# Patient Record
Sex: MALE | Employment: OTHER | URBAN - METROPOLITAN AREA
[De-identification: names, ages, dates, MRNs, and addresses within clinical notes are randomized per-mention and may not be internally consistent; named-entity substitution may affect disease eponyms.]

---

## 2017-03-02 ENCOUNTER — HOSPITAL ENCOUNTER (OUTPATIENT)
Age: 72
Setting detail: SPECIMEN
Discharge: HOME | End: 2017-03-02
Payer: MEDICARE

## 2017-03-02 LAB
ABSOLUTE EOS #: 0.3 K/UL (ref 0–0.4)
ABSOLUTE LYMPH #: 2.1 K/UL (ref 1–4.8)
ABSOLUTE MONO #: 0.6 K/UL (ref 0.1–1.2)
ALT SERPL-CCNC: 28 U/L (ref 5–41)
AST SERPL-CCNC: 21 U/L
BASOPHILS # BLD: 1 % (ref 0–2)
BASOPHILS ABSOLUTE: 0 K/UL (ref 0–0.2)
BUN BLDV-MCNC: 26 MG/DL (ref 8–23)
CHOLESTEROL/HDL RATIO: 3.4
CHOLESTEROL: 143 MG/DL
CREAT SERPL-MCNC: 1.2 MG/DL (ref 0.7–1.2)
DIFFERENTIAL TYPE: ABNORMAL
EOSINOPHILS RELATIVE PERCENT: 4 % (ref 1–4)
GFR AFRICAN AMERICAN: >60 ML/MIN
GFR NON-AFRICAN AMERICAN: 59 ML/MIN
GFR SERPL CREATININE-BSD FRML MDRD: ABNORMAL ML/MIN/{1.73_M2}
GFR SERPL CREATININE-BSD FRML MDRD: ABNORMAL ML/MIN/{1.73_M2}
HCT VFR BLD CALC: 39.3 % (ref 41–53)
HDLC SERPL-MCNC: 42 MG/DL
HEMOGLOBIN: 13.4 G/DL (ref 13.5–17.5)
INR BLD: 1.6
LDL CHOLESTEROL: 72 MG/DL (ref 0–130)
LYMPHOCYTES # BLD: 30 % (ref 24–44)
MAGNESIUM: 2.3 MG/DL (ref 1.6–2.6)
MCH RBC QN AUTO: 30.4 PG (ref 26–34)
MCHC RBC AUTO-ENTMCNC: 34.1 G/DL (ref 31–37)
MCV RBC AUTO: 89.1 FL (ref 80–100)
MONOCYTES # BLD: 9 % (ref 2–11)
PDW BLD-RTO: 14.4 % (ref 12.5–15.4)
PLATELET # BLD: 183 K/UL (ref 140–450)
PLATELET ESTIMATE: ABNORMAL
PMV BLD AUTO: 10.2 FL (ref 6–12)
POTASSIUM SERPL-SCNC: 4.5 MMOL/L (ref 3.7–5.3)
PROTHROMBIN TIME: 17.2 SEC (ref 9.4–12.6)
RBC # BLD: 4.41 M/UL (ref 4.5–5.9)
RBC # BLD: ABNORMAL 10*6/UL
SEG NEUTROPHILS: 56 % (ref 36–66)
SEGMENTED NEUTROPHILS ABSOLUTE COUNT: 3.9 K/UL (ref 1.8–7.7)
SODIUM BLD-SCNC: 141 MMOL/L (ref 135–144)
T3 FREE: 3.28 PG/ML (ref 2.02–4.43)
THYROXINE, FREE: 1.11 NG/DL (ref 0.93–1.7)
TRIGL SERPL-MCNC: 147 MG/DL
TSH SERPL DL<=0.05 MIU/L-ACNC: 2.68 MIU/L (ref 0.3–5)
VLDLC SERPL CALC-MCNC: NORMAL MG/DL (ref 1–30)
WBC # BLD: 7 K/UL (ref 3.5–11)
WBC # BLD: ABNORMAL 10*3/UL

## 2017-03-02 PROCEDURE — 84439 ASSAY OF FREE THYROXINE: CPT

## 2017-03-02 PROCEDURE — 82565 ASSAY OF CREATININE: CPT

## 2017-03-02 PROCEDURE — 80061 LIPID PANEL: CPT

## 2017-03-02 PROCEDURE — 36415 COLL VENOUS BLD VENIPUNCTURE: CPT

## 2017-03-02 PROCEDURE — 84450 TRANSFERASE (AST) (SGOT): CPT

## 2017-03-02 PROCEDURE — 83735 ASSAY OF MAGNESIUM: CPT

## 2017-03-02 PROCEDURE — 84295 ASSAY OF SERUM SODIUM: CPT

## 2017-03-02 PROCEDURE — 84520 ASSAY OF UREA NITROGEN: CPT

## 2017-03-02 PROCEDURE — 85025 COMPLETE CBC W/AUTO DIFF WBC: CPT

## 2017-03-02 PROCEDURE — 84443 ASSAY THYROID STIM HORMONE: CPT

## 2017-03-02 PROCEDURE — 84460 ALANINE AMINO (ALT) (SGPT): CPT

## 2017-03-02 PROCEDURE — 85610 PROTHROMBIN TIME: CPT

## 2017-03-02 PROCEDURE — 84481 FREE ASSAY (FT-3): CPT

## 2017-03-02 PROCEDURE — 84132 ASSAY OF SERUM POTASSIUM: CPT

## 2017-03-15 ENCOUNTER — HOSPITAL ENCOUNTER (OUTPATIENT)
Age: 72
Discharge: HOME | End: 2017-03-15
Payer: MEDICARE

## 2017-03-15 LAB
INR BLD: 1.7
PROTHROMBIN TIME: 18.9 SEC (ref 9.4–12.6)

## 2017-03-15 PROCEDURE — 85610 PROTHROMBIN TIME: CPT

## 2017-03-15 PROCEDURE — 36415 COLL VENOUS BLD VENIPUNCTURE: CPT

## 2017-03-20 ENCOUNTER — APPOINTMENT (OUTPATIENT)
Dept: GENERAL RADIOLOGY | Age: 72
End: 2017-03-20
Payer: MEDICARE

## 2017-03-20 ENCOUNTER — HOSPITAL ENCOUNTER (EMERGENCY)
Age: 72
Discharge: HOME | End: 2017-03-20
Attending: EMERGENCY MEDICINE
Payer: MEDICARE

## 2017-03-20 VITALS
DIASTOLIC BLOOD PRESSURE: 56 MMHG | OXYGEN SATURATION: 99 % | RESPIRATION RATE: 11 BRPM | HEART RATE: 45 BPM | SYSTOLIC BLOOD PRESSURE: 137 MMHG | TEMPERATURE: 95.7 F

## 2017-03-20 DIAGNOSIS — R06.02 SHORTNESS OF BREATH: Primary | ICD-10-CM

## 2017-03-20 LAB
ABSOLUTE EOS #: 0.3 K/UL (ref 0–0.4)
ABSOLUTE LYMPH #: 2.4 K/UL (ref 1–4.8)
ABSOLUTE MONO #: 0.5 K/UL (ref 0.1–1.2)
BASOPHILS # BLD: 1 % (ref 0–2)
BASOPHILS ABSOLUTE: 0 K/UL (ref 0–0.2)
BNP INTERPRETATION: ABNORMAL
DIFFERENTIAL TYPE: ABNORMAL
EOSINOPHILS RELATIVE PERCENT: 4 % (ref 1–4)
HCT VFR BLD CALC: 38.6 % (ref 41–53)
HEMOGLOBIN: 13.4 G/DL (ref 13.5–17.5)
LYMPHOCYTES # BLD: 33 % (ref 24–44)
MCH RBC QN AUTO: 30.8 PG (ref 26–34)
MCHC RBC AUTO-ENTMCNC: 34.8 G/DL (ref 31–37)
MCV RBC AUTO: 88.7 FL (ref 80–100)
MONOCYTES # BLD: 7 % (ref 2–11)
PDW BLD-RTO: 14.9 % (ref 12.5–15.4)
PLATELET # BLD: 163 K/UL (ref 140–450)
PLATELET ESTIMATE: ABNORMAL
PMV BLD AUTO: 11.1 FL (ref 6–12)
POC TROPONIN I: 0 NG/ML (ref 0–0.1)
POC TROPONIN I: 0.01 NG/ML (ref 0–0.1)
POC TROPONIN INTERP: NORMAL
POC TROPONIN INTERP: NORMAL
PRO-BNP: 400 PG/ML
RBC # BLD: 4.35 M/UL (ref 4.5–5.9)
RBC # BLD: ABNORMAL 10*6/UL
SEG NEUTROPHILS: 55 % (ref 36–66)
SEGMENTED NEUTROPHILS ABSOLUTE COUNT: 4 K/UL (ref 1.8–7.7)
WBC # BLD: 7.3 K/UL (ref 3.5–11)
WBC # BLD: ABNORMAL 10*3/UL

## 2017-03-20 PROCEDURE — 99285 EMERGENCY DEPT VISIT HI MDM: CPT

## 2017-03-20 PROCEDURE — 85025 COMPLETE CBC W/AUTO DIFF WBC: CPT

## 2017-03-20 PROCEDURE — 83880 ASSAY OF NATRIURETIC PEPTIDE: CPT

## 2017-03-20 PROCEDURE — 84484 ASSAY OF TROPONIN QUANT: CPT

## 2017-03-20 PROCEDURE — 93005 ELECTROCARDIOGRAM TRACING: CPT

## 2017-03-20 PROCEDURE — 71020 XR CHEST STANDARD TWO VW: CPT

## 2017-03-22 LAB
EKG ATRIAL RATE: 48 BPM
EKG P AXIS: 12 DEGREES
EKG P-R INTERVAL: 224 MS
EKG Q-T INTERVAL: 550 MS
EKG QRS DURATION: 156 MS
EKG QTC CALCULATION (BAZETT): 491 MS
EKG R AXIS: -62 DEGREES
EKG T AXIS: 3 DEGREES
EKG VENTRICULAR RATE: 48 BPM

## 2017-03-29 ENCOUNTER — HOSPITAL ENCOUNTER (OUTPATIENT)
Age: 72
Discharge: HOME | End: 2017-03-29
Payer: MEDICARE

## 2017-03-29 LAB
INR BLD: 1.4
PROTHROMBIN TIME: 15.5 SEC (ref 9.4–12.6)

## 2017-03-29 PROCEDURE — 85610 PROTHROMBIN TIME: CPT

## 2017-03-29 PROCEDURE — 36415 COLL VENOUS BLD VENIPUNCTURE: CPT

## 2017-04-06 ENCOUNTER — HOSPITAL ENCOUNTER (OUTPATIENT)
Age: 72
Discharge: HOME | End: 2017-04-06
Payer: MEDICARE

## 2017-04-06 LAB
INR BLD: 1.3
PROTHROMBIN TIME: 14.3 SEC (ref 9.4–12.6)

## 2017-04-06 PROCEDURE — 36415 COLL VENOUS BLD VENIPUNCTURE: CPT

## 2017-04-06 PROCEDURE — 85610 PROTHROMBIN TIME: CPT

## 2017-04-13 ENCOUNTER — HOSPITAL ENCOUNTER (OUTPATIENT)
Age: 72
Discharge: HOME | End: 2017-04-13
Payer: MEDICARE

## 2017-04-13 LAB
INR BLD: 1.8
PROTHROMBIN TIME: 19.5 SEC (ref 9.4–12.6)

## 2017-04-13 PROCEDURE — 85610 PROTHROMBIN TIME: CPT

## 2017-04-13 PROCEDURE — 36415 COLL VENOUS BLD VENIPUNCTURE: CPT

## 2017-04-27 ENCOUNTER — HOSPITAL ENCOUNTER (OUTPATIENT)
Age: 72
Discharge: HOME | End: 2017-04-27
Payer: MEDICARE

## 2017-04-27 LAB
INR BLD: 2.5
PROTHROMBIN TIME: 26.9 SEC (ref 9.4–12.6)

## 2017-04-27 PROCEDURE — 36415 COLL VENOUS BLD VENIPUNCTURE: CPT

## 2017-04-27 PROCEDURE — 85610 PROTHROMBIN TIME: CPT

## 2017-05-11 ENCOUNTER — HOSPITAL ENCOUNTER (OUTPATIENT)
Age: 72
Discharge: HOME | End: 2017-05-11
Payer: MEDICARE

## 2017-05-11 LAB
INR BLD: 2.3
PROTHROMBIN TIME: 24.5 SEC (ref 9.4–12.6)

## 2017-05-11 PROCEDURE — 36415 COLL VENOUS BLD VENIPUNCTURE: CPT

## 2017-05-11 PROCEDURE — 85610 PROTHROMBIN TIME: CPT

## 2017-06-08 ENCOUNTER — HOSPITAL ENCOUNTER (OUTPATIENT)
Age: 72
Discharge: HOME | End: 2017-06-08
Payer: MEDICARE

## 2017-06-08 LAB
INR BLD: 2.2
PROTHROMBIN TIME: 24.1 SEC (ref 9.4–12.6)

## 2017-06-08 PROCEDURE — 85610 PROTHROMBIN TIME: CPT

## 2017-06-08 PROCEDURE — 36415 COLL VENOUS BLD VENIPUNCTURE: CPT

## 2017-08-31 ENCOUNTER — HOSPITAL ENCOUNTER (OUTPATIENT)
Age: 72
Discharge: HOME | End: 2017-08-31
Payer: MEDICARE

## 2017-08-31 LAB
INR BLD: 2.1
PROTHROMBIN TIME: 22.9 SEC (ref 9.4–12.6)

## 2017-08-31 PROCEDURE — 85610 PROTHROMBIN TIME: CPT

## 2017-08-31 PROCEDURE — 36415 COLL VENOUS BLD VENIPUNCTURE: CPT

## 2017-10-12 ENCOUNTER — HOSPITAL ENCOUNTER (OUTPATIENT)
Age: 72
Discharge: HOME | End: 2017-10-12
Payer: MEDICARE

## 2017-10-12 LAB
INR BLD: 3.8
PROTHROMBIN TIME: 41.3 SEC (ref 9.4–12.6)

## 2017-10-12 PROCEDURE — 85610 PROTHROMBIN TIME: CPT

## 2017-10-12 PROCEDURE — 36415 COLL VENOUS BLD VENIPUNCTURE: CPT

## 2017-11-02 ENCOUNTER — HOSPITAL ENCOUNTER (OUTPATIENT)
Age: 72
Discharge: HOME | DRG: 308 | End: 2017-11-02
Payer: MEDICARE

## 2017-11-02 LAB
INR BLD: 2.7
PROTHROMBIN TIME: 29.7 SEC (ref 9.4–12.6)

## 2017-11-02 PROCEDURE — 85610 PROTHROMBIN TIME: CPT

## 2017-11-02 PROCEDURE — 36415 COLL VENOUS BLD VENIPUNCTURE: CPT

## 2017-11-04 ENCOUNTER — APPOINTMENT (OUTPATIENT)
Dept: GENERAL RADIOLOGY | Age: 72
DRG: 308 | End: 2017-11-04
Payer: MEDICARE

## 2017-11-04 ENCOUNTER — APPOINTMENT (OUTPATIENT)
Dept: CT IMAGING | Age: 72
DRG: 308 | End: 2017-11-04
Payer: MEDICARE

## 2017-11-04 ENCOUNTER — HOSPITAL ENCOUNTER (INPATIENT)
Age: 72
LOS: 1 days | Discharge: HOME | DRG: 308 | End: 2017-11-05
Attending: EMERGENCY MEDICINE | Admitting: INTERNAL MEDICINE
Payer: MEDICARE

## 2017-11-04 DIAGNOSIS — R55 SYNCOPE AND COLLAPSE: Primary | ICD-10-CM

## 2017-11-04 DIAGNOSIS — Z45.02 AICD DISCHARGE: ICD-10-CM

## 2017-11-04 LAB
ABSOLUTE EOS #: 0.25 K/UL (ref 0–0.44)
ABSOLUTE IMMATURE GRANULOCYTE: <0.03 K/UL (ref 0–0.3)
ABSOLUTE LYMPH #: 2.09 K/UL (ref 1.1–3.7)
ABSOLUTE MONO #: 0.39 K/UL (ref 0.1–1.2)
ANION GAP SERPL CALCULATED.3IONS-SCNC: 16 MMOL/L (ref 9–17)
BASOPHILS # BLD: 1 % (ref 0–2)
BASOPHILS ABSOLUTE: 0.07 K/UL (ref 0–0.2)
BNP INTERPRETATION: ABNORMAL
BUN BLDV-MCNC: 28 MG/DL (ref 8–23)
BUN/CREAT BLD: ABNORMAL (ref 9–20)
CALCIUM SERPL-MCNC: 9 MG/DL (ref 8.6–10.4)
CHLORIDE BLD-SCNC: 101 MMOL/L (ref 98–107)
CO2: 21 MMOL/L (ref 20–31)
CREAT SERPL-MCNC: 1.28 MG/DL (ref 0.7–1.2)
DIFFERENTIAL TYPE: NORMAL
EOSINOPHILS RELATIVE PERCENT: 3 % (ref 1–4)
GFR AFRICAN AMERICAN: >60 ML/MIN
GFR NON-AFRICAN AMERICAN: 55 ML/MIN
GFR SERPL CREATININE-BSD FRML MDRD: ABNORMAL ML/MIN/{1.73_M2}
GFR SERPL CREATININE-BSD FRML MDRD: ABNORMAL ML/MIN/{1.73_M2}
GLUCOSE BLD-MCNC: 196 MG/DL (ref 70–99)
HCT VFR BLD CALC: 40.9 % (ref 40.7–50.3)
HEMOGLOBIN: 13.6 G/DL (ref 13–17)
IMMATURE GRANULOCYTES: 0 %
INR BLD: 2.5
LYMPHOCYTES # BLD: 27 % (ref 24–43)
MAGNESIUM: 1.8 MG/DL (ref 1.6–2.6)
MAGNESIUM: 2.2 MG/DL (ref 1.6–2.6)
MCH RBC QN AUTO: 30.5 PG (ref 25.2–33.5)
MCHC RBC AUTO-ENTMCNC: 33.3 G/DL (ref 28.4–34.8)
MCV RBC AUTO: 91.7 FL (ref 82.6–102.9)
MONOCYTES # BLD: 5 % (ref 3–12)
PDW BLD-RTO: 12.9 % (ref 11.8–14.4)
PHOSPHORUS: 2.9 MG/DL (ref 2.5–4.5)
PLATELET # BLD: 172 K/UL (ref 138–453)
PLATELET ESTIMATE: NORMAL
PMV BLD AUTO: 13 FL (ref 8.1–13.5)
POC TROPONIN I: 0.02 NG/ML (ref 0–0.1)
POC TROPONIN I: 0.03 NG/ML (ref 0–0.1)
POC TROPONIN INTERP: NORMAL
POC TROPONIN INTERP: NORMAL
POTASSIUM SERPL-SCNC: 4 MMOL/L (ref 3.7–5.3)
PRO-BNP: 992 PG/ML
PROTHROMBIN TIME: 26.6 SEC (ref 9.4–12.6)
RBC # BLD: 4.46 M/UL (ref 4.21–5.77)
RBC # BLD: NORMAL 10*6/UL
SEG NEUTROPHILS: 63 % (ref 36–65)
SEGMENTED NEUTROPHILS ABSOLUTE COUNT: 4.87 K/UL (ref 1.5–8.1)
SODIUM BLD-SCNC: 138 MMOL/L (ref 135–144)
TROPONIN INTERP: NORMAL
TROPONIN INTERP: NORMAL
TROPONIN T: <0.03 NG/ML
TROPONIN T: <0.03 NG/ML
WBC # BLD: 7.7 K/UL (ref 3.5–11.3)
WBC # BLD: NORMAL 10*3/UL

## 2017-11-04 PROCEDURE — 85610 PROTHROMBIN TIME: CPT

## 2017-11-04 PROCEDURE — 71020 XR CHEST STANDARD TWO VW: CPT

## 2017-11-04 PROCEDURE — 83735 ASSAY OF MAGNESIUM: CPT

## 2017-11-04 PROCEDURE — 83880 ASSAY OF NATRIURETIC PEPTIDE: CPT

## 2017-11-04 PROCEDURE — 99223 1ST HOSP IP/OBS HIGH 75: CPT | Performed by: INTERNAL MEDICINE

## 2017-11-04 PROCEDURE — 36415 COLL VENOUS BLD VENIPUNCTURE: CPT

## 2017-11-04 PROCEDURE — 84484 ASSAY OF TROPONIN QUANT: CPT

## 2017-11-04 PROCEDURE — 84132 ASSAY OF SERUM POTASSIUM: CPT

## 2017-11-04 PROCEDURE — 2580000003 HC RX 258: Performed by: EMERGENCY MEDICINE

## 2017-11-04 PROCEDURE — 85025 COMPLETE CBC W/AUTO DIFF WBC: CPT

## 2017-11-04 PROCEDURE — 93005 ELECTROCARDIOGRAM TRACING: CPT

## 2017-11-04 PROCEDURE — 80048 BASIC METABOLIC PNL TOTAL CA: CPT

## 2017-11-04 PROCEDURE — 70450 CT HEAD/BRAIN W/O DYE: CPT

## 2017-11-04 PROCEDURE — 6370000000 HC RX 637 (ALT 250 FOR IP): Performed by: STUDENT IN AN ORGANIZED HEALTH CARE EDUCATION/TRAINING PROGRAM

## 2017-11-04 PROCEDURE — 99285 EMERGENCY DEPT VISIT HI MDM: CPT

## 2017-11-04 PROCEDURE — 1200000000 HC SEMI PRIVATE

## 2017-11-04 PROCEDURE — 84100 ASSAY OF PHOSPHORUS: CPT

## 2017-11-04 PROCEDURE — 6370000000 HC RX 637 (ALT 250 FOR IP): Performed by: INTERNAL MEDICINE

## 2017-11-04 RX ORDER — FUROSEMIDE 40 MG/1
40 TABLET ORAL DAILY
Status: DISCONTINUED | OUTPATIENT
Start: 2017-11-04 | End: 2017-11-04

## 2017-11-04 RX ORDER — WARFARIN SODIUM 3 MG/1
9 TABLET ORAL
Status: DISCONTINUED | OUTPATIENT
Start: 2017-11-04 | End: 2017-11-05 | Stop reason: HOSPADM

## 2017-11-04 RX ORDER — CLOPIDOGREL BISULFATE 75 MG/1
75 TABLET ORAL DAILY
Status: DISCONTINUED | OUTPATIENT
Start: 2017-11-04 | End: 2017-11-04

## 2017-11-04 RX ORDER — SODIUM CHLORIDE 0.9 % (FLUSH) 0.9 %
10 SYRINGE (ML) INJECTION PRN
Status: DISCONTINUED | OUTPATIENT
Start: 2017-11-04 | End: 2017-11-05 | Stop reason: HOSPADM

## 2017-11-04 RX ORDER — DOCUSATE SODIUM 100 MG/1
100 CAPSULE, LIQUID FILLED ORAL 2 TIMES DAILY
Status: DISCONTINUED | OUTPATIENT
Start: 2017-11-04 | End: 2017-11-04

## 2017-11-04 RX ORDER — WARFARIN SODIUM 1 MG/1
9 TABLET ORAL DAILY
COMMUNITY
End: 2022-04-07 | Stop reason: ALTCHOICE

## 2017-11-04 RX ORDER — SODIUM CHLORIDE 0.9 % (FLUSH) 0.9 %
10 SYRINGE (ML) INJECTION EVERY 12 HOURS SCHEDULED
Status: DISCONTINUED | OUTPATIENT
Start: 2017-11-04 | End: 2017-11-05 | Stop reason: HOSPADM

## 2017-11-04 RX ORDER — WARFARIN SODIUM 3 MG/1
6 TABLET ORAL
Status: DISCONTINUED | OUTPATIENT
Start: 2017-11-06 | End: 2017-11-05 | Stop reason: HOSPADM

## 2017-11-04 RX ORDER — ONDANSETRON 2 MG/ML
4 INJECTION INTRAMUSCULAR; INTRAVENOUS EVERY 6 HOURS PRN
Status: DISCONTINUED | OUTPATIENT
Start: 2017-11-04 | End: 2017-11-05 | Stop reason: HOSPADM

## 2017-11-04 RX ORDER — ATORVASTATIN CALCIUM 40 MG/1
40 TABLET, FILM COATED ORAL NIGHTLY
Status: DISCONTINUED | OUTPATIENT
Start: 2017-11-04 | End: 2017-11-05 | Stop reason: HOSPADM

## 2017-11-04 RX ORDER — WARFARIN SODIUM 6 MG/1
6 TABLET ORAL DAILY
COMMUNITY

## 2017-11-04 RX ORDER — CARVEDILOL 3.12 MG/1
3.12 TABLET ORAL 2 TIMES DAILY WITH MEALS
Status: DISCONTINUED | OUTPATIENT
Start: 2017-11-04 | End: 2017-11-04

## 2017-11-04 RX ORDER — SOTALOL HYDROCHLORIDE 80 MG/1
80 TABLET ORAL 2 TIMES DAILY
Status: DISCONTINUED | OUTPATIENT
Start: 2017-11-04 | End: 2017-11-05 | Stop reason: HOSPADM

## 2017-11-04 RX ORDER — SPIRONOLACTONE 25 MG/1
25 TABLET ORAL DAILY
Status: DISCONTINUED | OUTPATIENT
Start: 2017-11-04 | End: 2017-11-05 | Stop reason: HOSPADM

## 2017-11-04 RX ORDER — ALBUTEROL SULFATE 90 UG/1
2 AEROSOL, METERED RESPIRATORY (INHALATION) EVERY 6 HOURS PRN
Status: DISCONTINUED | OUTPATIENT
Start: 2017-11-04 | End: 2017-11-05 | Stop reason: HOSPADM

## 2017-11-04 RX ORDER — PANTOPRAZOLE SODIUM 40 MG/1
40 TABLET, DELAYED RELEASE ORAL DAILY
Status: DISCONTINUED | OUTPATIENT
Start: 2017-11-04 | End: 2017-11-04

## 2017-11-04 RX ORDER — GUAIFENESIN 600 MG/1
600 TABLET, EXTENDED RELEASE ORAL 2 TIMES DAILY
Status: DISCONTINUED | OUTPATIENT
Start: 2017-11-04 | End: 2017-11-05 | Stop reason: HOSPADM

## 2017-11-04 RX ORDER — ACETAMINOPHEN 325 MG/1
650 TABLET ORAL EVERY 4 HOURS PRN
Status: DISCONTINUED | OUTPATIENT
Start: 2017-11-04 | End: 2017-11-05 | Stop reason: HOSPADM

## 2017-11-04 RX ORDER — ASPIRIN 81 MG/1
81 TABLET, CHEWABLE ORAL DAILY
Status: DISCONTINUED | OUTPATIENT
Start: 2017-11-04 | End: 2017-11-05 | Stop reason: HOSPADM

## 2017-11-04 RX ORDER — CARVEDILOL 3.12 MG/1
3.12 TABLET ORAL DAILY
Status: DISCONTINUED | OUTPATIENT
Start: 2017-11-05 | End: 2017-11-05 | Stop reason: HOSPADM

## 2017-11-04 RX ORDER — LISINOPRIL 10 MG/1
10 TABLET ORAL DAILY
Status: DISCONTINUED | OUTPATIENT
Start: 2017-11-04 | End: 2017-11-05 | Stop reason: HOSPADM

## 2017-11-04 RX ADMIN — ATORVASTATIN CALCIUM 40 MG: 40 TABLET, FILM COATED ORAL at 20:13

## 2017-11-04 RX ADMIN — WARFARIN SODIUM 9 MG: 3 TABLET ORAL at 20:12

## 2017-11-04 RX ADMIN — GUAIFENESIN 600 MG: 600 TABLET, EXTENDED RELEASE ORAL at 21:30

## 2017-11-04 RX ADMIN — SODIUM CHLORIDE, PRESERVATIVE FREE 10 ML: 5 INJECTION INTRAVENOUS at 20:37

## 2017-11-04 ASSESSMENT — ENCOUNTER SYMPTOMS
DIARRHEA: 0
SHORTNESS OF BREATH: 0
BACK PAIN: 0
ORTHOPNEA: 0
HEARTBURN: 0
SPUTUM PRODUCTION: 0
RESPIRATORY NEGATIVE: 1
GASTROINTESTINAL NEGATIVE: 1
SORE THROAT: 0
VOMITING: 0
ABDOMINAL PAIN: 0
DOUBLE VISION: 0
BLURRED VISION: 0
HEMOPTYSIS: 0
COUGH: 0
CHEST TIGHTNESS: 0
NAUSEA: 0
PHOTOPHOBIA: 0
EYES NEGATIVE: 1

## 2017-11-04 NOTE — ED NOTES
Pt arrives per EMS from a gym where is was playing racketball. Pt began feeling dizzy while playing racketball and had a syncopal episode. Pt denies hitting head. Pt reports LOC for approx 2 minutes, witnessed by bystander. Pt denies any chest pain or SOB. Pt currently denies dizziness. Pt alert and oriented times four, speaking in complete sentences. Pt following commands. Neurologically intact.       Abrahan Garrido RN  11/04/17 1253

## 2017-11-04 NOTE — ED PROVIDER NOTES
[facility]  ED  Emergency Department Encounter  Emergency Medicine Resident     Pt Name: David Tay  MRN: [id number]  Otonielgfjayson 1945  Date of evaluation: 11/4/17  PCP:  Balbir Flores MD    [address] [place] [address]       Chief Complaint   Patient presents with    Loss of Consciousness     syncopal episode while playing racketball       HISTORY OF PRESENT ILLNESS  (Location/Symptom, Timing/Onset, Context/Setting, Quality, Duration, Modifying Factors, Severity.)      David Tay is a 74 y.o. male who presents with Syncopal episode. Patient was playing kickball when he sustained a wasp at two-minute syncopal episode via bystander. Patient does have history of CABG and related placed back in 2013. Patient's cardiologist Dr. Tex Irby. Upon EMS arrival patient was alert, oriented ×3 no apparent distress. Patient currently denies any chest pain, shortness of breath, abdominal pain, dizziness, paresthesias. Patient denies any preceding chest pain or shortness of breath prior to the event. PAST MEDICAL / SURGICAL / SOCIAL / FAMILY HISTORY      has a past medical history of CAD (coronary artery disease); Cardiac arrest ([facility] [address] Hyperkalemia; Hypertension, essential; Ischemic cardiomyopathy; Left ventricular apical thrombus; MI (myocardial infarction); Pseudoaneurysm (HCC) - right groin; and Systolic dysfunction with acute on chronic heart failure (HCC) - Efx 30%. has a past surgical history that includes Coronary artery bypass graft (11/19/13); Vasectomy; Coronary artery bypass graft (2013); Cardiac defibrillator placement (2013); and Cardiac catheterization (12/08/2016). Social History     Social History    Marital status: Single     Spouse name: N/A    Number of children: N/A    Years of education: N/A     Occupational History    Not on file.      Social History Main Topics    Smoking status: Current Every Day Smoker     Packs/day: 1.00     Types: Cigars    Smokeless tobacco: Not on file no tenderness. There is no rebound and no guarding. No pulsatile mass or bruit   Neurological: He is alert and oriented to person, place, and time. Cranial nerves II through XII intact   Skin: Skin is warm. No rash noted. DIFFERENTIAL  DIAGNOSIS     PLAN (LABS / IMAGING / EKG):  Orders Placed This Encounter   Procedures    XR CHEST STANDARD (2 VW)    CT Head WO Contrast    CBC Auto Differential    BASIC METABOLIC PANEL    Brain Natriuretic Peptide    PROTIME-INR    Magnesium    Phosphorus    Inpatient consult to Cardiology    Inpatient consult to Internal Medicine    Pacer Interrogate    POCT troponin    POCT troponin    POCT troponin    EKG 12 Lead    Insert peripheral IV    PATIENT STATUS (FROM ED OR OR/PROCEDURAL) Inpatient       MEDICATIONS ORDERED:  No orders of the defined types were placed in this encounter.       DDX: Arrhythmia, stroke, PE, dissection     DIAGNOSTIC RESULTS / EMERGENCY DEPARTMENT COURSE / MDM     LABS:  Results for orders placed or performed during the hospital encounter of 11/04/17   CBC Auto Differential   Result Value Ref Range    WBC 7.7 3.5 - 11.3 k/uL    RBC 4.46 4.21 - 5.77 m/uL    Hemoglobin 13.6 13.0 - 17.0 g/dL    Hematocrit 40.9 40.7 - 50.3 %    MCV 91.7 82.6 - 102.9 fL    MCH 30.5 25.2 - 33.5 pg    MCHC 33.3 28.4 - 34.8 g/dL    RDW 12.9 11.8 - 14.4 %    Platelets 775 570 - 876 k/uL    MPV 13.0 8.1 - 13.5 fL    Differential Type NOT REPORTED     WBC Morphology NOT REPORTED     RBC Morphology NOT REPORTED     Platelet Estimate NOT REPORTED     Seg Neutrophils 63 36 - 65 %    Lymphocytes 27 24 - 43 %    Monocytes 5 3 - 12 %    Eosinophils % 3 1 - 4 %    Basophils 1 0 - 2 %    Immature Granulocytes 0 0 %    Segs Absolute 4.87 1.50 - 8.10 k/uL    Absolute Lymph # 2.09 1.10 - 3.70 k/uL    Absolute Mono # 0.39 0.10 - 1.20 k/uL    Absolute Eos # 0.25 0.00 - 0.44 k/uL    Basophils # 0.07 0.00 - 0.20 k/uL    Absolute Immature Granulocyte <0.03 0.00 - 0.30 k/uL BASIC METABOLIC PANEL   Result Value Ref Range    Glucose 196 (H) 70 - 99 mg/dL    BUN 28 (H) 8 - 23 mg/dL    CREATININE 1.28 (H) 0.70 - 1.20 mg/dL    Bun/Cre Ratio NOT REPORTED 9 - 20    Calcium 9.0 8.6 - 10.4 mg/dL    Sodium 138 135 - 144 mmol/L    Potassium 4.0 3.7 - 5.3 mmol/L    Chloride 101 98 - 107 mmol/L    CO2 21 20 - 31 mmol/L    Anion Gap 16 9 - 17 mmol/L    GFR Non-African American 55 (L) >60 mL/min    GFR African American >60 >60 mL/min    GFR Comment          GFR Staging NOT REPORTED    Brain Natriuretic Peptide   Result Value Ref Range    Pro- (H) <300 pg/mL    BNP Interpretation         PROTIME-INR   Result Value Ref Range    Protime 26.6 (H) 9.4 - 12.6 sec    INR 2.5    Magnesium   Result Value Ref Range    Magnesium 1.8 1.6 - 2.6 mg/dL   Phosphorus   Result Value Ref Range    Phosphorus 2.9 2.5 - 4.5 mg/dL   POCT troponin   Result Value Ref Range    POC Troponin I 0.03 0.00 - 0.10 ng/mL    POC Troponin Interp       The Troponin-I (POC) results cannot be compared to the Troponin-T results. POCT troponin   Result Value Ref Range    POC Troponin I 0.02 0.00 - 0.10 ng/mL    POC Troponin Interp       The Troponin-I (POC) results cannot be compared to the Troponin-T results. IMPRESSION: 26-year-old male presenting with syncope and collapse history of CABG AICD in place. Plan for cardiac workup, PT INR, AICD interrogation. Given patient's presentation of syncope and collapse while playing squash likely cardiac etiology. Patient currently is asymptomatic not complaining of any chest pain, neck pain, abdominal pain. Low concern for dissection at this time given equal pulses bilaterally. If upon further evaluation with subtherapeutic INR or negative interrogation we will pursue dissection or PE workup. RADIOLOGY:  Xr Chest Standard (2 Vw)    Result Date: 11/4/2017  EXAMINATION: TWO VIEWS OF THE CHEST 11/4/2017 1:07 pm COMPARISON: Chest March 20, 2017.  HISTORY: ORDERING SYSTEM PROVIDED HISTORY: syncope TECHNOLOGIST PROVIDED HISTORY: Reason for exam:->syncope FINDINGS: Defibrillator device in place. Sternotomy wires are present. Cardiomegaly is unchanged. Lungs are clear. No free air. Osseous structures demonstrate degenerative change. Cardiomegaly without acute process. Ct Head Wo Contrast    Result Date: 11/4/2017  EXAMINATION: CT OF THE HEAD WITHOUT CONTRAST - STROKE ALERT  11/4/2017 1:12 pm TECHNIQUE: CT of the head was performed without the administration of intravenous contrast. Dose modulation, iterative reconstruction, and/or weight based adjustment of the mA/kV was utilized to reduce the radiation dose to as low as reasonably achievable. COMPARISON: None. HISTORY: ORDERING SYSTEM PROVIDED HISTORY: syncope, On coumadin FINDINGS: BRAIN/VENTRICLES: There is no acute intracranial hemorrhage, mass effect or midline shift. No abnormal extra-axial fluid collection. The gray-white differentiation is maintained without evidence of an acute infarct. There is no evidence of hydrocephalus. Cortical atrophy. ORBITS: The visualized portion of the orbits demonstrate no acute abnormality. SINUSES: The visualized paranasal sinuses and mastoid air cells demonstrate no acute abnormality. SOFT TISSUES/SKULL:  No acute abnormality of the visualized skull or soft tissues. No acute intracranial abnormality. Cortical atrophy. EKG  EKG Interpretation    Interpreted by emergency department physician    Rhythm: 1 degree AV block  Rate: normal  Axis: left  Ectopy: none  Conduction: 1st degree AV block, RBBB  ST Segments: normal  T Waves: normal  Q Waves: none    Clinical Impression: Sinus rhythm with first-degree AV block with right bundle branch block.   EKG unchanged when compared to 3/22/17    Lex Rod  All EKG's are interpreted by the Emergency Department Physician who either signs or Co-signs this chart in the absence of a cardiologist.    Obi Pate 94

## 2017-11-04 NOTE — ED NOTES
Pacer interrogated. Pt admits to [employer] that he was \"hot and heavy with a 26year old female last night\". Pt states \"I may have overexerted myself\".       Brittney Ramirez, ARCHIE  11/04/17 3953

## 2017-11-04 NOTE — ED PROVIDER NOTES
[facility] ED  Emergency Department  Emergency Medicine Resident Sign-out     Care of Janey Tillman was assumed from Dr. Endy Pena and is being seen for Loss of Consciousness (syncopal episode while playing racketball)  . The patient's initial evaluation and plan have been discussed with the prior provider who initially evaluated the patient. EMERGENCY DEPARTMENT COURSE / MEDICAL DECISION MAKING:       MEDICATIONS GIVEN:  No orders of the defined types were placed in this encounter. LABS / RADIOLOGY:     Labs Reviewed   BASIC METABOLIC PANEL - Abnormal; Notable for the following:        Result Value    Glucose 196 (*)     BUN 28 (*)     CREATININE 1.28 (*)     GFR Non- 55 (*)     All other components within normal limits   BRAIN NATRIURETIC PEPTIDE - Abnormal; Notable for the following:     Pro- (*)     All other components within normal limits   PROTIME-INR - Abnormal; Notable for the following:     Protime 26.6 (*)     All other components within normal limits   CBC WITH AUTO DIFFERENTIAL   MAGNESIUM   PHOSPHORUS   POCT TROPONIN   POCT TROPONIN   POCT TROPONIN   POCT TROPONIN       Xr Chest Standard (2 Vw)    Result Date: 11/4/2017  EXAMINATION: TWO VIEWS OF THE CHEST 11/4/2017 1:07 pm COMPARISON: Chest March 20, 2017. HISTORY: ORDERING SYSTEM PROVIDED HISTORY: syncope TECHNOLOGIST PROVIDED HISTORY: Reason for exam:->syncope FINDINGS: Defibrillator device in place. Sternotomy wires are present. Cardiomegaly is unchanged. Lungs are clear. No free air. Osseous structures demonstrate degenerative change. Cardiomegaly without acute process.      Ct Head Wo Contrast    Result Date: 11/4/2017  EXAMINATION: CT OF THE HEAD WITHOUT CONTRAST - STROKE ALERT  11/4/2017 1:12 pm TECHNIQUE: CT of the head was performed without the administration of intravenous contrast. Dose modulation, iterative reconstruction, and/or weight based adjustment of the mA/kV was utilized to

## 2017-11-04 NOTE — ED PROVIDER NOTES
His respiration is 16 and oxygen saturation is 96%. Patient is awake alert, speaking in full sentences with no signs of respiratory distress  cardiovascular: Regular rate and rhythm, no murmurs, gallops, rubs    respiratory: Lungs are clear to auscultation bilaterally without any rales, wheezes, rhonchi next line   abdomen: Abdomen is soft, nontender to palpation in all quadrants, no rebound or guarding noted  No swelling to the lower extremities    Impression: Syncope    Plan: pacemaker interrogation was done with shows Ventricular dysrythmia and 2 shocks. Cardiology consulted, and admission, additional trop, ekg, cbc, bmp,   Pt/inr. EKG Interpretation    Interpreted by me    EKG Interpretation    Interpreted by emergency department physician    Rhythm: normal sinus   Rate: normal  Axis: left  Ectopy: none  Conduction: 1st degree AV block, RBBB  ST Segments: normal  T Waves: normal  Q Waves: inferior leads    Clinical Impression: Normal sinus rhythm with first-degree AV block, and right bundle branch block, left anterior fascicular block. Deluna Section    EKG was compared to prior EKG in March 20 of 2013 that shows similar EKG morphology with normal sinus rhythm with a first-degree AV block, and a right bundle-branch block as well as a left anterior fascicular block.   There are Q waves noted in the inferior leads      Magdy Garrett D.O, M.P.H  Attending Emergency Medicine Physician         Magdy Garrett DO  11/04/17 5016

## 2017-11-04 NOTE — H&P
Internal Medicine         History and Physical Examination         NAME:  Juan Pablo Ly  MRN:  [id number]  Acct: [de-identified]   : 1945  PCP:Albertina Mendes MD  Admit date:2017  History Obtained From:  Patient     CHIEF COMPLAINT:  \"syncope\"    HISTORY OF PRESENT ILLNESS:    The patient is a 74 y.o. male with significant past medical history of HTN, A.fib, CAD s/p ICD, presented to the ER after syncope while he was playing squash (his usual). He began to feel light headed and he went to ground . No CP, SOB, nausea, vomiting . He has a PMH of CABG s/p AICD. His defibrillator was interrogated and showed  Ventricular dysrythmia and 2 shocks. Cardiology consulted . EKG unchanged       REVIEW OF SYSTEMS:  Review of Systems   Constitutional: Negative. Negative for chills, diaphoresis, fever, malaise/fatigue and weight loss. HENT: Negative. Negative for ear discharge, ear pain, hearing loss and tinnitus. Eyes: Negative. Negative for blurred vision, double vision and photophobia. Respiratory: Negative. Negative for cough, hemoptysis, sputum production and shortness of breath. Cardiovascular: Negative. Negative for chest pain, palpitations, orthopnea and claudication. Gastrointestinal: Negative. Negative for abdominal pain, diarrhea, heartburn, nausea and vomiting. Genitourinary: Negative. Negative for dysuria, frequency and urgency. Musculoskeletal: Negative. Negative for back pain, myalgias and neck pain. Skin: Negative. Negative for itching and rash. Neurological: Negative. Negative for dizziness, tingling, tremors and headaches. Endo/Heme/Allergies: Negative. Psychiatric/Behavioral: Negative.         PAST MEDICAL HISTORY:  Past Medical History:   Diagnosis Date    CAD (coronary artery disease) 13    cabg x 3    Cardiac arrest ([facility] [address]     Hyperkalemia 2014    Hypertension, essential     Ischemic cardiomyopathy 2016    Left ventricular apical thrombus 12/9/2016    MI (myocardial infarction) November 2013    Pseudoaneurysm [facility]) - right groin 98/54/6343    Systolic dysfunction with acute on chronic heart failure ([facility] [address] - Efx 30% 12/8/2016       PAST SURGICAL HISTORY:   Past Surgical History:   Procedure Laterality Date    CARDIAC CATHETERIZATION  12/08/2016    CARDIAC DEFIBRILLATOR PLACEMENT  2013    CORONARY ARTERY BYPASS GRAFT  11/19/13    CORONARY ARTERY BYPASS GRAFT  2013    VASECTOMY         MEDICATIONS PRIOR TO ADMISSION:  Prescriptions Prior to Admission: warfarin (COUMADIN) 6 MG tablet, Take 2 tablets by mouth daily daily  atorvastatin (LIPITOR) 40 MG tablet, Take 1 tablet by mouth nightly  aspirin 81 MG chewable tablet, Take 1 tablet by mouth daily. simvastatin (ZOCOR) 20 MG tablet, Take 1 tablet by mouth nightly. furosemide (LASIX) 40 MG tablet, Take 1 tablet by mouth daily  lisinopril (PRINIVIL;ZESTRIL) 10 MG tablet, Take 1 tablet by mouth daily  carvedilol (COREG) 3.125 MG tablet, Take 1 tablet by mouth 2 times daily (with meals)  spironolactone (ALDACTONE) 25 MG tablet, Take 1 tablet by mouth daily  albuterol (VENTOLIN HFA) 108 (90 BASE) MCG/ACT inhaler, Inhale 2 puffs into the lungs every 6 hours as needed for Wheezing.  sotalol (BETAPACE) 80 MG tablet, Take 1 tablet by mouth 2 times daily. aspirin 81 MG tablet, Take 81 mg by mouth daily. lisinopril (PRINIVIL;ZESTRIL) 5 MG tablet, Take 1 tablet by mouth nightly. metoprolol (LOPRESSOR) 25 MG tablet, Take 1.5 tablets by mouth 2 times daily. furosemide (LASIX) 40 MG tablet, Take 1 tablet by mouth daily. docusate sodium (COLACE, DULCOLAX) 100 MG CAPS, Take 100 mg by mouth 2 times daily. clopidogrel (PLAVIX) 75 MG tablet, Take 1 tablet by mouth daily. pantoprazole (PROTONIX) 40 MG tablet, Take 1 tablet by mouth daily.     ALLERGIES:  Pcn [penicillins] and Pcn [penicillins]    SOCIAL HISTORY:  Social History     Social History    Marital status: Single     Spouse name: N/A    Number of 1. Trop trend x 3   2. orthostats   3. Cardiology consulted from ER  4. Restarted home meds   5. Asa, plavix and coumadin ; inr daily   6. Cbc, bmp  carmen       Diet- Cradiac   GI prophylaxis- Pepcid  DVT prophylaxis- coumadin  Consults- Cards  Outpatient needs- pend       Zahraa Perez MD  PGY-3, Department of Internal Medicine,  [address] [facility],  Attending Physician Statement  Patient seen and examined  I have discussed the care of the patient, including pertinent history and exam findings,  with the resident. I have reviewed the key elements of all parts of the encounter with the resident. I agree with the assessment, plan and orders as documented by the resident.     Josy Bowman

## 2017-11-05 VITALS
SYSTOLIC BLOOD PRESSURE: 121 MMHG | WEIGHT: 190 LBS | DIASTOLIC BLOOD PRESSURE: 58 MMHG | OXYGEN SATURATION: 99 % | HEIGHT: 68 IN | HEART RATE: 52 BPM | BODY MASS INDEX: 28.79 KG/M2 | RESPIRATION RATE: 15 BRPM | TEMPERATURE: 97.9 F

## 2017-11-05 LAB
ABSOLUTE EOS #: 0.38 K/UL (ref 0–0.44)
ABSOLUTE IMMATURE GRANULOCYTE: 0.03 K/UL (ref 0–0.3)
ABSOLUTE LYMPH #: 2.78 K/UL (ref 1.1–3.7)
ABSOLUTE MONO #: 0.67 K/UL (ref 0.1–1.2)
ALBUMIN SERPL-MCNC: 3.9 G/DL (ref 3.5–5.2)
ALBUMIN/GLOBULIN RATIO: 1.4 (ref 1–2.5)
ALP BLD-CCNC: 139 U/L (ref 40–129)
ALT SERPL-CCNC: 22 U/L (ref 5–41)
ANION GAP SERPL CALCULATED.3IONS-SCNC: 10 MMOL/L (ref 9–17)
AST SERPL-CCNC: 26 U/L
BASOPHILS # BLD: 1 % (ref 0–2)
BASOPHILS ABSOLUTE: 0.09 K/UL (ref 0–0.2)
BILIRUB SERPL-MCNC: 0.6 MG/DL (ref 0.3–1.2)
BUN BLDV-MCNC: 31 MG/DL (ref 8–23)
BUN/CREAT BLD: ABNORMAL (ref 9–20)
CALCIUM SERPL-MCNC: 8.6 MG/DL (ref 8.6–10.4)
CHLORIDE BLD-SCNC: 100 MMOL/L (ref 98–107)
CO2: 25 MMOL/L (ref 20–31)
CREAT SERPL-MCNC: 0.99 MG/DL (ref 0.7–1.2)
DIFFERENTIAL TYPE: ABNORMAL
EOSINOPHILS RELATIVE PERCENT: 5 % (ref 1–4)
GFR AFRICAN AMERICAN: >60 ML/MIN
GFR NON-AFRICAN AMERICAN: >60 ML/MIN
GFR SERPL CREATININE-BSD FRML MDRD: ABNORMAL ML/MIN/{1.73_M2}
GFR SERPL CREATININE-BSD FRML MDRD: ABNORMAL ML/MIN/{1.73_M2}
GLUCOSE BLD-MCNC: 98 MG/DL (ref 70–99)
HCT VFR BLD CALC: 39.3 % (ref 40.7–50.3)
HEMOGLOBIN: 13.1 G/DL (ref 13–17)
IMMATURE GRANULOCYTES: 0 %
INR BLD: 2.6
LYMPHOCYTES # BLD: 33 % (ref 24–43)
MAGNESIUM: 2.1 MG/DL (ref 1.6–2.6)
MCH RBC QN AUTO: 30.1 PG (ref 25.2–33.5)
MCHC RBC AUTO-ENTMCNC: 33.3 G/DL (ref 28.4–34.8)
MCV RBC AUTO: 90.3 FL (ref 82.6–102.9)
MONOCYTES # BLD: 8 % (ref 3–12)
PDW BLD-RTO: 13.1 % (ref 11.8–14.4)
PLATELET # BLD: 164 K/UL (ref 138–453)
PLATELET ESTIMATE: ABNORMAL
PMV BLD AUTO: 12.7 FL (ref 8.1–13.5)
POTASSIUM SERPL-SCNC: 3.7 MMOL/L (ref 3.7–5.3)
POTASSIUM SERPL-SCNC: 3.7 MMOL/L (ref 3.7–5.3)
PROTHROMBIN TIME: 27.6 SEC (ref 9.4–12.6)
RBC # BLD: 4.35 M/UL (ref 4.21–5.77)
RBC # BLD: ABNORMAL 10*6/UL
SEG NEUTROPHILS: 53 % (ref 36–65)
SEGMENTED NEUTROPHILS ABSOLUTE COUNT: 4.38 K/UL (ref 1.5–8.1)
SODIUM BLD-SCNC: 135 MMOL/L (ref 135–144)
TOTAL PROTEIN: 6.7 G/DL (ref 6.4–8.3)
TROPONIN INTERP: NORMAL
TROPONIN T: <0.03 NG/ML
WBC # BLD: 8.7 K/UL (ref 3.5–11.3)
WBC # BLD: ABNORMAL 10*3/UL

## 2017-11-05 PROCEDURE — 85025 COMPLETE CBC W/AUTO DIFF WBC: CPT

## 2017-11-05 PROCEDURE — 93005 ELECTROCARDIOGRAM TRACING: CPT

## 2017-11-05 PROCEDURE — 6370000000 HC RX 637 (ALT 250 FOR IP): Performed by: INTERNAL MEDICINE

## 2017-11-05 PROCEDURE — 85610 PROTHROMBIN TIME: CPT

## 2017-11-05 PROCEDURE — 36415 COLL VENOUS BLD VENIPUNCTURE: CPT

## 2017-11-05 PROCEDURE — 6370000000 HC RX 637 (ALT 250 FOR IP): Performed by: STUDENT IN AN ORGANIZED HEALTH CARE EDUCATION/TRAINING PROGRAM

## 2017-11-05 PROCEDURE — 80053 COMPREHEN METABOLIC PANEL: CPT

## 2017-11-05 PROCEDURE — 84484 ASSAY OF TROPONIN QUANT: CPT

## 2017-11-05 PROCEDURE — 99239 HOSP IP/OBS DSCHRG MGMT >30: CPT | Performed by: INTERNAL MEDICINE

## 2017-11-05 PROCEDURE — 2580000003 HC RX 258: Performed by: EMERGENCY MEDICINE

## 2017-11-05 RX ADMIN — SODIUM CHLORIDE, PRESERVATIVE FREE 10 ML: 5 INJECTION INTRAVENOUS at 08:32

## 2017-11-05 RX ADMIN — CARVEDILOL 3.12 MG: 3.12 TABLET, FILM COATED ORAL at 08:30

## 2017-11-05 RX ADMIN — GUAIFENESIN 600 MG: 600 TABLET, EXTENDED RELEASE ORAL at 08:30

## 2017-11-05 RX ADMIN — ASPIRIN 81 MG: 81 TABLET, CHEWABLE ORAL at 08:30

## 2017-11-05 RX ADMIN — SOTALOL HYDROCHLORIDE 80 MG: 80 TABLET ORAL at 08:30

## 2017-11-05 RX ADMIN — SPIRONOLACTONE 25 MG: 25 TABLET ORAL at 08:30

## 2017-11-05 RX ADMIN — LISINOPRIL 10 MG: 10 TABLET ORAL at 08:30

## 2017-11-05 NOTE — PROGRESS NOTES
Positive for lightheadedness, improved now  · Respiratory: Negative for cough or wheezing, sputum production,  · Gastrointestinal: Negative for nausea/vomiting, change in bowel habits, abdominal pain, dysphagia/appetite loss, hematemesis, blood in stools. · Genitourinary:Negative for change in bladder habits, dysuria, hematuria. · Musculoskeletal: Negative for gait disturbance, weakness, joint complaints. · Neurological: Negative for headache, dizziness, weakness, numbness/tingling, change in gait,   · Psychiatric: negative for change in mood, behavior. PHYSICAL EXAM      BP (!) 121/58   Pulse 52   Temp 97.9 °F (36.6 °C) (Oral)   Resp 15   Ht 5' 8\" (1.727 m)   Wt 190 lb (86.2 kg)   SpO2 99%   BMI 28.89 kg/m²      · General appearance: well nourished, not in distress   · HEENT: Head: Normocephalic, atraumatic, neck supple, thyroid normal  · Lungs: clear to auscultation bilaterally  · Heart: regular rate and rhythm, S1, S2 normal, no murmur  · Abdomen: soft, non-tender; bowel sounds normal; no masses,  no organomegaly  · Extremities: extremities normal, atraumatic, no cyanosis or edema  · Neurological: Gait normal. Reflexes normal and symmetric. Sensation grossly normal  · Skin - no rash, no lump   · Psych-normal affect      DIAGNOSTICS      Laboratory Testing:  CBC:   Recent Labs      11/05/17   0430   WBC  8.7   HGB  13.1   PLT  164     BMP:    Recent Labs      11/04/17   1224   11/05/17   0559   NA  138   --   135   K  4.0   < >  3.7   CL  101   --   100   CO2  21   --   25   BUN  28*   --   31*   CREATININE  1.28*   --   0.99   GLUCOSE  196*   --   98    < > = values in this interval not displayed. S. Calcium:  Recent Labs      11/05/17   0559   CALCIUM  8.6     S. Ionized Calcium:No results for input(s): IONCA in the last 72 hours. S. Magnesium:  Recent Labs      11/04/17   2216   MG  2.1     S.  Phosphorus:  Recent Labs      11/04/17   1224   PHOS  2.9       INR:   Recent Labs      11/05/17 0430   INR  2.6     Hepatic functions:   Recent Labs      11/05/17   0559   ALKPHOS  139*   ALT  22   AST  26   PROT  6.7   BILITOT  0.60   LABALBU  3.9     Cardiac enzymes:  Recent Labs      11/04/17   1214  11/04/17   1411   TROPONINI  0.03  0.02     Thyroid functions:   Lab Results   Component Value Date    TSH 2.68 03/02/2017        ASSESSMENT     Patient Active Problem List   Diagnosis    Ventricular tachycardia, monomorphic ([facility] [address]    Coronary artery disease involving native coronary artery of native heart without angina pectoris    Tobacco use    Spell of dizziness    Hyponatremia    ICD (implantable cardioverter-defibrillator) discharge    HTN (hypertension), benign    Hyperkalemia    Bradycardia    Hyperphosphatemia    MI (myocardial infarction)    Cardiac arrest ([facility] [address]    Hyperglycemia    Acute on chronic congestive heart failure (HCC)    Shortness of breath    Ischemic cardiomyopathy    Systolic dysfunction with acute on chronic heart failure (HCC) - Efx 30%    Acute on chronic diastolic heart failure (HCC)    Left ventricular apical thrombus    Azotemia    Hematoma of groin    Disorder of scapula - sclerosis, 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    Pseudoaneurysm (HCC)    Thrombocytopenia (HCC)    LETICIA (acute kidney injury) ([facility] [address]    Elevated serum lactate dehydrogenase    Congestive heart failure (HCC)    Myocardial infarction    Syncope and collapse       PLAN      · Troponin WNL  · Orthostatic normal/ BMP normal, Hb normal   · Mag/phosphorous WNL  · AICD interrogated showed ventricular dysrhythmias and 2 shocks  · Continue home medication  · Continue warfarin 6+9 mg on alternate days  · INR therapeutic 2.6 today/ Follow daily INR  · PT/OT evaluation  · Will discharge today after cardiology recommendations      Karla Keating MD  PGY-1, Internal Medicine resident  [facility].   11/5/17  8:13 AM   Attending Physician Statement  Patient seen and examined  I have discussed the care of the patient, including pertinent history and exam findings,  with the resident. I have reviewed the key elements of all parts of the encounter with the resident. I agree with the assessment, plan and orders as documented by the resident.     Maxim Bowman

## 2017-11-05 NOTE — PLAN OF CARE
Problem: Falls - Risk of  Goal: Absence of falls  Outcome: Met This Shift      Problem: Fluid Volume - Imbalance:  Goal: Absence of imbalanced fluid volume signs and symptoms  Absence of imbalanced fluid volume signs and symptoms   Outcome: Ongoing

## 2017-11-05 NOTE — CARE COORDINATION
Discharge [facility] Case Management Department  Written by: Zayra Mascorro RN    Patient Name: Karon Ahn  Attending Provider: Deepak Steinberg MD  Admit Date: 2017 12:07 PM  MRN: [id number]  Account: [de-identified]                     : 1945  Discharge Date:       Disposition: home    Zayra Mascorro RN

## 2017-11-05 NOTE — FLOWSHEET NOTE
Visited and prayed with patient at his bedside. Family was not present at the time. However, patient did say he had strong family support. When asked how he was feeling, patient responded; \"I am feeling okay. \"  offered support. Patient said he was raised Jew but not affiliated with any parish. Patient received sacrament of anointing of the sick. Follow up visits recommended for more spiritual and emotional support. 11/05/17 1051   Encounter Summary   Services provided to: Patient   Support System Family members   Place of Jew None   Continue Visiting (11/05/2017)   Complexity of Encounter Moderate   Length of Encounter 30 minutes   Spiritual Assessment Completed Yes   Routine   Type Initial   Spiritual/Mosque   Type Spiritual support   Assessment Calm; Approachable; Hopeful   Intervention Active listening;Nurtured hope;Prayer; Anointing   Outcome Expressed gratitude   Sacraments   Sacrament of Sick-Anointing Anointed

## 2017-11-05 NOTE — CONSULTS
Attestation signed by      Attending Physician Statement:    I have discussed the care of  Jp Yung , including pertinent history and exam findings, with the Cardiology fellow/resident. I have seen and examined the patient and the key elements of all parts of the encounter have been performed by me. I agree with the assessment, plan and orders as documented by the fellow/resident, after I modified exam findings and plan of treatments, and the final version is my approved version of the assessment. Additional Comments: had appropriate ICD SHOCKS. WILL CONTINUE SOTALOL AND COREG AND PLAN OP FUNCTIONAL STRESS TEST. [facility] Cardiology Cardiology    Consult / H&P               Today's Date: 11/5/2017  Patient Name: Jp Yung  Date of admission: 11/4/2017 12:07 PM  Patient's age: 74 y.o., 1945  Admission Dx: Syncope and collapse [R55]    Reason for Consult:  Cardiac evaluation    Requesting Physician: Jose Munoz MD    CHIEF COMPLAINT:  Syncope    History Obtained From:  patient, electronic medical record    HISTORY OF PRESENT ILLNESS:      The patient is a 74 y.o.  male who is admitted to the hospital for syncope. Patient has  significant past medical history of HTN, A.fib, CAD s/p ICD, presented to the ER after syncope while he was playing squash. His defibrillator was interrogated and showed  Ventricular dysrythmia and 2 shocks. Past Medical History:   has a past medical history of CAD (coronary artery disease); Cardiac arrest ([facility] [address] Hyperkalemia; Hypertension, essential; Ischemic cardiomyopathy; Left ventricular apical thrombus; MI (myocardial infarction); Pseudoaneurysm (HCC) - right groin; and Systolic dysfunction with acute on chronic heart failure (HCC) - Efx 30%. Past Surgical History:   has a past surgical history that includes Coronary artery bypass graft (11/19/13); Vasectomy; Coronary artery bypass graft (2013);  Cardiac defibrillator placement (2013); and Cardiac catheterization (12/08/2016). Home Medications:    Prior to Admission medications    Medication Sig Start Date End Date Taking? Authorizing Provider   warfarin (COUMADIN) 6 MG tablet Take 6 mg by mouth Takes MON and tuesday   Yes Historical Provider, MD   warfarin (COUMADIN) 1 MG tablet Take 9 mg by mouth daily on Gamal WED THURS FRI Sat   Yes Historical Provider, MD   atorvastatin (LIPITOR) 40 MG tablet Take 1 tablet by mouth nightly 12/13/16  Yes Fariha Wells DO   aspirin 81 MG chewable tablet Take 1 tablet by mouth daily. 11/26/13  Yes Audelia Mg PA-C   furosemide (LASIX) 40 MG tablet Take 1 tablet by mouth daily  Patient taking differently: Take 20 mg by mouth daily  12/21/16   Herber Hobbs MD   lisinopril (PRINIVIL;ZESTRIL) 10 MG tablet Take 1 tablet by mouth daily 12/13/16   Fariha Wells DO   carvedilol (COREG) 3.125 MG tablet Take 1 tablet by mouth 2 times daily (with meals)  Patient taking differently: Take 3.125 mg by mouth daily  12/13/16   Fariha Wells DO   spironolactone (ALDACTONE) 25 MG tablet Take 1 tablet by mouth daily 12/13/16   Fariha Wells DO   albuterol (VENTOLIN HFA) 108 (90 BASE) MCG/ACT inhaler Inhale 2 puffs into the lungs every 6 hours as needed for Wheezing. 10/10/14   Bob Willett,    sotalol (BETAPACE) 80 MG tablet Take 1 tablet by mouth 2 times daily.  5/19/14   Yasmeen Govea DO      Current Facility-Administered Medications: sodium chloride flush 0.9 % injection 10 mL, 10 mL, Intravenous, 2 times per day  sodium chloride flush 0.9 % injection 10 mL, 10 mL, Intravenous, PRN  acetaminophen (TYLENOL) tablet 650 mg, 650 mg, Oral, Q4H PRN  influenza quadrivalent split vaccine (FLUZONE;FLUARIX;FLULAVAL;AFLURIA) injection 0.5 mL, 0.5 mL, Intramuscular, Once  albuterol sulfate  (90 Base) MCG/ACT inhaler 2 puff, 2 puff, Inhalation, Q6H PRN  aspirin chewable tablet 81 mg, 81 mg, Oral, Daily  atorvastatin (LIPITOR) tablet 40 mg, 40 mg, Oral, Nightly  lisinopril (PRINIVIL;ZESTRIL) tablet 10 mg, 10 mg, Oral, Daily  sotalol (BETAPACE) tablet 80 mg, 80 mg, Oral, BID  spironolactone (ALDACTONE) tablet 25 mg, 25 mg, Oral, Daily  sodium chloride flush 0.9 % injection 10 mL, 10 mL, Intravenous, 2 times per day  sodium chloride flush 0.9 % injection 10 mL, 10 mL, Intravenous, PRN  acetaminophen (TYLENOL) tablet 650 mg, 650 mg, Oral, Q4H PRN  magnesium hydroxide (MILK OF MAGNESIA) 400 MG/5ML suspension 30 mL, 30 mL, Oral, Daily PRN  ondansetron (ZOFRAN) injection 4 mg, 4 mg, Intravenous, Q6H PRN  [START ON 11/6/2017] warfarin (COUMADIN) tablet 6 mg, 6 mg, Oral, Once per day on Mon Tue  warfarin (COUMADIN) tablet 9 mg, 9 mg, Oral, Once per day on Sun Wed Thu Fri Sat  carvedilol (COREG) tablet 3.125 mg, 3.125 mg, Oral, Daily  guaiFENesin (MUCINEX) extended release tablet 600 mg, 600 mg, Oral, BID    Allergies:  Pcn [penicillins] and Pcn [penicillins]    Social History:   reports that he has been smoking Cigars. He has been smoking about 1.00 pack per day. He does not have any smokeless tobacco history on file. He reports that he drinks alcohol. He reports that he does not use drugs. Family History: family history includes Heart Failure in his brother and sister; Hypertension in his father, paternal grandfather, and paternal grandmother; No Known Problems in his mother; Other in his father; Stroke in his father and maternal grandfather. No h/o sudden cardiac death. for premature CAD    REVIEW OF SYSTEMS:    · Constitutional: there has been no unanticipated weight loss. There's been No change in energy level, No change in activity level. · Eyes: No visual changes or diplopia. No scleral icterus. · ENT: No Headaches  · Cardiovascular: No cardiac history  · Respiratory: No previous pulmonary problems, No cough  · Gastrointestinal: No abdominal pain. No change in bowel or bladder habits.   · Genitourinary: No dysuria, trouble voiding, or hematuria. · Musculoskeletal:  No gait disturbance, No weakness or joint complaints. · Integumentary: No rash or pruritis. · Neurological: No headache, diplopia, change in muscle strength, numbness or tingling. No change in gait, balance, coordination, mood, affect, memory, mentation, behavior. · Psychiatric: No anxiety, or depression. · Endocrine: No temperature intolerance. No excessive thirst, fluid intake, or urination. No tremor. · Hematologic/Lymphatic: No abnormal bruising or bleeding, blood clots or swollen lymph nodes. · Allergic/Immunologic: No nasal congestion or hives. PHYSICAL EXAM:      BP (!) 121/58   Pulse 52   Temp 97.9 °F (36.6 °C) (Oral)   Resp 15   Ht 5' 8\" (1.727 m)   Wt 190 lb (86.2 kg)   SpO2 99%   BMI 28.89 kg/m²    Constitutional and General Appearance: alert, cooperative, no distress and appears stated age  HEENT: PERRL, no cervical lymphadenopathy. No masses palpable. Normal oral mucosa  Respiratory:  · Normal excursion and expansion without use of accessory muscles  · Resp Auscultation: Good respiratory effort. No for increased work of breathing. On auscultation: clear to auscultation bilaterally  Cardiovascular:  · The apical impulse is not displaced  · Heart tones are crisp and normal. regular S1 and S2.  · Jugular venous pulsation Normal  · The carotid upstroke is normal in amplitude and contour without delay or bruit  · Peripheral pulses are symmetrical and full   Abdomen:   · No masses or tenderness  · Bowel sounds present  Extremities:  ·  No Cyanosis or Clubbing  ·  Lower extremity edema: No  ·  Skin: Warm and dry  Neurological:  · Alert and oriented. · Moves all extremities well  · No abnormalities of mood, affect, memory, mentation, or behavior are noted    DATA:    Diagnostics:    EKG: oredered  ECHO: reviewed. Ejection fraction: 30%  Stress Test: not obtained. Cardiac Angiography: reviewed.     Labs:     CBC:   Recent Labs      11/04/17   1224  11/05/17 infarction    Syncope and collapse       RECOMMENDATIONS:  1. Will continue on Coreg and sotalol  2. Need to restrict over exertion  3. F/u with cardiology OP  4. Need OP functional stress test  5. Can be D/C from cardiology point      Discussed with patient and Nurse.     Electronically signed by Jaydon Haq MD on 11/5/2017 at [address] [facility]      [phone]

## 2017-11-06 LAB
EKG ATRIAL RATE: 54 BPM
EKG ATRIAL RATE: 65 BPM
EKG P AXIS: 30 DEGREES
EKG P AXIS: 51 DEGREES
EKG P-R INTERVAL: 226 MS
EKG P-R INTERVAL: 238 MS
EKG Q-T INTERVAL: 492 MS
EKG Q-T INTERVAL: 550 MS
EKG QRS DURATION: 156 MS
EKG QRS DURATION: 156 MS
EKG QTC CALCULATION (BAZETT): 511 MS
EKG QTC CALCULATION (BAZETT): 521 MS
EKG R AXIS: -61 DEGREES
EKG R AXIS: -66 DEGREES
EKG T AXIS: -49 DEGREES
EKG T AXIS: 44 DEGREES
EKG VENTRICULAR RATE: 54 BPM
EKG VENTRICULAR RATE: 65 BPM

## 2017-11-07 NOTE — DISCHARGE SUMMARY
continued sotalol and coreg and recommend OP stress test and limit too much exertion with proper hydration with Follow up in OP. Procedures: none    Any Hospital Acquired Infections: none      Discharge Functional Status:   Hemodynamically stable, No dizziness, weakness      Disposition:   home      Patient Instructions:   Discharge Meds:-   Discharge Medication List as of 11/5/2017 12:02 PM      CONTINUE these medications which have NOT CHANGED    Details   !! warfarin (COUMADIN) 6 MG tablet Take 6 mg by mouth Takes MON and tuesdayHistorical Med      !! warfarin (COUMADIN) 1 MG tablet Take 9 mg by mouth daily on Sunday WED THURS FRI SatHistorical Med      furosemide (LASIX) 40 MG tablet Take 1 tablet by mouth daily, Disp-30 tablet, R-3      atorvastatin (LIPITOR) 40 MG tablet Take 1 tablet by mouth nightly, Disp-30 tablet, R-3      lisinopril (PRINIVIL;ZESTRIL) 10 MG tablet Take 1 tablet by mouth daily, Disp-30 tablet, R-3      carvedilol (COREG) 3.125 MG tablet Take 1 tablet by mouth 2 times daily (with meals), Disp-60 tablet, R-3      spironolactone (ALDACTONE) 25 MG tablet Take 1 tablet by mouth daily, Disp-30 tablet, R-3      albuterol (VENTOLIN HFA) 108 (90 BASE) MCG/ACT inhaler Inhale 2 puffs into the lungs every 6 hours as needed for Wheezing., Disp-1 Inhaler, R-3      sotalol (BETAPACE) 80 MG tablet Take 1 tablet by mouth 2 times daily. , Disp-60 tablet, R-0      aspirin 81 MG chewable tablet Take 1 tablet by mouth daily. , Disp-30 tablet, R-3       !! - Potential duplicate medications found. Please discuss with provider. STOP taking these medications       aspirin 81 MG tablet Comments:   Reason for Stopping:             Activity: activity as tolerated, avoid too much exertion  Diet: cardiac diet    Follow-up  Frederic Nuñez MD  [address]  [phone]    Schedule an appointment as

## 2017-11-24 ENCOUNTER — HOSPITAL ENCOUNTER (OUTPATIENT)
Age: 72
Discharge: HOME | End: 2017-11-24
Payer: MEDICARE

## 2017-11-24 LAB
INR BLD: 2.7
PROTHROMBIN TIME: 29.4 SEC (ref 9.4–12.6)

## 2017-11-24 PROCEDURE — 85610 PROTHROMBIN TIME: CPT

## 2017-11-24 PROCEDURE — 36415 COLL VENOUS BLD VENIPUNCTURE: CPT

## 2017-12-26 ENCOUNTER — HOSPITAL ENCOUNTER (OUTPATIENT)
Age: 72
Discharge: HOME | End: 2017-12-26
Payer: MEDICARE

## 2017-12-26 LAB
INR BLD: 3.2
PROTHROMBIN TIME: 34.3 SEC (ref 9.4–12.6)

## 2017-12-26 PROCEDURE — 85610 PROTHROMBIN TIME: CPT

## 2017-12-26 PROCEDURE — 36415 COLL VENOUS BLD VENIPUNCTURE: CPT

## 2018-01-23 ENCOUNTER — HOSPITAL ENCOUNTER (OUTPATIENT)
Age: 73
Discharge: HOME | End: 2018-01-23
Payer: MEDICARE

## 2018-01-23 LAB
INR BLD: 2.3
PROTHROMBIN TIME: 24.6 SEC (ref 9.4–12.6)

## 2018-01-23 PROCEDURE — 85610 PROTHROMBIN TIME: CPT

## 2018-01-23 PROCEDURE — 36415 COLL VENOUS BLD VENIPUNCTURE: CPT

## 2018-02-22 ENCOUNTER — HOSPITAL ENCOUNTER (OUTPATIENT)
Age: 73
Discharge: HOME | End: 2018-02-22
Payer: MEDICARE

## 2018-02-22 LAB
INR BLD: 2.6
PROTHROMBIN TIME: 26 SEC (ref 9–12)

## 2018-02-22 PROCEDURE — 85610 PROTHROMBIN TIME: CPT

## 2018-02-22 PROCEDURE — 36415 COLL VENOUS BLD VENIPUNCTURE: CPT

## 2018-03-21 ENCOUNTER — HOSPITAL ENCOUNTER (OUTPATIENT)
Age: 73
Discharge: HOME | End: 2018-03-21
Payer: MEDICARE

## 2018-03-21 LAB
INR BLD: 2.5
PROTHROMBIN TIME: 25.2 SEC (ref 9–12)

## 2018-03-21 PROCEDURE — 36415 COLL VENOUS BLD VENIPUNCTURE: CPT

## 2018-03-21 PROCEDURE — 85610 PROTHROMBIN TIME: CPT

## 2018-04-24 ENCOUNTER — HOSPITAL ENCOUNTER (OUTPATIENT)
Age: 73
Discharge: HOME | End: 2018-04-24
Payer: MEDICARE

## 2018-04-24 ENCOUNTER — HOSPITAL ENCOUNTER (EMERGENCY)
Age: 73
Discharge: HOME | End: 2018-04-24
Attending: EMERGENCY MEDICINE
Payer: MEDICARE

## 2018-04-24 ENCOUNTER — APPOINTMENT (OUTPATIENT)
Dept: GENERAL RADIOLOGY | Age: 73
End: 2018-04-24
Payer: MEDICARE

## 2018-04-24 VITALS
HEART RATE: 57 BPM | BODY MASS INDEX: 28.79 KG/M2 | RESPIRATION RATE: 18 BRPM | DIASTOLIC BLOOD PRESSURE: 81 MMHG | OXYGEN SATURATION: 100 % | SYSTOLIC BLOOD PRESSURE: 175 MMHG | TEMPERATURE: 97.9 F | HEIGHT: 68 IN | WEIGHT: 190 LBS

## 2018-04-24 DIAGNOSIS — R06.02 SHORTNESS OF BREATH: Primary | ICD-10-CM

## 2018-04-24 DIAGNOSIS — R79.89 ELEVATED BRAIN NATRIURETIC PEPTIDE (BNP) LEVEL: ICD-10-CM

## 2018-04-24 LAB
ABSOLUTE EOS #: 0.51 K/UL (ref 0–0.44)
ABSOLUTE IMMATURE GRANULOCYTE: 0.03 K/UL (ref 0–0.3)
ABSOLUTE LYMPH #: 3.43 K/UL (ref 1.1–3.7)
ABSOLUTE MONO #: 0.95 K/UL (ref 0.1–1.2)
ANION GAP SERPL CALCULATED.3IONS-SCNC: 12 MMOL/L (ref 9–17)
BASOPHILS # BLD: 1 % (ref 0–2)
BASOPHILS ABSOLUTE: 0.08 K/UL (ref 0–0.2)
BNP INTERPRETATION: ABNORMAL
BUN BLDV-MCNC: 30 MG/DL (ref 8–23)
BUN/CREAT BLD: ABNORMAL (ref 9–20)
CALCIUM SERPL-MCNC: 8.8 MG/DL (ref 8.6–10.4)
CHLORIDE BLD-SCNC: 101 MMOL/L (ref 98–107)
CO2: 23 MMOL/L (ref 20–31)
CREAT SERPL-MCNC: 1.18 MG/DL (ref 0.7–1.2)
D-DIMER QUANTITATIVE: 0.56 MG/L FEU
DIFFERENTIAL TYPE: ABNORMAL
EKG ATRIAL RATE: 54 BPM
EKG P AXIS: 63 DEGREES
EKG P-R INTERVAL: 220 MS
EKG Q-T INTERVAL: 498 MS
EKG QRS DURATION: 154 MS
EKG QTC CALCULATION (BAZETT): 472 MS
EKG R AXIS: -67 DEGREES
EKG T AXIS: 34 DEGREES
EKG VENTRICULAR RATE: 54 BPM
EOSINOPHILS RELATIVE PERCENT: 5 % (ref 1–4)
GFR AFRICAN AMERICAN: >60 ML/MIN
GFR NON-AFRICAN AMERICAN: >60 ML/MIN
GFR SERPL CREATININE-BSD FRML MDRD: ABNORMAL ML/MIN/{1.73_M2}
GFR SERPL CREATININE-BSD FRML MDRD: ABNORMAL ML/MIN/{1.73_M2}
GLUCOSE BLD-MCNC: 104 MG/DL (ref 70–99)
HCT VFR BLD CALC: 40.1 % (ref 40.7–50.3)
HEMOGLOBIN: 13.1 G/DL (ref 13–17)
IMMATURE GRANULOCYTES: 0 %
INR BLD: 3.3
INR BLD: 3.4
LYMPHOCYTES # BLD: 30 % (ref 24–43)
MCH RBC QN AUTO: 29.7 PG (ref 25.2–33.5)
MCHC RBC AUTO-ENTMCNC: 32.7 G/DL (ref 28.4–34.8)
MCV RBC AUTO: 90.9 FL (ref 82.6–102.9)
MONOCYTES # BLD: 8 % (ref 3–12)
NRBC AUTOMATED: 0 PER 100 WBC
PARTIAL THROMBOPLASTIN TIME: 33.4 SEC (ref 20.5–30.5)
PDW BLD-RTO: 13.8 % (ref 11.8–14.4)
PLATELET # BLD: 203 K/UL (ref 138–453)
PLATELET ESTIMATE: ABNORMAL
PMV BLD AUTO: 12.4 FL (ref 8.1–13.5)
POC TROPONIN I: 0 NG/ML (ref 0–0.1)
POC TROPONIN I: 0.01 NG/ML (ref 0–0.1)
POC TROPONIN INTERP: NORMAL
POC TROPONIN INTERP: NORMAL
POTASSIUM SERPL-SCNC: 4.6 MMOL/L (ref 3.7–5.3)
PRO-BNP: 394 PG/ML
PROTHROMBIN TIME: 32.9 SEC (ref 9–12)
PROTHROMBIN TIME: 33.4 SEC (ref 9–12)
RBC # BLD: 4.41 M/UL (ref 4.21–5.77)
RBC # BLD: ABNORMAL 10*6/UL
SEG NEUTROPHILS: 56 % (ref 36–65)
SEGMENTED NEUTROPHILS ABSOLUTE COUNT: 6.32 K/UL (ref 1.5–8.1)
SODIUM BLD-SCNC: 136 MMOL/L (ref 135–144)
WBC # BLD: 11.3 K/UL (ref 3.5–11.3)
WBC # BLD: ABNORMAL 10*3/UL

## 2018-04-24 PROCEDURE — 80048 BASIC METABOLIC PNL TOTAL CA: CPT

## 2018-04-24 PROCEDURE — 36415 COLL VENOUS BLD VENIPUNCTURE: CPT

## 2018-04-24 PROCEDURE — 84484 ASSAY OF TROPONIN QUANT: CPT

## 2018-04-24 PROCEDURE — 85025 COMPLETE CBC W/AUTO DIFF WBC: CPT

## 2018-04-24 PROCEDURE — 83880 ASSAY OF NATRIURETIC PEPTIDE: CPT

## 2018-04-24 PROCEDURE — 93005 ELECTROCARDIOGRAM TRACING: CPT

## 2018-04-24 PROCEDURE — 85610 PROTHROMBIN TIME: CPT

## 2018-04-24 PROCEDURE — 6370000000 HC RX 637 (ALT 250 FOR IP): Performed by: EMERGENCY MEDICINE

## 2018-04-24 PROCEDURE — 85730 THROMBOPLASTIN TIME PARTIAL: CPT

## 2018-04-24 PROCEDURE — 85379 FIBRIN DEGRADATION QUANT: CPT

## 2018-04-24 PROCEDURE — 99285 EMERGENCY DEPT VISIT HI MDM: CPT

## 2018-04-24 PROCEDURE — 71046 X-RAY EXAM CHEST 2 VIEWS: CPT

## 2018-04-24 RX ORDER — ASPIRIN 81 MG/1
324 TABLET, CHEWABLE ORAL ONCE
Status: COMPLETED | OUTPATIENT
Start: 2018-04-24 | End: 2018-04-24

## 2018-04-24 RX ADMIN — ASPIRIN 81 MG 324 MG: 81 TABLET ORAL at 01:56

## 2018-04-24 ASSESSMENT — ENCOUNTER SYMPTOMS
NAUSEA: 0
WHEEZING: 0
VOMITING: 0
COLOR CHANGE: 0
SHORTNESS OF BREATH: 1

## 2018-05-23 ENCOUNTER — HOSPITAL ENCOUNTER (OUTPATIENT)
Age: 73
Discharge: HOME | End: 2018-05-23
Payer: MEDICARE

## 2018-05-23 LAB
INR BLD: 2.7
PROTHROMBIN TIME: 27 SEC (ref 9–12)

## 2018-05-23 PROCEDURE — 36415 COLL VENOUS BLD VENIPUNCTURE: CPT

## 2018-05-23 PROCEDURE — 85610 PROTHROMBIN TIME: CPT

## 2018-06-09 ENCOUNTER — APPOINTMENT (OUTPATIENT)
Dept: GENERAL RADIOLOGY | Age: 73
End: 2018-06-09
Payer: MEDICARE

## 2018-06-09 ENCOUNTER — HOSPITAL ENCOUNTER (EMERGENCY)
Age: 73
Discharge: HOME | End: 2018-06-10
Attending: EMERGENCY MEDICINE
Payer: MEDICARE

## 2018-06-09 DIAGNOSIS — R06.00 DYSPNEA, UNSPECIFIED TYPE: Primary | ICD-10-CM

## 2018-06-09 LAB
ABSOLUTE EOS #: 0.68 K/UL (ref 0–0.44)
ABSOLUTE IMMATURE GRANULOCYTE: <0.03 K/UL (ref 0–0.3)
ABSOLUTE LYMPH #: 3.39 K/UL (ref 1.1–3.7)
ABSOLUTE MONO #: 0.7 K/UL (ref 0.1–1.2)
ANION GAP SERPL CALCULATED.3IONS-SCNC: 11 MMOL/L (ref 9–17)
BASOPHILS # BLD: 1 % (ref 0–2)
BASOPHILS ABSOLUTE: 0.11 K/UL (ref 0–0.2)
BNP INTERPRETATION: NORMAL
BUN BLDV-MCNC: 27 MG/DL (ref 8–23)
BUN/CREAT BLD: ABNORMAL (ref 9–20)
CALCIUM SERPL-MCNC: 8.8 MG/DL (ref 8.6–10.4)
CHLORIDE BLD-SCNC: 104 MMOL/L (ref 98–107)
CO2: 23 MMOL/L (ref 20–31)
CREAT SERPL-MCNC: 1.08 MG/DL (ref 0.7–1.2)
DIFFERENTIAL TYPE: ABNORMAL
EKG ATRIAL RATE: 56 BPM
EKG P AXIS: 110 DEGREES
EKG P-R INTERVAL: 222 MS
EKG Q-T INTERVAL: 490 MS
EKG QRS DURATION: 160 MS
EKG QTC CALCULATION (BAZETT): 472 MS
EKG R AXIS: -61 DEGREES
EKG T AXIS: 27 DEGREES
EKG VENTRICULAR RATE: 56 BPM
EOSINOPHILS RELATIVE PERCENT: 8 % (ref 1–4)
GFR AFRICAN AMERICAN: >60 ML/MIN
GFR NON-AFRICAN AMERICAN: >60 ML/MIN
GFR SERPL CREATININE-BSD FRML MDRD: ABNORMAL ML/MIN/{1.73_M2}
GFR SERPL CREATININE-BSD FRML MDRD: ABNORMAL ML/MIN/{1.73_M2}
GLUCOSE BLD-MCNC: 125 MG/DL (ref 70–99)
HCT VFR BLD CALC: 43.9 % (ref 40.7–50.3)
HEMOGLOBIN: 14.1 G/DL (ref 13–17)
IMMATURE GRANULOCYTES: 0 %
LYMPHOCYTES # BLD: 38 % (ref 24–43)
MCH RBC QN AUTO: 30.1 PG (ref 25.2–33.5)
MCHC RBC AUTO-ENTMCNC: 32.1 G/DL (ref 28.4–34.8)
MCV RBC AUTO: 93.6 FL (ref 82.6–102.9)
MONOCYTES # BLD: 8 % (ref 3–12)
NRBC AUTOMATED: 0 PER 100 WBC
PDW BLD-RTO: 13.4 % (ref 11.8–14.4)
PLATELET # BLD: 184 K/UL (ref 138–453)
PLATELET ESTIMATE: ABNORMAL
PMV BLD AUTO: 12.2 FL (ref 8.1–13.5)
POC TROPONIN I: 0.02 NG/ML (ref 0–0.1)
POC TROPONIN INTERP: NORMAL
POTASSIUM SERPL-SCNC: 4.5 MMOL/L (ref 3.7–5.3)
PRO-BNP: 192 PG/ML
RBC # BLD: 4.69 M/UL (ref 4.21–5.77)
RBC # BLD: ABNORMAL 10*6/UL
SEG NEUTROPHILS: 45 % (ref 36–65)
SEGMENTED NEUTROPHILS ABSOLUTE COUNT: 4.01 K/UL (ref 1.5–8.1)
SODIUM BLD-SCNC: 138 MMOL/L (ref 135–144)
WBC # BLD: 8.9 K/UL (ref 3.5–11.3)
WBC # BLD: ABNORMAL 10*3/UL

## 2018-06-09 PROCEDURE — 85025 COMPLETE CBC W/AUTO DIFF WBC: CPT

## 2018-06-09 PROCEDURE — 80048 BASIC METABOLIC PNL TOTAL CA: CPT

## 2018-06-09 PROCEDURE — 85610 PROTHROMBIN TIME: CPT

## 2018-06-09 PROCEDURE — 84484 ASSAY OF TROPONIN QUANT: CPT

## 2018-06-09 PROCEDURE — 83880 ASSAY OF NATRIURETIC PEPTIDE: CPT

## 2018-06-09 PROCEDURE — 93005 ELECTROCARDIOGRAM TRACING: CPT

## 2018-06-09 PROCEDURE — 99285 EMERGENCY DEPT VISIT HI MDM: CPT

## 2018-06-09 ASSESSMENT — ENCOUNTER SYMPTOMS
RHINORRHEA: 1
ABDOMINAL PAIN: 0
SHORTNESS OF BREATH: 1
VOMITING: 0
NAUSEA: 0
WHEEZING: 1
COUGH: 1

## 2018-06-10 ENCOUNTER — APPOINTMENT (OUTPATIENT)
Dept: GENERAL RADIOLOGY | Age: 73
End: 2018-06-10
Payer: MEDICARE

## 2018-06-10 VITALS
RESPIRATION RATE: 18 BRPM | TEMPERATURE: 98.8 F | BODY MASS INDEX: 29.82 KG/M2 | HEIGHT: 67 IN | OXYGEN SATURATION: 96 % | HEART RATE: 90 BPM | DIASTOLIC BLOOD PRESSURE: 67 MMHG | WEIGHT: 190 LBS | SYSTOLIC BLOOD PRESSURE: 127 MMHG

## 2018-06-10 LAB
INR BLD: 2.3
POC TROPONIN I: 0.01 NG/ML (ref 0–0.1)
POC TROPONIN INTERP: NORMAL
PROTHROMBIN TIME: 23.3 SEC (ref 9–12)

## 2018-06-10 PROCEDURE — 71046 X-RAY EXAM CHEST 2 VIEWS: CPT

## 2018-06-10 PROCEDURE — 6370000000 HC RX 637 (ALT 250 FOR IP): Performed by: EMERGENCY MEDICINE

## 2018-06-10 PROCEDURE — 84484 ASSAY OF TROPONIN QUANT: CPT

## 2018-06-10 PROCEDURE — 94640 AIRWAY INHALATION TREATMENT: CPT

## 2018-06-10 PROCEDURE — 6360000002 HC RX W HCPCS: Performed by: EMERGENCY MEDICINE

## 2018-06-10 RX ORDER — PREDNISONE 50 MG/1
50 TABLET ORAL DAILY
Qty: 4 TABLET | Refills: 0 | Status: SHIPPED | OUTPATIENT
Start: 2018-06-10 | End: 2019-07-10

## 2018-06-10 RX ORDER — PREDNISONE 20 MG/1
60 TABLET ORAL ONCE
Status: COMPLETED | OUTPATIENT
Start: 2018-06-10 | End: 2018-06-10

## 2018-06-10 RX ADMIN — PREDNISONE 60 MG: 20 TABLET ORAL at 01:06

## 2018-06-10 RX ADMIN — ALBUTEROL SULFATE 5 MG: 5 SOLUTION RESPIRATORY (INHALATION) at 00:36

## 2018-06-25 ENCOUNTER — HOSPITAL ENCOUNTER (OUTPATIENT)
Age: 73
Discharge: HOME | End: 2018-06-25
Payer: MEDICARE

## 2018-06-25 LAB
INR BLD: 2.9
PROTHROMBIN TIME: 29.3 SEC (ref 9–12)

## 2018-06-25 PROCEDURE — 85610 PROTHROMBIN TIME: CPT

## 2018-06-25 PROCEDURE — 36415 COLL VENOUS BLD VENIPUNCTURE: CPT

## 2018-07-23 ENCOUNTER — HOSPITAL ENCOUNTER (OUTPATIENT)
Age: 73
Discharge: HOME | End: 2018-07-23
Payer: MEDICARE

## 2018-07-23 LAB
INR BLD: 2.5
PROTHROMBIN TIME: 24.8 SEC (ref 9–12)

## 2018-07-23 PROCEDURE — 85610 PROTHROMBIN TIME: CPT

## 2018-07-23 PROCEDURE — 36415 COLL VENOUS BLD VENIPUNCTURE: CPT

## 2018-08-21 ENCOUNTER — HOSPITAL ENCOUNTER (OUTPATIENT)
Age: 73
Discharge: HOME | End: 2018-08-21
Payer: MEDICARE

## 2018-08-21 LAB
INR BLD: 2.9
PROTHROMBIN TIME: 29.2 SEC (ref 9–12)

## 2018-08-21 PROCEDURE — 36415 COLL VENOUS BLD VENIPUNCTURE: CPT

## 2018-08-21 PROCEDURE — 85610 PROTHROMBIN TIME: CPT

## 2018-09-24 ENCOUNTER — HOSPITAL ENCOUNTER (OUTPATIENT)
Age: 73
Discharge: HOME | End: 2018-09-24
Payer: MEDICARE

## 2018-09-24 LAB
INR BLD: 1.6
PROTHROMBIN TIME: 16.5 SEC (ref 9–12)

## 2018-09-24 PROCEDURE — 36415 COLL VENOUS BLD VENIPUNCTURE: CPT

## 2018-09-24 PROCEDURE — 85610 PROTHROMBIN TIME: CPT

## 2018-10-18 ENCOUNTER — HOSPITAL ENCOUNTER (OUTPATIENT)
Age: 73
Discharge: HOME | End: 2018-10-18
Payer: MEDICARE

## 2018-10-18 LAB
INR BLD: 2
PROTHROMBIN TIME: 20 SEC (ref 9–12)

## 2018-10-18 PROCEDURE — 85610 PROTHROMBIN TIME: CPT

## 2018-10-18 PROCEDURE — 36415 COLL VENOUS BLD VENIPUNCTURE: CPT

## 2018-12-25 ENCOUNTER — HOSPITAL ENCOUNTER (EMERGENCY)
Age: 73
Discharge: HOME | End: 2018-12-25
Attending: EMERGENCY MEDICINE
Payer: MEDICARE

## 2018-12-25 ENCOUNTER — APPOINTMENT (OUTPATIENT)
Dept: GENERAL RADIOLOGY | Age: 73
End: 2018-12-25
Payer: MEDICARE

## 2018-12-25 VITALS
SYSTOLIC BLOOD PRESSURE: 162 MMHG | TEMPERATURE: 98 F | BODY MASS INDEX: 27.28 KG/M2 | WEIGHT: 180 LBS | HEIGHT: 68 IN | HEART RATE: 55 BPM | OXYGEN SATURATION: 95 % | RESPIRATION RATE: 16 BRPM | DIASTOLIC BLOOD PRESSURE: 82 MMHG

## 2018-12-25 DIAGNOSIS — J40 BRONCHITIS: Primary | ICD-10-CM

## 2018-12-25 LAB
ABSOLUTE EOS #: 0.38 K/UL (ref 0–0.44)
ABSOLUTE IMMATURE GRANULOCYTE: 0.04 K/UL (ref 0–0.3)
ABSOLUTE LYMPH #: 2.01 K/UL (ref 1.1–3.7)
ABSOLUTE MONO #: 1.04 K/UL (ref 0.1–1.2)
ANION GAP SERPL CALCULATED.3IONS-SCNC: 12 MMOL/L (ref 9–17)
BASOPHILS # BLD: 1 % (ref 0–2)
BASOPHILS ABSOLUTE: 0.06 K/UL (ref 0–0.2)
BUN BLDV-MCNC: 23 MG/DL (ref 8–23)
BUN/CREAT BLD: ABNORMAL (ref 9–20)
CALCIUM SERPL-MCNC: 9.1 MG/DL (ref 8.6–10.4)
CHLORIDE BLD-SCNC: 102 MMOL/L (ref 98–107)
CO2: 27 MMOL/L (ref 20–31)
CREAT SERPL-MCNC: 1.01 MG/DL (ref 0.7–1.2)
DIFFERENTIAL TYPE: ABNORMAL
EKG ATRIAL RATE: 55 BPM
EKG P AXIS: 75 DEGREES
EKG P-R INTERVAL: 222 MS
EKG Q-T INTERVAL: 486 MS
EKG QRS DURATION: 152 MS
EKG QTC CALCULATION (BAZETT): 464 MS
EKG R AXIS: -59 DEGREES
EKG T AXIS: 34 DEGREES
EKG VENTRICULAR RATE: 55 BPM
EOSINOPHILS RELATIVE PERCENT: 3 % (ref 1–4)
GFR AFRICAN AMERICAN: >60 ML/MIN
GFR NON-AFRICAN AMERICAN: >60 ML/MIN
GFR SERPL CREATININE-BSD FRML MDRD: ABNORMAL ML/MIN/{1.73_M2}
GFR SERPL CREATININE-BSD FRML MDRD: ABNORMAL ML/MIN/{1.73_M2}
GLUCOSE BLD-MCNC: 106 MG/DL (ref 70–99)
HCT VFR BLD CALC: 39.2 % (ref 40.7–50.3)
HEMOGLOBIN: 13.2 G/DL (ref 13–17)
IMMATURE GRANULOCYTES: 0 %
LYMPHOCYTES # BLD: 16 % (ref 24–43)
MCH RBC QN AUTO: 31.1 PG (ref 25.2–33.5)
MCHC RBC AUTO-ENTMCNC: 33.7 G/DL (ref 28.4–34.8)
MCV RBC AUTO: 92.5 FL (ref 82.6–102.9)
MONOCYTES # BLD: 8 % (ref 3–12)
NRBC AUTOMATED: 0 PER 100 WBC
PDW BLD-RTO: 13.6 % (ref 11.8–14.4)
PLATELET # BLD: 184 K/UL (ref 138–453)
PLATELET ESTIMATE: ABNORMAL
PMV BLD AUTO: 12.1 FL (ref 8.1–13.5)
POTASSIUM SERPL-SCNC: 4.6 MMOL/L (ref 3.7–5.3)
RBC # BLD: 4.24 M/UL (ref 4.21–5.77)
RBC # BLD: ABNORMAL 10*6/UL
SEG NEUTROPHILS: 72 % (ref 36–65)
SEGMENTED NEUTROPHILS ABSOLUTE COUNT: 8.81 K/UL (ref 1.5–8.1)
SODIUM BLD-SCNC: 141 MMOL/L (ref 135–144)
TROPONIN INTERP: NORMAL
TROPONIN INTERP: NORMAL
TROPONIN T: NORMAL NG/ML
TROPONIN T: NORMAL NG/ML
TROPONIN, HIGH SENSITIVITY: 10 NG/L (ref 0–22)
TROPONIN, HIGH SENSITIVITY: <6 NG/L (ref 0–22)
WBC # BLD: 12.3 K/UL (ref 3.5–11.3)
WBC # BLD: ABNORMAL 10*3/UL

## 2018-12-25 PROCEDURE — 85025 COMPLETE CBC W/AUTO DIFF WBC: CPT

## 2018-12-25 PROCEDURE — 80048 BASIC METABOLIC PNL TOTAL CA: CPT

## 2018-12-25 PROCEDURE — 6370000000 HC RX 637 (ALT 250 FOR IP): Performed by: EMERGENCY MEDICINE

## 2018-12-25 PROCEDURE — 93005 ELECTROCARDIOGRAM TRACING: CPT

## 2018-12-25 PROCEDURE — 84484 ASSAY OF TROPONIN QUANT: CPT

## 2018-12-25 PROCEDURE — G0383 LEV 4 HOSP TYPE B ED VISIT: HCPCS

## 2018-12-25 PROCEDURE — 71046 X-RAY EXAM CHEST 2 VIEWS: CPT

## 2018-12-25 RX ORDER — PREDNISONE 20 MG/1
40 TABLET ORAL ONCE
Status: COMPLETED | OUTPATIENT
Start: 2018-12-25 | End: 2018-12-25

## 2018-12-25 RX ORDER — BENZONATATE 100 MG/1
100 CAPSULE ORAL 3 TIMES DAILY PRN
Status: DISCONTINUED | OUTPATIENT
Start: 2018-12-25 | End: 2018-12-25 | Stop reason: HOSPADM

## 2018-12-25 RX ORDER — PREDNISONE 10 MG/1
TABLET ORAL
Qty: 20 TABLET | Refills: 0 | Status: SHIPPED | OUTPATIENT
Start: 2018-12-25 | End: 2019-01-04

## 2018-12-25 RX ORDER — PROPOFOL 10 MG/ML
20 INJECTION, EMULSION INTRAVENOUS
Status: DISCONTINUED | OUTPATIENT
Start: 2018-12-25 | End: 2018-12-25

## 2018-12-25 RX ORDER — PREDNISONE 20 MG/1
40 TABLET ORAL ONCE
Status: DISCONTINUED | OUTPATIENT
Start: 2018-12-25 | End: 2018-12-25

## 2018-12-25 RX ORDER — ASPIRIN 81 MG/1
324 TABLET, CHEWABLE ORAL ONCE
Status: COMPLETED | OUTPATIENT
Start: 2018-12-25 | End: 2018-12-25

## 2018-12-25 RX ORDER — BENZONATATE 100 MG/1
100 CAPSULE ORAL 3 TIMES DAILY PRN
Qty: 30 CAPSULE | Refills: 0 | Status: SHIPPED | OUTPATIENT
Start: 2018-12-25 | End: 2019-01-01

## 2018-12-25 RX ADMIN — ASPIRIN 81 MG 324 MG: 81 TABLET ORAL at 04:39

## 2018-12-25 RX ADMIN — PREDNISONE 40 MG: 20 TABLET ORAL at 06:53

## 2018-12-25 RX ADMIN — BENZONATATE 100 MG: 100 CAPSULE ORAL at 05:59

## 2018-12-25 ASSESSMENT — ENCOUNTER SYMPTOMS
DIARRHEA: 0
SHORTNESS OF BREATH: 0
RHINORRHEA: 1
CHEST TIGHTNESS: 0
COUGH: 1
VOMITING: 0
NAUSEA: 0
ABDOMINAL PAIN: 0
COLOR CHANGE: 0

## 2018-12-25 ASSESSMENT — PAIN DESCRIPTION - LOCATION: LOCATION: SHOULDER

## 2018-12-25 ASSESSMENT — PAIN DESCRIPTION - PAIN TYPE: TYPE: ACUTE PAIN

## 2018-12-25 NOTE — ED NOTES
Pt resting in bed, no needs at this time. Will continue to monitor.       Lolly Bob RN  12/25/18 [id number]

## 2018-12-25 NOTE — ED PROVIDER NOTES
(TESSALON PERLES) 100 MG capsule Take 1 capsule by mouth 3 times daily as needed for Cough, Disp-30 capsule, R-0Print      !! predniSONE (DELTASONE) 10 MG tablet Take 4 tablets by mouth once daily for 5 days, Disp-20 tablet, R-0Print       !! - Potential duplicate medications found. Please discuss with provider.           Maggie Charles MD  Emergency Medicine Resident    (Please note that portions of thisnote were completed with a voice recognition program.  Efforts were made to edit the dictations but occasionally words are mis-transcribed.)       Maggie Charles MD  12/25/18 2127

## 2019-03-04 ENCOUNTER — HOSPITAL ENCOUNTER (OUTPATIENT)
Age: 74
Discharge: HOME | End: 2019-03-04
Payer: MEDICARE

## 2019-03-04 LAB
INR BLD: 3
PROTHROMBIN TIME: 28.7 SEC (ref 9–12)

## 2019-03-04 PROCEDURE — 36415 COLL VENOUS BLD VENIPUNCTURE: CPT

## 2019-03-04 PROCEDURE — 85610 PROTHROMBIN TIME: CPT

## 2019-04-08 ENCOUNTER — HOSPITAL ENCOUNTER (OUTPATIENT)
Age: 74
Discharge: HOME | End: 2019-04-08
Payer: MEDICARE

## 2019-04-08 LAB
INR BLD: 2.1
PROTHROMBIN TIME: 21 SEC (ref 9–12)

## 2019-04-08 PROCEDURE — 85610 PROTHROMBIN TIME: CPT

## 2019-04-08 PROCEDURE — 36415 COLL VENOUS BLD VENIPUNCTURE: CPT

## 2019-04-24 ENCOUNTER — HOSPITAL ENCOUNTER (OUTPATIENT)
Age: 74
Setting detail: OBSERVATION
Discharge: HOME | End: 2019-04-24
Attending: EMERGENCY MEDICINE | Admitting: INTERNAL MEDICINE
Payer: MEDICARE

## 2019-04-24 ENCOUNTER — APPOINTMENT (OUTPATIENT)
Dept: GENERAL RADIOLOGY | Age: 74
End: 2019-04-24
Payer: MEDICARE

## 2019-04-24 VITALS
HEART RATE: 54 BPM | HEIGHT: 68 IN | OXYGEN SATURATION: 93 % | TEMPERATURE: 97.8 F | BODY MASS INDEX: 28.95 KG/M2 | SYSTOLIC BLOOD PRESSURE: 109 MMHG | RESPIRATION RATE: 20 BRPM | DIASTOLIC BLOOD PRESSURE: 69 MMHG | WEIGHT: 191 LBS

## 2019-04-24 DIAGNOSIS — T82.118A MALFUNCTION OF IMPLANTABLE CARDIOVERTER-DEFIBRILLATOR (ICD), INITIAL ENCOUNTER: Primary | ICD-10-CM

## 2019-04-24 PROBLEM — I44.30 HEART BLOCK, AV: Status: ACTIVE | Noted: 2019-04-24

## 2019-04-24 PROBLEM — R74.8 SERUM LIPASE ELEVATION: Status: ACTIVE | Noted: 2019-04-24

## 2019-04-24 PROBLEM — I50.22 CHRONIC SYSTOLIC HEART FAILURE (HCC): Status: ACTIVE | Noted: 2019-04-24

## 2019-04-24 LAB
-: NORMAL
ABSOLUTE EOS #: 0.16 K/UL (ref 0–0.44)
ABSOLUTE IMMATURE GRANULOCYTE: 0.03 K/UL (ref 0–0.3)
ABSOLUTE LYMPH #: 2.94 K/UL (ref 1.1–3.7)
ABSOLUTE MONO #: 0.62 K/UL (ref 0.1–1.2)
ALBUMIN SERPL-MCNC: 4.3 G/DL (ref 3.5–5.2)
ALBUMIN/GLOBULIN RATIO: 1.4 (ref 1–2.5)
ALP BLD-CCNC: 173 U/L (ref 40–129)
ALT SERPL-CCNC: 58 U/L (ref 5–41)
AMORPHOUS: NORMAL
ANION GAP SERPL CALCULATED.3IONS-SCNC: 11 MMOL/L (ref 9–17)
AST SERPL-CCNC: 38 U/L
BACTERIA: NORMAL
BASOPHILS # BLD: 1 % (ref 0–2)
BASOPHILS ABSOLUTE: 0.05 K/UL (ref 0–0.2)
BILIRUB SERPL-MCNC: 0.53 MG/DL (ref 0.3–1.2)
BILIRUBIN URINE: NEGATIVE
BNP INTERPRETATION: NORMAL
BUN BLDV-MCNC: 37 MG/DL (ref 8–23)
BUN/CREAT BLD: ABNORMAL (ref 9–20)
CALCIUM SERPL-MCNC: 9.2 MG/DL (ref 8.6–10.4)
CASTS UA: NORMAL /LPF (ref 0–8)
CHLORIDE BLD-SCNC: 105 MMOL/L (ref 98–107)
CO2: 21 MMOL/L (ref 20–31)
COLOR: YELLOW
CREAT SERPL-MCNC: 1.1 MG/DL (ref 0.7–1.2)
CRYSTALS, UA: NORMAL /HPF
DIFFERENTIAL TYPE: NORMAL
EOSINOPHILS RELATIVE PERCENT: 2 % (ref 1–4)
EPITHELIAL CELLS UA: NORMAL /HPF (ref 0–5)
GFR AFRICAN AMERICAN: >60 ML/MIN
GFR NON-AFRICAN AMERICAN: >60 ML/MIN
GFR SERPL CREATININE-BSD FRML MDRD: ABNORMAL ML/MIN/{1.73_M2}
GFR SERPL CREATININE-BSD FRML MDRD: ABNORMAL ML/MIN/{1.73_M2}
GLUCOSE BLD-MCNC: 107 MG/DL (ref 70–99)
GLUCOSE URINE: NEGATIVE
HCT VFR BLD CALC: 48.9 % (ref 40.7–50.3)
HEMOGLOBIN: 16.2 G/DL (ref 13–17)
IMMATURE GRANULOCYTES: 0 %
KETONES, URINE: NEGATIVE
LEUKOCYTE ESTERASE, URINE: NEGATIVE
LIPASE: 76 U/L (ref 13–60)
LYMPHOCYTES # BLD: 31 % (ref 24–43)
MAGNESIUM: 2.2 MG/DL (ref 1.6–2.6)
MCH RBC QN AUTO: 30.3 PG (ref 25.2–33.5)
MCHC RBC AUTO-ENTMCNC: 33.1 G/DL (ref 28.4–34.8)
MCV RBC AUTO: 91.6 FL (ref 82.6–102.9)
MONOCYTES # BLD: 7 % (ref 3–12)
MUCUS: NORMAL
NITRITE, URINE: NEGATIVE
NRBC AUTOMATED: 0 PER 100 WBC
OTHER OBSERVATIONS UA: NORMAL
PDW BLD-RTO: 14.1 % (ref 11.8–14.4)
PH UA: 5 (ref 5–8)
PLATELET # BLD: 236 K/UL (ref 138–453)
PLATELET ESTIMATE: NORMAL
PMV BLD AUTO: 11.5 FL (ref 8.1–13.5)
POTASSIUM SERPL-SCNC: 4.9 MMOL/L (ref 3.7–5.3)
PRO-BNP: 108 PG/ML
PROTEIN UA: NEGATIVE
RBC # BLD: 5.34 M/UL (ref 4.21–5.77)
RBC # BLD: NORMAL 10*6/UL
RBC UA: NORMAL /HPF (ref 0–4)
RENAL EPITHELIAL, UA: NORMAL /HPF
SEG NEUTROPHILS: 59 % (ref 36–65)
SEGMENTED NEUTROPHILS ABSOLUTE COUNT: 5.73 K/UL (ref 1.5–8.1)
SODIUM BLD-SCNC: 137 MMOL/L (ref 135–144)
SPECIFIC GRAVITY UA: 1.02 (ref 1–1.03)
TOTAL PROTEIN: 7.4 G/DL (ref 6.4–8.3)
TRICHOMONAS: NORMAL
TROPONIN INTERP: NORMAL
TROPONIN INTERP: NORMAL
TROPONIN T: NORMAL NG/ML
TROPONIN T: NORMAL NG/ML
TROPONIN, HIGH SENSITIVITY: 8 NG/L (ref 0–22)
TROPONIN, HIGH SENSITIVITY: 8 NG/L (ref 0–22)
TSH SERPL DL<=0.05 MIU/L-ACNC: 1.91 MIU/L (ref 0.3–5)
TURBIDITY: CLEAR
URINE HGB: NEGATIVE
UROBILINOGEN, URINE: NORMAL
WBC # BLD: 9.5 K/UL (ref 3.5–11.3)
WBC # BLD: NORMAL 10*3/UL
WBC UA: NORMAL /HPF (ref 0–5)
YEAST: NORMAL

## 2019-04-24 PROCEDURE — 81001 URINALYSIS AUTO W/SCOPE: CPT

## 2019-04-24 PROCEDURE — 99285 EMERGENCY DEPT VISIT HI MDM: CPT

## 2019-04-24 PROCEDURE — APPSS60 APP SPLIT SHARED TIME 46-60 MINUTES: Performed by: PHYSICIAN ASSISTANT

## 2019-04-24 PROCEDURE — 83690 ASSAY OF LIPASE: CPT

## 2019-04-24 PROCEDURE — G0378 HOSPITAL OBSERVATION PER HR: HCPCS

## 2019-04-24 PROCEDURE — 84443 ASSAY THYROID STIM HORMONE: CPT

## 2019-04-24 PROCEDURE — 71046 X-RAY EXAM CHEST 2 VIEWS: CPT

## 2019-04-24 PROCEDURE — 84484 ASSAY OF TROPONIN QUANT: CPT

## 2019-04-24 PROCEDURE — 85025 COMPLETE CBC W/AUTO DIFF WBC: CPT

## 2019-04-24 PROCEDURE — 83880 ASSAY OF NATRIURETIC PEPTIDE: CPT

## 2019-04-24 PROCEDURE — 93005 ELECTROCARDIOGRAM TRACING: CPT

## 2019-04-24 PROCEDURE — 80053 COMPREHEN METABOLIC PANEL: CPT

## 2019-04-24 PROCEDURE — 83735 ASSAY OF MAGNESIUM: CPT

## 2019-04-24 PROCEDURE — 6370000000 HC RX 637 (ALT 250 FOR IP): Performed by: INTERNAL MEDICINE

## 2019-04-24 PROCEDURE — 99235 HOSP IP/OBS SAME DATE MOD 70: CPT | Performed by: INTERNAL MEDICINE

## 2019-04-24 RX ORDER — POTASSIUM CHLORIDE 20 MEQ/1
40 TABLET, EXTENDED RELEASE ORAL PRN
Status: DISCONTINUED | OUTPATIENT
Start: 2019-04-24 | End: 2019-04-24 | Stop reason: HOSPADM

## 2019-04-24 RX ORDER — ONDANSETRON 2 MG/ML
4 INJECTION INTRAMUSCULAR; INTRAVENOUS EVERY 6 HOURS PRN
Status: DISCONTINUED | OUTPATIENT
Start: 2019-04-24 | End: 2019-04-24

## 2019-04-24 RX ORDER — ATORVASTATIN CALCIUM 40 MG/1
40 TABLET, FILM COATED ORAL NIGHTLY
Status: DISCONTINUED | OUTPATIENT
Start: 2019-04-24 | End: 2019-04-24 | Stop reason: HOSPADM

## 2019-04-24 RX ORDER — CARVEDILOL 3.12 MG/1
3.12 TABLET ORAL 2 TIMES DAILY WITH MEALS
Status: DISCONTINUED | OUTPATIENT
Start: 2019-04-24 | End: 2019-04-24 | Stop reason: HOSPADM

## 2019-04-24 RX ORDER — FUROSEMIDE 40 MG/1
20 TABLET ORAL DAILY
Qty: 30 TABLET | Refills: 0 | Status: SHIPPED | OUTPATIENT
Start: 2019-04-24

## 2019-04-24 RX ORDER — WARFARIN SODIUM 3 MG/1
9 TABLET ORAL
Status: DISCONTINUED | OUTPATIENT
Start: 2019-04-24 | End: 2019-04-24 | Stop reason: HOSPADM

## 2019-04-24 RX ORDER — SODIUM CHLORIDE 0.9 % (FLUSH) 0.9 %
10 SYRINGE (ML) INJECTION PRN
Status: DISCONTINUED | OUTPATIENT
Start: 2019-04-24 | End: 2019-04-24 | Stop reason: HOSPADM

## 2019-04-24 RX ORDER — ACETAMINOPHEN 325 MG/1
650 TABLET ORAL EVERY 4 HOURS PRN
Status: DISCONTINUED | OUTPATIENT
Start: 2019-04-24 | End: 2019-04-24 | Stop reason: HOSPADM

## 2019-04-24 RX ORDER — ALBUTEROL SULFATE 90 UG/1
2 AEROSOL, METERED RESPIRATORY (INHALATION) EVERY 6 HOURS PRN
Status: DISCONTINUED | OUTPATIENT
Start: 2019-04-24 | End: 2019-04-24 | Stop reason: HOSPADM

## 2019-04-24 RX ORDER — SPIRONOLACTONE 25 MG/1
25 TABLET ORAL DAILY
Status: DISCONTINUED | OUTPATIENT
Start: 2019-04-24 | End: 2019-04-24 | Stop reason: HOSPADM

## 2019-04-24 RX ORDER — POTASSIUM CHLORIDE 7.45 MG/ML
10 INJECTION INTRAVENOUS PRN
Status: DISCONTINUED | OUTPATIENT
Start: 2019-04-24 | End: 2019-04-24 | Stop reason: HOSPADM

## 2019-04-24 RX ORDER — SODIUM CHLORIDE 0.9 % (FLUSH) 0.9 %
10 SYRINGE (ML) INJECTION EVERY 12 HOURS SCHEDULED
Status: DISCONTINUED | OUTPATIENT
Start: 2019-04-24 | End: 2019-04-24 | Stop reason: HOSPADM

## 2019-04-24 RX ORDER — FUROSEMIDE 20 MG/1
20 TABLET ORAL DAILY
Status: DISCONTINUED | OUTPATIENT
Start: 2019-04-24 | End: 2019-04-24 | Stop reason: HOSPADM

## 2019-04-24 RX ORDER — ASPIRIN 81 MG/1
81 TABLET, CHEWABLE ORAL DAILY
Status: DISCONTINUED | OUTPATIENT
Start: 2019-04-24 | End: 2019-04-24 | Stop reason: HOSPADM

## 2019-04-24 RX ORDER — LISINOPRIL 10 MG/1
10 TABLET ORAL DAILY
Status: DISCONTINUED | OUTPATIENT
Start: 2019-04-24 | End: 2019-04-24 | Stop reason: HOSPADM

## 2019-04-24 RX ORDER — PREDNISONE 50 MG/1
50 TABLET ORAL DAILY
Status: DISCONTINUED | OUTPATIENT
Start: 2019-04-24 | End: 2019-04-24 | Stop reason: HOSPADM

## 2019-04-24 RX ORDER — MAGNESIUM SULFATE 1 G/100ML
1 INJECTION INTRAVENOUS PRN
Status: DISCONTINUED | OUTPATIENT
Start: 2019-04-24 | End: 2019-04-24 | Stop reason: HOSPADM

## 2019-04-24 RX ORDER — MAGNESIUM HYDROXIDE/ALUMINUM HYDROXICE/SIMETHICONE 120; 1200; 1200 MG/30ML; MG/30ML; MG/30ML
30 SUSPENSION ORAL EVERY 6 HOURS PRN
Status: DISCONTINUED | OUTPATIENT
Start: 2019-04-24 | End: 2019-04-24 | Stop reason: HOSPADM

## 2019-04-24 RX ORDER — SOTALOL HYDROCHLORIDE 80 MG/1
80 TABLET ORAL 2 TIMES DAILY
Status: DISCONTINUED | OUTPATIENT
Start: 2019-04-24 | End: 2019-04-24 | Stop reason: HOSPADM

## 2019-04-24 RX ORDER — NICOTINE 21 MG/24HR
1 PATCH, TRANSDERMAL 24 HOURS TRANSDERMAL DAILY PRN
Status: DISCONTINUED | OUTPATIENT
Start: 2019-04-24 | End: 2019-04-24 | Stop reason: HOSPADM

## 2019-04-24 RX ADMIN — ALUMINUM HYDROXIDE, MAGNESIUM HYDROXIDE, AND SIMETHICONE 30 ML: 200; 200; 20 SUSPENSION ORAL at 14:17

## 2019-04-24 ASSESSMENT — ENCOUNTER SYMPTOMS
ABDOMINAL PAIN: 0
WHEEZING: 0
DIARRHEA: 1
FACIAL SWELLING: 0
VOMITING: 0
NAUSEA: 0
VOICE CHANGE: 0
SHORTNESS OF BREATH: 0
COUGH: 0

## 2019-04-24 ASSESSMENT — PAIN SCALES - GENERAL: PAINLEVEL_OUTOF10: 0

## 2019-04-24 NOTE — PROGRESS NOTES
Discharge teaching and instructions completed with patient using teachback method. AVS reviewed. Printed prescriptions given to patient. Patient voiced understanding regarding prescriptions, follow up appointments, and care of self at home. Discharged home with family. All questions answered.

## 2019-04-24 NOTE — CARE COORDINATION
Case Management Initial Discharge Plan  Mathew Willett             Met with:patient to discuss discharge plans. Information verified: address, contacts, phone number, , insurance Yes  PCP: Carolee Harrison MD  Date of last visit: 1 week ago     Insurance Provider: [facility] [employer]    Discharge Planning    Living Arrangements:      Support Systems:       Home has 2 stories  6 stairs to climb to get into front door, 16 stairs to climb to reach second floor  Location of bedroom/bathroom in home bath on the 1st and 2nd floor, bedroom on the 2nd floor. Patient able to perform ADL's:Independent    Current Services (outpatient & in home) none  DME equipment: nebulizer  DME provider:     Pharmacy: [facility], and [facility] [address] [facility] Medications:     Does patient want to participate in local refill/ meds to beds program?       Potential Assistance Needed:       Patient agreeable to home care: No  Ebro of choice provided:  n/a    Prior SNF/Rehab Placement and Facility:   Agreeable to SNF/Rehab: No  Ebro of choice provided: n/a   Evaluation: no    Expected Discharge date:     Patient expects to be discharged to: Follow Up Appointment: Best Day/ Time:      Transportation provider: self  Transportation arrangements needed for discharge: No, drove the hospital    Readmission Risk              Risk of Unplanned Readmission:        0             Does patient have a readmission risk score greater than 14?: not calculated. If yes, follow-up appointment must be made within 7 days of discharge. Discharge Plan: return to home, no skilled needs identified.           Electronically signed by Ayaka Arzola RN on 19 at 11:35 AM

## 2019-04-24 NOTE — PROGRESS NOTES
Smoking Cessation - topics covered   []  Health Risks  []  Benefits of Quitting   []  Smoking Cessation  [x]  Patient has no history of tobacco use  []  Patient is former smoker. [x]  No need for tobacco cessation education. []  Booklet given  []  Patient verbalizes understanding. []  Patient denies need for tobacco cessation education. []  Unable to meet with patient today. Will follow up as able.   Gibson Castelan  2:25 PM

## 2019-04-24 NOTE — ED NOTES
Pt to ER with c/o his AICD is making a \"buzzing\" noise and initially thought it was his cell phone. Denies chest pain, SOB, dizziness. Pt stated his AICD was interrogated about 3 months ago and also has an upcoming stress test and Echo tomorrow. Placed on full cardiac monitor. No acute distress noted, rr even and NL. Dr. Anton Merida at bedside to evaluate the pt. Will continue to monitor.      Gloria Thorne RN  04/24/19 [id number]

## 2019-04-24 NOTE — H&P
[facility]    HISTORY AND PHYSICAL EXAMINATION            Date:   4/24/2019  Patient name:  Beatriz Mar  Date of admission:  4/24/2019  8:00 AM  MRN:   [id number]  Account:  [de-identified]  YOB: 1945  PCP:    Aamir Villagomez MD  Room:   2372/6737-74  Code Status:    Full Code    Chief Complaint:     Chief Complaint   Patient presents with    AICD Problem     pt reports his AICD is making a \"buzzing\" noise and initially thought it was his cell phone. Denies chest pain, SOB, dizziness       History Obtained From:     patient, electronic medical record    History of Present Illness:     Per ED:     \"66year-old male with a history of CAD, CABG, AICD placement who presents with 2 days of noting his AST is making alarm. He says he has no cardiac symptoms, is recovering from a diarrheal illness but is eating and drinking normally now and feels that his symptoms are almost gone. Did have some symptoms of heartburn last night however these are resolved. \"    Mr. Ilir Palma reports that he began hearing a beeping noise yesterday, which he originally thought was his phone. He continued to hear the noise and realized it was his pacemaker alarm, prompting him to come to the ER. He otherwise denies chest pain, shortness of breath, palpitations, or diaphoresis. Cardiology was consulted in the ED and recommended admission to IM. In the ED, HS troponin x 2 was 8. Labs unremarkable with exception to mildly elevated lipase (76), ALP (173), and ALT (58). He was admitted to IM for further management.      Past Medical History:     Past Medical History:   Diagnosis Date    CAD (coronary artery disease) 11/19/13    cabg x 3    Cardiac arrest ([facility] [address]     Hyperkalemia 5/19/2014    Hypertension, essential     Ischemic cardiomyopathy 12/8/2016    Left ventricular apical thrombus 12/9/2016    MI ([facility] infarction) [facility] November 2013    Pseudoaneurysm ([facility] [address] - right groin 92/96/0806    Systolic dysfunction with acute on chronic heart failure (HCC) - Efx 30% 12/8/2016        Past Surgical History:     Past Surgical History:   Procedure Laterality Date    CARDIAC CATHETERIZATION  12/08/2016    CARDIAC DEFIBRILLATOR PLACEMENT  2013    CORONARY ARTERY BYPASS GRAFT  11/19/13    CORONARY ARTERY BYPASS GRAFT  2013    VASECTOMY          Medications Prior to Admission:     Prior to Admission medications    Medication Sig Start Date End Date Taking? Authorizing Provider   predniSONE (DELTASONE) 50 MG tablet Take 1 tablet by mouth daily 6/10/18  Yes Jill Coats MD   albuterol (PROVENTIL) (5 MG/ML) 0.5% nebulizer solution Take 0.5 mLs by nebulization every 6 hours as needed for Wheezing 6/10/18  Yes Jill Coats MD   warfarin (COUMADIN) 6 MG tablet Take 6 mg by mouth Takes MON and tuesday   Yes Historical Provider, MD   warfarin (COUMADIN) 1 MG tablet Take 9 mg by mouth daily on Gamal WED THURS FRI Sat   Yes Historical Provider, MD   furosemide (LASIX) 40 MG tablet Take 1 tablet by mouth daily  Patient taking differently: Take 20 mg by mouth daily  12/21/16  Yes Klaus Escalante MD   atorvastatin (LIPITOR) 40 MG tablet Take 1 tablet by mouth nightly 12/13/16  Yes Vani Max DO   lisinopril (PRINIVIL;ZESTRIL) 10 MG tablet Take 1 tablet by mouth daily 12/13/16  Yes Vani Max DO   carvedilol (COREG) 3.125 MG tablet Take 1 tablet by mouth 2 times daily (with meals)  Patient taking differently: Take 3.125 mg by mouth daily  12/13/16  Yes Vani Max DO   spironolactone (ALDACTONE) 25 MG tablet Take 1 tablet by mouth daily 12/13/16  Yes Vani Max DO   albuterol (VENTOLIN HFA) 108 (90 BASE) MCG/ACT inhaler Inhale 2 puffs into the lungs every 6 hours as needed for Wheezing. 10/10/14  Yes Tavo Rivera,    sotalol (BETAPACE) 80 MG tablet Take 1 tablet by mouth 2 times daily.  5/19/14  Yes Hailee Burciaga,    aspirin 81 MG chewable tablet g/dL    RDW 14.1 11.8 - 14.4 %    Platelets 689 817 - 075 k/uL    MPV 11.5 8.1 - 13.5 fL    NRBC Automated 0.0 0.0 per 100 WBC    Differential Type NOT REPORTED     Seg Neutrophils 59 36 - 65 %    Lymphocytes 31 24 - 43 %    Monocytes 7 3 - 12 %    Eosinophils % 2 1 - 4 %    Basophils 1 0 - 2 %    Immature Granulocytes 0 0 %    Segs Absolute 5.73 1.50 - 8.10 k/uL    Absolute Lymph # 2.94 1.10 - 3.70 k/uL    Absolute Mono # 0.62 0.10 - 1.20 k/uL    Absolute Eos # 0.16 0.00 - 0.44 k/uL    Basophils # 0.05 0.00 - 0.20 k/uL    Absolute Immature Granulocyte 0.03 0.00 - 0.30 k/uL    WBC Morphology NOT REPORTED     RBC Morphology NOT REPORTED     Platelet Estimate NOT REPORTED    Comprehensive Metabolic Panel    Collection Time: 04/24/19  8:19 AM   Result Value Ref Range    Glucose 107 (H) 70 - 99 mg/dL    BUN 37 (H) 8 - 23 mg/dL    CREATININE 1.10 0.70 - 1.20 mg/dL    Bun/Cre Ratio NOT REPORTED 9 - 20    Calcium 9.2 8.6 - 10.4 mg/dL    Sodium 137 135 - 144 mmol/L    Potassium 4.9 3.7 - 5.3 mmol/L    Chloride 105 98 - 107 mmol/L    CO2 21 20 - 31 mmol/L    Anion Gap 11 9 - 17 mmol/L    Alkaline Phosphatase 173 (H) 40 - 129 U/L    ALT 58 (H) 5 - 41 U/L    AST 38 <40 U/L    Total Bilirubin 0.53 0.3 - 1.2 mg/dL    Total Protein 7.4 6.4 - 8.3 g/dL    Alb 4.3 3.5 - 5.2 g/dL    Albumin/Globulin Ratio 1.4 1.0 - 2.5    GFR Non-African American >60 >60 mL/min    GFR African American >60 >60 mL/min    GFR Comment          GFR Staging NOT REPORTED    Lipase    Collection Time: 04/24/19  8:19 AM   Result Value Ref Range    Lipase 76 (H) 13 - 60 U/L   Magnesium    Collection Time: 04/24/19  8:19 AM   Result Value Ref Range    Magnesium 2.2 1.6 - 2.6 mg/dL   TSH with Reflex    Collection Time: 04/24/19  8:19 AM   Result Value Ref Range    TSH 1.91 0.30 - 5.00 mIU/L   Brain Natriuretic Peptide    Collection Time: 04/24/19  8:19 AM   Result Value Ref Range    Pro- <300 pg/mL    BNP Interpretation Pro-BNP Reference Range: Troponin    Collection Time: 04/24/19  8:19 AM   Result Value Ref Range    Troponin, High Sensitivity 8 0 - 22 ng/L    Troponin T NOT REPORTED <0.03 ng/mL    Troponin Interp NOT REPORTED    Troponin    Collection Time: 04/24/19  9:38 AM   Result Value Ref Range    Troponin, High Sensitivity 8 0 - 22 ng/L    Troponin T NOT REPORTED <0.03 ng/mL    Troponin Interp NOT REPORTED    EKG 12 Lead    Collection Time: 04/24/19  4:38 PM   Result Value Ref Range    Ventricular Rate 52 BPM    Atrial Rate 52 BPM    P-R Interval 238 ms    QRS Duration 160 ms    Q-T Interval 492 ms    QTc Calculation (Bazett) 457 ms    P Axis 53 degrees    R Axis -59 degrees    T Axis 17 degrees       Imaging/Diagnostics:    CXR    FINDINGS:  Single lead cardiac defibrillator is seen overlying the right chest and is  unchanged. There does not appear to be any evidence of lead fracture, as  visualized. Median sternotomy wires are noted. Heart size is within normal  limits. No vascular congestion. No focal airspace consolidation,  pneumothorax, or pleural effusion. No free air beneath the diaphragm. No  acute osseous abnormality. Assessment :      Primary Problem  AICD malfunction, initial encounter    Active Hospital Problems    Diagnosis Date Noted    AICD malfunction, initial encounter [T82.118A] 04/24/2019    Chronic systolic heart failure ([facility] [address] [I50.22] 04/24/2019    Serum lipase elevation [R74.8] 04/24/2019    Heart block, AV - bifascicular [I44.30] 04/24/2019    Left ventricular apical thrombus [I51.3] 12/09/2016    Azotemia [R79.89] 12/09/2016    Coronary artery disease involving native coronary artery of native heart without angina pectoris [I25.10] 05/18/2014    Tobacco use [Z72.0] 05/18/2014    HTN (hypertension), benign [I10] 05/18/2014       Plan:     Patient status Admit as observation in the  Med/Surge    1.  AICD malfunction  - cardiology consult placed; recs pending  - tele  - AICD interrogation without any abnormalities; believe alarm was due to rate parameters being set to low    2. HFrEF  - continue medical management with carvedilol, lisinopril, aldactone, lasix    3. Left ventricular apical thrombus  - pharmacy to dose coumadin    4. HTN  - stable; continue to monitor; resume home meds    5. CAD s/p CABG  - medical management with aspirin, atorvastatin  - troponin negative    6. Tobacco use  - encourage cessation; education provided    7. Elevated lipase, ALP, ALT  - no abdominal pain; f/u with PCP    Disposition: likely discharge home this afternoon pending cardiac clearance. I have seen and examined Mathew Willett and the mclean elements of all parts of the encounter have been performed by me. I agree with the assessment, plan and orders as documented by the Advanced Practice Provider. Any modifications to the exam findings and the plan of treatment is as below and the final version is my approved version of the assessment. Additional Comments:   38-year-old male admitted through the emergency room  His ICD was placed in 2013  The device begun beeping  The patient was admitted for further cardiology evaluation    His ICD was interrogated and reprogrammed  And is no longer alarming    Patient was found to have an elevated lipase  He does acknowledge some dyspepsia  No prior history of pancreatitis  He did eat a pizza last night with \"everything on it\"  The patient does generally drink a beer on Monday when he bowls  There has been no beer consumption this week    Database revealed:  EKG:Sinus bradycardia with 1st degree A-V block  Right bundle branch block  Left anterior fascicular block   Bifascicular block   Minimal voltage criteria for LVH, may be normal variant  Abnormal ECG  When compared with ECG of 24-APR-2019 08:07,     Lipase 76High     Glucose 107High mg/dL     BUN 37High mg/dL   CREATININE 1.10     Chest x-ray:  Impression:   No acute intrathoracic process.      The patient feels good and hopes to go

## 2019-04-24 NOTE — ED PROVIDER NOTES
[facility] ED  eMERGENCY dEPARTMENT eNCOUnter      Pt Name: Georgina Dunham  MRN: [id number]  [place] 1945  Date of evaluation: 4/24/2019  Provider: Cooper Bacon MD    CHIEF COMPLAINT     Chief Complaint   Patient presents with    AICD Problem     pt reports his AICD is making a \"buzzing\" noise and initially thought it was his cell phone. Denies chest pain, SOB, dizziness         HISTORY OF PRESENT ILLNESS   (Location/Symptom, Timing/Onset, Context/Setting,Quality, Duration, Modifying Factors, Severity)  Note limiting factors. Georgina Dunham is a70 y.o. male who presents to the emergency department      HPI    64-year-old male with a history of CAD, CABG, AICD placement who presents with 2 days of noting his AST is making alarm. He says he has no cardiac symptoms, is recovering from a diarrheal illness but is eating and drinking normally now and feels that his symptoms are almost gone. Did have some symptoms of heartburn last night however these are resolved. Taken his medicines properly, no other changes. Nursing Notes werereviewed. REVIEW OF SYSTEMS    (2-9 systems for level 4, 10 or more for level 5)     Review of Systems   Constitutional: Negative for appetite change, chills, fatigue and fever. HENT: Negative for drooling, facial swelling, mouth sores and voice change. Eyes: Negative for visual disturbance. Respiratory: Negative for cough, shortness of breath and wheezing. Cardiovascular: Negative for chest pain. Gastrointestinal: Positive for diarrhea. Negative for abdominal pain, nausea and vomiting. Genitourinary: Negative for difficulty urinating and dysuria. Musculoskeletal: Negative for neck stiffness. Skin: Negative for rash. Neurological: Negative for weakness and numbness. Psychiatric/Behavioral: Negative for agitation. All other systems reviewed and are negative.       Except as noted above the remainder of the review of systems was reviewed Gamal W[facility] THUR[facility] FRI Sat    WARFARIN (COUMADIN) 6 MG TABLET    Take 6 mg by mouth Takes MON and tuesday       ALLERGIES     Pcn [penicillins] and Pcn [penicillins]    FAMILY HISTORY       Family History   Problem Relation Age of Onset    Hypertension Father     Stroke Father     Stroke Maternal Grandfather     Hypertension Paternal Grandmother     Hypertension Paternal Grandfather     No Known Problems Mother         age 80    Other Father         vascular     Heart Failure Sister         MI    Heart Failure Brother         MI          SOCIAL HISTORY       Social History     Socioeconomic History    Marital status: Single     Spouse name: None    Number of children: None    Years of education: None    Highest education level: None   Occupational History    None   Social Needs    Financial resource strain: None    Food insecurity:     Worry: None     Inability: None    Transportation needs:     Medical: None     Non-medical: None   Tobacco Use    Smoking status: Current Every Day Smoker     Packs/day: 1.00     Types: Cigars    Smokeless tobacco: Never Used   Substance and Sexual Activity    Alcohol use: Yes     Comment: occasional    Drug use: No    Sexual activity: None   Lifestyle    Physical activity:     Days per week: None     Minutes per session: None    Stress: None   Relationships    Social connections:     Talks on phone: None     Gets together: None     Attends Anabaptist service: None     Active member of club or organization: None     Attends meetings of clubs or organizations: None     Relationship status: None    Intimate partner violence:     Fear of current or ex partner: None     Emotionally abused: None     Physically abused: None     Forced sexual activity: None   Other Topics Concern    None   Social History Narrative    ** Merged History Encounter **            SCREENINGS    [place] Coma Scale  Eye Opening: Spontaneous  Best Verbal Response: Oriented  Best Motor Response: Obeys commands  Juan José Coma Scale Score: 15 @FLOW([id number])@      PHYSICAL EXAM    (up to 7 for level 4, 8 or more for level 5)     ED Triage Vitals   BP Temp Temp src Pulse Resp SpO2 Height Weight   -- -- -- -- -- -- -- --       Physical Exam   Constitutional: He is oriented to person, place, and time. He appears well-developed and well-nourished. No distress. HENT:   Head: Normocephalic and atraumatic. Eyes: Conjunctivae and EOM are normal.   Neck: Normal range of motion. Neck supple. No JVD present. Cardiovascular: Normal rate, regular rhythm and intact distal pulses. Pulmonary/Chest: Effort normal. No stridor. No respiratory distress. Abdominal: Soft. He exhibits no distension. There is no tenderness. There is no guarding. Musculoskeletal: Normal range of motion. Neurological: He is alert and oriented to person, place, and time. Skin: Skin is warm and dry. Psychiatric: He has a normal mood and affect. DIAGNOSTIC RESULTS     EKG: All EKG's are interpreted by the Emergency Department Physician who either signs orCo-signs this chart in the absence of a cardiologist.      RADIOLOGY:   Non-plainfilm images such as CT, Ultrasound and MRI are read by the radiologist. Plain radiographic images are visualized and preliminarily interpreted by the emergency physician with the below findings:      Interpretationper the Radiologist below, if available at the time of this note:    XR CHEST STANDARD (2 VW)   Preliminary Result   No acute intrathoracic process.                ED BEDSIDE ULTRASOUND:   Performed by ED Physician - none    LABS:  Labs Reviewed   COMPREHENSIVE METABOLIC PANEL - Abnormal; Notable for the following components:       Result Value    Glucose 107 (*)     BUN 37 (*)     Alkaline Phosphatase 173 (*)     ALT 58 (*)     All other components within normal limits   LIPASE - Abnormal; Notable for the following components:    Lipase 76 (*)     All other components within normal limits   CBC WITH AUTO DIFFERENTIAL   MAGNESIUM   TSH WITH REFLEX   BRAIN NATRIURETIC PEPTIDE   TROPONIN   TROPONIN   URINALYSIS WITH MICROSCOPIC       All other labs were within normal range or not returned as of this dictation. EMERGENCY DEPARTMENT COURSE and DIFFERENTIAL DIAGNOSIS/MDM:   Vitals:    Vitals:    04/24/19 0815 04/24/19 0817 04/24/19 0932 04/24/19 1014   BP:  134/66 115/61    Pulse: 58  54 52   Resp: 14   12   Temp:       TempSrc:       SpO2:   98% 99%   Weight:       Height:             MDM  Number of Diagnoses or Management Options  Malfunction of implantable cardioverter-defibrillator (ICD), initial encounter:   Diagnosis management comments: 57-year-old with AICD alarm and no other cardiac symptoms. Interrogation shows there is lead impedance issue, discussed with [employer] and they recommended cardiology consult. Discussed with her all the resident, they requested admit to internal medicine and they would follow. Patient is also scheduled for a stress test tomorrow, is having no cardiac complaints. Baseline labs and troponins sent, no abnormalities. Discussed with cardiology and internal medicine, they're willing to accept this patient. Only addition of the chest x-ray report to re-eval for lead fracture       Amount and/or Complexity of Data Reviewed  Clinical lab tests: ordered and reviewed  Tests in the radiology section of CPT®: ordered and reviewed  Discuss the patient with other providers: yes              Procedures    FINAL IMPRESSION      1.  Malfunction of implantable cardioverter-defibrillator (ICD), initial encounter        DISPOSITION/PLAN   DISPOSITION Admitted 04/24/2019 10:46:59 AM      PATIENT REFERRED TO:  Gricelda Petersen MD  [address] [place] [address]  [phone]            DISCHARGE MEDICATIONS:  New Prescriptions    No medications on file              Summation      Patient Course:      ED Medications administered this visit:  Medications - No data to display    New Prescriptions from this visit:    New Prescriptions    No medications on file       Follow-up:  Arcadio Dance, MD  [address] [facility] [phone]              Final Impression:   1.  Malfunction of implantable cardioverter-defibrillator (ICD), initial encounter               (Please note that portions of this note were completed with a voice recognition program.  Efforts were made to edit the dictations but occasionally words are mis-transcribed.)           Jocelyne Fuentes MD  04/24/19 [id number]

## 2019-04-24 NOTE — ED NOTES
Pt back from [facility]  04/24/19 1024

## 2019-04-24 NOTE — PROGRESS NOTES
Cardiac Testing:    TTE 11/15/18: EF 40%. LAteral wall hypokinesis. Reduced LV diastolic compliance. Mild to mod MR. Mild TR    STRESS 12/08/17: Inferolateral infarction. Residual ischemia around infarction zone. EF 51%. Segmental WMA    CATH 12/08/16: Multi-vessel Coronary Artery Disease. Patent LIMA-LAD and SVG-RPDA. Occluded SVG-OM1. LCX territory getting good right to left collaterals. Mildly impaired ventricular function. (TK)    TTT 5/29/14: Vasodepressor response. ICD 11/25/13: MOC [employer]     CABG 11/19/13: LIMA-LAD, SVG-PDA, SVG-OM    CATH 11/18/13: MV CAD. EF 40%      1. VF arrest during exercise on 11/18/2013. Subsequent demonstration of 3-vessel CAD with serial mid-LAD lesion at 80% and 70%, totally occluded mid-circumflex, evidence of left-to-left collaterals and subtotal proximal RCA stenosis. 2. Urgent bypass surgery on 11/20/2013 at [facility] with LIMA-LAD, SVG-OM and SVG-PDA. 3. LVEF acutely 30%, increasing to 45% postoperatively with evidence of small inferior segment of hypokinesis. 4. Post bypass, EP study on 11/25/2013 demonstrated persistently inducible monomorphic VT (cycle length 215 ms) with evidence of underlying sinus bradycardia and prolonged HV interval.   5. Subsequent implantation of [employer] Evera XT VR defibrillator on 11/25/2013 at [facility]. The patient has a [employer] [id number] lead. 6. Hypertension. 7. Hospitalization in 05/2014 for clinical VT, cycle length near 300 ms, started on sotalol with good clinical suppression. 8. Underlying conduction disease with bifascicular block and NM interval of 200 ms. 9. Admission 12/2016 at [facility] for congestive heart failure with cardiac catheterization showing LVEF 40% with occluded SVG-OM1, however, with patent LIMA-LAD and patent SVG-RPDA, with the left circumflex territory getting good right-to-left collaterals. 10. Finding of small apical LV thrombus on admission 12/2016, Coumadin started.    11. Right groin pseudoaneurysm 12/2016 after catheterization, resolved without surgery. ([facility] 10/18/18)

## 2019-04-24 NOTE — PROGRESS NOTES
Pharmacy Note  Warfarin Consult    Theresa Bob is a 66 y.o. male for whom pharmacy has been consulted to manage warfarin therapy. Consulting Physician: Dr Darya Miller PAManuelC  Reason for Admission: AICD problem    Warfarin dose prior to admission: 6 mg Monday/Friday and 9 mg all other days  Warfarin indication: left ventricular thrombus  Target INR range: 2-3     Past Medical History:   Diagnosis Date    CAD (coronary artery disease) 11/19/13    cabg x 3    Cardiac arrest ([address]     Hyperkalemia 5/19/2014    Hypertension, essential     Ischemic cardiomyopathy 12/8/2016    Left ventricular apical thrombus 12/9/2016    MI (myocardial infarction) [facility]) November 2013    Pseudoaneurysm [facility]) - right groin 64/92/7218    Systolic dysfunction with acute on chronic heart failure (HCC) - Efx 30% 12/8/2016                No results for input(s): INR in the last 72 hours. Recent Labs     04/24/19  0819   HGB 16.2   HCT 48.9          Current warfarin drug-drug interactions: acetaminophen, aspirin, prednisone      Date             INR           Dose   4/24/2019         ordered      9 mg    Daily PT/INR while inpatient. Thank you for the consult. Will continue to follow. [address] [place] [address]  Ph., CACP, Clinical Pharmacist  Anticoagulation Services, [address] [place] [address] [facility]  4/24/2019  2:21 PM

## 2019-04-25 LAB
EKG ATRIAL RATE: 52 BPM
EKG ATRIAL RATE: 57 BPM
EKG P AXIS: -3 DEGREES
EKG P AXIS: 53 DEGREES
EKG P-R INTERVAL: 234 MS
EKG P-R INTERVAL: 238 MS
EKG Q-T INTERVAL: 458 MS
EKG Q-T INTERVAL: 492 MS
EKG QRS DURATION: 152 MS
EKG QRS DURATION: 160 MS
EKG QTC CALCULATION (BAZETT): 445 MS
EKG QTC CALCULATION (BAZETT): 457 MS
EKG R AXIS: -59 DEGREES
EKG R AXIS: -65 DEGREES
EKG T AXIS: 17 DEGREES
EKG T AXIS: 37 DEGREES
EKG VENTRICULAR RATE: 52 BPM
EKG VENTRICULAR RATE: 57 BPM

## 2019-04-25 NOTE — DISCHARGE SUMMARY
[address]    Discharge Summary     Patient ID: Shama Rollins  :  1945   MRN: [id number]     ACCOUNT:  [de-identified]   Patient's PCP: Roxy Jean-Baptiste MD  Admit Date: 2019   Discharge Date: 2019    Discharge Physician: Myrna Kong DO     The patient was seen and examined on day of discharge and this discharge summary is in conjunction with any daily progress note from day of discharge. Active Discharge Diagnoses:     Primary Problem  AICD malfunction, initial encounter      [place] Problems    Diagnosis Date Noted    AICD malfunction, initial encounter [T82.118A] 2019    Chronic systolic heart failure ([facility] [I50.22] 2019    Serum lipase elevation [R74.8] 2019    Heart block, AV - bifascicular [I44.30] 2019    Left ventricular apical thrombus [I51.3] 2016    Azotemia [R79.89] 2016    Coronary artery disease involving native coronary artery of native heart without angina pectoris [I25.10] 2014    Tobacco use [Z72.0] 2014    HTN (hypertension), benign [I10] 2014         Hospital Course:     Brief History:  Per ED:      \"66year-old male with a history of CAD, CABG, AICD placement who presents with 2 days of noting his AST is making alarm.  He says he has no cardiac symptoms, is recovering from a diarrheal illness but is eating and drinking normally now and feels that his symptoms are almost gone.  Did have some symptoms of heartburn last night however these are resolved. \"     Mr. Max Quevedo reports that he began hearing a beeping noise yesterday, which he originally thought was his phone. He continued to hear the noise and realized it was his pacemaker alarm, prompting him to come to the ER. He otherwise denies chest pain, shortness of breath, palpitations, or diaphoresis.  Cardiology was consulted in the ED and recommended admission to IM.     In the ED, HS troponin x 2 was 8. Labs unremarkable with exception to mildly elevated lipase (76), ALP (173), and ALT (58).    He was admitted to IM for further management. Discharge plan:       57-year-old male admitted through the emergency room  His ICD was placed in 2013  The device began beeping  The patient was admitted for further cardiology evaluation     His ICD was interrogated and reprogrammed  And is no longer alarming     Patient was found to have an elevated lipase  He does acknowledge some dyspepsia  No prior history of pancreatitis  He did eat a pizza last night with \"everything on it\"  The patient does generally drink a beer on Monday when he bowls  There has been no beer consumption this week     Database revealed:  EKG:Sinus bradycardia with 1st degree A-V block  Right bundle branch block  Left anterior fascicular block  Bifascicular block   Minimal voltage criteria for LVH, may be normal variant  Abnormal ECG  When compared with ECG of 24-APR-2019 08:07,      Lipase 76High      Glucose 107High mg/dL      BUN 37High mg/dL   CREATININE 1.10      Chest x-ray:  Impression:   No acute intrathoracic process.      The patient feels good and hopes to go home  He has been cleared by cardiology for discharge  Suggest outpatient GI consultation  Cardiology follow-up as scheduled  Has stress test and echocardiogram ordered for tomorrow  Monitor BUN and creatinine     Will discharge when arrangements complete and ok with other services.   Follow-up with PCP in one week, Carry MD Tobias  Notify PCP of discharge         Significant Diagnostic Studies:   Labs / Micro:   Hematology:  Recent Labs     04/24/19  0819   WBC 9.5   HGB 16.2   HCT 48.9        Chemistry:  Recent Labs     04/24/19  0819      K 4.9      CO2 21   GLUCOSE 107*   BUN 37*   CREATININE 1.10   MG 2.2   CALCIUM 9.2     Recent Labs     04/24/19  0819   PROT 7.4   LABALBU 4.3   TSH 1.91   AST 38   ALT 58* ALKPHOS 173*   BILITOT 0.53   LIPASE 76*       Radiology:    Xr Chest Standard (2 Vw)    Result Date: 4/24/2019  EXAMINATION: TWO VIEWS OF THE CHEST 4/24/2019 10:19 am COMPARISON: 12/25/2018 HISTORY: ORDERING SYSTEM PROVIDED HISTORY: eval for AICD lead placement/fracture TECHNOLOGIST PROVIDED HISTORY: eval for AICD lead placement/fracture FINDINGS: Single lead cardiac defibrillator is seen overlying the right chest and is unchanged. There does not appear to be any evidence of lead fracture, as visualized. Median sternotomy wires are noted. Heart size is within normal limits. No vascular congestion. No focal airspace consolidation, pneumothorax, or pleural effusion. No free air beneath the diaphragm. No acute osseous abnormality. No acute intrathoracic process. Consultations:    Consults:     Final Specialist Recommendations/Findings:   IP CONSULT TO CARDIOLOGY  IP CONSULT TO INTERNAL MEDICINE  PHARMACY TO DOSE WARFARIN        Discharged Condition:    Stable     Disposition: Home    Physician Follow Up: Raul Owens MD  [address] [place] [address]  [phone]    Call  for follow-up appointment. Diet:   Cardiac    Activity:   As tolerated    Discharge Medications:      Medication List      CHANGE how you take these medications    carvedilol 3.125 MG tablet  Commonly known as:  COREG  Take 1 tablet by mouth 2 times daily (with meals)  What changed:  when to take this        CONTINUE taking these medications    * albuterol sulfate  (90 Base) MCG/ACT inhaler  Commonly known as:  VENTOLIN HFA  Inhale 2 puffs into the lungs every 6 hours as needed for Wheezing. * albuterol (5 MG/ML) 0.5% nebulizer solution  Commonly known as:  PROVENTIL  Take 0.5 mLs by nebulization every 6 hours as needed for Wheezing     aspirin 81 MG chewable tablet  Take 1 tablet by mouth daily.      atorvastatin 40 MG tablet  Commonly known as:  LIPITOR  Take 1 tablet by mouth nightly furosemide 40 MG tablet  Commonly known as:  LASIX  Take 0.5 tablets by mouth daily     lisinopril 10 MG tablet  Commonly known as:  PRINIVIL;ZESTRIL  Take 1 tablet by mouth daily     * Nebulizer Compressor Kit  1 kit by Does not apply route once for 1 dose     * Nebulizer Compressor Kit  1 kit by Does not apply route once for 1 dose     predniSONE 50 MG tablet  Commonly known as:  DELTASONE  Take 1 tablet by mouth daily     sotalol 80 MG tablet  Commonly known as:  BETAPACE  Take 1 tablet by mouth 2 times daily. spironolactone 25 MG tablet  Commonly known as:  ALDACTONE  Take 1 tablet by mouth daily     * warfarin 6 MG tablet  Commonly known as:  COUMADIN     * warfarin 1 MG tablet  Commonly known as:  COUMADIN         * This list has 6 medication(s) that are the same as other medications prescribed for you. Read the directions carefully, and ask your doctor or other care provider to review them with you. Where to Get Your Medications      These medications were sent to [facility] [address] [place] [address], [address] [place] [address], [address]  [address]    Phone:  [phone]   · furosemide 40 MG tablet         Time Spent on discharge is  15 mins in patient examination, evaluation, counseling, medication reconciliation, discharge plan and follow up. Electronically signed by   Evalina Blizzard, DO  4/25/2019  7:51 AM      Thank you Dr. Jerica Long MD for the opportunity to be involved in this patient's care.

## 2019-05-29 ENCOUNTER — HOSPITAL ENCOUNTER (OUTPATIENT)
Age: 74
Discharge: HOME | End: 2019-05-29
Payer: MEDICARE

## 2019-05-29 LAB
INR BLD: 1.8
PROTHROMBIN TIME: 18 SEC (ref 9–12)

## 2019-05-29 PROCEDURE — 36415 COLL VENOUS BLD VENIPUNCTURE: CPT

## 2019-05-29 PROCEDURE — 85610 PROTHROMBIN TIME: CPT

## 2019-07-10 ENCOUNTER — HOSPITAL ENCOUNTER (OUTPATIENT)
Dept: PREADMISSION TESTING | Age: 74
Discharge: HOME | End: 2019-07-14
Payer: MEDICARE

## 2019-07-10 VITALS
SYSTOLIC BLOOD PRESSURE: 140 MMHG | HEIGHT: 68 IN | RESPIRATION RATE: 16 BRPM | DIASTOLIC BLOOD PRESSURE: 65 MMHG | BODY MASS INDEX: 30.81 KG/M2 | OXYGEN SATURATION: 97 % | WEIGHT: 203.26 LBS | HEART RATE: 54 BPM

## 2019-07-10 LAB
ANION GAP SERPL CALCULATED.3IONS-SCNC: 11 MMOL/L (ref 9–17)
BUN BLDV-MCNC: 27 MG/DL (ref 8–23)
CHLORIDE BLD-SCNC: 101 MMOL/L (ref 98–107)
CO2: 27 MMOL/L (ref 20–31)
CREAT SERPL-MCNC: 1.12 MG/DL (ref 0.7–1.2)
GFR AFRICAN AMERICAN: >60 ML/MIN
GFR NON-AFRICAN AMERICAN: >60 ML/MIN
GFR SERPL CREATININE-BSD FRML MDRD: ABNORMAL ML/MIN/{1.73_M2}
GFR SERPL CREATININE-BSD FRML MDRD: ABNORMAL ML/MIN/{1.73_M2}
HCT VFR BLD CALC: 43.8 % (ref 40.7–50.3)
HEMOGLOBIN: 14.5 G/DL (ref 13–17)
MCH RBC QN AUTO: 30.5 PG (ref 25.2–33.5)
MCHC RBC AUTO-ENTMCNC: 33.1 G/DL (ref 28.4–34.8)
MCV RBC AUTO: 92.2 FL (ref 82.6–102.9)
NRBC AUTOMATED: 0 PER 100 WBC
PDW BLD-RTO: 13.5 % (ref 11.8–14.4)
PLATELET # BLD: 211 K/UL (ref 138–453)
PMV BLD AUTO: 11.6 FL (ref 8.1–13.5)
POTASSIUM SERPL-SCNC: 4.6 MMOL/L (ref 3.7–5.3)
RBC # BLD: 4.75 M/UL (ref 4.21–5.77)
SODIUM BLD-SCNC: 139 MMOL/L (ref 135–144)
WBC # BLD: 8.8 K/UL (ref 3.5–11.3)

## 2019-07-10 PROCEDURE — 36415 COLL VENOUS BLD VENIPUNCTURE: CPT

## 2019-07-10 PROCEDURE — 80051 ELECTROLYTE PANEL: CPT

## 2019-07-10 PROCEDURE — 82565 ASSAY OF CREATININE: CPT

## 2019-07-10 PROCEDURE — 85027 COMPLETE CBC AUTOMATED: CPT

## 2019-07-10 PROCEDURE — 84520 ASSAY OF UREA NITROGEN: CPT

## 2019-07-10 RX ORDER — AZELASTINE 1 MG/ML
1 SPRAY, METERED NASAL 2 TIMES DAILY PRN
COMMUNITY
End: 2022-04-07

## 2019-07-10 RX ORDER — MONTELUKAST SODIUM 10 MG/1
10 TABLET ORAL NIGHTLY
COMMUNITY

## 2019-07-10 RX ORDER — IPRATROPIUM BROMIDE AND ALBUTEROL SULFATE 2.5; .5 MG/3ML; MG/3ML
1 SOLUTION RESPIRATORY (INHALATION) 3 TIMES DAILY PRN
Status: ON HOLD | COMMUNITY
End: 2022-08-12 | Stop reason: HOSPADM

## 2019-07-10 RX ORDER — IPRATROPIUM BROMIDE 21 UG/1
2 SPRAY, METERED NASAL EVERY 12 HOURS
COMMUNITY
End: 2022-04-07

## 2019-07-10 NOTE — PRE-PROCEDURE INSTRUCTIONS
pressure, or seizure medications. No alcoholic beverages for 24 hours prior to surgery.  Bring a list of all medications you take, along with the dose of the medications and how often you take it. If more convenient bring the pharmacy bottles in a zip lock bag.  Brush your teeth but do not swallow water.  Bring your eyeglasses and case with you. No contacts are to be worn the day of surgery. You also may bring your hearing aids.  If you are on C-PAP or Bi-PAP at home and plan on staying in the hospital overnight for your surgery please bring the machine with you. · Do not wear any jewelry or body piercings day of surgery. Also, NO lotion, perfume or deodorant to be used the day of surgery. No nail polish on the operative extremity (arm/leg surgeries)    ·   If you are staying overnight with us, please bring a small bag of personal items.  Please wear loose, comfortable clothing. If you are potentially going to have a cast or brace bring clothing that will fit over them.  In case of illness - If you have cold or flu like symptoms (high fever, runny nose, sore throat, cough, etc.) rash, nausea, vomiting, loose stools, and/or recent contact with someone who has a contagious disease (chicken pox, measles, etc.) Please call your doctor before coming to the hospital.     If your child is having surgery please make arrangements for any other children to be cared for at home on the day of surgery. Other children are not permitted in recovery room and we want you to be able to spend time with the patient. If other arrangements are not available then we suggest that you have a second adult to stay in the waiting room. Day of Surgery/Procedure:    As a patient at [place] [address] you can expect quality medical and nursing care that is centered on your individual needs.

## 2019-07-10 NOTE — H&P
atraumatic. Eye:  No icterus, redness, pupils equal and reactive, extraocular eye movements intact, and conjunctiva clear. Ear: Hard of hearing. Nose:  No drainage noted. Mouth:  Mucous membranes moist.  Neck:  Supple and no carotid bruits noted. Lungs:  Bilateral equal air entry, clear to auscultation, no wheezing, rales or rhonchi, and normal effort. Cardiovascular: Distant heart sounds. Bradycardia. Defibrillator in the right upper chest. Regular rhythm, no murmur, gallop, or rub. Abdomen:  Soft, non-tender, non-distended, and active bowel sounds. Neurologic:  Normal speech and cranial nerves II through XII grossly intact. Strength 5/5 bilaterally. Skin: Soft, golf ball size lump in the right upper chest.  Multiple bruises on bilateral arms. No other gross lesions, rashes, or bleeding on exposed skin area. Extremities: Posterior tibial pulses 2+ bilaterally. No pedal edema. No calf tenderness with palpation. Psych: Normal affect.      Investigations:      Laboratory Testing:  Recent Results (from the past 24 hour(s))   CBC    Collection Time: 07/10/19  8:57 AM   Result Value Ref Range    WBC 8.8 3.5 - 11.3 k/uL    RBC 4.75 4.21 - 5.77 m/uL    Hemoglobin 14.5 13.0 - 17.0 g/dL    Hematocrit 43.8 40.7 - 50.3 %    MCV 92.2 82.6 - 102.9 fL    MCH 30.5 25.2 - 33.5 pg    MCHC 33.1 28.4 - 34.8 g/dL    RDW 13.5 11.8 - 14.4 %    Platelets 567 418 - 961 k/uL    MPV 11.6 8.1 - 13.5 fL    NRBC Automated 0.0 0.0 per 100 WBC   Electrolytes    Collection Time: 07/10/19  8:57 AM   Result Value Ref Range    Sodium 139 135 - 144 mmol/L    Potassium 4.6 3.7 - 5.3 mmol/L    Chloride 101 98 - 107 mmol/L    CO2 27 20 - 31 mmol/L    Anion Gap 11 9 - 17 mmol/L   BUN + Creatinine    Collection Time: 07/10/19  8:57 AM   Result Value Ref Range    BUN 27 (H) 8 - 23 mg/dL    CREATININE 1.12 0.70 - 1.20 mg/dL    GFR Non-African American >60 >60 mL/min    GFR African American >60 >60 mL/min    GFR Comment          GFR Staging NOT

## 2019-07-10 NOTE — H&P (VIEW-ONLY)
tolerance     pt plays golf, bowling but cannot play [employer] squash anymore (he passes out), still works full time ()    Hx of blood clots 12/2017    heart    Hyperkalemia 5/19/2014    Hypertension, essential     Ischemic cardiomyopathy 12/8/2016    Left ventricular apical thrombus 12/9/2016    [facility]) [facility] 2013    Pseudoaneurysm [facility]) - right groin 12/12/2016    Post cardiac catheterization    Sinus drainage     Systolic dysfunction with acute on chronic heart failure (HCC) - Efx 30% 12/8/2016        Past Surgical History:     Past Surgical History:   Procedure Laterality Date    CARDIAC CATHETERIZATION  12/08/2016    No stents placed per pt    CARDIAC CATHETERIZATION  11/18/2013    CARDIAC DEFIBRILLATOR PLACEMENT  2013    COLONOSCOPY      CORONARY ARTERY BYPASS GRAFT  11/19/2013    x 3 vessell    TONSILLECTOMY      VASECTOMY          Medications Prior to Admission:     Prior to Admission medications    Medication Sig Start Date End Date Taking?  Authorizing Provider   montelukast (SINGULAIR) 10 MG tablet Take 10 mg by mouth nightly   Yes Historical Provider, MD   ipratropium-albuterol (DUONEB) 0.5-2.5 (3) MG/3ML SOLN nebulizer solution Inhale 1 vial into the lungs 3 times daily as needed for Shortness of Breath   Yes Historical Provider, MD   fluticasone propionate (FLOVENT DISKUS) 100 MCG/BLIST AEPB inhaler Inhale 1 puff into the lungs 2 times daily   Yes Historical Provider, MD   ipratropium (ATROVENT) 0.03 % nasal spray 2 sprays by Each Nostril route every 12 hours   Yes Historical Provider, MD   azelastine (ASTELIN) 0.1 % nasal spray 1 spray by Nasal route 2 times daily as needed for Rhinitis Use in each nostril as directed   Yes Historical Provider, MD   LUTEIN PO Take 2 tablets by mouth daily   Yes Historical Provider, MD   Cholecalciferol (VITAMIN D3 PO) Take by mouth daily   Yes Historical Provider, MD   VITAMIN E PO Take by mouth daily   Yes Historical Provider, MD   furosemide (LASIX) 40 MG tablet Take 0.5 tablets by mouth daily 4/24/19   Caroline Lighter, DO   Respiratory Therapy Supplies (NEBULIZER COMPRESSOR) KIT 1 kit by Does not apply route once for 1 dose 6/10/18 12/25/18  Saulo Urrutia MD   Respiratory Therapy Supplies (NEBULIZER COMPRESSOR) KIT 1 kit by Does not apply route once for 1 dose 6/10/18 6/10/18  Saulo Urrutia MD   warfarin (COUMADIN) 6 MG tablet Take 6 mg by mouth Takes MON and tuesday    Historical Provider, MD   warfarin (COUMADIN) 1 MG tablet Take 9 mg by mouth daily on Sunday  [facility]    Historical Provider, MD   atorvastatin (LIPITOR) 40 MG tablet Take 1 tablet by mouth nightly 12/13/16   Akin Argueta, DO   lisinopril (PRINIVIL;ZESTRIL) 10 MG tablet Take 1 tablet by mouth daily 12/13/16   Akin Argueta, DO   spironolactone (ALDACTONE) 25 MG tablet Take 1 tablet by mouth daily 12/13/16   Akin Argueta, DO   albuterol (VENTOLIN HFA) 108 (90 BASE) MCG/ACT inhaler Inhale 2 puffs into the lungs every 6 hours as needed for Wheezing. 10/10/14   Mendez Gomez, DO   sotalol (BETAPACE) 80 MG tablet Take 1 tablet by mouth 2 times daily. 5/19/14   Caroline Lighter, DO   aspirin 81 MG chewable tablet Take 1 tablet by mouth daily. 11/26/13   Audelia Hightower PA-C        Allergies:     Pcn [penicillins] and Pcn [penicillins]    Social History:     Tobacco:    reports that he has been smoking cigars. He has been smoking about 0.00 packs per day. He has never used smokeless tobacco.  Alcohol:      reports that he drinks alcohol. Drug Use:  reports that he does not use drugs. Functional Capacity:   1) Pt is able to walk 2 city blocks or more on level ground without SOB. 2) Pt is able to climb 2 flights of stairs without SOB. 3) Pt is not able to walk up a hill for 1-2 city blocks without SOB.     Family History:     Family History   Problem Relation Age of Onset    Hypertension Father     Stroke Father     Stroke

## 2019-07-18 ENCOUNTER — ANESTHESIA EVENT (OUTPATIENT)
Dept: OPERATING ROOM | Age: 74
End: 2019-07-18
Payer: MEDICARE

## 2019-07-19 ENCOUNTER — ANESTHESIA (OUTPATIENT)
Dept: OPERATING ROOM | Age: 74
End: 2019-07-19
Payer: MEDICARE

## 2019-07-19 ENCOUNTER — HOSPITAL ENCOUNTER (OUTPATIENT)
Age: 74
Setting detail: OUTPATIENT SURGERY
Discharge: HOME | End: 2019-07-19
Attending: SURGERY | Admitting: SURGERY
Payer: MEDICARE

## 2019-07-19 VITALS
WEIGHT: 203.26 LBS | DIASTOLIC BLOOD PRESSURE: 69 MMHG | TEMPERATURE: 97 F | HEIGHT: 68 IN | RESPIRATION RATE: 16 BRPM | OXYGEN SATURATION: 98 % | HEART RATE: 43 BPM | BODY MASS INDEX: 30.81 KG/M2 | SYSTOLIC BLOOD PRESSURE: 110 MMHG

## 2019-07-19 VITALS — TEMPERATURE: 95 F | SYSTOLIC BLOOD PRESSURE: 131 MMHG | OXYGEN SATURATION: 98 % | DIASTOLIC BLOOD PRESSURE: 59 MMHG

## 2019-07-19 DIAGNOSIS — G89.18 ACUTE POST-OPERATIVE PAIN: Primary | ICD-10-CM

## 2019-07-19 LAB
INR BLD: 1
PARTIAL THROMBOPLASTIN TIME: 24.7 SEC (ref 23–31)
PROTHROMBIN TIME: 10.5 SEC (ref 9.7–11.6)

## 2019-07-19 PROCEDURE — 2580000003 HC RX 258: Performed by: SURGERY

## 2019-07-19 PROCEDURE — 85730 THROMBOPLASTIN TIME PARTIAL: CPT

## 2019-07-19 PROCEDURE — 7100000010 HC PHASE II RECOVERY - FIRST 15 MIN: Performed by: SURGERY

## 2019-07-19 PROCEDURE — 88304 TISSUE EXAM BY PATHOLOGIST: CPT

## 2019-07-19 PROCEDURE — 3700000001 HC ADD 15 MINUTES (ANESTHESIA): Performed by: SURGERY

## 2019-07-19 PROCEDURE — 2580000003 HC RX 258: Performed by: ANESTHESIOLOGY

## 2019-07-19 PROCEDURE — 3700000000 HC ANESTHESIA ATTENDED CARE: Performed by: SURGERY

## 2019-07-19 PROCEDURE — 3600000012 HC SURGERY LEVEL 2 ADDTL 15MIN: Performed by: SURGERY

## 2019-07-19 PROCEDURE — 6360000002 HC RX W HCPCS: Performed by: SURGERY

## 2019-07-19 PROCEDURE — 2500000003 HC RX 250 WO HCPCS

## 2019-07-19 PROCEDURE — 85610 PROTHROMBIN TIME: CPT

## 2019-07-19 PROCEDURE — 2500000003 HC RX 250 WO HCPCS: Performed by: SURGERY

## 2019-07-19 PROCEDURE — 7100000011 HC PHASE II RECOVERY - ADDTL 15 MIN: Performed by: SURGERY

## 2019-07-19 PROCEDURE — 3600000002 HC SURGERY LEVEL 2 BASE: Performed by: SURGERY

## 2019-07-19 PROCEDURE — 2709999900 HC NON-CHARGEABLE SUPPLY: Performed by: SURGERY

## 2019-07-19 PROCEDURE — 6360000002 HC RX W HCPCS

## 2019-07-19 RX ORDER — DOCUSATE SODIUM 100 MG/1
100 CAPSULE, LIQUID FILLED ORAL 2 TIMES DAILY PRN
Qty: 20 CAPSULE | Refills: 0 | Status: SHIPPED | OUTPATIENT
Start: 2019-07-19 | End: 2022-04-07

## 2019-07-19 RX ORDER — HYDROCODONE BITARTRATE AND ACETAMINOPHEN 5; 325 MG/1; MG/1
1 TABLET ORAL EVERY 6 HOURS PRN
Qty: 20 TABLET | Refills: 0 | Status: SHIPPED | OUTPATIENT
Start: 2019-07-19 | End: 2019-07-26

## 2019-07-19 RX ORDER — BUPIVACAINE HYDROCHLORIDE AND EPINEPHRINE 5; 5 MG/ML; UG/ML
INJECTION, SOLUTION EPIDURAL; INTRACAUDAL; PERINEURAL PRN
Status: DISCONTINUED | OUTPATIENT
Start: 2019-07-19 | End: 2019-07-19 | Stop reason: ALTCHOICE

## 2019-07-19 RX ORDER — PROPOFOL 10 MG/ML
INJECTION, EMULSION INTRAVENOUS CONTINUOUS PRN
Status: DISCONTINUED | OUTPATIENT
Start: 2019-07-19 | End: 2019-07-19 | Stop reason: SDUPTHER

## 2019-07-19 RX ORDER — LIDOCAINE HYDROCHLORIDE AND EPINEPHRINE 10; 10 MG/ML; UG/ML
INJECTION, SOLUTION INFILTRATION; PERINEURAL PRN
Status: DISCONTINUED | OUTPATIENT
Start: 2019-07-19 | End: 2019-07-19 | Stop reason: ALTCHOICE

## 2019-07-19 RX ORDER — ONDANSETRON 4 MG/1
4 TABLET, FILM COATED ORAL EVERY 8 HOURS PRN
Qty: 20 TABLET | Refills: 0 | Status: SHIPPED | OUTPATIENT
Start: 2019-07-19 | End: 2022-04-07

## 2019-07-19 RX ORDER — LIDOCAINE HYDROCHLORIDE 20 MG/ML
INJECTION, SOLUTION EPIDURAL; INFILTRATION; INTRACAUDAL; PERINEURAL PRN
Status: DISCONTINUED | OUTPATIENT
Start: 2019-07-19 | End: 2019-07-19 | Stop reason: SDUPTHER

## 2019-07-19 RX ORDER — SODIUM CHLORIDE, SODIUM LACTATE, POTASSIUM CHLORIDE, CALCIUM CHLORIDE 600; 310; 30; 20 MG/100ML; MG/100ML; MG/100ML; MG/100ML
INJECTION, SOLUTION INTRAVENOUS CONTINUOUS
Status: DISCONTINUED | OUTPATIENT
Start: 2019-07-19 | End: 2019-07-19 | Stop reason: HOSPADM

## 2019-07-19 RX ORDER — ONDANSETRON 2 MG/ML
4 INJECTION INTRAMUSCULAR; INTRAVENOUS
Status: DISCONTINUED | OUTPATIENT
Start: 2019-07-19 | End: 2019-07-19 | Stop reason: HOSPADM

## 2019-07-19 RX ORDER — HYDROMORPHONE HCL 110MG/55ML
0.25 PATIENT CONTROLLED ANALGESIA SYRINGE INTRAVENOUS EVERY 5 MIN PRN
Status: DISCONTINUED | OUTPATIENT
Start: 2019-07-19 | End: 2019-07-19 | Stop reason: HOSPADM

## 2019-07-19 RX ORDER — PROPOFOL 10 MG/ML
INJECTION, EMULSION INTRAVENOUS PRN
Status: DISCONTINUED | OUTPATIENT
Start: 2019-07-19 | End: 2019-07-19 | Stop reason: SDUPTHER

## 2019-07-19 RX ORDER — FENTANYL CITRATE 50 UG/ML
50 INJECTION, SOLUTION INTRAMUSCULAR; INTRAVENOUS EVERY 5 MIN PRN
Status: DISCONTINUED | OUTPATIENT
Start: 2019-07-19 | End: 2019-07-19 | Stop reason: HOSPADM

## 2019-07-19 RX ORDER — HYDROMORPHONE HCL 110MG/55ML
0.5 PATIENT CONTROLLED ANALGESIA SYRINGE INTRAVENOUS EVERY 5 MIN PRN
Status: DISCONTINUED | OUTPATIENT
Start: 2019-07-19 | End: 2019-07-19 | Stop reason: HOSPADM

## 2019-07-19 RX ORDER — FENTANYL CITRATE 50 UG/ML
25 INJECTION, SOLUTION INTRAMUSCULAR; INTRAVENOUS EVERY 5 MIN PRN
Status: DISCONTINUED | OUTPATIENT
Start: 2019-07-19 | End: 2019-07-19 | Stop reason: HOSPADM

## 2019-07-19 RX ADMIN — CEFAZOLIN 2 G: 10 INJECTION, POWDER, FOR SOLUTION INTRAVENOUS; PARENTERAL at 10:12

## 2019-07-19 RX ADMIN — LIDOCAINE HYDROCHLORIDE 100 MG: 20 INJECTION, SOLUTION EPIDURAL; INFILTRATION; INTRACAUDAL; PERINEURAL at 10:07

## 2019-07-19 RX ADMIN — PROPOFOL 40 MG: 10 INJECTION, EMULSION INTRAVENOUS at 10:10

## 2019-07-19 RX ADMIN — PROPOFOL 75 MCG/KG/MIN: 10 INJECTION, EMULSION INTRAVENOUS at 10:10

## 2019-07-19 RX ADMIN — SODIUM CHLORIDE, POTASSIUM CHLORIDE, SODIUM LACTATE AND CALCIUM CHLORIDE: 600; 310; 30; 20 INJECTION, SOLUTION INTRAVENOUS at 07:41

## 2019-07-19 ASSESSMENT — PULMONARY FUNCTION TESTS
PIF_VALUE: 1
PIF_VALUE: 0
PIF_VALUE: 1
PIF_VALUE: 0
PIF_VALUE: 1

## 2019-07-19 ASSESSMENT — PAIN SCALES - GENERAL
PAINLEVEL_OUTOF10: 0

## 2019-07-19 ASSESSMENT — ENCOUNTER SYMPTOMS: SHORTNESS OF BREATH: 1

## 2019-07-19 ASSESSMENT — PAIN - FUNCTIONAL ASSESSMENT: PAIN_FUNCTIONAL_ASSESSMENT: 0-10

## 2019-07-19 NOTE — ANESTHESIA POSTPROCEDURE EVALUATION
Department of Anesthesiology  Postprocedure Note    Patient: Jada Salinas  MRN: [id number]  YOB: 1945  Date of evaluation: 7/19/2019  Time:  11:54 AM     Procedure Summary     Date:  07/19/19 Room / Location:  [facility] OR 04 / [facility]    Anesthesia Start:  1002 Anesthesia Stop:  1119    Procedure:  EXCISION RIGHT SHOULDER MASS (Right ) Diagnosis:  (DX SHOULDER MASS)    Surgeon:  Sundar Ferraro DO Responsible Provider:  Michell Amaya MD    Anesthesia Type:  MAC ASA Status:  4          Anesthesia Type: MAC    Lianna Phase I:      Lianna Phase II: Lianna Score: 8    Last vitals: Reviewed and per EMR flowsheets.        Anesthesia Post Evaluation    Patient location during evaluation: PACU  Level of consciousness: awake and alert  Complications: no

## 2019-07-19 NOTE — INTERVAL H&P NOTE
Supplies (NEBULIZER COMPRESSOR) KIT 1 kit by Does not apply route once for 1 dose 6/10/18 12/25/18  Camelia Prince MD   Respiratory Therapy Supplies (NEBULIZER COMPRESSOR) KIT 1 kit by Does not apply route once for 1 dose 6/10/18 6/10/18  Camelia Prince MD   warfarin (COUMADIN) 6 MG tablet Take 6 mg by mouth Takes MON and tuesday    Historical Provider, MD   warfarin (COUMADIN) 1 MG tablet Take 9 mg by mouth daily on Gamal WED THURS FRI Sat    Historical Provider, MD   atorvastatin (LIPITOR) 40 MG tablet Take 1 tablet by mouth nightly 12/13/16   Maura Cervantes, DO   lisinopril (PRINIVIL;ZESTRIL) 10 MG tablet Take 1 tablet by mouth daily 12/13/16   Maura Cervantes, DO   spironolactone (ALDACTONE) 25 MG tablet Take 1 tablet by mouth daily 12/13/16   Maura Cervantes, DO   albuterol (VENTOLIN HFA) 108 (90 BASE) MCG/ACT inhaler Inhale 2 puffs into the lungs every 6 hours as needed for Wheezing. 10/10/14   Kenny Pickering, DO   sotalol (BETAPACE) 80 MG tablet Take 1 tablet by mouth 2 times daily. 5/19/14   Kameron Roque, DO   aspirin 81 MG chewable tablet Take 1 tablet by mouth daily. 11/26/13   Michelle Verduzco PA-C         This is a 66 y.o. male who is pleasant, cooperative, alert and oriented x3, in no acute distress. Heart: Heart sounds are normal.  HR regular rate and rhythm without murmur, gallop or rub. Lungs: Normal respiratory effort with good air exchange, unlabored and clear to auscultation without wheezes or rales bilaterally   Abdomen: soft, nontender, nondistended with bowel sounds   PEDAL PULSES . + 2 BILATERALLY NO CALF TENDERNESS NO EDEMA.        Labs:  Recent Labs     07/10/19  0857   HGB 14.5   HCT 43.8   WBC 8.8   MCV 92.2         K 4.6      CO2 27   BUN 27*   CREATININE 1.12       BRUNO FRAUSTO  APRN, ACNP-BC  Electronically signed 7/19/2019 at 7:46 AM

## 2019-07-22 LAB — SURGICAL PATHOLOGY REPORT: NORMAL

## 2019-08-02 ENCOUNTER — HOSPITAL ENCOUNTER (OUTPATIENT)
Age: 74
Discharge: HOME | End: 2019-08-02
Payer: MEDICARE

## 2019-08-02 LAB
INR BLD: 1.4
PROTHROMBIN TIME: 14.4 SEC (ref 9–12)

## 2019-08-02 PROCEDURE — 85610 PROTHROMBIN TIME: CPT

## 2019-08-02 PROCEDURE — 36415 COLL VENOUS BLD VENIPUNCTURE: CPT

## 2019-08-20 ENCOUNTER — HOSPITAL ENCOUNTER (OUTPATIENT)
Age: 74
Discharge: HOME | End: 2019-08-20
Payer: MEDICARE

## 2019-08-20 LAB
INR BLD: 4.2
PROTHROMBIN TIME: 39.2 SEC (ref 9–12)

## 2019-08-20 PROCEDURE — 85610 PROTHROMBIN TIME: CPT

## 2019-08-20 PROCEDURE — 36415 COLL VENOUS BLD VENIPUNCTURE: CPT

## 2019-08-27 ENCOUNTER — HOSPITAL ENCOUNTER (OUTPATIENT)
Age: 74
Discharge: HOME | End: 2019-08-27
Payer: MEDICARE

## 2019-08-27 LAB
INR BLD: 1.3
PROTHROMBIN TIME: 13.5 SEC (ref 9–12)

## 2019-08-27 PROCEDURE — 36415 COLL VENOUS BLD VENIPUNCTURE: CPT

## 2019-08-27 PROCEDURE — 85610 PROTHROMBIN TIME: CPT

## 2019-09-04 ENCOUNTER — HOSPITAL ENCOUNTER (OUTPATIENT)
Age: 74
Discharge: HOME | End: 2019-09-04
Payer: MEDICARE

## 2019-09-04 LAB
INR BLD: 1.9
PROTHROMBIN TIME: 19.2 SEC (ref 9–12)

## 2019-09-04 PROCEDURE — 36415 COLL VENOUS BLD VENIPUNCTURE: CPT

## 2019-09-04 PROCEDURE — 85610 PROTHROMBIN TIME: CPT

## 2019-09-19 ENCOUNTER — HOSPITAL ENCOUNTER (OUTPATIENT)
Age: 74
Discharge: HOME | End: 2019-09-19
Payer: MEDICARE

## 2019-09-19 LAB
INR BLD: 2.6
PROTHROMBIN TIME: 25.7 SEC (ref 9–12)

## 2019-09-19 PROCEDURE — 85610 PROTHROMBIN TIME: CPT

## 2019-09-19 PROCEDURE — 36415 COLL VENOUS BLD VENIPUNCTURE: CPT

## 2019-10-24 ENCOUNTER — HOSPITAL ENCOUNTER (OUTPATIENT)
Age: 74
Discharge: HOME | End: 2019-10-24
Payer: MEDICARE

## 2019-10-24 LAB
INR BLD: 2.7
PROTHROMBIN TIME: 26.6 SEC (ref 9–12)

## 2019-10-24 PROCEDURE — 36415 COLL VENOUS BLD VENIPUNCTURE: CPT

## 2019-10-24 PROCEDURE — 85610 PROTHROMBIN TIME: CPT

## 2019-11-22 ENCOUNTER — HOSPITAL ENCOUNTER (OUTPATIENT)
Age: 74
Discharge: HOME | End: 2019-11-22
Payer: MEDICARE

## 2019-11-22 LAB
INR BLD: 2.5
PROTHROMBIN TIME: 24.3 SEC (ref 9–12)

## 2019-11-22 PROCEDURE — 36415 COLL VENOUS BLD VENIPUNCTURE: CPT

## 2019-11-22 PROCEDURE — 85610 PROTHROMBIN TIME: CPT

## 2019-12-27 ENCOUNTER — HOSPITAL ENCOUNTER (OUTPATIENT)
Age: 74
Discharge: HOME | End: 2019-12-27
Payer: MEDICARE

## 2019-12-27 LAB
INR BLD: 2.8
PROTHROMBIN TIME: 27.3 SEC (ref 9–12)

## 2019-12-27 PROCEDURE — 36415 COLL VENOUS BLD VENIPUNCTURE: CPT

## 2019-12-27 PROCEDURE — 85610 PROTHROMBIN TIME: CPT

## 2020-01-28 ENCOUNTER — HOSPITAL ENCOUNTER (OUTPATIENT)
Age: 75
Discharge: HOME | End: 2020-01-28
Payer: MEDICARE

## 2020-01-28 LAB
INR BLD: 3.7
PROTHROMBIN TIME: 35.1 SEC (ref 9–12)

## 2020-01-28 PROCEDURE — 85610 PROTHROMBIN TIME: CPT

## 2020-01-28 PROCEDURE — 36415 COLL VENOUS BLD VENIPUNCTURE: CPT

## 2020-02-05 ENCOUNTER — HOSPITAL ENCOUNTER (OUTPATIENT)
Age: 75
Discharge: HOME | End: 2020-02-05
Payer: MEDICARE

## 2020-02-05 LAB
INR BLD: 1.5
PROTHROMBIN TIME: 15.4 SEC (ref 9–12)

## 2020-02-05 PROCEDURE — 85610 PROTHROMBIN TIME: CPT

## 2020-02-05 PROCEDURE — 36415 COLL VENOUS BLD VENIPUNCTURE: CPT

## 2020-02-18 ENCOUNTER — HOSPITAL ENCOUNTER (OUTPATIENT)
Age: 75
Discharge: HOME | End: 2020-02-18
Payer: MEDICARE

## 2020-02-18 LAB
INR BLD: 4.4
PROTHROMBIN TIME: 41.7 SEC (ref 9–12)

## 2020-02-18 PROCEDURE — 36415 COLL VENOUS BLD VENIPUNCTURE: CPT

## 2020-02-18 PROCEDURE — 85610 PROTHROMBIN TIME: CPT

## 2020-02-25 ENCOUNTER — HOSPITAL ENCOUNTER (OUTPATIENT)
Age: 75
Discharge: HOME | End: 2020-02-25
Payer: MEDICARE

## 2020-02-25 LAB
INR BLD: 3.2
PROTHROMBIN TIME: 30.7 SEC (ref 9–12)

## 2020-02-25 PROCEDURE — 85610 PROTHROMBIN TIME: CPT

## 2020-02-25 PROCEDURE — 36415 COLL VENOUS BLD VENIPUNCTURE: CPT

## 2020-03-03 ENCOUNTER — HOSPITAL ENCOUNTER (OUTPATIENT)
Age: 75
Discharge: HOME | End: 2020-03-03
Payer: MEDICARE

## 2020-03-03 LAB
INR BLD: 2.4
PROTHROMBIN TIME: 24 SEC (ref 9–12)

## 2020-03-03 PROCEDURE — 36415 COLL VENOUS BLD VENIPUNCTURE: CPT

## 2020-03-03 PROCEDURE — 85610 PROTHROMBIN TIME: CPT

## 2020-04-09 ENCOUNTER — HOSPITAL ENCOUNTER (OUTPATIENT)
Age: 75
Discharge: HOME | End: 2020-04-09
Payer: MEDICARE

## 2020-04-09 LAB
INR BLD: 2.9
PROTHROMBIN TIME: 29.2 SEC (ref 9–12)

## 2020-04-09 PROCEDURE — 85610 PROTHROMBIN TIME: CPT

## 2020-04-09 PROCEDURE — 36415 COLL VENOUS BLD VENIPUNCTURE: CPT

## 2020-05-11 ENCOUNTER — HOSPITAL ENCOUNTER (OUTPATIENT)
Age: 75
Discharge: HOME | End: 2020-05-11
Payer: MEDICARE

## 2020-05-11 LAB
INR BLD: 1.8
PROTHROMBIN TIME: 18.9 SEC (ref 9–12)

## 2020-05-11 PROCEDURE — 36415 COLL VENOUS BLD VENIPUNCTURE: CPT

## 2020-05-11 PROCEDURE — 85610 PROTHROMBIN TIME: CPT

## 2020-05-18 ENCOUNTER — HOSPITAL ENCOUNTER (OUTPATIENT)
Age: 75
Discharge: HOME | End: 2020-05-18
Payer: MEDICARE

## 2020-05-18 LAB
INR BLD: 4.4
PROTHROMBIN TIME: 43.8 SEC (ref 9–12)

## 2020-05-18 PROCEDURE — 85610 PROTHROMBIN TIME: CPT

## 2020-05-18 PROCEDURE — 36415 COLL VENOUS BLD VENIPUNCTURE: CPT

## 2020-05-28 ENCOUNTER — HOSPITAL ENCOUNTER (OUTPATIENT)
Age: 75
Discharge: HOME | End: 2020-05-28
Payer: MEDICARE

## 2020-05-28 LAB
INR BLD: 2.7
PROTHROMBIN TIME: 27 SEC (ref 9–12)

## 2020-05-28 PROCEDURE — 36415 COLL VENOUS BLD VENIPUNCTURE: CPT

## 2020-05-28 PROCEDURE — 85610 PROTHROMBIN TIME: CPT

## 2020-06-19 ENCOUNTER — HOSPITAL ENCOUNTER (OUTPATIENT)
Age: 75
Discharge: HOME | End: 2020-06-19
Payer: MEDICARE

## 2020-06-19 LAB
INR BLD: 3.4
PROTHROMBIN TIME: 33.6 SEC (ref 9–12)

## 2020-06-19 PROCEDURE — 36415 COLL VENOUS BLD VENIPUNCTURE: CPT

## 2020-06-19 PROCEDURE — 85610 PROTHROMBIN TIME: CPT

## 2020-06-26 ENCOUNTER — HOSPITAL ENCOUNTER (OUTPATIENT)
Age: 75
Discharge: HOME | End: 2020-06-26
Payer: MEDICARE

## 2020-06-26 LAB
INR BLD: 2.1
PROTHROMBIN TIME: 21.1 SEC (ref 9–12)

## 2020-06-26 PROCEDURE — 36415 COLL VENOUS BLD VENIPUNCTURE: CPT

## 2020-06-26 PROCEDURE — 85610 PROTHROMBIN TIME: CPT

## 2020-07-09 ENCOUNTER — HOSPITAL ENCOUNTER (OUTPATIENT)
Age: 75
Discharge: HOME | End: 2020-07-09
Payer: MEDICARE

## 2020-07-09 LAB
INR BLD: 2.2
PROTHROMBIN TIME: 22.1 SEC (ref 9–12)

## 2020-07-09 PROCEDURE — 36415 COLL VENOUS BLD VENIPUNCTURE: CPT

## 2020-07-09 PROCEDURE — 85610 PROTHROMBIN TIME: CPT

## 2020-08-13 ENCOUNTER — HOSPITAL ENCOUNTER (OUTPATIENT)
Age: 75
Discharge: HOME | End: 2020-08-13
Payer: MEDICARE

## 2020-08-13 LAB
INR BLD: 3.2
PROTHROMBIN TIME: 31.9 SEC (ref 9–12)

## 2020-08-13 PROCEDURE — 85610 PROTHROMBIN TIME: CPT

## 2020-08-13 PROCEDURE — 36415 COLL VENOUS BLD VENIPUNCTURE: CPT

## 2020-08-19 ENCOUNTER — HOSPITAL ENCOUNTER (OUTPATIENT)
Age: 75
Discharge: HOME | End: 2020-08-19
Payer: MEDICARE

## 2020-08-19 LAB
INR BLD: 3.8
PROTHROMBIN TIME: 37.8 SEC (ref 9–12)

## 2020-08-19 PROCEDURE — 36415 COLL VENOUS BLD VENIPUNCTURE: CPT

## 2020-08-19 PROCEDURE — 85610 PROTHROMBIN TIME: CPT

## 2020-08-27 ENCOUNTER — HOSPITAL ENCOUNTER (OUTPATIENT)
Age: 75
Discharge: HOME | End: 2020-08-27
Payer: MEDICARE

## 2020-08-27 LAB
INR BLD: 3.5
PROTHROMBIN TIME: 34.9 SEC (ref 9–12)

## 2020-08-27 PROCEDURE — 85610 PROTHROMBIN TIME: CPT

## 2020-08-27 PROCEDURE — 36415 COLL VENOUS BLD VENIPUNCTURE: CPT

## 2020-09-03 ENCOUNTER — HOSPITAL ENCOUNTER (OUTPATIENT)
Age: 75
Discharge: HOME | End: 2020-09-03
Payer: MEDICARE

## 2020-09-03 LAB
INR BLD: 2
PROTHROMBIN TIME: 20.7 SEC (ref 9–12)

## 2020-09-03 PROCEDURE — 85610 PROTHROMBIN TIME: CPT

## 2020-09-03 PROCEDURE — 36415 COLL VENOUS BLD VENIPUNCTURE: CPT

## 2020-09-10 ENCOUNTER — HOSPITAL ENCOUNTER (OUTPATIENT)
Age: 75
Discharge: HOME | End: 2020-09-10
Payer: MEDICARE

## 2020-09-10 LAB
INR BLD: 2.4
PROTHROMBIN TIME: 23.9 SEC (ref 9–12)

## 2020-09-10 PROCEDURE — 36415 COLL VENOUS BLD VENIPUNCTURE: CPT

## 2020-09-10 PROCEDURE — 85610 PROTHROMBIN TIME: CPT

## 2020-09-24 ENCOUNTER — HOSPITAL ENCOUNTER (OUTPATIENT)
Age: 75
Discharge: HOME | End: 2020-09-24
Payer: MEDICARE

## 2020-09-24 LAB
INR BLD: 2.4
PROTHROMBIN TIME: 24.5 SEC (ref 9–12)

## 2020-09-24 PROCEDURE — 85610 PROTHROMBIN TIME: CPT

## 2020-09-24 PROCEDURE — 36415 COLL VENOUS BLD VENIPUNCTURE: CPT

## 2020-10-28 ENCOUNTER — HOSPITAL ENCOUNTER (OUTPATIENT)
Age: 75
Discharge: HOME | End: 2020-10-28
Payer: MEDICARE

## 2020-10-28 LAB
INR BLD: 2.1
PROTHROMBIN TIME: 21.8 SEC (ref 9–12)

## 2020-10-28 PROCEDURE — 85610 PROTHROMBIN TIME: CPT

## 2020-10-28 PROCEDURE — 36415 COLL VENOUS BLD VENIPUNCTURE: CPT

## 2020-12-18 ENCOUNTER — HOSPITAL ENCOUNTER (OUTPATIENT)
Age: 75
Discharge: HOME | End: 2020-12-18
Payer: MEDICARE

## 2020-12-18 LAB
INR BLD: 1.2
PROTHROMBIN TIME: 12.1 SEC (ref 9–12)

## 2020-12-18 PROCEDURE — 85610 PROTHROMBIN TIME: CPT

## 2020-12-18 PROCEDURE — 36415 COLL VENOUS BLD VENIPUNCTURE: CPT

## 2020-12-23 ENCOUNTER — HOSPITAL ENCOUNTER (OUTPATIENT)
Age: 75
Discharge: HOME | End: 2020-12-23
Payer: MEDICARE

## 2020-12-23 LAB
INR BLD: 1.5
PROTHROMBIN TIME: 15.8 SEC (ref 9–12)

## 2020-12-23 PROCEDURE — 85610 PROTHROMBIN TIME: CPT

## 2020-12-30 ENCOUNTER — HOSPITAL ENCOUNTER (OUTPATIENT)
Age: 75
Discharge: HOME | End: 2020-12-30
Payer: MEDICARE

## 2020-12-30 LAB
INR BLD: 6.8
PROTHROMBIN TIME: 66.7 SEC (ref 9–12)

## 2020-12-30 PROCEDURE — 85610 PROTHROMBIN TIME: CPT

## 2020-12-30 PROCEDURE — 36415 COLL VENOUS BLD VENIPUNCTURE: CPT

## 2021-01-04 ENCOUNTER — HOSPITAL ENCOUNTER (OUTPATIENT)
Age: 76
Discharge: HOME | End: 2021-01-04
Payer: MEDICARE

## 2021-01-04 LAB
INR BLD: 1.7
PROTHROMBIN TIME: 17 SEC (ref 9–12)

## 2021-01-04 PROCEDURE — 36415 COLL VENOUS BLD VENIPUNCTURE: CPT

## 2021-01-04 PROCEDURE — 85610 PROTHROMBIN TIME: CPT

## 2021-01-11 ENCOUNTER — HOSPITAL ENCOUNTER (OUTPATIENT)
Age: 76
Discharge: HOME | End: 2021-01-11
Payer: MEDICARE

## 2021-01-11 LAB
INR BLD: 3.1
PROTHROMBIN TIME: 30.8 SEC (ref 9–12)

## 2021-01-11 PROCEDURE — 36415 COLL VENOUS BLD VENIPUNCTURE: CPT

## 2021-01-11 PROCEDURE — 85610 PROTHROMBIN TIME: CPT

## 2021-01-18 ENCOUNTER — HOSPITAL ENCOUNTER (OUTPATIENT)
Age: 76
Discharge: HOME | End: 2021-01-18
Payer: MEDICARE

## 2021-01-18 LAB
INR BLD: 2
PROTHROMBIN TIME: 20.2 SEC (ref 9–12)

## 2021-01-18 PROCEDURE — 36415 COLL VENOUS BLD VENIPUNCTURE: CPT

## 2021-01-18 PROCEDURE — 85610 PROTHROMBIN TIME: CPT

## 2021-02-01 ENCOUNTER — HOSPITAL ENCOUNTER (OUTPATIENT)
Age: 76
Discharge: HOME | End: 2021-02-01
Payer: MEDICARE

## 2021-02-01 LAB
INR BLD: 2.1
PROTHROMBIN TIME: 21.5 SEC (ref 9–12)

## 2021-02-01 PROCEDURE — 85610 PROTHROMBIN TIME: CPT

## 2021-02-01 PROCEDURE — 36415 COLL VENOUS BLD VENIPUNCTURE: CPT

## 2021-02-15 ENCOUNTER — HOSPITAL ENCOUNTER (OUTPATIENT)
Age: 76
Discharge: HOME | End: 2021-02-15
Payer: MEDICARE

## 2021-02-15 LAB
INR BLD: 2.3
PROTHROMBIN TIME: 22.8 SEC (ref 9.1–12.3)

## 2021-02-15 PROCEDURE — 36415 COLL VENOUS BLD VENIPUNCTURE: CPT

## 2021-02-15 PROCEDURE — 85610 PROTHROMBIN TIME: CPT

## 2021-03-13 ENCOUNTER — HOSPITAL ENCOUNTER (OUTPATIENT)
Age: 76
Discharge: HOME | End: 2021-03-13
Payer: MEDICARE

## 2021-03-13 LAB
INR BLD: 2.8
PROTHROMBIN TIME: 27.7 SEC (ref 9.1–12.3)

## 2021-03-13 PROCEDURE — 85610 PROTHROMBIN TIME: CPT

## 2021-03-13 PROCEDURE — 36415 COLL VENOUS BLD VENIPUNCTURE: CPT

## 2021-04-21 ENCOUNTER — HOSPITAL ENCOUNTER (OUTPATIENT)
Age: 76
Discharge: HOME | End: 2021-04-21
Payer: MEDICARE

## 2021-04-21 LAB
INR BLD: 1.5
PROTHROMBIN TIME: 15.6 SEC (ref 9.1–12.3)

## 2021-04-21 PROCEDURE — 85610 PROTHROMBIN TIME: CPT

## 2021-04-21 PROCEDURE — 36415 COLL VENOUS BLD VENIPUNCTURE: CPT

## 2021-04-29 ENCOUNTER — HOSPITAL ENCOUNTER (OUTPATIENT)
Age: 76
Discharge: HOME | End: 2021-04-29
Payer: MEDICARE

## 2021-04-29 LAB
INR BLD: 1.6
PROTHROMBIN TIME: 16.8 SEC (ref 9.1–12.3)

## 2021-04-29 PROCEDURE — 85610 PROTHROMBIN TIME: CPT

## 2021-04-29 PROCEDURE — 36415 COLL VENOUS BLD VENIPUNCTURE: CPT

## 2021-05-10 ENCOUNTER — HOSPITAL ENCOUNTER (OUTPATIENT)
Age: 76
Discharge: HOME | End: 2021-05-10
Payer: MEDICARE

## 2021-05-10 LAB
INR BLD: 2.2
PROTHROMBIN TIME: 22.3 SEC (ref 9.1–12.3)

## 2021-05-10 PROCEDURE — 36415 COLL VENOUS BLD VENIPUNCTURE: CPT

## 2021-05-10 PROCEDURE — 85610 PROTHROMBIN TIME: CPT

## 2021-06-01 ENCOUNTER — HOSPITAL ENCOUNTER (OUTPATIENT)
Age: 76
Discharge: HOME | End: 2021-06-01
Payer: MEDICARE

## 2021-06-01 LAB
INR BLD: 2.3
PROTHROMBIN TIME: 23.3 SEC (ref 9.1–12.3)

## 2021-06-01 PROCEDURE — 85610 PROTHROMBIN TIME: CPT

## 2021-06-01 PROCEDURE — 36415 COLL VENOUS BLD VENIPUNCTURE: CPT

## 2021-07-06 ENCOUNTER — HOSPITAL ENCOUNTER (OUTPATIENT)
Age: 76
Discharge: HOME | End: 2021-07-06
Payer: MEDICARE

## 2021-07-06 LAB
INR BLD: 1.9
PROTHROMBIN TIME: 19.6 SEC (ref 9.1–12.3)

## 2021-07-06 PROCEDURE — 36415 COLL VENOUS BLD VENIPUNCTURE: CPT

## 2021-07-06 PROCEDURE — 85610 PROTHROMBIN TIME: CPT

## 2021-07-14 ENCOUNTER — HOSPITAL ENCOUNTER (OUTPATIENT)
Age: 76
Discharge: HOME | End: 2021-07-14
Payer: MEDICARE

## 2021-07-14 LAB
INR BLD: 1.8
PROTHROMBIN TIME: 18.6 SEC (ref 9.1–12.3)

## 2021-07-14 PROCEDURE — 85610 PROTHROMBIN TIME: CPT

## 2021-07-14 PROCEDURE — 36415 COLL VENOUS BLD VENIPUNCTURE: CPT

## 2021-07-22 ENCOUNTER — HOSPITAL ENCOUNTER (OUTPATIENT)
Age: 76
Discharge: HOME | End: 2021-07-22
Payer: MEDICARE

## 2021-07-22 LAB
INR BLD: 2
PROTHROMBIN TIME: 19.9 SEC (ref 9.1–12.3)

## 2021-07-22 PROCEDURE — 85610 PROTHROMBIN TIME: CPT

## 2021-07-22 PROCEDURE — 36415 COLL VENOUS BLD VENIPUNCTURE: CPT

## 2021-07-29 ENCOUNTER — HOSPITAL ENCOUNTER (OUTPATIENT)
Age: 76
Discharge: HOME | End: 2021-07-29
Payer: MEDICARE

## 2021-07-29 LAB
INR BLD: 2.1
PROTHROMBIN TIME: 21.4 SEC (ref 9.1–12.3)

## 2021-07-29 PROCEDURE — 36415 COLL VENOUS BLD VENIPUNCTURE: CPT

## 2021-07-29 PROCEDURE — 85610 PROTHROMBIN TIME: CPT

## 2021-08-19 ENCOUNTER — HOSPITAL ENCOUNTER (OUTPATIENT)
Age: 76
Discharge: HOME | End: 2021-08-19
Payer: MEDICARE

## 2021-08-19 LAB
INR BLD: 2.2
PROTHROMBIN TIME: 22.2 SEC (ref 9.1–12.3)

## 2021-08-19 PROCEDURE — 85610 PROTHROMBIN TIME: CPT

## 2021-08-19 PROCEDURE — 36415 COLL VENOUS BLD VENIPUNCTURE: CPT

## 2021-09-09 ENCOUNTER — HOSPITAL ENCOUNTER (OUTPATIENT)
Age: 76
Discharge: HOME | End: 2021-09-09
Payer: MEDICARE

## 2021-09-09 LAB
INR BLD: 2.3
PROTHROMBIN TIME: 23 SEC (ref 9.1–12.3)

## 2021-09-09 PROCEDURE — 36415 COLL VENOUS BLD VENIPUNCTURE: CPT

## 2021-09-09 PROCEDURE — 85610 PROTHROMBIN TIME: CPT

## 2021-10-12 ENCOUNTER — HOSPITAL ENCOUNTER (OUTPATIENT)
Age: 76
Discharge: HOME | End: 2021-10-12
Payer: MEDICARE

## 2021-10-12 LAB
INR BLD: 2
PROTHROMBIN TIME: 19.9 SEC (ref 9.1–12.3)

## 2021-10-12 PROCEDURE — 85610 PROTHROMBIN TIME: CPT

## 2021-10-12 PROCEDURE — 36415 COLL VENOUS BLD VENIPUNCTURE: CPT

## 2021-11-12 ENCOUNTER — HOSPITAL ENCOUNTER (OUTPATIENT)
Age: 76
Discharge: HOME | End: 2021-11-12
Payer: MEDICARE

## 2021-11-12 LAB
INR BLD: 2
PROTHROMBIN TIME: 19.9 SEC (ref 9.1–12.3)

## 2021-11-12 PROCEDURE — 36415 COLL VENOUS BLD VENIPUNCTURE: CPT

## 2021-11-12 PROCEDURE — 85610 PROTHROMBIN TIME: CPT

## 2021-12-15 ENCOUNTER — HOSPITAL ENCOUNTER (OUTPATIENT)
Age: 76
Discharge: HOME | End: 2021-12-15
Payer: MEDICARE

## 2021-12-15 LAB
INR BLD: 1.6
PROTHROMBIN TIME: 16.6 SEC (ref 9.1–12.3)

## 2021-12-15 PROCEDURE — 85610 PROTHROMBIN TIME: CPT

## 2021-12-15 PROCEDURE — 36415 COLL VENOUS BLD VENIPUNCTURE: CPT

## 2021-12-23 ENCOUNTER — HOSPITAL ENCOUNTER (OUTPATIENT)
Age: 76
Discharge: HOME | End: 2021-12-23
Payer: MEDICARE

## 2021-12-23 LAB
INR BLD: 1.9
PROTHROMBIN TIME: 18.9 SEC (ref 9.1–12.3)

## 2021-12-23 PROCEDURE — 85610 PROTHROMBIN TIME: CPT

## 2021-12-23 PROCEDURE — 36415 COLL VENOUS BLD VENIPUNCTURE: CPT

## 2021-12-30 ENCOUNTER — HOSPITAL ENCOUNTER (OUTPATIENT)
Age: 76
Discharge: HOME | End: 2021-12-30
Payer: MEDICARE

## 2021-12-30 LAB
INR BLD: 2.7
PROTHROMBIN TIME: 26.2 SEC (ref 9.1–12.3)

## 2021-12-30 PROCEDURE — 85610 PROTHROMBIN TIME: CPT

## 2021-12-30 PROCEDURE — 36415 COLL VENOUS BLD VENIPUNCTURE: CPT

## 2022-01-19 ENCOUNTER — HOSPITAL ENCOUNTER (OUTPATIENT)
Age: 77
Discharge: HOME | End: 2022-01-19
Payer: MEDICARE

## 2022-01-19 LAB
INR BLD: 2.1
PROTHROMBIN TIME: 21.1 SEC (ref 9.1–12.3)

## 2022-01-19 PROCEDURE — 36415 COLL VENOUS BLD VENIPUNCTURE: CPT

## 2022-01-19 PROCEDURE — 85610 PROTHROMBIN TIME: CPT

## 2022-02-06 ENCOUNTER — HOSPITAL ENCOUNTER (EMERGENCY)
Age: 77
Discharge: HOME | End: 2022-02-06
Attending: EMERGENCY MEDICINE
Payer: MEDICARE

## 2022-02-06 VITALS
BODY MASS INDEX: 28.25 KG/M2 | OXYGEN SATURATION: 90 % | WEIGHT: 180 LBS | SYSTOLIC BLOOD PRESSURE: 155 MMHG | RESPIRATION RATE: 16 BRPM | TEMPERATURE: 96.8 F | HEIGHT: 67 IN | DIASTOLIC BLOOD PRESSURE: 71 MMHG | HEART RATE: 63 BPM

## 2022-02-06 DIAGNOSIS — I10 HYPERTENSION, UNSPECIFIED TYPE: Primary | ICD-10-CM

## 2022-02-06 PROCEDURE — 99284 EMERGENCY DEPT VISIT MOD MDM: CPT

## 2022-02-06 PROCEDURE — 93005 ELECTROCARDIOGRAM TRACING: CPT | Performed by: STUDENT IN AN ORGANIZED HEALTH CARE EDUCATION/TRAINING PROGRAM

## 2022-02-06 ASSESSMENT — ENCOUNTER SYMPTOMS
RHINORRHEA: 0
BLOOD IN STOOL: 0
DIARRHEA: 0
SORE THROAT: 0
NAUSEA: 0
COUGH: 0
ABDOMINAL PAIN: 0
SHORTNESS OF BREATH: 0
CONSTIPATION: 0
VOMITING: 0

## 2022-02-06 NOTE — ED NOTES
Pt ambulatory to room 23 with c/o hypertension. Pt reports that he has been monitoring his blood pressure at home and that it has been higher than normal. Pt reports that he does not have any chest pain, but has had previous cardiac caths. Pt reports that he took his morning medications. Pt reports that he is concerned about his blood pressure and wants to have everything checked out. Pt denies chest pain, denies SOB, denies N/V/D. Pt placed on continuous cardiac monitor, bp and pulse ox. EKG completed, IV established, blood work drawn. Pt alert and oriented x4, talking in complete sentences, respirations even and unlabored. Pt acting age appropriate.  White board updated, will continue to plan of care       Collin Luna RN  02/06/22 9702

## 2022-02-06 NOTE — ED PROVIDER NOTES
[facility]  ED  Emergency Department Encounter  EmergencyMedicine Resident     Pt Name:Erik Corbett  MRN: [id number]  [place] 1945  Date of evaluation: 2/6/22  PCP:  Lanie Archibald MD    This patient was evaluated in the Emergency Department for symptoms described in the history of present illness. The patient was evaluated in the context of the global COVID-19 pandemic, which necessitated consideration that the patient might be at risk for infection with the SARS-CoV-2 virus that causes COVID-19. Institutional protocols and algorithms that pertain to the evaluation of patients at risk for COVID-19 are in a state of rapid change based on information released by regulatory bodies including the CDC and federal and state organizations. These policies and algorithms were followed during the patient's care in the ED. CHIEF COMPLAINT       Chief Complaint   Patient presents with    Hypertension       HISTORY OF PRESENT ILLNESS  (Location/Symptom, Timing/Onset, Context/Setting, Quality, Duration, Modifying Factors, Severity.)      Moustapha Luna is a 64 y.o. male who presents with high blood pressure. Patient has history of hypertension, takes 4 medications for this, has been taking all medications as prescribed, reports elevated blood pressures over the last couple days to include 190s/100s, this morning it was 140/90, came to the emergency department for evaluation. Patient denies any headaches, visual changes, hearing changes, cough, congestion, chest pain, shortness breath, abdominal pain, nausea, vomiting, diarrhea, lower extremity swelling or pain. Patient has history of CAD, cardiac arrest, HTN, HLD. Patient was concerned about his blood pressure because it is normally 120/70. Patient does have an appointment with his cardiologist scheduled for 2/17/2022.     PAST MEDICAL / SURGICAL / SOCIAL / FAMILY HISTORY      has a past medical history of Accident, Arthritis, Bronchitis, CAD (coronary artery disease), Cancer ([facility] Cardiac arrest ([facility] Good exercise tolerance, Hx of blood clots, Hyperkalemia, Hypertension, essential, Ischemic cardiomyopathy, Left ventricular apical thrombus, MI (myocardial infarction) ([facility] Pseudoaneurysm (HCC) - right groin, Sinus drainage, and Systolic dysfunction with acute on chronic heart failure (HCC) - Efx 30%. has a past surgical history that includes Coronary artery bypass graft (11/19/2013); Vasectomy; Tonsillectomy; Colonoscopy; Cardiac defibrillator placement (2013); Cardiac catheterization (12/08/2016); Cardiac catheterization (11/18/2013); other surgical history (Right, 07/19/2019); and Abdomen surgery (Right, 7/19/2019). Social History     Socioeconomic History    Marital status: Single     Spouse name: Not on file    Number of children: Not on file    Years of education: Not on file    Highest education level: Not on file   Occupational History    Not on file   Tobacco Use    Smoking status: Current Some Day Smoker     Packs/day: 0.00     Types: Cigars    Smokeless tobacco: Never Used    Tobacco comment: smokes cigars occasionally   Vaping Use    Vaping Use: Never used   Substance and Sexual Activity    Alcohol use: Yes     Comment: couple drinks per week    Drug use: No    Sexual activity: Not on file   Other Topics Concern    Not on file   Social History Narrative    ** Merged History Encounter **          Social Determinants of Health     Financial Resource Strain:     Difficulty of Paying Living Expenses: Not on file   Food Insecurity:     Worried About Running Out of Food in the Last Year: Not on file    [place] of Food in the Last Year: Not on file   Transportation Needs:     Lack of Transportation (Medical): Not on file    Lack of Transportation (Non-Medical):  Not on file   Physical Activity:     Days of Exercise per Week: Not on file    Minutes of Exercise per Session: Not on file   Stress:     Feeling of Stress : Not on file   Social Connections:     Frequency of Communication with Friends and Family: Not on file    Frequency of Social Gatherings with Friends and Family: Not on file    Attends Yazidi Services: Not on file    Active Member of Clubs or Organizations: Not on file    Attends Club or Organization Meetings: Not on file    Marital Status: Not on file   Intimate Partner Violence:     Fear of Current or Ex-Partner: Not on file    Emotionally Abused: Not on file    Physically Abused: Not on file    Sexually Abused: Not on file   Housing Stability:     Unable to Pay for Housing in the Last Year: Not on file    Number of [place] in the Last Year: Not on file    Unstable Housing in the Last Year: Not on file       Family History   Problem Relation Age of Onset    Hypertension Father     Stroke Father     Stroke Maternal Grandfather     Hypertension Paternal Grandmother     Hypertension Paternal Grandfather     No Known Problems Mother         age 80    Other Father         vascular     Heart Failure Sister         MI    Heart Failure Brother         MI       Allergies:  Pcn [penicillins]    Home Medications:  Prior to Admission medications    Medication Sig Start Date End Date Taking?  Authorizing Provider   docusate sodium (COLACE) 100 MG capsule Take 1 capsule by mouth 2 times daily as needed for Constipation 7/19/19   Janel Din, DO   ondansetron (ZOFRAN) 4 MG tablet Take 1 tablet by mouth every 8 hours as needed for Nausea or Vomiting 7/19/19   Janel Din, DO   montelukast (SINGULAIR) 10 MG tablet Take 10 mg by mouth nightly    Historical Provider, MD   ipratropium-albuterol (DUONEB) 0.5-2.5 (3) MG/3ML SOLN nebulizer solution Inhale 1 vial into the lungs 3 times daily as needed for Shortness of Breath    Historical Provider, MD   fluticasone propionate (FLOVENT DISKUS) 100 MCG/BLIST AEPB inhaler Inhale 1 puff into the lungs 2 times daily    Historical Provider, MD   ipratropium (ATROVENT) 0.03 % nasal spray 2 sprays by Each Nostril route every 12 hours    Historical Provider, MD   azelastine (ASTELIN) 0.1 % nasal spray 1 spray by Nasal route 2 times daily as needed for Rhinitis Use in each nostril as directed    Historical Provider, MD   LUTEIN PO Take 2 tablets by mouth daily    Historical Provider, MD   Cholecalciferol (VITAMIN D3 PO) Take by mouth daily    Historical Provider, MD   VITAMIN E PO Take by mouth daily    Historical Provider, MD   furosemide (LASIX) 40 MG tablet Take 0.5 tablets by mouth daily 4/24/19   Tita Crouch,    Respiratory Therapy Supplies (NEBULIZER COMPRESSOR) KIT 1 kit by Does not apply route once for 1 dose 6/10/18 12/25/18  Elina Nichols MD   Respiratory Therapy Supplies (NEBULIZER COMPRESSOR) KIT 1 kit by Does not apply route once for 1 dose 6/10/18 6/10/18  Elina Nichols MD   warfarin (COUMADIN) 6 MG tablet Take 6 mg by mouth Takes MON and tuesday    Historical Provider, MD   warfarin (COUMADIN) 1 MG tablet Take 9 mg by mouth daily on Sunday [facility]    Historical Provider, MD   atorvastatin (LIPITOR) 40 MG tablet Take 1 tablet by mouth nightly 12/13/16   Francyne Leventhal, DO   lisinopril (PRINIVIL;ZESTRIL) 10 MG tablet Take 1 tablet by mouth daily 12/13/16   Francyne Leventhal, DO   spironolactone (ALDACTONE) 25 MG tablet Take 1 tablet by mouth daily 12/13/16   Francyne Leventhal, DO   albuterol (VENTOLIN HFA) 108 (90 BASE) MCG/ACT inhaler Inhale 2 puffs into the lungs every 6 hours as needed for Wheezing. 10/10/14   Cristian Kelly, DO   sotalol (BETAPACE) 80 MG tablet Take 1 tablet by mouth 2 times daily. 5/19/14   Tita Crouch, DO   aspirin 81 MG chewable tablet Take 1 tablet by mouth daily. 11/26/13   Audelia Kwon PA-C       REVIEW OF SYSTEMS    (2-9 systems for level 4, 10 or more for level 5)      Review of Systems   Constitutional: Negative for chills, diaphoresis, fatigue and fever.    HENT: Negative for congestion, rhinorrhea and sore throat. Eyes: Negative for visual disturbance. Respiratory: Negative for cough and shortness of breath. Cardiovascular: Negative for chest pain, palpitations and leg swelling. Gastrointestinal: Negative for abdominal pain, blood in stool, constipation, diarrhea, nausea and vomiting. Genitourinary: Negative for hematuria. Musculoskeletal: Negative for neck pain and neck stiffness. Skin: Negative for pallor and rash. Neurological: Negative for dizziness, syncope, facial asymmetry, speech difficulty, weakness, light-headedness, numbness and headaches. PHYSICAL EXAM   (up to 7 for level 4, 8 or more for level 5)      INITIAL VITALS:   BP (!) 155/71   Pulse 63   Temp 96.8 °F (36 °C) (Tympanic) Comment (Src): could not obtain oral temp  Resp 16   Ht 5' 7\" (1.702 m)   Wt 180 lb (81.6 kg)   SpO2 90%   BMI 28.19 kg/m²     Physical Exam  Constitutional:       General: He is not in acute distress. Appearance: Normal appearance. He is well-developed. He is not ill-appearing, toxic-appearing or diaphoretic. HENT:      Head: Normocephalic and atraumatic. Right Ear: External ear normal.      Left Ear: External ear normal.   Eyes:      General:         Right eye: No discharge. Left eye: No discharge. Extraocular Movements: Extraocular movements intact. Pupils: Pupils are equal, round, and reactive to light. Neck:      Vascular: No JVD. Trachea: No tracheal deviation. Cardiovascular:      Rate and Rhythm: Normal rate and regular rhythm. Pulses: Normal pulses. Heart sounds: Normal heart sounds. No murmur heard. No friction rub. No gallop. Pulmonary:      Effort: Pulmonary effort is normal. No respiratory distress. Breath sounds: Normal breath sounds. No stridor. No wheezing, rhonchi or rales. Chest:      Chest wall: No tenderness. Abdominal:      General: There is no distension. Palpations: Abdomen is soft. There is no mass. Tenderness: There is no abdominal tenderness. There is no right CVA tenderness, left CVA tenderness or guarding. Musculoskeletal:         General: No tenderness. Normal range of motion. Cervical back: Normal range of motion and neck supple. No rigidity or tenderness. Right lower leg: No edema. Left lower leg: No edema. Skin:     General: Skin is warm. Capillary Refill: Capillary refill takes less than 2 seconds. Neurological:      General: No focal deficit present. Mental Status: He is alert and oriented to person, place, and time. Cranial Nerves: No cranial nerve deficit. Sensory: No sensory deficit. Motor: No weakness. Coordination: Coordination normal.      Gait: Gait normal.   Psychiatric:         Mood and Affect: Mood normal.         Behavior: Behavior normal.         DIFFERENTIAL  DIAGNOSIS     PLAN (LABS / IMAGING / EKG):  No orders of the defined types were placed in this encounter. MEDICATIONS ORDERED:  No orders of the defined types were placed in this encounter. DDX: High blood pressure    DIAGNOSTIC RESULTS / EMERGENCY DEPARTMENT COURSE / MDM   LAB RESULTS:  No results found for this visit on 02/06/22. IMPRESSION: 58-year-old male with history of high blood pressure, taking all medications as prescribed, presenting with elevated blood pressure more than normal, no associated symptoms. Patient is completely asymptomatic at this time. Patient's blood pressure is 150s/90s on initial exam, no indication for labs or imaging at this time as patient is asymptomatic. Had in-depth discussion with patient regarding risks and benefits of testing in the emergency department for high blood pressure, recommended outpatient management of patient's blood pressure regimen. Patient is in agreement with plan.       RADIOLOGY:      EKG  EKG Interpretation    Interpreted by me    Rhythm: Sinus bradycardia  Rate: 53  Axis: Left axis  Ectopy: none  Conduction: RBBB, LAFB  ST Segments: no acute change  T Waves: no acute change  Q Waves: none    Clinical Impression: no acute changes and normal EKG, similar to prior EKG    All EKG's are interpreted by the Emergency Department Physician who either signs or Co-signs this chart in the absence of a cardiologist.    EMERGENCY DEPARTMENT COURSE:  Patient came to emergency department, HPI and physical exam were conducted. All nursing notes were reviewed. Patient remained stable emerge department with improving vital signs. Gave strict return precautions to the emergency department and discharge patient home. Recommend patient follow-up with his primary care provider and keep a blood pressure diary for better management. Recommended patient continue to follow with his cardiologist as scheduled. Recommended patient continue to take all medications as prescribed. PROCEDURES:      CONSULTS:  None    CRITICAL CARE:      FINAL IMPRESSION      1.  Hypertension, unspecified type          DISPOSITION / PLAN     DISPOSITION Decision To Discharge 02/06/2022 12:27:30 PM      PATIENT REFERRED TO:  Wade Whiteside MD  [address] [place] [address]  [phone]    Schedule an appointment as soon as possible for a visit in 3 days  For reassessment    [facility]  [address] [facility] [address]  [phone]  Go to   As needed, If symptoms worsen      DISCHARGE MEDICATIONS:  Discharge Medication List as of 2/6/2022 12:29 PM          Shane Mendes MD  Emergency Medicine Resident    (Please note that portions of thisnote were completed with a voice recognition program.  Efforts were made to edit the dictations but occasionally words are mis-transcribed.)        Shane Mendes MD  Resident  02/06/22 [id number]

## 2022-02-06 NOTE — ED PROVIDER NOTES
[facility]     Emergency Department     Faculty Note/ Attestation      Pt Name: Peter Simental                                       MRN: [id number]  [place] 1945  Date of evaluation: 2/6/2022    Patients PCP:    Sheryl Solomon MD      Attestation  I performed a history and physical examination of the patient and discussed management with the resident. I reviewed the residents note and agree with the documented findings and plan of care. Any areas of disagreement are noted on the chart. I was personally present for the key portions of any procedures. I have documented in the chart those procedures where I was not present during the key portions. I have reviewed the emergency nurses triage note. I agree with the chief complaint, past medical history, past surgical history, allergies, medications, social and family history as documented unless otherwise noted below. For Physician Assistant/ Nurse Practitioner cases/documentation I have personally evaluated this patient and have completed at least one if not all key elements of the E/M (history, physical exam, and MDM). Additional findings are as noted.       Initial Screens:        Juan José Coma Scale  Eye Opening: Spontaneous  Best Verbal Response: Oriented  Best Motor Response: Obeys commands  Juan José Coma Scale Score: 15    Vitals:    Vitals:    02/06/22 1135 02/06/22 1205   BP: (!) 144/75 (!) 155/71   Pulse: 63    Resp: 18    Temp: 96.8 °F (36 °C)    TempSrc: Tympanic    SpO2: 98% 90%   Weight: 180 lb (81.6 kg)    Height: 5' 7\" (1.702 m)        CHIEF COMPLAINT       Chief Complaint   Patient presents with    Hypertension             DIAGNOSTIC RESULTS             RADIOLOGY:   No orders to display         LABS:  Labs Reviewed - No data to display      EMERGENCY DEPARTMENT COURSE:     -------------------------  BP: (!) 155/71, Temp: 96.8 °F (36 °C), Pulse: 63, Resp: 18      Comments    Prior bypass, on warfarin  HTN  BP elevated at home to 955 systolic  Normal BP 060Z    Patient has multiple medications for blood pressure, he is asymptomatic at this time, had no symptoms other than a blood pressure reading elevated at home. We had shared decision making, he has a cardiologist that he follows in a primary care doctor. The patient, the resident, and I all felt it was more appropriate for him to follow-up with his PCP for primary blood pressure management, but he is to return to the emergency department or call his doctor right away if he has any new or concerning symptoms.     (Please note that portions of this note were completed with a voice recognition program.  Efforts were made to edit the dictations but occasionally words are mis-transcribed.)      Aura Gonzalez MD,, MD  Attending Emergency Physician         Aura Gonzalez MD  02/10/22 6230

## 2022-02-07 LAB
EKG ATRIAL RATE: 53 BPM
EKG P AXIS: 50 DEGREES
EKG P-R INTERVAL: 252 MS
EKG Q-T INTERVAL: 526 MS
EKG QRS DURATION: 154 MS
EKG QTC CALCULATION (BAZETT): 493 MS
EKG R AXIS: -63 DEGREES
EKG T AXIS: 23 DEGREES
EKG VENTRICULAR RATE: 53 BPM

## 2022-02-07 PROCEDURE — 93010 ELECTROCARDIOGRAM REPORT: CPT | Performed by: INTERNAL MEDICINE

## 2022-02-25 ENCOUNTER — HOSPITAL ENCOUNTER (OUTPATIENT)
Age: 77
Setting detail: SPECIMEN
Discharge: HOME | End: 2022-02-25
Payer: MEDICARE

## 2022-02-25 LAB
INR BLD: 2.5
PROTHROMBIN TIME: 24.8 SEC (ref 9.1–12.3)

## 2022-02-25 PROCEDURE — 85610 PROTHROMBIN TIME: CPT

## 2022-02-25 PROCEDURE — 36415 COLL VENOUS BLD VENIPUNCTURE: CPT

## 2022-04-07 ENCOUNTER — HOSPITAL ENCOUNTER (OUTPATIENT)
Dept: CARDIAC CATH/INVASIVE PROCEDURES | Age: 77
Discharge: HOME | End: 2022-04-07
Payer: MEDICARE

## 2022-04-07 VITALS
SYSTOLIC BLOOD PRESSURE: 122 MMHG | OXYGEN SATURATION: 96 % | DIASTOLIC BLOOD PRESSURE: 63 MMHG | RESPIRATION RATE: 12 BRPM | BODY MASS INDEX: 28.49 KG/M2 | WEIGHT: 188 LBS | HEIGHT: 68 IN | HEART RATE: 45 BPM

## 2022-04-07 LAB
GFR NON-AFRICAN AMERICAN: 56 ML/MIN
GFR SERPL CREATININE-BSD FRML MDRD: >60 ML/MIN
GFR SERPL CREATININE-BSD FRML MDRD: ABNORMAL ML/MIN/{1.73_M2}
GLUCOSE BLD-MCNC: 105 MG/DL (ref 74–100)
PLATELET # BLD: 210 K/UL (ref 138–453)
POC BUN: 29 MG/DL (ref 8–26)
POC CHLORIDE: 103 MMOL/L (ref 98–107)
POC CREATININE: 1.24 MG/DL (ref 0.51–1.19)
POC HEMATOCRIT: 44 % (ref 41–53)
POC HEMOGLOBIN: 14.9 G/DL (ref 13.5–17.5)
POC POTASSIUM: 4.4 MMOL/L (ref 3.5–4.5)
POC SODIUM: 138 MMOL/L (ref 138–146)

## 2022-04-07 PROCEDURE — 84520 ASSAY OF UREA NITROGEN: CPT

## 2022-04-07 PROCEDURE — 2709999900 HC NON-CHARGEABLE SUPPLY

## 2022-04-07 PROCEDURE — 6360000002 HC RX W HCPCS

## 2022-04-07 PROCEDURE — 85014 HEMATOCRIT: CPT

## 2022-04-07 PROCEDURE — 84295 ASSAY OF SERUM SODIUM: CPT

## 2022-04-07 PROCEDURE — C1760 CLOSURE DEV, VASC: HCPCS

## 2022-04-07 PROCEDURE — 82565 ASSAY OF CREATININE: CPT

## 2022-04-07 PROCEDURE — C1894 INTRO/SHEATH, NON-LASER: HCPCS

## 2022-04-07 PROCEDURE — 82435 ASSAY OF BLOOD CHLORIDE: CPT

## 2022-04-07 PROCEDURE — 93459 L HRT ART/GRFT ANGIO: CPT

## 2022-04-07 PROCEDURE — 7100000001 HC PACU RECOVERY - ADDTL 15 MIN

## 2022-04-07 PROCEDURE — 84132 ASSAY OF SERUM POTASSIUM: CPT

## 2022-04-07 PROCEDURE — 99152 MOD SED SAME PHYS/QHP 5/>YRS: CPT

## 2022-04-07 PROCEDURE — 7100000000 HC PACU RECOVERY - FIRST 15 MIN

## 2022-04-07 PROCEDURE — 85049 AUTOMATED PLATELET COUNT: CPT

## 2022-04-07 PROCEDURE — 82947 ASSAY GLUCOSE BLOOD QUANT: CPT

## 2022-04-07 PROCEDURE — 6360000004 HC RX CONTRAST MEDICATION

## 2022-04-07 RX ORDER — SODIUM CHLORIDE 9 MG/ML
INJECTION, SOLUTION INTRAVENOUS CONTINUOUS
Status: DISCONTINUED | OUTPATIENT
Start: 2022-04-07 | End: 2022-04-08 | Stop reason: HOSPADM

## 2022-04-07 RX ORDER — SODIUM CHLORIDE 9 MG/ML
25 INJECTION, SOLUTION INTRAVENOUS PRN
Status: DISCONTINUED | OUTPATIENT
Start: 2022-04-07 | End: 2022-04-08 | Stop reason: HOSPADM

## 2022-04-07 RX ORDER — SODIUM CHLORIDE 0.9 % (FLUSH) 0.9 %
5-40 SYRINGE (ML) INJECTION PRN
Status: DISCONTINUED | OUTPATIENT
Start: 2022-04-07 | End: 2022-04-08 | Stop reason: HOSPADM

## 2022-04-07 RX ORDER — SODIUM CHLORIDE 0.9 % (FLUSH) 0.9 %
5-40 SYRINGE (ML) INJECTION EVERY 12 HOURS SCHEDULED
Status: DISCONTINUED | OUTPATIENT
Start: 2022-04-07 | End: 2022-04-08 | Stop reason: HOSPADM

## 2022-04-07 RX ORDER — MEXILETINE HYDROCHLORIDE 200 MG/1
200 CAPSULE ORAL 2 TIMES DAILY
COMMUNITY

## 2022-04-07 RX ORDER — ACETAMINOPHEN 325 MG/1
650 TABLET ORAL EVERY 4 HOURS PRN
Status: DISCONTINUED | OUTPATIENT
Start: 2022-04-07 | End: 2022-04-08 | Stop reason: HOSPADM

## 2022-04-07 RX ORDER — VITAMIN B COMPLEX
1 CAPSULE ORAL DAILY
COMMUNITY

## 2022-04-07 RX ORDER — FLUTICASONE PROPIONATE 50 MCG
1 SPRAY, SUSPENSION (ML) NASAL DAILY
COMMUNITY

## 2022-04-07 RX ADMIN — SODIUM CHLORIDE: 9 INJECTION, SOLUTION INTRAVENOUS at 13:31

## 2022-04-07 NOTE — OP NOTE
[facility]    CARDIAC CATHETERIZATION    Date:   4/7/2022  Patient name:  Abhishek Wu  Date of admission:  4/7/2022 12:12 PM  MRN:   [id number]  YOB: 1945    Operators:  Primary:   Siena Lo MD (Attending Physician)    Assistant/CV fellow: Dave Florentino MD      Procedure performed:     [x] Left Heart Catheterization. [x] Graft Angiography. [x] Left Ventriculography. [] Right Heart Catheterization. [x] Coronary Angiography. [] Aortic Valve Studies. [] PCI:      [] Other:       Pre Procedure Conscious Sedation Data:  ASA Class:    [] I [x] II [] III [] IV    Mallampati Class:  [] I [x] II [] III [] IV      Indication:  [] STEMI      [x] + Stress test  [] ACS      [] + EKG Changes  [] Non Q MI       [] Significant Risk Factors  [] Recurrent Angina             [] Diabetes Mellitus    [] New LBBB      [] Other.  [] CHF / Low EF changes     [] Abnormal CTA / Ca Score      Procedure:  Access:  [x] Femoral  [] Radial  artery       [x] Right  [] Left    Procedure: After informed consent was obtained with explanation of the risks and benefits, patient was brought to the cath lab. The access area was prepped and draped in sterile fashion. 1% lidocaine was used for local block. The artery was cannulated with 6  Fr sheath with brisk arterial blood return. The side port was frequently flushed and aspirated with normal saline. Estimated Blood Loss:  [x] Minimal < 25 cc [] Moderate 25-50 cc  [] >50 cc    Findings:        LMCA: Normal 0% stenosis. LAD: Multiple stenosis. with patent LIMA-LAD         Lesion on Prox LAD: 80% stenosis. Lesion on Mid LAD: 90% stenosis. Lesion on 1st Diag: Ostial.70% stenosis. Comments:very small vessel       LCx: Chronic occlusion 100 % . with known occluded SVG-OM . LCX territory   getting right to left collaterals         Lesion on Prox CX: 100% stenosis. RCA: Multiple stenosis. with patent SVG-RCA         Lesion on Prox RCA: 80% stenosis. Lesion on Mid RCA: 90% stenosis. Lesion on Dist RCA: 80% stenosis. Graft Lesions         Lesion on Aorta Left to 1st Ob Cristina: Proximal anastomosis. 100% stenosis. Cardiac Grafts        -  There is a LIMA graft that originates at the LIMA and attaches to the       Mid LAD. -  There is a Vein graft that originates at the Aorta Left and attaches to       the 1st Ob Cristina.        -  There is a Vein graft that originates at the Aorta Right and attaches       to the R PDA. The LV gram was performed in the MONTE 30 position. LVEF: 55 %. Conclusions:     Multi-vessel Coronary Artery Disease. Patent LIMA-LAD and SVG-RPDA. Occluded LCX and occluded SVG-OM. with right to left collaterals. Mildly impaired ventricular function. No significant change from cardiac cath 2016. Recommendations:  Post-cath protocol  Continue optimal medical therapy  Risk factor modification        Electronically signed on 4/7/2022 at 2:24 PM by:    Naseem Haas MD  Fellow, [address]      Attending Physician Statement  I have discussed the case of Danielle Rodriguez including pertinent history and exam findings with the resident. I have seen and examined the patient and the key elements of the encounter have been performed by me. I agree with the assessment, plan and orders as documented by the resident With changes made to the note.   I was present during entire procedure and performed all critical elements of the procedure    Electronically signed by Stefano Wylie MD on 4/8/2022 at 11:57 AM.    [facility]      [phone]

## 2022-04-07 NOTE — H&P
[facility]  Procedure History and Physical Update          Patient Name: Kiera Bradshaw  MRN:    [id number]  YOB: 1945  Date of evaluation:  4/7/2022    Procedure:    Cardiac cath +/- PCI    Indication for procedure:  Abnormal stress test      Please refer to the office note completed by Dr. Jose Ortiz on 3/24/2022 in the medical record and note that:    [x] I have examined the patient and reviewed the H&P/Consult and there are no changes to be made to the assessment or plan. [] I have examined the patient and reviewed the H&P/Consult and have noted the following changes:    Past Medical History:   Diagnosis Date    Accident 5    [place] accident in the [address] [place] [address] \"jumping school\". Numbness and tingling for 1 year from the low back to bilateral mid thighs. Arthritis     Bronchitis     CAD (coronary artery disease) 11/19/13    cabg x 3    Cancer ([address]     skin    Cardiac arrest [address] [address] 11/2013    While playing squash.     Good exercise tolerance     pt plays golf, bowling but cannot play competive squash anymore (he passes out), still works full time ()    Hx of blood clots 12/2017    heart    Hyperkalemia 5/19/2014    Hypertension, essential     Ischemic cardiomyopathy 12/8/2016    Left ventricular apical thrombus 12/9/2016    MI (myocardial infarction) [facility]) November 2013    Pseudoaneurysm [facility]) - right groin 12/12/2016    Post cardiac catheterization    Sinus drainage     Systolic dysfunction with acute on chronic heart failure ([facility] [address] - Efx 30% 12/8/2016       Past Surgical History:   Procedure Laterality Date    ABDOMEN SURGERY Right 7/19/2019    EXCISION RIGHT SHOULDER MASS performed by Clelia Soulier, DO at [address] [place] [address]  12/08/2016    No stents placed per pt    CARDIAC CATHETERIZATION  11/18/2013    CARDIAC CATHETERIZATION  04/07/2022    CARDIAC DEFIBRILLATOR PLACEMENT  2013    right chest    COLONOSCOPY      CORONARY ARTERY BYPASS GRAFT 11/19/2013    x 3 vessell    OTHER SURGICAL HISTORY Right 07/19/2019    EXCISION RIGHT SHOULDER MASS     TONSILLECTOMY      VASECTOMY         Family History   Problem Relation Age of Onset    Hypertension Father     Stroke Father     Stroke Maternal Grandfather     Hypertension Paternal Grandmother     Hypertension Paternal Grandfather     No Known Problems Mother         age 80    Other Father         vascular     Heart Failure Sister         MI    Heart Failure Brother         MI       Allergies   Allergen Reactions    Pcn [Penicillins] Itching     Patient denies. Prior to Admission medications    Medication Sig Start Date End Date Taking?  Authorizing Provider   fluticasone (FLONASE) 50 MCG/ACT nasal spray 1 spray by Each Nostril route daily   Yes Historical Provider, MD   b complex vitamins capsule Take 1 capsule by mouth daily   Yes Historical Provider, MD   mexiletine (MEXITIL) 200 MG capsule Take 200 mg by mouth 2 times daily   Yes Historical Provider, MD   montelukast (SINGULAIR) 10 MG tablet Take 10 mg by mouth nightly    Historical Provider, MD   ipratropium-albuterol (DUONEB) 0.5-2.5 (3) MG/3ML SOLN nebulizer solution Inhale 1 vial into the lungs 3 times daily as needed for Shortness of Breath    Historical Provider, MD   fluticasone propionate (FLOVENT DISKUS) 100 MCG/BLIST AEPB inhaler Inhale 1 puff into the lungs 2 times daily    Historical Provider, MD   LUTEIN PO Take 2 tablets by mouth daily    Historical Provider, MD   Cholecalciferol (VITAMIN D3 PO) Take by mouth daily    Historical Provider, MD   furosemide (LASIX) 40 MG tablet Take 0.5 tablets by mouth daily 4/24/19   Juanjo Carty DO   Respiratory Therapy Supplies (NEBULIZER COMPRESSOR) KIT 1 kit by Does not apply route once for 1 dose 6/10/18 12/25/18  Andrew Linder MD   Respiratory Therapy Supplies (NEBULIZER COMPRESSOR) KIT 1 kit by Does not apply route once for 1 dose 6/10/18 6/10/18  Andrew Linder MD   warfarin (COUMADIN) 6 MG tablet Take 6 mg by mouth Takes MON and tuesday    Historical Provider, MD   atorvastatin (LIPITOR) 40 MG tablet Take 1 tablet by mouth nightly 12/13/16   Young Coats, DO   lisinopril (PRINIVIL;ZESTRIL) 10 MG tablet Take 1 tablet by mouth daily 12/13/16   Young Coats, DO   spironolactone (ALDACTONE) 25 MG tablet Take 1 tablet by mouth daily 12/13/16   Young Coats, DO   albuterol (VENTOLIN HFA) 108 (90 BASE) MCG/ACT inhaler Inhale 2 puffs into the lungs every 6 hours as needed for Wheezing. 10/10/14   Darby Licote, DO   sotalol (BETAPACE) 80 MG tablet Take 1 tablet by mouth 2 times daily. 5/19/14   Lucien Sarah, DO   aspirin 81 MG chewable tablet Take 1 tablet by mouth daily. 11/26/13   Audelia Alvarado PA-C         Vitals:    04/07/22 1325   BP:    Pulse: (!) 48   Resp:    SpO2:        Constitutional and General Appearance:   alert, cooperative, no distress and appears stated age  HEENT:  PERRL, EOMI  Respiratory:  Normal excursion and expansion without use of accessory muscles  Resp Auscultation:  Good respiratory effort. No for increased work of breathing. On auscultation: clear to auscultation bilaterally  Cardiovascular:  Regular rate and rhythm. S1/S2  No murmurs. The apical impulse is not displaced  Abdomen:  Soft  Bowel sounds present  Non-tender to palpation  Extremities:  No cyanosis or clubbing  Lower extremity edema: No.  Skin:  Warm and dry  Neurological:  Alert and oriented. Moves all extremities well      Plan:  Proceed with planned procedure. Further orders to follow. Pre Procedure Conscious Sedation Data:     ASA Class:                  [] I [] II [x] III [] IV     Mallampati Class:       [] I [] II [x] III [] IV    Risks, benefits, and alternatives of cardiac catheterization were discussed, in detail, with patient.  Risks include, but not limited to, bleeding, requiring blood transfusion, vascular complication requiring surgery, renal failure with need of dialysis, CVA, MI, death and anesthesia complications including intubation were discussed. Patient verbalized understanding and agreed to proceed with the procedure understanding the above risks and alternatives to the procedure. Electronically signed on 04/07/22 at 1:48 PM by:    Anastasiya Valdez MD, MD   Fellow, [address]      Attending Physician Statement  I have discussed the case of Benjamin Hardy including pertinent history and exam findings with the resident. I have seen and examined the patient and the key elements of the encounter have been performed by me. I agree with the assessment, plan and orders as documented by the resident With changes made to the note.      Electronically signed by Yvette Enamorado MD on 4/8/2022 at 11:52 AM.    [facility]      [phone]

## 2022-04-07 NOTE — PROGRESS NOTES
Ambulated to bathroom and in halls. Gait steady. . Right groin puncture site and right pedal pulse assessment unchanged. All discharge instructions reviewed, questions answered, paper signed and given copy. Patient discharged per ambulatory with sister and belongings.

## 2022-04-07 NOTE — PROGRESS NOTES
Received post cardiac cath procedure to [facility] room 9. Assessment obtained. Restrictions reviewed with patient. Post procedure pathway initiated. Right groin site soft , band dry and intact. No hematoma noted. Family at side. Patient without complaints. Head of bed flat with right leg straight.

## 2022-04-07 NOTE — PROGRESS NOTES
Patient admitted, consent signed and questions answered. Patient ready for procedure. Call light to reach with side rails up 2 of 2. bILATERAL GROIN AREAS clipped. Sister Brendan Claude at bedside with patient. History and physical needs updated.

## 2022-05-06 ENCOUNTER — INITIAL CONSULT (OUTPATIENT)
Dept: VASCULAR SURGERY | Age: 77
End: 2022-05-06
Payer: MEDICARE

## 2022-05-06 VITALS
TEMPERATURE: 98.7 F | DIASTOLIC BLOOD PRESSURE: 64 MMHG | OXYGEN SATURATION: 97 % | WEIGHT: 175 LBS | BODY MASS INDEX: 26.52 KG/M2 | RESPIRATION RATE: 18 BRPM | HEIGHT: 68 IN | SYSTOLIC BLOOD PRESSURE: 135 MMHG | HEART RATE: 57 BPM

## 2022-05-06 DIAGNOSIS — I65.23 BILATERAL CAROTID ARTERY STENOSIS: Primary | ICD-10-CM

## 2022-05-06 PROCEDURE — 99204 OFFICE O/P NEW MOD 45 MIN: CPT | Performed by: SURGERY

## 2022-05-06 ASSESSMENT — ENCOUNTER SYMPTOMS
VOICE CHANGE: 0
COUGH: 0
ABDOMINAL PAIN: 0
TROUBLE SWALLOWING: 0
COLOR CHANGE: 0
EYE PAIN: 0
CHEST TIGHTNESS: 0
VOMITING: 0
SHORTNESS OF BREATH: 0
ABDOMINAL DISTENTION: 0

## 2022-05-06 NOTE — PROGRESS NOTES
[facility]  [address] [facility]  [address] [place] [address]  Dept: [phone]     Patient: Sherrie Marin  : 1945  MRN: [id number]  DOS: 2022    Referring provider:  Adelaida Martinez MD         HPI:  Sherrie Marin is a 76 y.o. male who comes to the office for the first time regarding carotid stenosis. He had carotid duplex recently showing at least a 70% stenosis on the left and likely 50 to 69% stenosis on the right. He has not had stroke, TIA or amaurosis fugax. I queried him regarding upper or lower extremity numbness or weakness as well as visual disturbance and speech problems. He denies all symptoms of stroke. He had a coronary event many years ago treated with coronary artery bypass grafting. Since that time he has been asymptomatic. He still works as a finish spicer. He he denies claudication rest pain or ulceration. He denies significant hypertension or high cholesterol and he is not diabetic. He does take atorvastatin 40 mg daily as well as aspirin 81 mg daily which is good medical therapy for his carotids. He is also on Coumadin at 6 mg daily. He does not smoke cigarettes. Past Medical History:   Diagnosis Date    Accident 5    [place] accident in the [employer] \"jumping school\". Numbness and tingling for 1 year from the low back to bilateral mid thighs.  Arthritis     Bronchitis     CAD (coronary artery disease) 13    cabg x 3    Cancer ([facility]     skin    Cardiac arrest ([address] 2013    While playing squash.     Good exercise tolerance     pt plays golf, bowling but cannot play competive squash anymore (he passes out), still works full time (finish spicer)    Hx of blood clots 2017    heart    Hyperkalemia 2014    Hypertension, essential     Ischemic cardiomyopathy 2016    Left ventricular apical thrombus 2016    MI (myocardial infarction) ([address] November 2013    Pseudoaneurysm [facility]) - right groin 12/12/2016    Post cardiac catheterization    Sinus drainage     Systolic dysfunction with acute on chronic heart failure (HCC) - Efx 30% 12/8/2016     Family History   Problem Relation Age of Onset    Hypertension Father     Stroke Father     Stroke Maternal Grandfather     Hypertension Paternal Grandmother     Hypertension Paternal Grandfather     No Known Problems Mother         age 80    Other Father         vascular     Heart Failure Sister         MI    Heart Failure Brother         MI      Social History     Socioeconomic History    Marital status: Single     Spouse name: Not on file    Number of children: Not on file    Years of education: Not on file    Highest education level: Not on file   Occupational History    Not on file   Tobacco Use    Smoking status: Current Some Day Smoker     Packs/day: 0.00     Types: Cigars    Smokeless tobacco: Never Used    Tobacco comment: smokes cigars occasionally   Vaping Use    Vaping Use: Never used   Substance and Sexual Activity    Alcohol use: Yes     Comment: couple drinks per week    Drug use: No    Sexual activity: Not on file   Other Topics Concern    Not on file   Social History Narrative    ** Merged History Encounter **          Social Determinants of Health     Financial Resource Strain:     Difficulty of Paying Living Expenses: Not on file   Food Insecurity:     Worried About Running Out of Food in the Last Year: Not on file    [place] of Food in the Last Year: Not on file   Transportation Needs:     Lack of Transportation (Medical): Not on file    Lack of Transportation (Non-Medical):  Not on file   Physical Activity:     Days of Exercise per Week: Not on file    Minutes of Exercise per Session: Not on file   Stress:     Feeling of Stress : Not on file   Social Connections:     Frequency of Communication with Friends and Family: Not on file    Frequency of Social Gatherings with Friends and Family: Not on file    Attends Advent Services: Not on file    Active Member of Clubs or Organizations: Not on file    Attends Club or Organization Meetings: Not on file    Marital Status: Not on file   Intimate Partner Violence:     Fear of Current or Ex-Partner: Not on file    Emotionally Abused: Not on file    Physically Abused: Not on file    Sexually Abused: Not on file   Housing Stability:     Unable to Pay for Housing in the Last Year: Not on file    Number of [place] in the Last Year: Not on file    Unstable Housing in the Last Year: Not on file      Past Surgical History:   Procedure Laterality Date    ABDOMEN SURGERY Right 7/19/2019    EXCISION RIGHT SHOULDER MASS performed by Indy Haq DO at [address] [facility] [address]  12/08/2016    No stents placed per pt   [address]  11/18/2013    CARDIAC CATHETERIZATION  04/07/2022   [address]  2013    right chest    COLONOSCOPY      CORONARY ARTERY BYPASS GRAFT  11/19/2013    x 3 vessell    OTHER SURGICAL HISTORY Right 07/19/2019    EXCISION RIGHT SHOULDER MASS     TONSILLECTOMY      VASECTOMY        Review of Systems   Constitutional: Negative for activity change, fever and unexpected weight change. HENT: Negative for trouble swallowing and voice change. Eyes: Negative for pain and visual disturbance. Respiratory: Negative for cough, chest tightness and shortness of breath. Cardiovascular: Negative for chest pain and palpitations. Gastrointestinal: Negative for abdominal distention, abdominal pain and vomiting. Endocrine: Negative for cold intolerance and heat intolerance. Genitourinary: Negative for dysuria, flank pain and hematuria. Musculoskeletal: Negative for joint swelling and neck pain. Skin: Negative for color change and rash. Allergic/Immunologic: Negative for immunocompromised state.    Neurological: Negative for syncope, speech difficulty, weakness, numbness and headaches. Hematological: Negative for adenopathy. Psychiatric/Behavioral: Negative for behavioral problems and suicidal ideas. Vitals:    05/06/22 1534   BP: 135/64   Site: Right Upper Arm   Position: Sitting   Cuff Size: Large Adult   Pulse: 57   Resp: 18   Temp: 98.7 °F (37.1 °C)   TempSrc: Temporal   SpO2: 97%   Weight: 175 lb (79.4 kg)   Height: 5' 8\" (1.727 m)          Physical Exam  Constitutional:       General: He is not in acute distress. HENT:      Mouth/Throat:      Mouth: Mucous membranes are moist.      Pharynx: Oropharynx is clear. Eyes:      General: No scleral icterus. Extraocular Movements: Extraocular movements intact. Conjunctiva/sclera: Conjunctivae normal.   Neck:      Thyroid: No thyroid mass or thyromegaly. Cardiovascular:      Rate and Rhythm: Normal rate and regular rhythm. Heart sounds: No murmur heard. Comments: He has a carotid bruit bilaterally. The right is during systole only. The left does enter diastole. His chest is clear to auscultation bilaterally. His heart has a regular rate and rhythm with no murmurs rubs or gallops. I can hear no precordial noise that would correspond to his carotid bruits. His abdomen is soft nontender nondistended with normal bowel sounds and no masses. He has no evidence of aneurysmal disease. He has palpable femoral, popliteal, dorsalis pedis and posterior tibial pulses bilaterally which are strong and equal.  He has no gangrene. He has no ulceration. He has no significant edema. There are no varicosities. Pulmonary:      Effort: No respiratory distress. Breath sounds: No rales. Abdominal:      General: There is no distension. Palpations: There is no mass. Tenderness: There is no abdominal tenderness. There is no guarding. Musculoskeletal:      Cervical back: No rigidity or tenderness. Lymphadenopathy:      Cervical: No cervical adenopathy. Skin:     Coloration: Skin is not jaundiced. Findings: No rash. Neurological:      General: No focal deficit present. Mental Status: He is alert and oriented to person, place, and time. Cranial Nerves: No cranial nerve deficit. Psychiatric:         Mood and Affect: Mood normal.         Assessment:  1. Bilateral carotid artery stenosis          Plan: At this point I would like to go ahead with a CTA of the carotids to evaluate the morphology of the plaque as well as the location of the bifurcation and the actual degree of stenosis. This will be a confirmatory study for me and decide whether or not he should be treated with operative therapy. He understands and agrees and we will see him back after that has been completed. He is currently on the best medical therapy and we will continue that as well.     Electronically signed by:  Lida Hermosillo MD

## 2022-05-13 ENCOUNTER — HOSPITAL ENCOUNTER (OUTPATIENT)
Dept: CT IMAGING | Age: 77
Discharge: HOME | End: 2022-05-15
Payer: MEDICARE

## 2022-05-13 ENCOUNTER — HOSPITAL ENCOUNTER (OUTPATIENT)
Age: 77
Discharge: HOME | End: 2022-05-13
Payer: MEDICARE

## 2022-05-13 DIAGNOSIS — I65.23 BILATERAL CAROTID ARTERY STENOSIS: ICD-10-CM

## 2022-05-13 DIAGNOSIS — I10 HTN (HYPERTENSION), BENIGN: Primary | ICD-10-CM

## 2022-05-13 LAB
CREAT SERPL-MCNC: 1.14 MG/DL (ref 0.7–1.2)
GFR AFRICAN AMERICAN: >60 ML/MIN
GFR NON-AFRICAN AMERICAN: >60 ML/MIN
GFR SERPL CREATININE-BSD FRML MDRD: NORMAL ML/MIN/{1.73_M2}

## 2022-05-13 PROCEDURE — 70498 CT ANGIOGRAPHY NECK: CPT

## 2022-05-13 PROCEDURE — 6360000004 HC RX CONTRAST MEDICATION: Performed by: SURGERY

## 2022-05-13 PROCEDURE — 82565 ASSAY OF CREATININE: CPT

## 2022-05-13 PROCEDURE — 36415 COLL VENOUS BLD VENIPUNCTURE: CPT

## 2022-05-13 RX ADMIN — IOPAMIDOL 90 ML: 755 INJECTION, SOLUTION INTRAVENOUS at 12:02

## 2022-05-18 ENCOUNTER — HOSPITAL ENCOUNTER (OUTPATIENT)
Age: 77
Discharge: HOME | End: 2022-05-18
Payer: MEDICARE

## 2022-05-18 LAB
INR BLD: 1.8
PROTHROMBIN TIME: 18.8 SEC (ref 9.1–12.3)

## 2022-05-18 PROCEDURE — 85610 PROTHROMBIN TIME: CPT

## 2022-05-18 PROCEDURE — 36415 COLL VENOUS BLD VENIPUNCTURE: CPT

## 2022-05-27 ENCOUNTER — OFFICE VISIT (OUTPATIENT)
Dept: VASCULAR SURGERY | Age: 77
End: 2022-05-27
Payer: MEDICARE

## 2022-05-27 ENCOUNTER — HOSPITAL ENCOUNTER (OUTPATIENT)
Age: 77
Discharge: HOME | End: 2022-05-27
Payer: MEDICARE

## 2022-05-27 VITALS
HEART RATE: 62 BPM | BODY MASS INDEX: 28.79 KG/M2 | DIASTOLIC BLOOD PRESSURE: 74 MMHG | TEMPERATURE: 98.2 F | HEIGHT: 68 IN | OXYGEN SATURATION: 98 % | RESPIRATION RATE: 20 BRPM | WEIGHT: 190 LBS | SYSTOLIC BLOOD PRESSURE: 145 MMHG

## 2022-05-27 DIAGNOSIS — I65.22 STENOSIS OF LEFT CAROTID ARTERY: Primary | ICD-10-CM

## 2022-05-27 LAB
INR BLD: 1.6
PROTHROMBIN TIME: 16.6 SEC (ref 9.1–12.3)

## 2022-05-27 PROCEDURE — 1123F ACP DISCUSS/DSCN MKR DOCD: CPT | Performed by: SURGERY

## 2022-05-27 PROCEDURE — 85610 PROTHROMBIN TIME: CPT

## 2022-05-27 PROCEDURE — 36415 COLL VENOUS BLD VENIPUNCTURE: CPT

## 2022-05-27 PROCEDURE — 99214 OFFICE O/P EST MOD 30 MIN: CPT | Performed by: SURGERY

## 2022-05-27 ASSESSMENT — ENCOUNTER SYMPTOMS
SHORTNESS OF BREATH: 0
VOICE CHANGE: 0
ABDOMINAL DISTENTION: 0
EYE PAIN: 0
VOMITING: 0
ABDOMINAL PAIN: 0
CHEST TIGHTNESS: 0
COUGH: 0
COLOR CHANGE: 0
TROUBLE SWALLOWING: 0

## 2022-05-27 NOTE — PROGRESS NOTES
[address] [facility] [address] [place] [address]  Dept: [phone]     Patient: Sharmon Lennox  : 1945  MRN: [id number]  DOS: 2022    Referring provider:  No ref. provider found         HPI:  Sharmon Lennox is a 76 y.o. male who returns to the office for bilateral carotid artery stenosis. His left is worse than his right. His left carotid has significant stenosis on CT examination and to my independent review of the images measures at least 80% if not greater. The right internal carotid artery has approximately 50% stenosis on CTA. He has not had symptoms of stroke, TIA or amaurosis fugax from either carotid artery. He has had coronary problems in the past and has had significant acute coronary syndrome with cardiac arrest previously. He has a defibrillator present and usually sees his cardiologist every 4 to 6 months. He has had coronary artery bypass grafting to 3 vessels in the past.  He no longer smokes cigarettes. He complains of no significant chest pain either at rest or with exertion. He has no evidence of shortness of breath. He continues to work as a . Past Medical History:   Diagnosis Date    Accident 5    Tuttle accident in the [employer] \"jumping school\". Numbness and tingling for 1 year from the low back to bilateral mid thighs.  Arthritis     Bronchitis     CAD (coronary artery disease) 13    cabg x 3    Cancer ([address]     skin    Cardiac arrest ([address] 2013    While playing squash.     Good exercise tolerance     pt plays golf, bowling but cannot play competive squash anymore (he passes out), still works full time ()    Hx of blood clots 2017    heart    Hyperkalemia 2014    Hypertension, essential     Ischemic cardiomyopathy 2016    Left ventricular apical thrombus 2016    MI (myocardial infarction) ([address] November 2013    Pseudoaneurysm [facility]) - right groin 12/12/2016    Post cardiac catheterization    Sinus drainage     Systolic dysfunction with acute on chronic heart failure (HCC) - Efx 30% 12/8/2016     Family History   Problem Relation Age of Onset    Hypertension Father     Stroke Father     Stroke Maternal Grandfather     Hypertension Paternal Grandmother     Hypertension Paternal Grandfather     No Known Problems Mother         age 80    Other Father         vascular     Heart Failure Sister         MI    Heart Failure Brother         MI      Social History     Socioeconomic History    Marital status: Single     Spouse name: Not on file    Number of children: Not on file    Years of education: Not on file    Highest education level: Not on file   Occupational History    Not on file   Tobacco Use    Smoking status: Current Some Day Smoker     Packs/day: 0.00     Types: Cigars    Smokeless tobacco: Never Used    Tobacco comment: smokes cigars occasionally   Vaping Use    Vaping Use: Never used   Substance and Sexual Activity    Alcohol use: Yes     Comment: couple drinks per week    Drug use: No    Sexual activity: Not on file   Other Topics Concern    Not on file   Social History Narrative    ** Merged History Encounter **          Social Determinants of Health     Financial Resource Strain:     Difficulty of Paying Living Expenses: Not on file   Food Insecurity:     Worried About Running Out of Food in the Last Year: Not on file    Jyotsna of Food in the Last Year: Not on file   Transportation Needs:     Lack of Transportation (Medical): Not on file    Lack of Transportation (Non-Medical):  Not on file   Physical Activity:     Days of Exercise per Week: Not on file    Minutes of Exercise per Session: Not on file   Stress:     Feeling of Stress : Not on file   Social Connections:     Frequency of Communication with Friends and Family: Not on file    Frequency of Social Gatherings with Friends and Family: Not on file    Attends Mandaeism Services: Not on file    Active Member of Clubs or Organizations: Not on file    Attends Club or Organization Meetings: Not on file    Marital Status: Not on file   Intimate Partner Violence:     Fear of Current or Ex-Partner: Not on file    Emotionally Abused: Not on file    Physically Abused: Not on file    Sexually Abused: Not on file   Housing Stability:     Unable to Pay for Housing in the Last Year: Not on file    Number of [place] in the Last Year: Not on file    Unstable Housing in the Last Year: Not on file      Past Surgical History:   Procedure Laterality Date    ABDOMEN SURGERY Right 7/19/2019    EXCISION RIGHT SHOULDER MASS performed by Komal Wellington DO at [address] [facility] [address]  12/08/2016    No stents placed per pt   [address]  11/18/2013    CARDIAC CATHETERIZATION  04/07/2022   [address]  2013    right chest    COLONOSCOPY      CORONARY ARTERY BYPASS GRAFT  11/19/2013    x 3 vessell    OTHER SURGICAL HISTORY Right 07/19/2019    EXCISION RIGHT SHOULDER MASS     TONSILLECTOMY      VASECTOMY        Review of Systems   Constitutional: Negative for activity change, fever and unexpected weight change. HENT: Negative for trouble swallowing and voice change. Eyes: Negative for pain and visual disturbance. Respiratory: Negative for cough, chest tightness and shortness of breath. Cardiovascular: Negative for chest pain and palpitations. Gastrointestinal: Negative for abdominal distention, abdominal pain and vomiting. Endocrine: Negative for cold intolerance and heat intolerance. Genitourinary: Negative for dysuria, flank pain and hematuria. Musculoskeletal: Negative for joint swelling and neck pain. Skin: Negative for color change and rash. Allergic/Immunologic: Negative for immunocompromised state.    Neurological: Negative for syncope, speech difficulty, weakness, numbness and headaches. Hematological: Negative for adenopathy. Psychiatric/Behavioral: Negative for behavioral problems and suicidal ideas. Vitals:    05/27/22 0831   BP: (!) 145/74   Site: Left Upper Arm   Position: Sitting   Cuff Size: Medium Adult   Pulse: 62   Resp: 20   Temp: 98.2 °F (36.8 °C)   TempSrc: Temporal   SpO2: 98%   Weight: 190 lb (86.2 kg)   Height: 5' 8\" (1.727 m)          Physical Exam  Constitutional:       General: He is not in acute distress. HENT:      Mouth/Throat:      Mouth: Mucous membranes are moist.      Pharynx: Oropharynx is clear. Eyes:      General: No scleral icterus. Extraocular Movements: Extraocular movements intact. Conjunctiva/sclera: Conjunctivae normal.   Neck:      Thyroid: No thyroid mass or thyromegaly. Cardiovascular:      Rate and Rhythm: Normal rate and regular rhythm. Heart sounds: No murmur heard. Comments: He has a prominent carotid bruit on the left. His abdomen is soft nontender nondistended. His lower extremities are warm dry and adequately perfused. He has no ulceration or gangrene. He has palpable distal pulses. Pulmonary:      Effort: No respiratory distress. Breath sounds: No rales. Abdominal:      General: There is no distension. Palpations: There is no mass. Tenderness: There is no abdominal tenderness. There is no guarding. Musculoskeletal:      Cervical back: No rigidity or tenderness. Lymphadenopathy:      Cervical: No cervical adenopathy. Skin:     Coloration: Skin is not jaundiced. Findings: No rash. Neurological:      General: No focal deficit present. Mental Status: He is alert and oriented to person, place, and time. Cranial Nerves: No cranial nerve deficit. Psychiatric:         Mood and Affect: Mood normal.         Assessment:  1. Stenosis of left carotid artery          Plan:   At this point his carotid stenosis would meet requirement for left carotid endarterectomy. The right is asymptomatic as well and probably around a 50% stenosis. I think we should treat his left side with endarterectomy. He understands and agrees. He will need clearance from his cardiologist which we will be arranging. He understands that this operation involves opening the neck and opening the carotid to remove the plaque and remove the nidus that could cause a stroke. He understands that his risk of stroke over the next 5 years is higher than the risk of stroke with the operation. He understands that these operations do have risk including but not limited to bleeding, infection, hematoma, nerve damage, permanent neurologic disability, stroke, TIA, myocardial infarction, cerebral hyperperfusion syndrome and death. He agrees to proceed and we will be seeing him in the operating room for left carotid endarterectomy in the near future.     Electronically signed by:  Collins Armenta MD

## 2022-07-01 ENCOUNTER — HOSPITAL ENCOUNTER (OUTPATIENT)
Age: 77
Discharge: HOME | End: 2022-07-01
Payer: MEDICARE

## 2022-07-01 LAB
INR BLD: 2.1
PROTHROMBIN TIME: 21 SEC (ref 9.1–12.3)

## 2022-07-01 PROCEDURE — 85610 PROTHROMBIN TIME: CPT

## 2022-07-01 PROCEDURE — 36415 COLL VENOUS BLD VENIPUNCTURE: CPT

## 2022-07-14 ENCOUNTER — HOSPITAL ENCOUNTER (OUTPATIENT)
Age: 77
Discharge: HOME | End: 2022-07-14
Payer: MEDICARE

## 2022-07-14 LAB
INR BLD: 1.7
PROTHROMBIN TIME: 17.7 SEC (ref 9.1–12.3)

## 2022-07-14 PROCEDURE — 85610 PROTHROMBIN TIME: CPT

## 2022-07-14 PROCEDURE — 36415 COLL VENOUS BLD VENIPUNCTURE: CPT

## 2022-07-19 ENCOUNTER — HOSPITAL ENCOUNTER (OUTPATIENT)
Age: 77
Discharge: HOME | End: 2022-07-19
Payer: MEDICARE

## 2022-07-19 LAB
INR BLD: 1.7
PROTHROMBIN TIME: 17.8 SEC (ref 9.1–12.3)

## 2022-07-19 PROCEDURE — 85610 PROTHROMBIN TIME: CPT

## 2022-07-19 PROCEDURE — 36415 COLL VENOUS BLD VENIPUNCTURE: CPT

## 2022-08-04 ENCOUNTER — TELEPHONE (OUTPATIENT)
Dept: VASCULAR SURGERY | Age: 77
End: 2022-08-04

## 2022-08-04 RX ORDER — SODIUM CHLORIDE, SODIUM LACTATE, POTASSIUM CHLORIDE, CALCIUM CHLORIDE 600; 310; 30; 20 MG/100ML; MG/100ML; MG/100ML; MG/100ML
1000 INJECTION, SOLUTION INTRAVENOUS CONTINUOUS
Status: CANCELLED | OUTPATIENT
Start: 2022-08-04

## 2022-08-04 NOTE — DISCHARGE INSTRUCTIONS
Preoperative Instructions:    Stop eating solid foods at MIDNIGHT the night prior to surgery. (This includes gum, mints, chewing tobacco). Please stop smoking 48 hours prior to surgery. Stop drinking clear liquids at MIDNIGHT the night prior to surgery. Arrive at the [address] [facility] center on 8/11/2022 as directed by your surgeon's office. Any arrival scheduled BEFORE 6 am goes straight to CAR 1 which is the first floor (to not be confused with the MAIN floor). When you get off the elevator on the first floor, there will be a large sign on the locked doors to push the button and someone will ask you \"May I help you\". You are to tell them your name and that you are there for surgery. Any patient arriving at 6 am and after go to MAIN level registration in the heart center. PLEASE FOLLOW ALL MEDICATION INSTRUCTIONS (WHAT TO HOLD, etc) AS DIRECTED BY YOUR SURGEON! If applicable:  -If you have been given a blood band, you must bring it with you the day of surgery, unclasped.  -Please use routine inhalers and bring inhalers the day of surgery.   -Bring C-Pap/Bi-pap with you morning of surgery if planning on staying in the hospital overnight.  -Please do not take diabetic medications on the day of surgery. - Please do not wear any jewelry or body piercings day of surgery. 8/5/22  12:36 PM      ___________________  _______________________  Signature (Provider)              Signature (Patient)     Day of Surgery/Procedure    As a patient at [facility] you can expect quality medical and nursing care that is centered on your individual needs. Our goal is to make your surgical experience as comfortable as possible  . Directions to the [address] [place] [address])  [place] [address] is located at [address] [facility], [address]   The [address] [place] [address]) is across the street from the [facility]. You may park at the [address] [facility] [place] parking garage, or if handicapped there are closer spots in the surface lot directly in front of the [address]. The patients that arrive at 5:30 am need to report to the 1st floor nurse's station, otherwise please report to the main floor registration desk. Transportation after your procedure - If applicable  You will need a friend or family member to drive you home after your procedure. Your  must be 25years of age or older and able to sign off on your discharge instructions. Taxi cabs or any form of public transportation is not acceptable. It is preferable that the friend or family member stay at the hospital throughout your procedure. Someone must remain with you for the first 24 hours after your surgery if you receive anesthesia or medication. If you do not have someone to stay with you, your procedure may be cancelled. Patient Instructions    If you are having any type of anesthesia you are to have nothing to eat or drink after midnight the night before your surgery. This includes gum, mints, water or smoking or chewing tobacco.  The only exception to this is a small sip of water to take with any morning dose of heart, blood pressure, or seizure medications. Bring a list of all medications you take, along with the dose of the medications and how often you take it. If more convenient bring the pharmacy bottles in a zip lock bag. Please shower the night before and the morning of surgery with an antibacterial soap. Please use the wipes given to you the night before your surgery after your shower. Unless otherwise told by your physician, please do not shave legs or any part of your body below your neck the night before or day of your surgery. You may shave your face or neck. Brush your teeth but do not swallow water. Bring your inhaler if you are currently using one. Bring your eyeglasses and case with you.   No contacts are to be worn the day of surgery. You also may bring your hearing aids. Bring your blood band if one has been given to you. Please do not close the clasp. If you are on C-PAP or Bi-PAP at home and plan on staying in the hospital overnight for your surgery please bring the machine with you. Do not wear any jewelry or body piercings day of surgery. Also, NO lotion, perfume or deodorant to be used the day of surgery. Do not bring any valuables, such as jewelry, cash or credit cards. If you are staying overnight with us, please bring a SMALL bag of personal items. We cannot accommodate large items, like suitcases. Please wear loose, comfortable clothing. If you are potentially going to have a cast or brace bring clothing that will fit over them. In case of illness - If you have cold or flu like symptoms (high fever, runny nose, sore throat, cough, etc.) rash, nausea, vomiting, loose stools, and/or recent contact with someone who has a contagious disease (chicken pox, measles, etc.) Please call your doctor before coming to the hospital.      If your child is having surgery please make arrangements for any other children to be cared for at home on the day of surgery. Other children are not permitted in recovery room and we want you to be able to spend time with the patient. If other arrangements are not available then we suggest that you have a second adult to stay in the waiting room.       If you have any other questions regarding your procedure or the day of surgery, please call [phone], or [phone]

## 2022-08-04 NOTE — TELEPHONE ENCOUNTER
----- Message from Frederick Guzman MA sent at 8/4/2022  9:24 AM EDT -----  Scheduled, spoke with pt informed him of surgery day and time. Also informed him of PAT testing day and time. Went over all pre-op instructions with him. He will stop by the office tomorrow and pick upo the pre-op instructions. He verbalized understanding.   ----- Message -----  From: Frederick Guzman MA  Sent: 7/29/2022  11:29 AM EDT  To: [facility]/V[facility] Clinical Staff      ----- Message -----  From: Harika Cormier MD  Sent: 7/29/2022   8:28 AM EDT  To: Frederick Guzman MA    Whenever  ----- Message -----  From: Frederick Guzman MA  Sent: 7/20/2022   9:38 AM EDT  To: Harika Cormier MD, #    PT was calling to set up his surgery (left carotid endarterectomy), when would be a good day and time to schedule?

## 2022-08-05 ENCOUNTER — HOSPITAL ENCOUNTER (OUTPATIENT)
Dept: PREADMISSION TESTING | Age: 77
Discharge: HOME | End: 2022-08-09
Payer: MEDICARE

## 2022-08-05 ENCOUNTER — HOSPITAL ENCOUNTER (OUTPATIENT)
Dept: GENERAL RADIOLOGY | Age: 77
Discharge: HOME | End: 2022-08-07
Payer: MEDICARE

## 2022-08-05 VITALS
BODY MASS INDEX: 27.74 KG/M2 | RESPIRATION RATE: 20 BRPM | TEMPERATURE: 97 F | WEIGHT: 183 LBS | HEART RATE: 48 BPM | DIASTOLIC BLOOD PRESSURE: 66 MMHG | SYSTOLIC BLOOD PRESSURE: 113 MMHG | HEIGHT: 68 IN | OXYGEN SATURATION: 95 %

## 2022-08-05 LAB
ABSOLUTE EOS #: 0.46 K/UL (ref 0–0.44)
ABSOLUTE IMMATURE GRANULOCYTE: 0.03 K/UL (ref 0–0.3)
ABSOLUTE LYMPH #: 2.84 K/UL (ref 1.1–3.7)
ABSOLUTE MONO #: 0.68 K/UL (ref 0.1–1.2)
ANION GAP SERPL CALCULATED.3IONS-SCNC: 8 MMOL/L (ref 9–17)
BASOPHILS # BLD: 1 % (ref 0–2)
BASOPHILS ABSOLUTE: 0.08 K/UL (ref 0–0.2)
BUN BLDV-MCNC: 31 MG/DL (ref 8–23)
CALCIUM SERPL-MCNC: 9.4 MG/DL (ref 8.6–10.4)
CHLORIDE BLD-SCNC: 101 MMOL/L (ref 98–107)
CO2: 27 MMOL/L (ref 20–31)
CREAT SERPL-MCNC: 1.31 MG/DL (ref 0.7–1.2)
EOSINOPHILS RELATIVE PERCENT: 5 % (ref 1–4)
GFR AFRICAN AMERICAN: >60 ML/MIN
GFR NON-AFRICAN AMERICAN: 53 ML/MIN
GFR SERPL CREATININE-BSD FRML MDRD: ABNORMAL ML/MIN/{1.73_M2}
GLUCOSE BLD-MCNC: 101 MG/DL (ref 70–99)
HCT VFR BLD CALC: 43.3 % (ref 40.7–50.3)
HEMOGLOBIN: 14.2 G/DL (ref 13–17)
IMMATURE GRANULOCYTES: 0 %
INR BLD: 1.5
LYMPHOCYTES # BLD: 30 % (ref 24–43)
MCH RBC QN AUTO: 30.8 PG (ref 25.2–33.5)
MCHC RBC AUTO-ENTMCNC: 32.8 G/DL (ref 28.4–34.8)
MCV RBC AUTO: 93.9 FL (ref 82.6–102.9)
MONOCYTES # BLD: 7 % (ref 3–12)
NRBC AUTOMATED: 0 PER 100 WBC
PARTIAL THROMBOPLASTIN TIME: 29 SEC (ref 20.5–30.5)
PDW BLD-RTO: 13.6 % (ref 11.8–14.4)
PLATELET # BLD: 240 K/UL (ref 138–453)
PMV BLD AUTO: 11.5 FL (ref 8.1–13.5)
POTASSIUM SERPL-SCNC: 5.1 MMOL/L (ref 3.7–5.3)
PROTHROMBIN TIME: 15.1 SEC (ref 9.1–12.3)
RBC # BLD: 4.61 M/UL (ref 4.21–5.77)
SEG NEUTROPHILS: 57 % (ref 36–65)
SEGMENTED NEUTROPHILS ABSOLUTE COUNT: 5.3 K/UL (ref 1.5–8.1)
SODIUM BLD-SCNC: 136 MMOL/L (ref 135–144)
WBC # BLD: 9.4 K/UL (ref 3.5–11.3)

## 2022-08-05 PROCEDURE — 93005 ELECTROCARDIOGRAM TRACING: CPT | Performed by: SURGERY

## 2022-08-05 PROCEDURE — 85610 PROTHROMBIN TIME: CPT

## 2022-08-05 PROCEDURE — 85730 THROMBOPLASTIN TIME PARTIAL: CPT

## 2022-08-05 PROCEDURE — 36415 COLL VENOUS BLD VENIPUNCTURE: CPT

## 2022-08-05 PROCEDURE — 71046 X-RAY EXAM CHEST 2 VIEWS: CPT

## 2022-08-05 PROCEDURE — 85025 COMPLETE CBC W/AUTO DIFF WBC: CPT

## 2022-08-05 PROCEDURE — 80048 BASIC METABOLIC PNL TOTAL CA: CPT

## 2022-08-05 RX ORDER — ALBUTEROL SULFATE 0.63 MG/3ML
1 SOLUTION RESPIRATORY (INHALATION) EVERY 6 HOURS PRN
COMMUNITY

## 2022-08-05 NOTE — PROGRESS NOTES
Writer spoke with Debbie De Anda from [employer]. Informed her of  Dr Marcelina Arndt note on cardiac clearance form that states \"Pt will need ICD reprogrammed perioperatively with [employer]\" Informed Debbie De Anda of time and date of surgery.

## 2022-08-07 LAB
EKG ATRIAL RATE: 46 BPM
EKG P AXIS: -13 DEGREES
EKG P-R INTERVAL: 290 MS
EKG Q-T INTERVAL: 538 MS
EKG QRS DURATION: 154 MS
EKG QTC CALCULATION (BAZETT): 470 MS
EKG R AXIS: -62 DEGREES
EKG T AXIS: -11 DEGREES
EKG VENTRICULAR RATE: 46 BPM

## 2022-08-07 PROCEDURE — 93010 ELECTROCARDIOGRAM REPORT: CPT | Performed by: INTERNAL MEDICINE

## 2022-08-10 ENCOUNTER — ANESTHESIA EVENT (OUTPATIENT)
Dept: OPERATING ROOM | Age: 77
DRG: 039 | End: 2022-08-10
Payer: MEDICARE

## 2022-08-11 ENCOUNTER — ANESTHESIA (OUTPATIENT)
Dept: OPERATING ROOM | Age: 77
DRG: 039 | End: 2022-08-11
Payer: MEDICARE

## 2022-08-11 ENCOUNTER — HOSPITAL ENCOUNTER (INPATIENT)
Age: 77
LOS: 1 days | Discharge: HOME | DRG: 039 | End: 2022-08-12
Attending: SURGERY | Admitting: SURGERY
Payer: MEDICARE

## 2022-08-11 DIAGNOSIS — I65.22 STENOSIS OF LEFT CAROTID ARTERY: ICD-10-CM

## 2022-08-11 PROBLEM — Z98.890 S/P CAROTID ENDARTERECTOMY: Status: ACTIVE | Noted: 2022-08-11

## 2022-08-11 LAB
GFR NON-AFRICAN AMERICAN: >60 ML/MIN
GFR SERPL CREATININE-BSD FRML MDRD: >60 ML/MIN
GFR SERPL CREATININE-BSD FRML MDRD: NORMAL ML/MIN/{1.73_M2}
GLUCOSE BLD-MCNC: 87 MG/DL (ref 74–100)
POC BUN: 21 MG/DL (ref 8–26)
POC CHLORIDE: 108 MMOL/L (ref 98–107)
POC CREATININE: 0.93 MG/DL (ref 0.51–1.19)
POC HEMATOCRIT: 40 % (ref 41–53)
POC HEMOGLOBIN: 13.8 G/DL (ref 13.5–17.5)
POC INR: 1.1
POC IONIZED CALCIUM: 1.16 MMOL/L (ref 1.15–1.33)
POC POTASSIUM: 4.5 MMOL/L (ref 3.5–4.5)
POC SODIUM: 144 MMOL/L (ref 138–146)
PROTHROMBIN TIME, POC: 12.8 SEC (ref 10.4–14.2)

## 2022-08-11 PROCEDURE — 2500000003 HC RX 250 WO HCPCS: Performed by: SURGERY

## 2022-08-11 PROCEDURE — 3700000000 HC ANESTHESIA ATTENDED CARE: Performed by: SURGERY

## 2022-08-11 PROCEDURE — 6360000002 HC RX W HCPCS: Performed by: ANESTHESIOLOGY

## 2022-08-11 PROCEDURE — 6370000000 HC RX 637 (ALT 250 FOR IP): Performed by: SURGERY

## 2022-08-11 PROCEDURE — 2000000000 HC ICU R&B

## 2022-08-11 PROCEDURE — 35301 RECHANNELING OF ARTERY: CPT | Performed by: SURGERY

## 2022-08-11 PROCEDURE — 88311 DECALCIFY TISSUE: CPT

## 2022-08-11 PROCEDURE — 84295 ASSAY OF SERUM SODIUM: CPT

## 2022-08-11 PROCEDURE — 82435 ASSAY OF BLOOD CHLORIDE: CPT

## 2022-08-11 PROCEDURE — 6370000000 HC RX 637 (ALT 250 FOR IP): Performed by: STUDENT IN AN ORGANIZED HEALTH CARE EDUCATION/TRAINING PROGRAM

## 2022-08-11 PROCEDURE — 2500000003 HC RX 250 WO HCPCS: Performed by: ANESTHESIOLOGY

## 2022-08-11 PROCEDURE — 88304 TISSUE EXAM BY PATHOLOGIST: CPT

## 2022-08-11 PROCEDURE — 82565 ASSAY OF CREATININE: CPT

## 2022-08-11 PROCEDURE — 2709999900 HC NON-CHARGEABLE SUPPLY: Performed by: SURGERY

## 2022-08-11 PROCEDURE — 6360000002 HC RX W HCPCS: Performed by: SURGERY

## 2022-08-11 PROCEDURE — C1768 GRAFT, VASCULAR: HCPCS | Performed by: SURGERY

## 2022-08-11 PROCEDURE — 3600000013 HC SURGERY LEVEL 3 ADDTL 15MIN: Performed by: SURGERY

## 2022-08-11 PROCEDURE — 87641 MR-STAPH DNA AMP PROBE: CPT

## 2022-08-11 PROCEDURE — C1769 GUIDE WIRE: HCPCS | Performed by: SURGERY

## 2022-08-11 PROCEDURE — 03UL0KZ SUPPLEMENT LEFT INTERNAL CAROTID ARTERY WITH NONAUTOLOGOUS TISSUE SUBSTITUTE, OPEN APPROACH: ICD-10-PCS | Performed by: SURGERY

## 2022-08-11 PROCEDURE — 84132 ASSAY OF SERUM POTASSIUM: CPT

## 2022-08-11 PROCEDURE — 2580000003 HC RX 258: Performed by: STUDENT IN AN ORGANIZED HEALTH CARE EDUCATION/TRAINING PROGRAM

## 2022-08-11 PROCEDURE — 3600000003 HC SURGERY LEVEL 3 BASE: Performed by: SURGERY

## 2022-08-11 PROCEDURE — 2580000003 HC RX 258: Performed by: ANESTHESIOLOGY

## 2022-08-11 PROCEDURE — 3700000001 HC ADD 15 MINUTES (ANESTHESIA): Performed by: SURGERY

## 2022-08-11 PROCEDURE — 82947 ASSAY GLUCOSE BLOOD QUANT: CPT

## 2022-08-11 PROCEDURE — 85014 HEMATOCRIT: CPT

## 2022-08-11 PROCEDURE — 85610 PROTHROMBIN TIME: CPT

## 2022-08-11 PROCEDURE — 03CL0ZZ EXTIRPATION OF MATTER FROM LEFT INTERNAL CAROTID ARTERY, OPEN APPROACH: ICD-10-PCS | Performed by: SURGERY

## 2022-08-11 PROCEDURE — 84520 ASSAY OF UREA NITROGEN: CPT

## 2022-08-11 PROCEDURE — 82330 ASSAY OF CALCIUM: CPT

## 2022-08-11 PROCEDURE — C1889 IMPLANT/INSERT DEVICE, NOC: HCPCS | Performed by: SURGERY

## 2022-08-11 DEVICE — PATCH VASC W0.8XL8CM PERIPH BOV PERICARD N PVC N DEHP CRSS: Type: IMPLANTABLE DEVICE | Site: CAROTID | Status: FUNCTIONAL

## 2022-08-11 DEVICE — XENOSURE BIOLOGIC PATCH, 0.8CM X 8CM, EIFU
Type: IMPLANTABLE DEVICE | Site: CAROTID | Status: FUNCTIONAL
Brand: XENOSURE BIOLOGIC PATCH

## 2022-08-11 RX ORDER — OXYCODONE HYDROCHLORIDE 5 MG/1
5 TABLET ORAL EVERY 6 HOURS PRN
Status: DISCONTINUED | OUTPATIENT
Start: 2022-08-11 | End: 2022-08-12 | Stop reason: HOSPADM

## 2022-08-11 RX ORDER — SODIUM CHLORIDE 0.9 % (FLUSH) 0.9 %
5-40 SYRINGE (ML) INJECTION PRN
Status: DISCONTINUED | OUTPATIENT
Start: 2022-08-11 | End: 2022-08-11

## 2022-08-11 RX ORDER — PROPOFOL 10 MG/ML
INJECTION, EMULSION INTRAVENOUS PRN
Status: DISCONTINUED | OUTPATIENT
Start: 2022-08-11 | End: 2022-08-11 | Stop reason: SDUPTHER

## 2022-08-11 RX ORDER — FENTANYL CITRATE 50 UG/ML
INJECTION, SOLUTION INTRAMUSCULAR; INTRAVENOUS PRN
Status: DISCONTINUED | OUTPATIENT
Start: 2022-08-11 | End: 2022-08-11 | Stop reason: SDUPTHER

## 2022-08-11 RX ORDER — FENTANYL CITRATE 50 UG/ML
INJECTION, SOLUTION INTRAMUSCULAR; INTRAVENOUS
Status: DISPENSED
Start: 2022-08-11 | End: 2022-08-12

## 2022-08-11 RX ORDER — DIPHENHYDRAMINE HYDROCHLORIDE 50 MG/ML
12.5 INJECTION INTRAMUSCULAR; INTRAVENOUS
Status: DISCONTINUED | OUTPATIENT
Start: 2022-08-11 | End: 2022-08-11

## 2022-08-11 RX ORDER — PROTAMINE SULFATE 10 MG/ML
INJECTION, SOLUTION INTRAVENOUS PRN
Status: DISCONTINUED | OUTPATIENT
Start: 2022-08-11 | End: 2022-08-11 | Stop reason: SDUPTHER

## 2022-08-11 RX ORDER — ONDANSETRON 2 MG/ML
INJECTION INTRAMUSCULAR; INTRAVENOUS PRN
Status: DISCONTINUED | OUTPATIENT
Start: 2022-08-11 | End: 2022-08-11 | Stop reason: SDUPTHER

## 2022-08-11 RX ORDER — SODIUM CHLORIDE 0.9 % (FLUSH) 0.9 %
5-40 SYRINGE (ML) INJECTION EVERY 12 HOURS SCHEDULED
Status: DISCONTINUED | OUTPATIENT
Start: 2022-08-11 | End: 2022-08-11

## 2022-08-11 RX ORDER — ONDANSETRON 2 MG/ML
4 INJECTION INTRAMUSCULAR; INTRAVENOUS EVERY 6 HOURS PRN
Status: DISCONTINUED | OUTPATIENT
Start: 2022-08-11 | End: 2022-08-12 | Stop reason: HOSPADM

## 2022-08-11 RX ORDER — EPHEDRINE SULFATE/0.9% NACL/PF 50 MG/5 ML
SYRINGE (ML) INTRAVENOUS PRN
Status: DISCONTINUED | OUTPATIENT
Start: 2022-08-11 | End: 2022-08-11 | Stop reason: SDUPTHER

## 2022-08-11 RX ORDER — SODIUM CHLORIDE 9 MG/ML
INJECTION, SOLUTION INTRAVENOUS PRN
Status: DISCONTINUED | OUTPATIENT
Start: 2022-08-11 | End: 2022-08-11

## 2022-08-11 RX ORDER — ATORVASTATIN CALCIUM 40 MG/1
40 TABLET, FILM COATED ORAL NIGHTLY
Status: DISCONTINUED | OUTPATIENT
Start: 2022-08-11 | End: 2022-08-12 | Stop reason: HOSPADM

## 2022-08-11 RX ORDER — DEXAMETHASONE SODIUM PHOSPHATE 10 MG/ML
INJECTION INTRAMUSCULAR; INTRAVENOUS PRN
Status: DISCONTINUED | OUTPATIENT
Start: 2022-08-11 | End: 2022-08-11 | Stop reason: SDUPTHER

## 2022-08-11 RX ORDER — GLYCOPYRROLATE 0.2 MG/ML
INJECTION INTRAMUSCULAR; INTRAVENOUS PRN
Status: DISCONTINUED | OUTPATIENT
Start: 2022-08-11 | End: 2022-08-11 | Stop reason: SDUPTHER

## 2022-08-11 RX ORDER — SODIUM CHLORIDE 9 MG/ML
INJECTION, SOLUTION INTRAVENOUS PRN
Status: DISCONTINUED | OUTPATIENT
Start: 2022-08-11 | End: 2022-08-12 | Stop reason: HOSPADM

## 2022-08-11 RX ORDER — PHENYLEPHRINE HYDROCHLORIDE 10 MG/ML
INJECTION INTRAVENOUS PRN
Status: DISCONTINUED | OUTPATIENT
Start: 2022-08-11 | End: 2022-08-11 | Stop reason: SDUPTHER

## 2022-08-11 RX ORDER — ACETAMINOPHEN 325 MG/1
650 TABLET ORAL EVERY 6 HOURS PRN
Status: DISCONTINUED | OUTPATIENT
Start: 2022-08-11 | End: 2022-08-12 | Stop reason: HOSPADM

## 2022-08-11 RX ORDER — LIDOCAINE HYDROCHLORIDE 10 MG/ML
INJECTION, SOLUTION EPIDURAL; INFILTRATION; INTRACAUDAL; PERINEURAL PRN
Status: DISCONTINUED | OUTPATIENT
Start: 2022-08-11 | End: 2022-08-11 | Stop reason: HOSPADM

## 2022-08-11 RX ORDER — SODIUM CHLORIDE 0.9 % (FLUSH) 0.9 %
5-40 SYRINGE (ML) INJECTION PRN
Status: DISCONTINUED | OUTPATIENT
Start: 2022-08-11 | End: 2022-08-12 | Stop reason: HOSPADM

## 2022-08-11 RX ORDER — HEPARIN SODIUM 1000 [USP'U]/ML
INJECTION, SOLUTION INTRAVENOUS; SUBCUTANEOUS PRN
Status: DISCONTINUED | OUTPATIENT
Start: 2022-08-11 | End: 2022-08-11 | Stop reason: HOSPADM

## 2022-08-11 RX ORDER — FENTANYL CITRATE 50 UG/ML
25 INJECTION, SOLUTION INTRAMUSCULAR; INTRAVENOUS EVERY 5 MIN PRN
Status: DISCONTINUED | OUTPATIENT
Start: 2022-08-11 | End: 2022-08-11

## 2022-08-11 RX ORDER — IPRATROPIUM BROMIDE AND ALBUTEROL SULFATE 2.5; .5 MG/3ML; MG/3ML
1 SOLUTION RESPIRATORY (INHALATION) 3 TIMES DAILY PRN
Status: DISCONTINUED | OUTPATIENT
Start: 2022-08-11 | End: 2022-08-12 | Stop reason: HOSPADM

## 2022-08-11 RX ORDER — ONDANSETRON 4 MG/1
4 TABLET, ORALLY DISINTEGRATING ORAL EVERY 8 HOURS PRN
Status: DISCONTINUED | OUTPATIENT
Start: 2022-08-11 | End: 2022-08-12 | Stop reason: HOSPADM

## 2022-08-11 RX ORDER — CEFAZOLIN SODIUM 1 G/3ML
INJECTION, POWDER, FOR SOLUTION INTRAMUSCULAR; INTRAVENOUS PRN
Status: DISCONTINUED | OUTPATIENT
Start: 2022-08-11 | End: 2022-08-11 | Stop reason: SDUPTHER

## 2022-08-11 RX ORDER — ROCURONIUM BROMIDE 10 MG/ML
INJECTION, SOLUTION INTRAVENOUS PRN
Status: DISCONTINUED | OUTPATIENT
Start: 2022-08-11 | End: 2022-08-11 | Stop reason: SDUPTHER

## 2022-08-11 RX ORDER — ONDANSETRON 2 MG/ML
4 INJECTION INTRAMUSCULAR; INTRAVENOUS
Status: DISCONTINUED | OUTPATIENT
Start: 2022-08-11 | End: 2022-08-11

## 2022-08-11 RX ORDER — ASPIRIN 81 MG/1
81 TABLET, CHEWABLE ORAL DAILY
Status: DISCONTINUED | OUTPATIENT
Start: 2022-08-11 | End: 2022-08-12 | Stop reason: HOSPADM

## 2022-08-11 RX ORDER — FENTANYL CITRATE 50 UG/ML
50 INJECTION, SOLUTION INTRAMUSCULAR; INTRAVENOUS EVERY 5 MIN PRN
Status: DISCONTINUED | OUTPATIENT
Start: 2022-08-11 | End: 2022-08-11

## 2022-08-11 RX ORDER — ALBUTEROL SULFATE 2.5 MG/3ML
0.63 SOLUTION RESPIRATORY (INHALATION) EVERY 6 HOURS PRN
Status: DISCONTINUED | OUTPATIENT
Start: 2022-08-11 | End: 2022-08-12 | Stop reason: HOSPADM

## 2022-08-11 RX ORDER — SODIUM CHLORIDE, SODIUM LACTATE, POTASSIUM CHLORIDE, CALCIUM CHLORIDE 600; 310; 30; 20 MG/100ML; MG/100ML; MG/100ML; MG/100ML
1000 INJECTION, SOLUTION INTRAVENOUS CONTINUOUS
Status: DISCONTINUED | OUTPATIENT
Start: 2022-08-11 | End: 2022-08-11

## 2022-08-11 RX ORDER — SODIUM CHLORIDE 0.9 % (FLUSH) 0.9 %
5-40 SYRINGE (ML) INJECTION EVERY 12 HOURS SCHEDULED
Status: DISCONTINUED | OUTPATIENT
Start: 2022-08-11 | End: 2022-08-12 | Stop reason: HOSPADM

## 2022-08-11 RX ORDER — LIDOCAINE HYDROCHLORIDE 10 MG/ML
INJECTION, SOLUTION EPIDURAL; INFILTRATION; INTRACAUDAL; PERINEURAL PRN
Status: DISCONTINUED | OUTPATIENT
Start: 2022-08-11 | End: 2022-08-11 | Stop reason: SDUPTHER

## 2022-08-11 RX ADMIN — PHENYLEPHRINE HYDROCHLORIDE 25 MCG: 10 INJECTION INTRAVENOUS at 11:15

## 2022-08-11 RX ADMIN — Medication 5 MG: at 10:09

## 2022-08-11 RX ADMIN — DESMOPRESSIN ACETATE 40 MG: 0.2 TABLET ORAL at 20:32

## 2022-08-11 RX ADMIN — PHENYLEPHRINE HYDROCHLORIDE 50 MCG: 10 INJECTION INTRAVENOUS at 10:27

## 2022-08-11 RX ADMIN — ROCURONIUM BROMIDE 10 MG: 10 INJECTION, SOLUTION INTRAVENOUS at 10:37

## 2022-08-11 RX ADMIN — ROCURONIUM BROMIDE 10 MG: 10 INJECTION, SOLUTION INTRAVENOUS at 09:45

## 2022-08-11 RX ADMIN — Medication 2.5 MG: at 10:39

## 2022-08-11 RX ADMIN — ROCURONIUM BROMIDE 10 MG: 10 INJECTION, SOLUTION INTRAVENOUS at 10:16

## 2022-08-11 RX ADMIN — SODIUM CHLORIDE, POTASSIUM CHLORIDE, SODIUM LACTATE AND CALCIUM CHLORIDE: 600; 310; 30; 20 INJECTION, SOLUTION INTRAVENOUS at 08:41

## 2022-08-11 RX ADMIN — GLYCOPYRROLATE 0.2 MG: 0.2 INJECTION INTRAMUSCULAR; INTRAVENOUS at 11:59

## 2022-08-11 RX ADMIN — PROPOFOL 20 MG: 10 INJECTION, EMULSION INTRAVENOUS at 12:28

## 2022-08-11 RX ADMIN — PHENYLEPHRINE HYDROCHLORIDE 25 MCG: 10 INJECTION INTRAVENOUS at 11:27

## 2022-08-11 RX ADMIN — FENTANYL CITRATE 25 MCG: 50 INJECTION, SOLUTION INTRAMUSCULAR; INTRAVENOUS at 12:30

## 2022-08-11 RX ADMIN — HEPARIN SODIUM 2000 UNITS: 1000 INJECTION INTRAVENOUS; SUBCUTANEOUS at 11:03

## 2022-08-11 RX ADMIN — PROPOFOL 20 MG: 10 INJECTION, EMULSION INTRAVENOUS at 12:37

## 2022-08-11 RX ADMIN — PHENYLEPHRINE HYDROCHLORIDE 25 MCG: 10 INJECTION INTRAVENOUS at 11:43

## 2022-08-11 RX ADMIN — FENTANYL CITRATE 50 MCG: 50 INJECTION, SOLUTION INTRAMUSCULAR; INTRAVENOUS at 08:48

## 2022-08-11 RX ADMIN — CEFAZOLIN 2000 MG: 1 INJECTION, POWDER, FOR SOLUTION INTRAMUSCULAR; INTRAVENOUS at 09:12

## 2022-08-11 RX ADMIN — SODIUM CHLORIDE, PRESERVATIVE FREE 10 ML: 5 INJECTION INTRAVENOUS at 20:32

## 2022-08-11 RX ADMIN — PHENYLEPHRINE HYDROCHLORIDE 50 MCG: 10 INJECTION INTRAVENOUS at 11:56

## 2022-08-11 RX ADMIN — DEXAMETHASONE SODIUM PHOSPHATE 5 MG: 10 INJECTION INTRAMUSCULAR; INTRAVENOUS at 08:57

## 2022-08-11 RX ADMIN — Medication 5 MG: at 10:46

## 2022-08-11 RX ADMIN — Medication 5 MG: at 10:31

## 2022-08-11 RX ADMIN — PHENYLEPHRINE HYDROCHLORIDE 50 MCG: 10 INJECTION INTRAVENOUS at 10:22

## 2022-08-11 RX ADMIN — Medication 2.5 MG: at 10:37

## 2022-08-11 RX ADMIN — OXYCODONE 5 MG: 5 TABLET ORAL at 14:19

## 2022-08-11 RX ADMIN — ROCURONIUM BROMIDE 10 MG: 10 INJECTION, SOLUTION INTRAVENOUS at 11:40

## 2022-08-11 RX ADMIN — PHENYLEPHRINE HYDROCHLORIDE 25 MCG: 10 INJECTION INTRAVENOUS at 11:37

## 2022-08-11 RX ADMIN — Medication 5 MG: at 10:11

## 2022-08-11 RX ADMIN — FENTANYL CITRATE 25 MCG: 50 INJECTION, SOLUTION INTRAMUSCULAR; INTRAVENOUS at 09:20

## 2022-08-11 RX ADMIN — PROTAMINE SULFATE 10 MG: 10 INJECTION, SOLUTION INTRAVENOUS at 12:07

## 2022-08-11 RX ADMIN — PHENYLEPHRINE HYDROCHLORIDE 50 MCG: 10 INJECTION INTRAVENOUS at 11:59

## 2022-08-11 RX ADMIN — ASPIRIN 81 MG: 81 TABLET, CHEWABLE ORAL at 15:28

## 2022-08-11 RX ADMIN — FENTANYL CITRATE 25 MCG: 50 INJECTION, SOLUTION INTRAMUSCULAR; INTRAVENOUS at 13:14

## 2022-08-11 RX ADMIN — DEXAMETHASONE SODIUM PHOSPHATE 5 MG: 10 INJECTION INTRAMUSCULAR; INTRAVENOUS at 09:55

## 2022-08-11 RX ADMIN — HEPARIN SODIUM 9000 UNITS: 1000 INJECTION INTRAVENOUS; SUBCUTANEOUS at 10:07

## 2022-08-11 RX ADMIN — SODIUM CHLORIDE, POTASSIUM CHLORIDE, SODIUM LACTATE AND CALCIUM CHLORIDE 1000 ML: 600; 310; 30; 20 INJECTION, SOLUTION INTRAVENOUS at 13:53

## 2022-08-11 RX ADMIN — ROCURONIUM BROMIDE 50 MG: 10 INJECTION, SOLUTION INTRAVENOUS at 08:48

## 2022-08-11 RX ADMIN — ROCURONIUM BROMIDE 10 MG: 10 INJECTION, SOLUTION INTRAVENOUS at 11:12

## 2022-08-11 RX ADMIN — ACETAMINOPHEN 650 MG: 325 TABLET ORAL at 15:28

## 2022-08-11 RX ADMIN — PROPOFOL 150 MG: 10 INJECTION, EMULSION INTRAVENOUS at 08:48

## 2022-08-11 RX ADMIN — PHENYLEPHRINE HYDROCHLORIDE 50 MCG: 10 INJECTION INTRAVENOUS at 12:11

## 2022-08-11 RX ADMIN — ROCURONIUM BROMIDE 10 MG: 10 INJECTION, SOLUTION INTRAVENOUS at 11:21

## 2022-08-11 RX ADMIN — ONDANSETRON 4 MG: 2 INJECTION INTRAMUSCULAR; INTRAVENOUS at 12:11

## 2022-08-11 RX ADMIN — PHENYLEPHRINE HYDROCHLORIDE 50 MCG: 10 INJECTION INTRAVENOUS at 11:02

## 2022-08-11 RX ADMIN — LIDOCAINE HYDROCHLORIDE 50 MG: 10 INJECTION, SOLUTION EPIDURAL; INFILTRATION; INTRACAUDAL; PERINEURAL at 08:48

## 2022-08-11 RX ADMIN — SUGAMMADEX 200 MG: 100 INJECTION, SOLUTION INTRAVENOUS at 12:26

## 2022-08-11 RX ADMIN — Medication 5 MG: at 09:00

## 2022-08-11 RX ADMIN — Medication 2.5 MG: at 10:35

## 2022-08-11 RX ADMIN — PROTAMINE SULFATE 40 MG: 10 INJECTION, SOLUTION INTRAVENOUS at 12:00

## 2022-08-11 RX ADMIN — Medication 2.5 MG: at 10:52

## 2022-08-11 RX ADMIN — PHENYLEPHRINE HYDROCHLORIDE 25 MCG: 10 INJECTION INTRAVENOUS at 11:22

## 2022-08-11 RX ADMIN — Medication 5 MG: at 10:22

## 2022-08-11 RX ADMIN — Medication 10 MG: at 10:27

## 2022-08-11 ASSESSMENT — ENCOUNTER SYMPTOMS: SHORTNESS OF BREATH: 1

## 2022-08-11 ASSESSMENT — PAIN SCALES - GENERAL
PAINLEVEL_OUTOF10: 5
PAINLEVEL_OUTOF10: 5
PAINLEVEL_OUTOF10: 2
PAINLEVEL_OUTOF10: 3

## 2022-08-11 NOTE — ANESTHESIA POSTPROCEDURE EVALUATION
Department of Anesthesiology  Postprocedure Note    Patient: Eddie Castano  MRN: [id number]  YOB: 1945  Date of evaluation: 8/11/2022      Procedure Summary     Date: 08/11/22 Room / Location: [address]    Anesthesia Start: 1549 Anesthesia Stop: 7483    Procedure: LEFT CAROTID ENDARTERECTOMY WITH Patricgladis Margarette (Left) Diagnosis:       Stenosis of left carotid artery      (CAROTID ARTERY STENOSIS)    Surgeons: Jovana Wilde MD Responsible Provider: Norma Pearl MD    Anesthesia Type: general ASA Status: 4          Anesthesia Type: No value filed.     Lianna Phase I:      Lianna Phase II:        Anesthesia Post Evaluation    Patient location during evaluation: PACU  Patient participation: complete - patient participated  Level of consciousness: awake  Pain score: 1  Airway patency: patent  Nausea & Vomiting: no nausea and no vomiting  Complications: no  Cardiovascular status: blood pressure returned to baseline and hemodynamically stable  Respiratory status: acceptable  Hydration status: euvolemic

## 2022-08-11 NOTE — PLAN OF CARE
Problem: Discharge Planning  Goal: Discharge to home or other facility with appropriate resources  Outcome: Progressing  Flowsheets (Taken 8/11/2022 1862)  Discharge to home or other facility with appropriate resources: Identify discharge learning needs (meds, wound care, etc)     Problem: Safety - Adult  Goal: Free from fall injury  Outcome: Progressing

## 2022-08-11 NOTE — H&P
Division of Vascular Surgery         History and Physical  HPI:   Ida Romo is a 76 y.o. male who presents for left carotid endarterectomy. His carotid artery stenosis on the left is worse than his right. His left carotid has significant stenosis on CT examination and to my independent review of the images measures at least 80% if not greater. The right internal carotid artery has approximately 50% stenosis on CTA. He has not had symptoms of stroke, TIA or amaurosis fugax from either carotid artery. He has had coronary problems in the past and has had significant acute coronary syndrome with cardiac arrest previously. He has a defibrillator present and usually sees his cardiologist every 4 to 6 months. He has had coronary artery bypass grafting to 3 vessels in the past.  He no longer smokes cigarettes. He complains of no significant chest pain either at rest or with exertion. He has no evidence of shortness of breath. He continues to work as a . Past Medical History:   Diagnosis Date    Accident 5     [place] accident in the [address] [place] [address] \"jumping school\". Numbness and tingling for 1 year from the low back to bilateral mid thighs. Arthritis      Bronchitis      CAD (coronary artery disease) 11/19/13     cabg x 3    Cancer ([address]       skin    Cardiac arrest ([address] 11/2013     While playing squash.     Good exercise tolerance       pt plays golf, bowling but cannot play competive squash anymore (he passes out), still works full time ()    Hx of blood clots 12/2017     heart    Hyperkalemia 5/19/2014    Hypertension, essential      Ischemic cardiomyopathy 12/8/2016    Left ventricular apical thrombus 12/9/2016    MI (myocardial infarction) [facility]) November 2013    Pseudoaneurysm [facility]) - right groin 12/12/2016     Post cardiac catheterization    Sinus drainage      Systolic dysfunction with acute on chronic heart failure ([facility] - Efx 30% 12/8/2016            Family History   Problem Relation Age of Onset    Hypertension Father      Stroke Father      Stroke Maternal Grandfather      Hypertension Paternal Grandmother      Hypertension Paternal Grandfather      No Known Problems Mother           age 80    Other Father           vascular    Heart Failure Sister           MI    Heart Failure Brother           MI      Social History            Socioeconomic History    Marital status: Single       Spouse name: Not on file    Number of children: Not on file    Years of education: Not on file    Highest education level: Not on file   Occupational History    Not on file   Tobacco Use    Smoking status: Current Some Day Smoker       Packs/day: 0.00       Types: Cigars    Smokeless tobacco: Never Used    Tobacco comment: smokes cigars occasionally   Vaping Use    Vaping Use: Never used   Substance and Sexual Activity    Alcohol use: Yes       Comment: couple drinks per week    Drug use: No    Sexual activity: Not on file   Other Topics Concern    Not on file   Social History Narrative     ** Merged History Encounter **            Social Determinants of Health          Financial Resource Strain:    Difficulty of Paying Living Expenses: Not on file   Food Insecurity:    Worried About Running Out of Food in the Last Year: Not on file    [place] of Food in the Last Year: Not on file   Transportation Needs:    Lack of Transportation (Medical): Not on file    Lack of Transportation (Non-Medical):  Not on file   Physical Activity:    Days of Exercise per Week: Not on file    Minutes of Exercise per Session: Not on file   Stress:    Feeling of Stress : Not on file   Social Connections:    Frequency of Communication with Friends and Family: Not on file    Frequency of Social Gatherings with Friends and Family: Not on file    Attends Rastafari Services: Not on file    Active Member of Clubs or Organizations: Not on file    Attends Club or Organization Meetings: Not on file    Marital Status: Not on file   Intimate Partner Violence:    Fear of Current or Ex-Partner: Not on file    Emotionally Abused: Not on file    Physically Abused: Not on file    Sexually Abused: Not on file   Housing Stability:    Unable to Pay for Housing in the Last Year: Not on file    Number of [place] in the Last Year: Not on file    Unstable Housing in the Last Year: Not on file            Past Surgical History:   Procedure Laterality Date    ABDOMEN SURGERY Right 7/19/2019     EXCISION RIGHT SHOULDER MASS performed by Dashawn Maddox DO at [address]   12/08/2016     No stents placed per pt    CARDIAC CATHETERIZATION   11/18/2013    CARDIAC CATHETERIZATION   04/07/2022    CARDIAC DEFIBRILLATOR PLACEMENT   2013     right chest    COLONOSCOPY        CORONARY ARTERY BYPASS GRAFT   11/19/2013     x 3 vessell    OTHER SURGICAL HISTORY Right 07/19/2019     EXCISION RIGHT SHOULDER MASS    TONSILLECTOMY        VASECTOMY          Review of Systems  Constitutional: Negative for activity change, fever and unexpected weight change. HENT: Negative for trouble swallowing and voice change. Eyes: Negative for pain and visual disturbance. Respiratory: Negative for cough, chest tightness and shortness of breath. Cardiovascular: Negative for chest pain and palpitations. Gastrointestinal: Negative for abdominal distention, abdominal pain and vomiting. Endocrine: Negative for cold intolerance and heat intolerance. Genitourinary: Negative for dysuria, flank pain and hematuria. Musculoskeletal: Negative for joint swelling and neck pain. Skin: Negative for color change and rash. Allergic/Immunologic: Negative for immunocompromised state. Neurological: Negative for syncope, speech difficulty, weakness, numbness and headaches. Hematological: Negative for adenopathy. Psychiatric/Behavioral: Negative for behavioral problems and suicidal ideas.              Vitals:     05/06/22 1534   BP: 135/64   Site: Right Upper Arm   Position: Sitting   Cuff Size: Large Adult   Pulse: 57   Resp: 18   Temp: 98.7 °F (37.1 °C)   TempSrc: Temporal   SpO2: 97%   Weight: 175 lb (79.4 kg)   Height: 5' 8\" (1.727 m)          Physical Exam  Constitutional:       General: He is not in acute distress. HENT:     Mouth/Throat:      Mouth: Mucous membranes are moist.      Pharynx: Oropharynx is clear. Eyes:     General: No scleral icterus. Extraocular Movements: Extraocular movements intact. Conjunctiva/sclera: Conjunctivae normal.   Neck:     Thyroid: No thyroid mass or thyromegaly. Cardiovascular:     Rate and Rhythm: Normal rate and regular rhythm. Heart sounds: No murmur heard. Comments: He has a carotid bruit bilaterally. The right is during systole only. The left does enter diastole. His chest is clear to auscultation bilaterally. His heart has a regular rate and rhythm with no murmurs rubs or gallops. I can hear no precordial noise that would correspond to his carotid bruits. His abdomen is soft nontender nondistended with normal bowel sounds and no masses. He has no evidence of aneurysmal disease. He has palpable femoral, popliteal, dorsalis pedis and posterior tibial pulses bilaterally which are strong and equal.  He has no gangrene. He has no ulceration. He has no significant edema. There are no varicosities. Pulmonary:     Effort: No respiratory distress. Breath sounds: No rales. Abdominal:      General: There is no distension. Palpations: There is no mass. Tenderness: There is no abdominal tenderness. There is no guarding. Musculoskeletal:      Cervical back: No rigidity or tenderness. Lymphadenopathy:     Cervical: No cervical adenopathy. Skin:     Coloration: Skin is not jaundiced. Findings: No rash. Neurological:     General: No focal deficit present. Mental Status: He is alert and oriented to person, place, and time. Cranial Nerves: No cranial nerve deficit. Psychiatric:         Mood and Affect: Mood normal.       Assessment:  1. Bilateral carotid artery stenosis          Plan:  Plan for left carotid endarterectomy for 80% occlusion of the left carotid. He understands that this operation involves opening the neck and opening the carotid to remove the plaque and remove the nidus that could cause a stroke. He understands that his risk of stroke over the next 5 years is higher than the risk of stroke with the operation. He understands that these operations do have risk including but not limited to bleeding, infection, hematoma, nerve damage, permanent neurologic disability, stroke, TIA, myocardial infarction, cerebral hyperperfusion syndrome and death.     Ale Lynne DO PGY-2  8/11/22 7:11 AM

## 2022-08-11 NOTE — PLAN OF CARE
Problem: Discharge Planning  Goal: Discharge to home or other facility with appropriate resources  8/11/2022 1940 by Joce Cantu RN  Outcome: Progressing  8/11/2022 1755 by Corwin Young RN  Outcome: Progressing  Flowsheets (Taken 8/11/2022 1732)  Discharge to home or other facility with appropriate resources: Identify discharge learning needs (meds, wound care, etc)     Problem: Safety - Adult  Goal: Free from fall injury  8/11/2022 1940 by Joce Cantu RN  Outcome: Progressing  Flowsheets (Taken 8/11/2022 1938)  Free From Fall Injury: Instruct family/caregiver on patient safety  8/11/2022 1755 by Corwin Young RN  Outcome: Progressing     Problem: ABCDS Injury Assessment  Goal: Absence of physical injury  Outcome: Progressing

## 2022-08-11 NOTE — OP NOTE
Operative Note      Patient: Damir Mosquera  YOB: 1945  MRN: [id number]    Date of Procedure: 8/11/2022    Pre-Op Diagnosis: Asymptomatic left carotid stenosis 80%    Post-Op Diagnosis: Same       Procedure(s):  LEFT CAROTID ENDARTERECTOMY WITH [place] Olivier PATCH ANGIOPLASTY    Surgeon(s):  Linda Christopher MD    Assistant:   Irma Shelton    Anesthesia: General    Estimated Blood Loss (mL): 958     Complications: None    Specimens:   ID Type Source Tests Collected by Time Destination   A : LEFT CAROTID ENDARTERECTOMY Tissue Carotid Arteries SURGICAL PATHOLOGY Linda Christopher MD 8/11/2022 1035        Implants:  Implant Name Type Inv. Item Serial No.  Lot No. LRB No. Used Action   PATCH VASC W0.8XL8CM PERIPH BOV PERICARD N PVC N DEHP CRSS - [id number] Vascular grafts PATCH VASC W0.8XL8CM PERIPH BOV PERICARD N PVC N DEHP CRSS  [facility]- [id number] Left 1 Implanted   GRAFT VASC W0.8XL8CM THK0.35-0.75MM CAR PERICARD PROC BOV - [id number] Vascular grafts GRAFT VASC W0.8XL8CM THK0.35-0.75MM CAR PERICARD PROC BOV [id number] 1968 [facility]- [id number] Left 1 Implanted         Drains:   Closed/Suction Drain Left; Anterior Neck Bulb (Active)       Findings: The patient has at least 80% stenosis in the distal common carotid extending to the internal carotid artery. There is calcified and heterogeneous plaque in same arteries extending approximately 2 to 3 cm into the internal carotid artery and all the way down to approximately 8 cm into the common carotid artery. Arteriotomy was done measuring 11 to 12 cm in length. Following the case the internal carotid artery was quite redundant which was repaired by resecting a piece of internal carotid artery and we anastomosing with interrupted 7-0 Prolene. 2 patches were placed and duplex examination following patch angioplasty showed that there was no significant stenosis.   Power Doppler showed good color flow throughout the entirety of the repair. I was unable to perform spectral waveform analysis with that ultrasound machine. Continuous-wave Doppler did not demonstrate high-pitched waveforms in the internal carotid artery and there were monophasic low resistance signals to the brain. The external had high resistance signals. The patient woke moving all 4 extremities and there were no complications. He has no deviation of the tongue. The hypoglossal and vagus nerves were identified and preserved. The glossopharyngeal was actually identified and preserved. Detailed Description of Procedure: The patient was brought to the operating room and placed in the supine position with the arms tucked at the sides. The patient was identified by name and time out was held after general anesthetic was established and an endotracheal airway was established. An arterial line was established and appropriate IV access was established. Antibiotics were given. The right neck face and chest were prepped and draped in sterile fashion. A longitudinal incision was made on the anterior border of the right sternocleidomastoid muscle through which Bovie cautery was used to dissect down to the level of the jugular vein. The jugular vein was dissected on its medial aspect and the facial vein was identified ligated and transected. The common carotid artery was identified and dissected in a circumferential fashion up to the bifurcation at which point dissection continued along the lateral surface of the internal carotid artery to a disease-free portion of the internal carotid artery. It was dissected circumferentially at that location so that the clamp could be placed later. Once adequate dissection was established and the hypoglossal and vagus nerves were identified and preserved the patient was given a systemic dose of heparin and 3 minutes were allowed for circulation time.     A Keyshawn bulldog clamp was then placed across the internal carotid artery at the disease-free portion. A Andrea Alex bulldog was then placed on the proximal common carotid artery and the external carotid artery was dissected on its undersurface with scissors. A profundofemoral clamp was placed on the external carotid artery for control. A longitudinal arteriotomy was made with 11 blade and Arevalo scissors and extended from the common carotid up to and including the internal carotid up to the disease-free portion of the internal carotid artery. A 3 x 4 shunt was then placed first in the distal internal carotid artery and allowed to backbleed and fastened in place with a Judah shunt clamp. The shunt was allowed to backbleed and de-aired. It was then used to irrigate the common carotid stump to allow any debris or air evacuated from that location. It was fastened in place with a Judah shunt clamp in the common carotid artery. A Chris tourniquet was then used to fasten the proximal portion of the shunt in place by advancing the shunt in a retrograde fashion down the common carotid artery and pulling the Chris tourniquet taut around the shunt. The proximal Judah shunt clamp was then removed. The shunt was inspected for flow with continuous-wave Doppler and was normal.  A dental freer was used with forceps to remove the plaque in a standard endarterectomy technique. Endpoints were established by gentle traction and with Arevalo scissors. Excellent endpoints were achieved both proximally and distally. Forced heparin saline was then used to inspect the back wall and any debris was then removed with ring forceps. The internal carotid was redundant and therefore a piece of internal carotid artery was resected by transecting it at 90 degree angles in 2 places and then reanastomosed in the back wall of the endarterectomized internal carotid artery using 7-0 Prolene in an interrupted fashion.   Once the back wall was reanastomosed the patch was placed to the distal apex with a 6-0 Prolene on a C1 needle. The patch was then used to close the arteriotomy on both sides of the arteriotomy down to about the midportion of the patch. It was obvious that the patch was not going to be able to make the distance of the entirety of the arteriotomy and therefore a second patch was placed at the distal apex and brought up to the first.  The shunt was exiting the arterial lumen from the lateral aspect. The 2 patches were sewn together after cutting them to length at the midportion of the arteriotomy with the existing 6-0 Prolene that was being used. The last quadrant of the arteriotomy was closed with the existing suture from the proximal apex up to the level of the sheath. The shunt was then clamped with a hemostat and removed from the distal internal carotid artery. Gentle backbleeding was allowed before clamping the distal internal carotid artery with a Wes Felt bulldog clamp once again. It was then removed from the proximal common carotid artery and a short blast of blood was allowed before clamping the common carotid artery with a [place] Felt bulldog clamp. The shunt was delivered from the wound. Copious heparin saline irrigation was used to irrigate the entirety of the repair and there was no debris or air. The remainder of the patch was then fastened up to about the last stitch at which point the distal internal carotid clamp was flashed to allow backbleeding and any air to be removed. The arteriotomy was closed in its entirety and tied. The distal internal carotid was flashed once again and reclamped to pressurize the arteriotomy. The common carotid clamp and external carotid clamp were removed. 10 heartbeats were allowed to elapse before removing the distal internal carotid artery clamp. Duplex ultrasonography was then performed and the findings were as described above. The patient was given a reversal dose of protamine and FloSeal was then established around the repair.   5 minutes were allowed to elapse to inspect for any bleeding of which there was none. The wound was then closed in 2 layers using 4-0 Vicryl in an interrupted simple style at the level of the platysma and 4-0 nylon in an interrupted vertical mattress style at the level of the skin. This was done over a 7 flat Ajit-Moreland drain. The drain had minimal drainage. The patient was awakened extubated and returned to the recovery room in stable condition moving all 4 extremities with no apparent neurologic deficit. The patient tolerated this procedure well and there were no apparent complications. The patient will remain in the unit until at least tomorrow morning and likely be discharged thereafter.     Electronically signed by Army Maureen MD on 8/11/2022 at 1:35 PM

## 2022-08-11 NOTE — ANESTHESIA PROCEDURE NOTES
Arterial Line:    An arterial line was placed using ultrasound guidance and surface landmarks, in the OR for the following indication(s): continuous blood pressure monitoring. A 20 gauge (size), 1 and 3/4 inch (length), Arrow (type) catheter was placed, Seldinger technique used, into the right radial artery, secured by Tegaderm. Anesthesia type: General    Events:  patient tolerated procedure well with no complications.   Performed: Anesthesiologist   Preanesthetic Checklist  Completed: patient identified, IV checked, site marked, risks and benefits discussed, surgical/procedural consents, equipment checked, pre-op evaluation, timeout performed, anesthesia consent given, oxygen available and monitors applied/VS acknowledged

## 2022-08-11 NOTE — ANESTHESIA PRE PROCEDURE
Department of Anesthesiology  Preprocedure Note       Name:  Mago Gonzalez   Age:  76 y.o.  :  1945                                          MRN:  [id number]         Date:  2022      Surgeon: Blair Osorio):  Tracy Browne MD    Procedure: CAROTID ENDARTERECTOMY (Left)    Medications prior to admission:   Prior to Admission medications    Medication Sig Start Date End Date Taking?  Authorizing Provider   albuterol (ACCUNEB) 0.63 MG/3ML nebulizer solution Take 1 ampule by nebulization every 6 hours as needed for Wheezing Currently using bid    Historical Provider, MD   fluticasone (FLONASE) 50 MCG/ACT nasal spray 1 spray by Each Nostril route daily Uses prn    Historical Provider, MD   b complex vitamins capsule Take 1 capsule by mouth daily    Historical Provider, MD   mexiletine (MEXITIL) 200 MG capsule Take 200 mg by mouth 2 times daily    Historical Provider, MD   montelukast (SINGULAIR) 10 MG tablet Take 10 mg by mouth nightly    Historical Provider, MD   ipratropium-albuterol (DUONEB) 0.5-2.5 (3) MG/3ML SOLN nebulizer solution Inhale 1 vial into the lungs 3 times daily as needed for Shortness of Breath  Patient not taking: Reported on 2022    Historical Provider, MD   fluticasone propionate (FLOVENT) 100 MCG/BLIST AEPB inhaler Inhale 1 puff into the lungs 2 times daily  Patient not taking: Reported on 2022    Historical Provider, MD   LUTEIN PO Take 2 tablets by mouth daily    Historical Provider, MD   Cholecalciferol (VITAMIN D3 PO) Take by mouth daily    Historical Provider, MD   furosemide (LASIX) 40 MG tablet Take 0.5 tablets by mouth daily 19   Enrique Sears DO   Respiratory Therapy Supplies (NEBULIZER COMPRESSOR) KIT 1 kit by Does not apply route once for 1 dose 6/10/18 12/25/18  Mirella Black MD   Respiratory Therapy Supplies (NEBULIZER COMPRESSOR) KIT 1 kit by Does not apply route once for 1 dose 6/10/18 6/10/18  Mirella Black MD   warfarin (COUMADIN) 6 MG tablet Take 6 mg by mouth in the morning. Historical Provider, MD   atorvastatin (LIPITOR) 40 MG tablet Take 1 tablet by mouth nightly 12/13/16   Luis , DO   lisinopril (PRINIVIL;ZESTRIL) 10 MG tablet Take 1 tablet by mouth daily 12/13/16   Luis , DO   spironolactone (ALDACTONE) 25 MG tablet Take 1 tablet by mouth daily 12/13/16   Luis , DO   albuterol (VENTOLIN HFA) 108 (90 BASE) MCG/ACT inhaler Inhale 2 puffs into the lungs every 6 hours as needed for Wheezing. 10/10/14   Arias Garcia, DO   sotalol (BETAPACE) 80 MG tablet Take 1 tablet by mouth 2 times daily. 5/19/14   Richard Lopez, DO   aspirin 81 MG chewable tablet Take 1 tablet by mouth daily. Patient taking differently: Take 81 mg by mouth in the morning. Stop 7 days prior to sx per Dr. Lon Payton. 11/26/13   Audelia Huber PA-C       Current medications:    No current facility-administered medications for this visit. No current outpatient medications on file. Facility-Administered Medications Ordered in Other Visits   Medication Dose Route Frequency Provider Last Rate Last Admin    lactated ringers infusion 1,000 mL  1,000 mL IntraVENous Continuous Roland Call MD           Allergies: Allergies   Allergen Reactions    Pcn [Penicillins] Itching     Patient denies.        Problem List:    Patient Active Problem List   Diagnosis Code    Ventricular tachycardia, monomorphic ([facility] I47.2    Coronary artery disease involving native coronary artery of native heart without angina pectoris I25.10    Tobacco use Z72.0    Spell of dizziness R42    Hyponatremia E87.1    ICD (implantable cardioverter-defibrillator) discharge Z45.02    HTN (hypertension), benign I10    Hyperkalemia E87.5    Bradycardia R00.1    Hyperphosphatemia E83.39    MI (myocardial infarction) ([address] I21.9    Cardiac arrest ([facility] I46.9    Hyperglycemia R73.9    Acute on chronic congestive heart failure (HCC) I50.9    Shortness of breath R06.02    Ischemic cardiomyopathy E68.9    Systolic dysfunction with acute on chronic heart failure (HCC) - Efx 30% I50.23    Acute on chronic diastolic heart failure (HCC) I50.33    Left ventricular apical thrombus I51.3    Azotemia R79.89    Hematoma of groin S30. 1XXA    Disorder of scapula - sclerosis, 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 M89.9    Pseudoaneurysm (HCC) I72.9    Thrombocytopenia (HCC) D69.6    LETICIA (acute kidney injury) ([address] N17.9    Elevated serum lactate dehydrogenase R74.02    Congestive heart failure (HCC) I50.9    Myocardial infarction (HCC) I21.9    Syncope and collapse R55    AICD malfunction, initial encounter T82.118A    Chronic systolic heart failure (HCC) I50.22    Serum lipase elevation R74.8    Heart block, AV - bifascicular I44.30       Past Medical History:        Diagnosis Date    Accident 5    [place] accident in the [employer] \"jumping school\". Numbness and tingling for 1 year from the low back to bilateral mid thighs.  Arthritis     states no pain from arthritis    Bronchitis     CAD (coronary artery disease) 11/19/2013    cabg x 3  [facility]    Cancer ([address]     head and cheek skin cancer    Cardiac arrest ([address] 11/2013    While playing squash.     Good exercise tolerance     pt plays golf, bowling but cannot play competive squash anymore (he passes out), still works full time ()    Hx of blood clots 12/2017    heart    Hyperkalemia 05/19/2014    Hypertension, essential     Ischemic cardiomyopathy 12/08/2016    Left ventricular apical thrombus 12/09/2016    MI (myocardial infarction) ([address] 11/2013    Pseudoaneurysm ([address] - right groin 12/12/2016    Post cardiac catheterization    Sinus drainage     Snores     no sleep apnea    Systolic dysfunction with acute on chronic heart failure (HCC) - Efx 30% 12/08/2016    Wellness examination     Dr. Linda Eli last seen 7/2022   [place], [place]       Past Surgical History:        Procedure Laterality Date    ABDOMEN SURGERY Right 07/19/2019    EXCISION RIGHT SHOULDER MASS performed by Rebel Middleton DO at [address]  12/08/2016    No stents placed per pt    CARDIAC CATHETERIZATION  11/18/2013    CARDIAC CATHETERIZATION  04/07/2022    CARDIAC DEFIBRILLATOR PLACEMENT  2013    right chest    COLONOSCOPY      CORONARY ARTERY BYPASS GRAFT  11/19/2013    x 3 vessell    OTHER SURGICAL HISTORY Right 07/19/2019    EXCISION RIGHT SHOULDER MASS     TONSILLECTOMY      VASECTOMY         Social History:    Social History     Tobacco Use    Smoking status: Former     Types: Cigars    Smokeless tobacco: Never    Tobacco comments:     Does not smoke cigars any longer   Substance Use Topics    Alcohol use: Yes     Comment: couple drinks per week at most                                Counseling given: Not Answered  Tobacco comments: Does not smoke cigars any longer      Vital Signs (Current): There were no vitals filed for this visit.                                            BP Readings from Last 3 Encounters:   08/05/22 113/66   05/27/22 (!) 145/74   05/06/22 135/64       NPO Status:                                                                                 BMI:   Wt Readings from Last 3 Encounters:   08/05/22 183 lb (83 kg)   05/27/22 190 lb (86.2 kg)   05/06/22 175 lb (79.4 kg)     There is no height or weight on file to calculate BMI.    CBC:   Lab Results   Component Value Date/Time    WBC 9.4 08/05/2022 02:53 PM    RBC 4.61 08/05/2022 02:53 PM    HGB 14.2 08/05/2022 02:53 PM    HCT 43.3 08/05/2022 02:53 PM    MCV 93.9 08/05/2022 02:53 PM    RDW 13.6 08/05/2022 02:53 PM     08/05/2022 02:53 PM       CMP:   Lab Results   Component Value Date/Time     08/05/2022 02:53 PM    K 5.1 08/05/2022 02:53 PM     08/05/2022 02:53 PM    CO2 27 08/05/2022 02:53 PM    BUN 31 08/05/2022 02:53 PM    CREATININE 1.31 08/05/2022 02:53 PM    GFRAA >60 08/05/2022 02:53 PM    LABGLOM 53 08/05/2022 02:53 PM    GLUCOSE 101 08/05/2022 02:53 PM    PROT 7.4 04/24/2019 08:19 AM    CALCIUM 9.4 08/05/2022 02:53 PM    BILITOT 0.53 04/24/2019 08:19 AM    ALKPHOS 173 04/24/2019 08:19 AM    AST 38 04/24/2019 08:19 AM    ALT 58 04/24/2019 08:19 AM       POC Tests: No results for input(s): POCGLU, POCNA, POCK, POCCL, POCBUN, POCHEMO, POCHCT in the last 72 hours. Coags:   Lab Results   Component Value Date/Time    PROTIME 15.1 08/05/2022 02:53 PM    INR 1.5 08/05/2022 02:53 PM    APTT 29.0 08/05/2022 02:53 PM       HCG (If Applicable): No results found for: PREGTESTUR, PREGSERUM, HCG, HCGQUANT     ABGs: No results found for: PHART, PO2ART, KYK4JHO, ESA6AKL, BEART, F7OQNRTL     Type & Screen (If Applicable):  No results found for: LABABO, [address]    Anesthesia Evaluation  Patient summary reviewed no history of anesthetic complications:   Airway: Mallampati: III  TM distance: >3 FB   Neck ROM: full  Mouth opening: > = 3 FB   Dental:          Pulmonary: breath sounds clear to auscultation  (+) shortness of breath:                             Cardiovascular:    (+) hypertension:, pacemaker: AICD, past MI:, CAD:, CABG/stent:, dysrhythmias: ventricular tachycardia, CHF: systolic, hyperlipidemia        Rhythm: regular  Rate: normal           Beta Blocker:  Dose within 24 Hrs         Neuro/Psych:   Negative Neuro/Psych ROS              GI/Hepatic/Renal: Neg GI/Hepatic/Renal ROS            Endo/Other: Negative Endo/Other ROS                    Abdominal:             Vascular:   + PVD, aortic or cerebral, . Other Findings:             Anesthesia Plan      general     ASA 4       Induction: intravenous. arterial line  MIPS: Postoperative opioids intended, Prophylactic antiemetics administered and Postoperative trial extubation. Anesthetic plan and risks discussed with patient. Plan discussed with CRNA. 4/2022  Conclusions:      Multi-vessel Coronary Artery Disease.    Patent LIMA-LAD and SVG-RPDA.  Occluded LCX and occluded SVG-OM. with right to left collaterals.    Mildly impaired ventricular function.    No significant change from cardiac cath 2016.         Recommendations:  1. Post-cath protocol  2. Continue optimal medical therapy  3.  Risk factor modification          Pasquale Henderson MD   8/11/2022

## 2022-08-12 VITALS
OXYGEN SATURATION: 96 % | RESPIRATION RATE: 13 BRPM | SYSTOLIC BLOOD PRESSURE: 126 MMHG | WEIGHT: 186 LBS | TEMPERATURE: 97.9 F | BODY MASS INDEX: 29.19 KG/M2 | HEART RATE: 60 BPM | HEIGHT: 67 IN | DIASTOLIC BLOOD PRESSURE: 59 MMHG

## 2022-08-12 LAB
ANION GAP SERPL CALCULATED.3IONS-SCNC: 9 MMOL/L (ref 9–17)
BUN BLDV-MCNC: 18 MG/DL (ref 8–23)
CALCIUM SERPL-MCNC: 8.4 MG/DL (ref 8.6–10.4)
CHLORIDE BLD-SCNC: 102 MMOL/L (ref 98–107)
CO2: 23 MMOL/L (ref 20–31)
CREAT SERPL-MCNC: 0.79 MG/DL (ref 0.7–1.2)
GFR AFRICAN AMERICAN: >60 ML/MIN
GFR NON-AFRICAN AMERICAN: >60 ML/MIN
GFR SERPL CREATININE-BSD FRML MDRD: ABNORMAL ML/MIN/{1.73_M2}
GLUCOSE BLD-MCNC: 133 MG/DL (ref 70–99)
INR BLD: 0.9
MAGNESIUM: 1.9 MG/DL (ref 1.6–2.6)
MRSA, DNA, NASAL: NEGATIVE
PHOSPHORUS: 2.9 MG/DL (ref 2.5–4.5)
POTASSIUM SERPL-SCNC: 4.4 MMOL/L (ref 3.7–5.3)
PROTHROMBIN TIME: 10.2 SEC (ref 9.1–12.3)
SODIUM BLD-SCNC: 134 MMOL/L (ref 135–144)
SPECIMEN DESCRIPTION: NORMAL

## 2022-08-12 PROCEDURE — 36415 COLL VENOUS BLD VENIPUNCTURE: CPT

## 2022-08-12 PROCEDURE — 84100 ASSAY OF PHOSPHORUS: CPT

## 2022-08-12 PROCEDURE — 6370000000 HC RX 637 (ALT 250 FOR IP): Performed by: STUDENT IN AN ORGANIZED HEALTH CARE EDUCATION/TRAINING PROGRAM

## 2022-08-12 PROCEDURE — 2580000003 HC RX 258: Performed by: STUDENT IN AN ORGANIZED HEALTH CARE EDUCATION/TRAINING PROGRAM

## 2022-08-12 PROCEDURE — 80048 BASIC METABOLIC PNL TOTAL CA: CPT

## 2022-08-12 PROCEDURE — 85610 PROTHROMBIN TIME: CPT

## 2022-08-12 PROCEDURE — 83735 ASSAY OF MAGNESIUM: CPT

## 2022-08-12 RX ORDER — WARFARIN SODIUM 3 MG/1
6 TABLET ORAL DAILY
Status: DISCONTINUED | OUTPATIENT
Start: 2022-08-12 | End: 2022-08-12 | Stop reason: HOSPADM

## 2022-08-12 RX ADMIN — ASPIRIN 81 MG: 81 TABLET, CHEWABLE ORAL at 08:30

## 2022-08-12 RX ADMIN — SODIUM CHLORIDE, PRESERVATIVE FREE 10 ML: 5 INJECTION INTRAVENOUS at 08:30

## 2022-08-12 NOTE — FLOWSHEET NOTE
SPIRITUAL CARE DEPARTMENT - [facility]  PROGRESS NOTE    Shift date: 8/11/2022  Shift day: Thursday   Shift # 3    Room # 1021/1021-01   Name: Nirav Hunter                Buddhist: [address] of Orthodoxy: Unknown    Referral: Post-Op Visit    Admit Date & Time: 8/11/2022  6:08 AM    Assessment:  Nirav Hunter is a 76 y.o. male in the hospital because undergoing surgery on his \"Carotid. \" Upon entering the room writer observes patient resting in hospital bed and watching TV. Intervention:  Writer introduced self and title as .  inquired about patient's well-being and his support system. Patient indicated that he is coping well after surgery, however, he remains \"very hungry,\" as he has not been able to eat for two days. Patient indicated that no family has visited him yet in the hospital. Patient indicated that he is likely going to be discharged to a facility today. Patient expressed feelings of frustration, as he has not yet been able to try walking since his surgery yesterday, and feels he is \"being rushed out. \"  provided support and  hospitality to patient. Outcome:  Patient thanked  for visit. Plan:  Chaplains will remain available to offer spiritual and emotional support as needed. 08/12/22 0739   Encounter Summary   Service Provided For: Patient   Referral/Consult From: Multi-disciplinary team  (Surgery List)   Support System Family members   Last Encounter  08/10/22   Complexity of Encounter Low   Begin Time 0739   End Time  0801   Total Time Calculated 22 min   Encounter    Type Post-Procedural   Spiritual/Emotional needs   Type Spiritual Support   Assessment/Intervention/Outcome   Assessment Calm;Coping;Powerlessness  (Experiencing Hunger)   Intervention Active listening;Discussed illness injury and its impact; Explored/Affirmed feelings, thoughts, concerns;Explored Coping Skills/Resources;Sustaining Presence/Ministry of presence   Outcome Comfort;Coping;Receptive   Plan and Referrals   Plan/Referrals Continue to visit, (comment)  (as needed)     Electronically signed by Shmuel Stone, on 8/12/2022 at 8:23 AM.  [facility]  [phone]

## 2022-08-12 NOTE — CARE COORDINATION
08/12/22 0825   Service Assessment   Patient Orientation Alert and Oriented   Cognition Alert   History Provided By Patient   Primary Caregiver Self   Support Systems Spouse/Significant Other;Family Members   PCP Verified by CM Yes   Last Visit to PCP Within last 3 months   Prior Functional Level Independent in ADLs/IADLs   Current Functional Level Independent in ADLs/IADLs   Can patient return to prior living arrangement Yes   Ability to make needs known: Good   Family able to assist with home care needs: Yes   Financial Resources [place]   Social/Functional History   Lives With Alone   Type of [address] [place] [address] Two level  (bedroom upstairs, bathroom downstairs)   Home Access Stairs to enter without rails   Entrance Stairs - Number of Steps 5   [address]L [address]   Transfer Assistance Independent   Discharge Planning   Type of [facility] Prior To Admission None   Potential Assistance Needed N/A   DME Ordered? No   Potential Assistance Purchasing Medications No   Type of Home Care Services None   Patient expects to be discharged to: [facility] Discharge   Confirm Follow Up Transport Other (see comment)  (can call someone for a ride home)     Plan is for patient to return home. He lives alone, but sister is 5 minutes away if needed. He can arrange transportation. He is agreeable with this plan.

## 2022-08-12 NOTE — PROGRESS NOTES
Division of Vascular Surgery         Progress Note      Name: Morena Sloan  MRN: [id number]         Overnight Events:      No acute events overnight. Drain removed this am.      Subjective:      Pt seen and examined. POD1 from L carotid endarterectomy. Remains afebrile and hemodynamically stable. Dressing has minimal serosanguinous drainage. Drain with less than 5cc serosanguinous drainage. Drain removed and dressing changed. Pain is well controlled. Denies any CP, SOB, nausea or vomiting. Physical Exam:     Vitals:  BP (!) 122/53   Pulse (!) 43   Temp 98.1 °F (36.7 °C) (Oral)   Resp 12   Ht 5' 7\" (1.702 m)   Wt 186 lb (84.4 kg)   SpO2 95%   BMI 29.13 kg/m²     Constitutional:       General: He is not in acute distress. HENT:     Mouth: Mucous membranes are moist.   Eyes:     General: No scleral icterus. Extraocular Movements: Extraocular movements intact. Conjunctiva/sclera: Conjunctivae normal.   Cardiovascular:     Rate and Rhythm: Normal rate and regular rhythm. Pulmonary:     Effort: No respiratory distress. Nl work of breathing on room air. Abdominal:      General: There is no distension. Palpations: There is no mass. Tenderness: There is no abdominal tenderness. Musculoskeletal:      Cervical back: No rigidity or tenderness. Lymphadenopathy:     Cervical: No cervical adenopathy. Skin:     Coloration: Skin is not jaundiced. Findings: L sided neck incision closed with sutures, clean dry and intact. No signs of infection. Neurological:     General: No focal deficit present. Mental Status: He is alert and oriented to person, place, and time. Psychiatric:         Mood and Affect: Mood normal.    Imaging/Labs:     New no imaging. Assessment/Plan:     Arterial line to be pulled today. Restart home meds. Advance to general diet. PT/OT. Discharge planning to home.      Electronically signed by Juan Amato on 8/12/22 at 6:52 AM [facility] KathieAdrien[facility] [address]: [phone]  C: [phone]      General Surgery Resident Statement/Addendum  I have discussed the case, including pertinent history and exam findings with the above medical student. I have personally seen the patient. Pt seen and examined at bedside. I performed both history and physical exam.      Note was edited and changes made by me. Assessment and plan reviewed and changes made by me. I agree with the assessment and plan as stated above.       Shireen Parada,  PGY-2  8/12/22 7:58 AM

## 2022-08-12 NOTE — DISCHARGE SUMMARY
[facility]  Vascular Surgery  Discharge Summary    Name: Damir Mosquera  MRN: [id number]  Age: 76 y.o. Sex: male. : 1945  Admit Date:  2022  Discharge Date: 2022    Disposition: Home  Condition on Discharge: stable    Consults:  None    Surgery:  Left carotid endarterectomy    Physical Exam on Day of Discharge:  Constitutional:       General: He is not in acute distress. HENT:     Mouth: Mucous membranes are moist.   Eyes:     General: No scleral icterus. Extraocular Movements: Extraocular movements intact. Conjunctiva/sclera: Conjunctivae normal.   Cardiovascular:     Rate and Rhythm: Normal rate and regular rhythm. Pulmonary:     Effort: No respiratory distress. Nl work of breathing on room air. Abdominal:      General: There is no distension. Palpations: There is no mass. Tenderness: There is no abdominal tenderness. Musculoskeletal:      Cervical back: No rigidity or tenderness. Lymphadenopathy:     Cervical: No cervical adenopathy. Skin:     Coloration: Skin is not jaundiced. Findings: L sided neck incision closed with sutures, clean dry and intact. No signs of infection. Neurological:     General: No focal deficit present. Mental Status: He is alert and oriented to person, place, and time. Psychiatric:         Mood and Affect: Mood normal.    Patient Instructions:   See pre-printed instructions in chart and given to patient upon discharge. Discharge Medications:      Medication List        CONTINUE taking these medications      * albuterol 0.63 MG/3ML nebulizer solution  Commonly known as: ACCUNEB     * albuterol sulfate  (90 Base) MCG/ACT inhaler  Commonly known as: Ventolin HFA  Inhale 2 puffs into the lungs every 6 hours as needed for Wheezing.      atorvastatin 40 MG tablet  Commonly known as: LIPITOR  Take 1 tablet by mouth nightly     b complex vitamins capsule     fluticasone 50 MCG/ACT nasal spray  Commonly known as: FLONASE furosemide 40 MG tablet  Commonly known as: LASIX  Take 0.5 tablets by mouth daily     lisinopril 10 MG tablet  Commonly known as: PRINIVIL;ZESTRIL  Take 1 tablet by mouth daily     LUTEIN PO     mexiletine 200 MG capsule  Commonly known as: MEXITIL     montelukast 10 MG tablet  Commonly known as: SINGULAIR     * Nebulizer Compressor Kit  1 kit by Does not apply route once for 1 dose     * Nebulizer Compressor Kit  1 kit by Does not apply route once for 1 dose     sotalol 80 MG tablet  Commonly known as: BETAPACE  Take 1 tablet by mouth 2 times daily. spironolactone 25 MG tablet  Commonly known as: ALDACTONE  Take 1 tablet by mouth daily     VITAMIN D3 PO     warfarin 6 MG tablet  Commonly known as: COUMADIN           * This list has 4 medication(s) that are the same as other medications prescribed for you. Read the directions carefully, and ask your doctor or other care provider to review them with you. STOP taking these medications      fluticasone propionate 100 MCG/BLIST Aepb inhaler  Commonly known as: FLOVENT     ipratropium-albuterol 0.5-2.5 (3) MG/3ML Soln nebulizer solution  Commonly known as: DUONEB            ASK your doctor about these medications      aspirin 81 MG chewable tablet  Take 1 tablet by mouth daily.               Discharge Diagnoses:  Patient Active Problem List   Diagnosis    Ventricular tachycardia, monomorphic ([facility]    Coronary artery disease involving native coronary artery of native heart without angina pectoris    Tobacco use    Spell of dizziness    Hyponatremia    ICD (implantable cardioverter-defibrillator) discharge    HTN (hypertension), benign    Hyperkalemia    Bradycardia    Hyperphosphatemia    MI (myocardial infarction) ([facility]    [facility]    Hyperglycemia    Acute on chronic congestive heart failure (HCC)    Shortness of breath    Ischemic cardiomyopathy    Systolic dysfunction with acute on chronic heart failure (HCC) - Efx 30%    Acute on chronic diastolic heart failure (HCC)    Left ventricular apical thrombus    Azotemia    Hematoma of groin    Disorder of scapula - sclerosis, 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    Pseudoaneurysm (HCC)    Thrombocytopenia (HCC)    LETICIA (acute kidney injury) (HCC)    Elevated serum lactate dehydrogenase    Congestive heart failure (HCC)    Myocardial infarction (HCC)    Syncope and collapse    AICD malfunction, initial encounter    Chronic systolic heart failure (HCC)    Serum lipase elevation    Heart block, AV - bifascicular    Stenosis of left carotid artery    S/P carotid endarterectomy       SHERRI Aviles is a 76 y.o. male with history of 80% stenosis of left carotid artery who presented on 8/11/2022 for left carotid endarterectomy. Hospital Course:  Hospital course was unremarkable. On day of discharge, patient was tolerating diet, pain controlled with oral medications, and otherwise meeting discharge criteria.       Electronically signed by Collette Ramus, DO on 8/12/2022 at 7:58 AM

## 2022-08-15 LAB — SURGICAL PATHOLOGY REPORT: NORMAL

## 2022-08-18 ENCOUNTER — TELEPHONE (OUTPATIENT)
Dept: CARDIOTHORACIC SURGERY | Age: 77
End: 2022-08-18

## 2022-08-18 NOTE — TELEPHONE ENCOUNTER
Patient had surgery on 08/11/22 with you Stenosis of left carotid artery      Patient called in requesting pain medication if possible, he has been using over the counter ibprophen and tylenol but the pain he is experiencing is keeping him up at night. Please advise if you can call in something for pain.        Thank you     Shiloh Molina

## 2022-08-19 DIAGNOSIS — G89.18 ACUTE POSTOPERATIVE PAIN: Primary | ICD-10-CM

## 2022-08-19 RX ORDER — OXYCODONE HYDROCHLORIDE AND ACETAMINOPHEN 5; 325 MG/1; MG/1
1 TABLET ORAL EVERY 8 HOURS PRN
Qty: 12 TABLET | Refills: 0 | Status: SHIPPED | OUTPATIENT
Start: 2022-08-19 | End: 2022-08-22

## 2022-08-19 NOTE — TELEPHONE ENCOUNTER
Patient informed Lupe Ambrocio is at pharmacy , 10 pills is all he will get . Patient was understanding.

## 2022-08-22 ENCOUNTER — TELEPHONE (OUTPATIENT)
Dept: VASCULAR SURGERY | Age: 77
End: 2022-08-22

## 2022-08-26 ENCOUNTER — OFFICE VISIT (OUTPATIENT)
Dept: VASCULAR SURGERY | Age: 77
End: 2022-08-26

## 2022-08-26 VITALS
DIASTOLIC BLOOD PRESSURE: 64 MMHG | WEIGHT: 187 LBS | RESPIRATION RATE: 18 BRPM | BODY MASS INDEX: 28.34 KG/M2 | TEMPERATURE: 97.8 F | HEART RATE: 53 BPM | HEIGHT: 68 IN | SYSTOLIC BLOOD PRESSURE: 118 MMHG | OXYGEN SATURATION: 96 %

## 2022-08-26 DIAGNOSIS — I65.23 CAROTID STENOSIS, BILATERAL: Primary | ICD-10-CM

## 2022-08-26 DIAGNOSIS — Z09 POSTOPERATIVE EXAMINATION: ICD-10-CM

## 2022-08-26 PROCEDURE — 99024 POSTOP FOLLOW-UP VISIT: CPT | Performed by: SURGERY

## 2022-08-26 ASSESSMENT — ENCOUNTER SYMPTOMS
EYE DISCHARGE: 0
APNEA: 0
FACIAL SWELLING: 0
CHEST TIGHTNESS: 0
EYE ITCHING: 0
ABDOMINAL PAIN: 0

## 2022-08-26 NOTE — PROGRESS NOTES
Division of Vascular Surgery        New Consult        History of Present Illness:      Octavio Willard is a 76 y.o. gentleman who presents to the office for follow up after left carotid endarterectomy on the 8/11/2022. The patient had at least 80% stenosis in the distal common carotid extending to the internal carotid artery. There is calcified and heterogeneous plaque in same arteries extending approximately 2 to 3 cm into the internal carotid artery and all the way down to approximately 8 cm into the common carotid artery. CTA of neck in 05/06/2022 showed right common carotid artery results in 60% stenosis in the distal portion. Patient is doing very well after surgery, no signs of stroke. Able to move all 4 extremities, no change in vision, no tongue deviation, no hoarseness or change in his speech. Patient is able to eat, drink and swallow without difficulty. Neck incision is healing well without signs of hematoma or infection. Medical History:     Past Medical History:   Diagnosis Date    Accident 5    [place] accident in the [address] [place] [address] \"jumping school\". Numbness and tingling for 1 year from the low back to bilateral mid thighs. Arthritis     states no pain from arthritis    Bronchitis     CAD (coronary artery disease) 11/19/2013    cabg x 3  [facility]    Cancer ([address]     head and cheek skin cancer    Cardiac arrest ([address] 11/2013    While playing squash.     Good exercise tolerance     pt plays golf, bowling but cannot play competive squash anymore (he passes out), still works full time ()    Hx of blood clots 12/2017    heart    Hyperkalemia 05/19/2014    Hypertension, essential     Ischemic cardiomyopathy 12/08/2016    Left ventricular apical thrombus 12/09/2016    MI (myocardial infarction) ([address] 11/2013    Pseudoaneurysm ([address] - right groin 12/12/2016    Post cardiac catheterization    Sinus drainage     Snores     no sleep apnea    Systolic dysfunction with acute on chronic heart daily 30 tablet 0    warfarin (COUMADIN) 6 MG tablet Take 6 mg by mouth in the morning. atorvastatin (LIPITOR) 40 MG tablet Take 1 tablet by mouth nightly 30 tablet 3    lisinopril (PRINIVIL;ZESTRIL) 10 MG tablet Take 1 tablet by mouth daily 30 tablet 3    spironolactone (ALDACTONE) 25 MG tablet Take 1 tablet by mouth daily 30 tablet 3    albuterol (VENTOLIN HFA) 108 (90 BASE) MCG/ACT inhaler Inhale 2 puffs into the lungs every 6 hours as needed for Wheezing. 1 Inhaler 3    sotalol (BETAPACE) 80 MG tablet Take 1 tablet by mouth 2 times daily. 60 tablet 0    aspirin 81 MG chewable tablet Take 1 tablet by mouth daily. (Patient taking differently: Take 81 mg by mouth daily Stop 7 days prior to sx per Dr. Cathy Kawasaki) 30 tablet 3    Respiratory Therapy Supplies (NEBULIZER COMPRESSOR) KIT 1 kit by Does not apply route once for 1 dose 1 kit 0    Respiratory Therapy Supplies (NEBULIZER COMPRESSOR) KIT 1 kit by Does not apply route once for 1 dose 1 kit 0     No current facility-administered medications for this visit. Social History:     Tobacco:    reports that he has quit smoking. His smoking use included cigars. He has never used smokeless tobacco.  Alcohol:      reports current alcohol use. Drug Use:  reports no history of drug use. Review of Systems:     Review of Systems   Constitutional:  Negative for activity change. HENT:  Negative for ear discharge, ear pain and facial swelling. Eyes:  Negative for discharge and itching. Respiratory:  Negative for apnea and chest tightness. Cardiovascular:  Negative for chest pain. Gastrointestinal:  Negative for abdominal pain. Endocrine: Negative for cold intolerance. Genitourinary:  Negative for difficulty urinating. Musculoskeletal:  Negative for arthralgias. Allergic/Immunologic: Negative for environmental allergies. Neurological:  Negative for dizziness, tremors, speech difficulty, weakness, light-headedness, numbness and headaches. Hematological:  Negative for adenopathy. Psychiatric/Behavioral:  Negative for agitation and behavioral problems. Physical Exam:     Vitals:  /64 (Site: Left Lower Arm, Position: Sitting, Cuff Size: Medium Adult)   Pulse 53   Temp 97.8 °F (36.6 °C)   Resp 18   Ht 5' 7.5\" (1.715 m)   Wt 187 lb (84.8 kg)   SpO2 96%   BMI 28.86 kg/m²     Physical Exam  Constitutional:       Appearance: Normal appearance. HENT:      Head: Normocephalic and atraumatic. Right Ear: External ear normal.      Left Ear: External ear normal.      Nose: Nose normal.      Mouth/Throat:      Mouth: Mucous membranes are moist.      Comments: No tongue deviation or change in voice  Eyes:      Pupils: Pupils are equal, round, and reactive to light. Neck:      Comments: Soft to touch, no sign of infection or hematoma  Cardiovascular:      Rate and Rhythm: Normal rate and regular rhythm. Pulmonary:      Effort: Pulmonary effort is normal.   Abdominal:      General: Abdomen is flat. Palpations: Abdomen is soft. Musculoskeletal:         General: Normal range of motion. Cervical back: Normal range of motion and neck supple. Comments: 4/4 strength on all 4 extremities   Skin:     General: Skin is warm. Capillary Refill: Capillary refill takes less than 2 seconds. Neurological:      General: No focal deficit present. Mental Status: He is alert. Psychiatric:         Mood and Affect: Mood normal.       Imaging/Labs:     No results found. Assessment and Plan:     Follow up in 3 months and bilateral carotid duplex for carotid evaluation 3 months before office appointment. Electronically signed by Abdiel Caputo DO on 8/26/22 at 2:06 PM EDT      [facility]  Office: [phone]  Cell: [phone]  Email: [email or web address]

## 2022-08-30 NOTE — PROGRESS NOTES
Physician Progress Note      PATIENT:               Nathan Lyons  CSN #:                  [id number]  :                       1945  ADMIT DATE:       2022 6:08 AM  DISCH DATE:        2022 12:50 PM  RESPONDING  PROVIDER #:        Kg Webb MD          QUERY TEXT:    Patient admitted for Left carotid endarterectomy. Noted documentation of   Left carotid endarterectomy in the Op note on  and right neck face and   chest were prepped as well as  incision was made on the anterior border of the   right sternocleidomastoid muscle also in the Op note. If possible, please document in progress notes and discharge summary if you   are evaluating and /or treating any of the following: The medical record reflects the following:  Risk Factors: carotid endarterectomy  Clinical Indicators: Asymptomatic left carotid stenosis 80%,  LEFT CAROTID   ENDARTERECTOMY WITH XENOSURE PATCH ANGIOPLASTY, SURGICAL PATHOLOGY Source of   Specimen A: LEFT CAROTID ENDARTERECTOMY,  post op  progress not L sided   neck incision closed with sutures, clean dry and intact. per D/C summary; HPI-   76 y.o. male with history of 80% stenosis of left carotid artery who   presented on 2022 for left carotid endarterectomy. Treatment: surgery , and dressings applied to the left side incision    Thank you, Please call if questions  Silvina Hermosillo RN [facility]  [phone]  Options provided:  -- procedure addendum  Left carotid endarterectomy confirmed and Right   carotid endarterectomy ruled out  -- procedure addendum  Right carotid endarterectomy  confirmed and Left   carotid endarterectomy ruled out  -- Other - I will add my own diagnosis  -- Disagree - Not applicable / Not valid  -- Disagree - Clinically unable to determine / Unknown  -- Refer to Clinical Documentation Reviewer    PROVIDER RESPONSE TEXT:    procedure addendum  Left carotid endarterectomy confirmed and Right   carotid endarterectomy ruled

## 2022-09-01 ENCOUNTER — HOSPITAL ENCOUNTER (OUTPATIENT)
Dept: VASCULAR LAB | Age: 77
Discharge: HOME | End: 2022-09-01
Payer: MEDICARE

## 2022-09-01 DIAGNOSIS — I65.23 CAROTID STENOSIS, BILATERAL: ICD-10-CM

## 2022-09-01 PROCEDURE — 93880 EXTRACRANIAL BILAT STUDY: CPT

## 2022-09-22 ENCOUNTER — HOSPITAL ENCOUNTER (OUTPATIENT)
Age: 77
Discharge: HOME | End: 2022-09-22
Payer: MEDICARE

## 2022-09-22 LAB
INR BLD: 1.9
PROTHROMBIN TIME: 18.2 SEC (ref 9.1–12.3)

## 2022-09-22 PROCEDURE — 36415 COLL VENOUS BLD VENIPUNCTURE: CPT

## 2022-09-22 PROCEDURE — 85610 PROTHROMBIN TIME: CPT

## 2022-11-02 ENCOUNTER — HOSPITAL ENCOUNTER (OUTPATIENT)
Age: 77
Discharge: HOME | End: 2022-11-02
Payer: MEDICARE

## 2022-11-02 LAB
INR BLD: 1.8
PROTHROMBIN TIME: 18.6 SEC (ref 9.1–12.3)

## 2022-11-02 PROCEDURE — 36415 COLL VENOUS BLD VENIPUNCTURE: CPT

## 2022-11-02 PROCEDURE — 85610 PROTHROMBIN TIME: CPT

## 2022-11-09 ENCOUNTER — HOSPITAL ENCOUNTER (OUTPATIENT)
Age: 77
Discharge: HOME | End: 2022-11-09
Payer: MEDICARE

## 2022-11-09 LAB
INR BLD: 2.4
PROTHROMBIN TIME: 24.1 SEC (ref 9.1–12.3)

## 2022-11-09 PROCEDURE — 36415 COLL VENOUS BLD VENIPUNCTURE: CPT

## 2022-11-09 PROCEDURE — 85610 PROTHROMBIN TIME: CPT

## 2022-12-02 ENCOUNTER — OFFICE VISIT (OUTPATIENT)
Dept: VASCULAR SURGERY | Age: 77
End: 2022-12-02
Payer: MEDICARE

## 2022-12-02 VITALS
RESPIRATION RATE: 16 BRPM | WEIGHT: 183 LBS | TEMPERATURE: 97.3 F | HEART RATE: 54 BPM | BODY MASS INDEX: 28.72 KG/M2 | HEIGHT: 67 IN | SYSTOLIC BLOOD PRESSURE: 154 MMHG | DIASTOLIC BLOOD PRESSURE: 68 MMHG | OXYGEN SATURATION: 97 %

## 2022-12-02 DIAGNOSIS — I65.21 STENOSIS OF RIGHT CAROTID ARTERY: Primary | ICD-10-CM

## 2022-12-02 PROCEDURE — G8484 FLU IMMUNIZE NO ADMIN: HCPCS | Performed by: SURGERY

## 2022-12-02 PROCEDURE — 1123F ACP DISCUSS/DSCN MKR DOCD: CPT | Performed by: SURGERY

## 2022-12-02 PROCEDURE — 3074F SYST BP LT 130 MM HG: CPT | Performed by: SURGERY

## 2022-12-02 PROCEDURE — G8417 CALC BMI ABV UP PARAM F/U: HCPCS | Performed by: SURGERY

## 2022-12-02 PROCEDURE — 1036F TOBACCO NON-USER: CPT | Performed by: SURGERY

## 2022-12-02 PROCEDURE — 99204 OFFICE O/P NEW MOD 45 MIN: CPT | Performed by: SURGERY

## 2022-12-02 PROCEDURE — 3078F DIAST BP <80 MM HG: CPT | Performed by: SURGERY

## 2022-12-02 PROCEDURE — G8427 DOCREV CUR MEDS BY ELIG CLIN: HCPCS | Performed by: SURGERY

## 2022-12-02 ASSESSMENT — ENCOUNTER SYMPTOMS
COLOR CHANGE: 0
SHORTNESS OF BREATH: 0
ABDOMINAL PAIN: 0
COUGH: 0
ABDOMINAL DISTENTION: 0
VOMITING: 0
TROUBLE SWALLOWING: 0
CHEST TIGHTNESS: 0
VOICE CHANGE: 0
EYE PAIN: 0

## 2022-12-02 NOTE — PROGRESS NOTES
[place] [id number] [facility] [address] [place] [address]  Dept: [phone]     Patient: Eduardo Sparks  : 1945  MRN: [id number]  DOS: 2022    Referring provider:  No ref. provider found         HPI:  Eduardo Sparks is a 76 y.o. male who returns to the office for follow-up regarding his left carotid endarterectomy. There was a very extensive repair requiring 2 patches and shortening of the artery. Duplex ultrasonography of both carotids show no significant stenosis on the left with a 50 to 69% stenosis on the right more closely associated to 60 to 70%. CTA previously showed a carotid atherosclerotic plaque on the right with a large ulcer. The left side had greater than 80% stenosis requiring repair first.  At this point given the large ulceration I would recommend repair of the right side with carotid endarterectomy. We discussed this today at length and he understands. He has had no other significant problems since that time. He has not had stroke, TIA or amaurosis fugax. He did well following the first operation. Recall that he does have chronic congestive heart failure with an EF of 30% in the past.  He also had cardiac catheterization done in 2022 revealing a patent left internal mammary to LAD bypass. The circumflex was occluded. There were other stenoses on the right. There is a vein graft originating from the aorta which anastomosis to the first obtuse marginal and also 1 that anastomosis to the right PDA. There was no intervention done during that catheterization. Past Medical History:   Diagnosis Date    Accident 5    [place] accident in the [employer] \"jumping school\". Numbness and tingling for 1 year from the low back to bilateral mid thighs.     Arthritis     states no pain from arthritis    Bronchitis     CAD (coronary artery disease) 2013    cabg x 3  [facility]    Cancer ([facility]     head and cheek skin cancer    Cardiac arrest ([facility] 11/2013    While playing squash.     Good exercise tolerance     pt plays golf, bowling but cannot play competive squash anymore (he passes out), still works full time ()    Hx of blood clots 12/2017    heart    Hyperkalemia 05/19/2014    Hypertension, essential     Ischemic cardiomyopathy 12/08/2016    Left ventricular apical thrombus 12/09/2016    MI (myocardial infarction) ([facility] 11/2013    Pseudoaneurysm ([facility] - right groin 12/12/2016    Post cardiac catheterization    Sinus drainage     Snores     no sleep apnea    Systolic dysfunction with acute on chronic heart failure (HCC) - Efx 30% 12/08/2016    Wellness examination     Dr. Cori Martínez last seen 7/2022   [place], [place]     Family History   Problem Relation Age of Onset    Hypertension Father     Stroke Father     Stroke Maternal Grandfather     Hypertension Paternal Grandmother     Hypertension Paternal Grandfather     No Known Problems Mother         age 80    Other Father         vascular     Heart Failure Sister         MI    Heart Failure Brother         MI      Social History     Socioeconomic History    Marital status: Single     Spouse name: Not on file    Number of children: Not on file    Years of education: Not on file    Highest education level: Not on file   Occupational History    Not on file   Tobacco Use    Smoking status: Never    Smokeless tobacco: Former     Types: Chew    Tobacco comments:     Does not smoke cigars any longer   Vaping Use    Vaping Use: Never used   Substance and Sexual Activity    Alcohol use: Yes     Comment: couple drinks per week at most    Drug use: No    Sexual activity: Not on file   Other Topics Concern    Not on file   Social History Narrative    ** Merged History Encounter **          Social Determinants of Health     Financial Resource Strain: Not on file   Food Insecurity: Not on file   Transportation Needs: Not on file   Physical Activity: Not on file   Stress: Not on file   Social Connections: Not on file   Intimate Partner Violence: Not on file   Housing Stability: Not on file      Past Surgical History:   Procedure Laterality Date    ABDOMEN SURGERY Right 07/19/2019    EXCISION RIGHT SHOULDER MASS performed by Agustin Dozier DO at [address]  12/08/2016    No stents placed per pt    CARDIAC CATHETERIZATION  11/18/2013    CARDIAC CATHETERIZATION  04/07/2022    CARDIAC DEFIBRILLATOR PLACEMENT  2013    right chest    CAROTID ENDARTERECTOMY Left 8/11/2022    LEFT CAROTID ENDARTERECTOMY WITH Marlyn Lusher PATCH ANGIOPLASTY performed by Sharmaine Orellana MD at [address]  11/19/2013    x 3 vessell    OTHER SURGICAL HISTORY Right 07/19/2019    EXCISION RIGHT SHOULDER MASS     TONSILLECTOMY      VASECTOMY        Review of Systems   Constitutional:  Negative for activity change, appetite change, fever and unexpected weight change. HENT:  Negative for congestion, trouble swallowing and voice change. Eyes:  Negative for pain and visual disturbance. Respiratory:  Negative for cough, chest tightness and shortness of breath. Cardiovascular:  Negative for chest pain and palpitations. Gastrointestinal:  Negative for abdominal distention, abdominal pain and vomiting. Endocrine: Negative for cold intolerance and heat intolerance. Genitourinary:  Negative for dysuria, flank pain and hematuria. Musculoskeletal:  Negative for joint swelling and neck pain. Skin:  Negative for color change and rash. Allergic/Immunologic: Negative for immunocompromised state. Neurological:  Negative for syncope, speech difficulty, weakness, numbness and headaches. Hematological:  Negative for adenopathy. Psychiatric/Behavioral:  Negative for agitation and confusion.       Vitals:    12/02/22 0909 12/02/22 0913   BP: (!) 156/72 (!) 154/68   Site: Left Upper Arm Left Upper Arm Position: Sitting Sitting   Cuff Size: Medium Adult Medium Adult   Pulse: 54    Resp: 16    Temp: 97.3 °F (36.3 °C)    TempSrc: Temporal    SpO2: 97%    Weight: 183 lb (83 kg)    Height: 5' 7\" (1.702 m)           Physical Exam  Constitutional:       General: He is not in acute distress. Appearance: He is not ill-appearing. HENT:      Mouth/Throat:      Mouth: Mucous membranes are moist.      Pharynx: Oropharynx is clear. Eyes:      General: No scleral icterus. Extraocular Movements: Extraocular movements intact. Conjunctiva/sclera: Conjunctivae normal.   Neck:      Thyroid: No thyroid mass or thyromegaly. Cardiovascular:      Comments: He continues to have adequate distal pulses bilaterally. His feet are warm and well-perfused. He has no neurologic symptoms. His neck is soft and has a surgical scar on the left from carotid endarterectomy. The right side has an audible bruit. His heart currently has a regular rate and rhythm with no murmurs rubs or gallops. Pulmonary:      Effort: No respiratory distress. Breath sounds: No rales. Abdominal:      General: There is no distension. Palpations: There is no mass. Tenderness: There is no abdominal tenderness. There is no guarding or rebound. Musculoskeletal:      Cervical back: No rigidity or tenderness. Lymphadenopathy:      Cervical: No cervical adenopathy. Skin:     Coloration: Skin is not jaundiced. Findings: No rash. Neurological:      General: No focal deficit present. Mental Status: He is alert and oriented to person, place, and time. Cranial Nerves: No cranial nerve deficit. Psychiatric:         Mood and Affect: Mood normal.       Assessment:  1. Stenosis of right carotid artery          Plan: At this time given the ulcerated nature of the right carotid plaque which is at least a grade B if not grade C ulcer I would recommend repair of the right side with endarterectomy.   He understands my stance on this position and understands that ulcerated plaques do have higher stroke risk than not ulcerated plaques less than 70%. He also understands that the same risks apply for the right side including bleeding, infection, hematoma, nerve damage, permanent neurologic disability, stroke, TIA, myocardial infarction, cerebral hyperperfusion syndrome and death. I spent quite some time looking at the previous CTA done earlier this year to evaluate the right side. I think this is the best option given the velocities which are well into the 50 to 69% stenosis range more closely associated to 60 to 70%. He agrees to proceed after the first of next year which is only approximately 1 month away. We will schedule him for this procedure at that time.     Electronically signed by:  Myla Mendoza MD

## 2022-12-21 ENCOUNTER — HOSPITAL ENCOUNTER (OUTPATIENT)
Age: 77
Discharge: HOME | End: 2022-12-21
Payer: MEDICARE

## 2022-12-21 LAB
INR BLD: 2.1
PROTHROMBIN TIME: 21.4 SEC (ref 9.1–12.3)

## 2022-12-21 PROCEDURE — 85610 PROTHROMBIN TIME: CPT

## 2022-12-21 PROCEDURE — 36415 COLL VENOUS BLD VENIPUNCTURE: CPT

## 2023-01-17 ENCOUNTER — ANESTHESIA EVENT (OUTPATIENT)
Dept: OPERATING ROOM | Age: 78
DRG: 038 | End: 2023-01-17
Payer: MEDICARE

## 2023-01-17 ENCOUNTER — ANESTHESIA (OUTPATIENT)
Dept: OPERATING ROOM | Age: 78
DRG: 038 | End: 2023-01-17
Payer: MEDICARE

## 2023-01-17 ENCOUNTER — HOSPITAL ENCOUNTER (INPATIENT)
Age: 78
LOS: 1 days | Discharge: HOME | DRG: 038 | End: 2023-01-18
Attending: SURGERY | Admitting: SURGERY
Payer: MEDICARE

## 2023-01-17 DIAGNOSIS — Z98.890 S/P CAROTID ENDARTERECTOMY: Primary | ICD-10-CM

## 2023-01-17 DIAGNOSIS — I65.21 STENOSIS OF RIGHT CAROTID ARTERY: ICD-10-CM

## 2023-01-17 LAB
EGFR, POC: >60 ML/MIN/1.73M2
GLUCOSE BLD-MCNC: 106 MG/DL (ref 74–100)
HCT VFR BLD CALC: 37.6 % (ref 40.7–50.3)
HEMOGLOBIN: 12.4 G/DL (ref 13–17)
POC BUN: 26 MG/DL (ref 8–26)
POC CHLORIDE: 106 MMOL/L (ref 98–107)
POC CREATININE: 1.14 MG/DL (ref 0.51–1.19)
POC HEMATOCRIT: 44 % (ref 41–53)
POC HEMOGLOBIN: 14.9 G/DL (ref 13.5–17.5)
POC INR: 1.2
POC POTASSIUM: 4.9 MMOL/L (ref 3.5–4.5)
POC SODIUM: 142 MMOL/L (ref 138–146)
PROTHROMBIN TIME, POC: 13.9 SEC (ref 10.4–14.2)

## 2023-01-17 PROCEDURE — C1768 GRAFT, VASCULAR: HCPCS | Performed by: SURGERY

## 2023-01-17 PROCEDURE — 85014 HEMATOCRIT: CPT

## 2023-01-17 PROCEDURE — 84520 ASSAY OF UREA NITROGEN: CPT

## 2023-01-17 PROCEDURE — 6360000002 HC RX W HCPCS: Performed by: ANESTHESIOLOGY

## 2023-01-17 PROCEDURE — 88304 TISSUE EXAM BY PATHOLOGIST: CPT

## 2023-01-17 PROCEDURE — 82435 ASSAY OF BLOOD CHLORIDE: CPT

## 2023-01-17 PROCEDURE — 35301 RECHANNELING OF ARTERY: CPT | Performed by: SURGERY

## 2023-01-17 PROCEDURE — 2580000003 HC RX 258: Performed by: SURGERY

## 2023-01-17 PROCEDURE — 6360000002 HC RX W HCPCS: Performed by: SURGERY

## 2023-01-17 PROCEDURE — 2000000000 HC ICU R&B

## 2023-01-17 PROCEDURE — 85018 HEMOGLOBIN: CPT

## 2023-01-17 PROCEDURE — 2709999900 HC NON-CHARGEABLE SUPPLY: Performed by: SURGERY

## 2023-01-17 PROCEDURE — 7100000001 HC PACU RECOVERY - ADDTL 15 MIN

## 2023-01-17 PROCEDURE — 84132 ASSAY OF SERUM POTASSIUM: CPT

## 2023-01-17 PROCEDURE — 3600000013 HC SURGERY LEVEL 3 ADDTL 15MIN: Performed by: SURGERY

## 2023-01-17 PROCEDURE — 84295 ASSAY OF SERUM SODIUM: CPT

## 2023-01-17 PROCEDURE — 6370000000 HC RX 637 (ALT 250 FOR IP): Performed by: STUDENT IN AN ORGANIZED HEALTH CARE EDUCATION/TRAINING PROGRAM

## 2023-01-17 PROCEDURE — A4217 STERILE WATER/SALINE, 500 ML: HCPCS | Performed by: SURGERY

## 2023-01-17 PROCEDURE — 3700000001 HC ADD 15 MINUTES (ANESTHESIA): Performed by: SURGERY

## 2023-01-17 PROCEDURE — 3700000000 HC ANESTHESIA ATTENDED CARE: Performed by: SURGERY

## 2023-01-17 PROCEDURE — 3600000003 HC SURGERY LEVEL 3 BASE: Performed by: SURGERY

## 2023-01-17 PROCEDURE — 88311 DECALCIFY TISSUE: CPT

## 2023-01-17 PROCEDURE — 7100000000 HC PACU RECOVERY - FIRST 15 MIN

## 2023-01-17 PROCEDURE — 82565 ASSAY OF CREATININE: CPT

## 2023-01-17 PROCEDURE — 6370000000 HC RX 637 (ALT 250 FOR IP): Performed by: SURGERY

## 2023-01-17 PROCEDURE — 82947 ASSAY GLUCOSE BLOOD QUANT: CPT

## 2023-01-17 PROCEDURE — 03UK0KZ SUPPLEMENT RIGHT INTERNAL CAROTID ARTERY WITH NONAUTOLOGOUS TISSUE SUBSTITUTE, OPEN APPROACH: ICD-10-PCS | Performed by: SURGERY

## 2023-01-17 PROCEDURE — 03CK0ZZ EXTIRPATION OF MATTER FROM RIGHT INTERNAL CAROTID ARTERY, OPEN APPROACH: ICD-10-PCS | Performed by: SURGERY

## 2023-01-17 PROCEDURE — 85610 PROTHROMBIN TIME: CPT

## 2023-01-17 PROCEDURE — 2580000003 HC RX 258: Performed by: ANESTHESIOLOGY

## 2023-01-17 PROCEDURE — 2580000003 HC RX 258: Performed by: STUDENT IN AN ORGANIZED HEALTH CARE EDUCATION/TRAINING PROGRAM

## 2023-01-17 PROCEDURE — 2500000003 HC RX 250 WO HCPCS: Performed by: SURGERY

## 2023-01-17 PROCEDURE — 2500000003 HC RX 250 WO HCPCS: Performed by: ANESTHESIOLOGY

## 2023-01-17 PROCEDURE — 6370000000 HC RX 637 (ALT 250 FOR IP)

## 2023-01-17 DEVICE — XENOSURE BIOLOGIC PATCH, 0.8CM X 8CM, EIFU
Type: IMPLANTABLE DEVICE | Site: NECK | Status: FUNCTIONAL
Brand: XENOSURE BIOLOGIC PATCH

## 2023-01-17 RX ORDER — SODIUM CHLORIDE 0.9 % (FLUSH) 0.9 %
5-40 SYRINGE (ML) INJECTION EVERY 12 HOURS SCHEDULED
Status: DISCONTINUED | OUTPATIENT
Start: 2023-01-17 | End: 2023-01-18 | Stop reason: HOSPADM

## 2023-01-17 RX ORDER — MORPHINE SULFATE 2 MG/ML
2 INJECTION, SOLUTION INTRAMUSCULAR; INTRAVENOUS
Status: DISCONTINUED | OUTPATIENT
Start: 2023-01-17 | End: 2023-01-18

## 2023-01-17 RX ORDER — LIDOCAINE HYDROCHLORIDE 10 MG/ML
INJECTION, SOLUTION EPIDURAL; INFILTRATION; INTRACAUDAL; PERINEURAL PRN
Status: DISCONTINUED | OUTPATIENT
Start: 2023-01-17 | End: 2023-01-17 | Stop reason: SDUPTHER

## 2023-01-17 RX ORDER — ALBUTEROL SULFATE 2.5 MG/3ML
0.63 SOLUTION RESPIRATORY (INHALATION) EVERY 6 HOURS PRN
Status: DISCONTINUED | OUTPATIENT
Start: 2023-01-17 | End: 2023-01-18 | Stop reason: HOSPADM

## 2023-01-17 RX ORDER — MONTELUKAST SODIUM 10 MG/1
10 TABLET ORAL NIGHTLY
Status: DISCONTINUED | OUTPATIENT
Start: 2023-01-17 | End: 2023-01-18 | Stop reason: HOSPADM

## 2023-01-17 RX ORDER — SOTALOL HYDROCHLORIDE 80 MG/1
80 TABLET ORAL 2 TIMES DAILY
Status: DISCONTINUED | OUTPATIENT
Start: 2023-01-17 | End: 2023-01-18 | Stop reason: HOSPADM

## 2023-01-17 RX ORDER — SODIUM CHLORIDE 9 MG/ML
INJECTION, SOLUTION INTRAVENOUS CONTINUOUS PRN
Status: DISCONTINUED | OUTPATIENT
Start: 2023-01-17 | End: 2023-01-17 | Stop reason: SDUPTHER

## 2023-01-17 RX ORDER — FENTANYL CITRATE 50 UG/ML
25 INJECTION, SOLUTION INTRAMUSCULAR; INTRAVENOUS EVERY 5 MIN PRN
Status: CANCELLED | OUTPATIENT
Start: 2023-01-17

## 2023-01-17 RX ORDER — ROCURONIUM BROMIDE 10 MG/ML
INJECTION, SOLUTION INTRAVENOUS PRN
Status: DISCONTINUED | OUTPATIENT
Start: 2023-01-17 | End: 2023-01-17 | Stop reason: SDUPTHER

## 2023-01-17 RX ORDER — SODIUM CHLORIDE 9 MG/ML
INJECTION, SOLUTION INTRAVENOUS CONTINUOUS
Status: DISCONTINUED | OUTPATIENT
Start: 2023-01-17 | End: 2023-01-18

## 2023-01-17 RX ORDER — ALBUTEROL SULFATE 90 UG/1
2 AEROSOL, METERED RESPIRATORY (INHALATION) EVERY 6 HOURS PRN
Status: DISCONTINUED | OUTPATIENT
Start: 2023-01-17 | End: 2023-01-18 | Stop reason: HOSPADM

## 2023-01-17 RX ORDER — PROPOFOL 10 MG/ML
INJECTION, EMULSION INTRAVENOUS PRN
Status: DISCONTINUED | OUTPATIENT
Start: 2023-01-17 | End: 2023-01-17 | Stop reason: SDUPTHER

## 2023-01-17 RX ORDER — NICARDIPINE HYDROCHLORIDE 0.1 MG/ML
2.5-15 INJECTION INTRAVENOUS CONTINUOUS
Status: DISCONTINUED | OUTPATIENT
Start: 2023-01-17 | End: 2023-01-18

## 2023-01-17 RX ORDER — SODIUM CHLORIDE 0.9 % (FLUSH) 0.9 %
5-40 SYRINGE (ML) INJECTION EVERY 12 HOURS SCHEDULED
Status: CANCELLED | OUTPATIENT
Start: 2023-01-17

## 2023-01-17 RX ORDER — ACETAMINOPHEN 500 MG
1000 TABLET ORAL EVERY 8 HOURS SCHEDULED
Status: DISCONTINUED | OUTPATIENT
Start: 2023-01-17 | End: 2023-01-18 | Stop reason: HOSPADM

## 2023-01-17 RX ORDER — NICARDIPINE HYDROCHLORIDE 0.1 MG/ML
INJECTION INTRAVENOUS
Status: DISPENSED
Start: 2023-01-17 | End: 2023-01-17

## 2023-01-17 RX ORDER — PROTAMINE SULFATE 10 MG/ML
INJECTION, SOLUTION INTRAVENOUS PRN
Status: DISCONTINUED | OUTPATIENT
Start: 2023-01-17 | End: 2023-01-17 | Stop reason: SDUPTHER

## 2023-01-17 RX ORDER — LABETALOL HYDROCHLORIDE 5 MG/ML
INJECTION, SOLUTION INTRAVENOUS
Status: DISPENSED
Start: 2023-01-17 | End: 2023-01-17

## 2023-01-17 RX ORDER — DEXAMETHASONE SODIUM PHOSPHATE 10 MG/ML
INJECTION, SOLUTION INTRAMUSCULAR; INTRAVENOUS PRN
Status: DISCONTINUED | OUTPATIENT
Start: 2023-01-17 | End: 2023-01-17 | Stop reason: SDUPTHER

## 2023-01-17 RX ORDER — OXYCODONE HYDROCHLORIDE 5 MG/1
5 TABLET ORAL EVERY 4 HOURS PRN
Status: DISCONTINUED | OUTPATIENT
Start: 2023-01-17 | End: 2023-01-18 | Stop reason: HOSPADM

## 2023-01-17 RX ORDER — NITROGLYCERIN 20 MG/100ML
INJECTION INTRAVENOUS
Status: COMPLETED
Start: 2023-01-17 | End: 2023-01-17

## 2023-01-17 RX ORDER — EPHEDRINE SULFATE/0.9% NACL/PF 50 MG/5 ML
SYRINGE (ML) INTRAVENOUS PRN
Status: DISCONTINUED | OUTPATIENT
Start: 2023-01-17 | End: 2023-01-17 | Stop reason: SDUPTHER

## 2023-01-17 RX ORDER — MEXILETINE HYDROCHLORIDE 200 MG/1
200 CAPSULE ORAL 2 TIMES DAILY
Status: DISCONTINUED | OUTPATIENT
Start: 2023-01-17 | End: 2023-01-18 | Stop reason: HOSPADM

## 2023-01-17 RX ORDER — HEPARIN SODIUM 1000 [USP'U]/ML
INJECTION, SOLUTION INTRAVENOUS; SUBCUTANEOUS PRN
Status: DISCONTINUED | OUTPATIENT
Start: 2023-01-17 | End: 2023-01-17 | Stop reason: SDUPTHER

## 2023-01-17 RX ORDER — FENTANYL CITRATE 50 UG/ML
50 INJECTION, SOLUTION INTRAMUSCULAR; INTRAVENOUS EVERY 5 MIN PRN
Status: CANCELLED | OUTPATIENT
Start: 2023-01-17

## 2023-01-17 RX ORDER — CHOLECALCIFEROL (VITAMIN D3) 125 MCG
5 CAPSULE ORAL NIGHTLY PRN
Status: DISCONTINUED | OUTPATIENT
Start: 2023-01-17 | End: 2023-01-18 | Stop reason: HOSPADM

## 2023-01-17 RX ORDER — HYDRALAZINE HYDROCHLORIDE 20 MG/ML
INJECTION INTRAMUSCULAR; INTRAVENOUS
Status: DISPENSED
Start: 2023-01-17 | End: 2023-01-17

## 2023-01-17 RX ORDER — ATORVASTATIN CALCIUM 40 MG/1
40 TABLET, FILM COATED ORAL NIGHTLY
Status: DISCONTINUED | OUTPATIENT
Start: 2023-01-17 | End: 2023-01-18 | Stop reason: HOSPADM

## 2023-01-17 RX ORDER — SODIUM CHLORIDE 0.9 % (FLUSH) 0.9 %
5-40 SYRINGE (ML) INJECTION PRN
Status: CANCELLED | OUTPATIENT
Start: 2023-01-17

## 2023-01-17 RX ORDER — GABAPENTIN 300 MG/1
300 CAPSULE ORAL 3 TIMES DAILY
Status: DISCONTINUED | OUTPATIENT
Start: 2023-01-17 | End: 2023-01-18 | Stop reason: HOSPADM

## 2023-01-17 RX ORDER — SODIUM CHLORIDE 9 MG/ML
INJECTION, SOLUTION INTRAVENOUS PRN
Status: DISCONTINUED | OUTPATIENT
Start: 2023-01-17 | End: 2023-01-18 | Stop reason: HOSPADM

## 2023-01-17 RX ORDER — SODIUM CHLORIDE, SODIUM LACTATE, POTASSIUM CHLORIDE, CALCIUM CHLORIDE 600; 310; 30; 20 MG/100ML; MG/100ML; MG/100ML; MG/100ML
INJECTION, SOLUTION INTRAVENOUS CONTINUOUS
Status: DISCONTINUED | OUTPATIENT
Start: 2023-01-17 | End: 2023-01-18

## 2023-01-17 RX ORDER — ONDANSETRON 4 MG/1
4 TABLET, ORALLY DISINTEGRATING ORAL EVERY 8 HOURS PRN
Status: DISCONTINUED | OUTPATIENT
Start: 2023-01-17 | End: 2023-01-18 | Stop reason: HOSPADM

## 2023-01-17 RX ORDER — MAGNESIUM HYDROXIDE 1200 MG/15ML
LIQUID ORAL CONTINUOUS PRN
Status: COMPLETED | OUTPATIENT
Start: 2023-01-17 | End: 2023-01-17

## 2023-01-17 RX ORDER — ASPIRIN 81 MG/1
81 TABLET, CHEWABLE ORAL DAILY
Status: DISCONTINUED | OUTPATIENT
Start: 2023-01-18 | End: 2023-01-18 | Stop reason: HOSPADM

## 2023-01-17 RX ORDER — ONDANSETRON 2 MG/ML
INJECTION INTRAMUSCULAR; INTRAVENOUS PRN
Status: DISCONTINUED | OUTPATIENT
Start: 2023-01-17 | End: 2023-01-17 | Stop reason: SDUPTHER

## 2023-01-17 RX ORDER — NITROGLYCERIN 20 MG/100ML
INJECTION INTRAVENOUS PRN
Status: DISCONTINUED | OUTPATIENT
Start: 2023-01-17 | End: 2023-01-17 | Stop reason: SDUPTHER

## 2023-01-17 RX ORDER — PHENYLEPHRINE HCL IN 0.9% NACL 0.5 MG/5ML
SYRINGE (ML) INTRAVENOUS PRN
Status: DISCONTINUED | OUTPATIENT
Start: 2023-01-17 | End: 2023-01-17 | Stop reason: SDUPTHER

## 2023-01-17 RX ORDER — SPIRONOLACTONE 25 MG/1
25 TABLET ORAL DAILY
Status: DISCONTINUED | OUTPATIENT
Start: 2023-01-18 | End: 2023-01-18 | Stop reason: HOSPADM

## 2023-01-17 RX ORDER — LIDOCAINE HYDROCHLORIDE 10 MG/ML
INJECTION, SOLUTION EPIDURAL; INFILTRATION; INTRACAUDAL; PERINEURAL PRN
Status: DISCONTINUED | OUTPATIENT
Start: 2023-01-17 | End: 2023-01-17 | Stop reason: ALTCHOICE

## 2023-01-17 RX ORDER — SODIUM CHLORIDE 9 MG/ML
INJECTION, SOLUTION INTRAVENOUS PRN
Status: CANCELLED | OUTPATIENT
Start: 2023-01-17

## 2023-01-17 RX ORDER — FUROSEMIDE 40 MG/1
40 TABLET ORAL DAILY
Status: DISCONTINUED | OUTPATIENT
Start: 2023-01-18 | End: 2023-01-18 | Stop reason: HOSPADM

## 2023-01-17 RX ORDER — ONDANSETRON 2 MG/ML
4 INJECTION INTRAMUSCULAR; INTRAVENOUS EVERY 6 HOURS PRN
Status: DISCONTINUED | OUTPATIENT
Start: 2023-01-17 | End: 2023-01-18 | Stop reason: HOSPADM

## 2023-01-17 RX ORDER — ONDANSETRON 2 MG/ML
4 INJECTION INTRAMUSCULAR; INTRAVENOUS
Status: CANCELLED | OUTPATIENT
Start: 2023-01-17 | End: 2023-01-18

## 2023-01-17 RX ORDER — SODIUM CHLORIDE 0.9 % (FLUSH) 0.9 %
5-40 SYRINGE (ML) INJECTION PRN
Status: DISCONTINUED | OUTPATIENT
Start: 2023-01-17 | End: 2023-01-18 | Stop reason: HOSPADM

## 2023-01-17 RX ORDER — FENTANYL CITRATE 50 UG/ML
INJECTION, SOLUTION INTRAMUSCULAR; INTRAVENOUS PRN
Status: DISCONTINUED | OUTPATIENT
Start: 2023-01-17 | End: 2023-01-17 | Stop reason: SDUPTHER

## 2023-01-17 RX ORDER — LISINOPRIL 10 MG/1
10 TABLET ORAL DAILY
Status: DISCONTINUED | OUTPATIENT
Start: 2023-01-18 | End: 2023-01-18 | Stop reason: HOSPADM

## 2023-01-17 RX ADMIN — LIDOCAINE HYDROCHLORIDE 50 MG: 10 INJECTION, SOLUTION EPIDURAL; INFILTRATION; INTRACAUDAL; PERINEURAL at 11:20

## 2023-01-17 RX ADMIN — Medication 50 MCG: at 10:27

## 2023-01-17 RX ADMIN — FENTANYL CITRATE 25 MCG: 50 INJECTION, SOLUTION INTRAMUSCULAR; INTRAVENOUS at 11:10

## 2023-01-17 RX ADMIN — SOTALOL HYDROCHLORIDE 80 MG: 80 TABLET ORAL at 21:18

## 2023-01-17 RX ADMIN — ACETAMINOPHEN 1000 MG: 500 TABLET ORAL at 14:57

## 2023-01-17 RX ADMIN — Medication 5 MG: at 22:00

## 2023-01-17 RX ADMIN — ROCURONIUM BROMIDE 10 MG: 10 INJECTION, SOLUTION INTRAVENOUS at 09:32

## 2023-01-17 RX ADMIN — MEXILETINE HYDROCHLORIDE 200 MG: 200 CAPSULE ORAL at 21:18

## 2023-01-17 RX ADMIN — DESMOPRESSIN ACETATE 40 MG: 0.2 TABLET ORAL at 21:18

## 2023-01-17 RX ADMIN — LIDOCAINE HYDROCHLORIDE 50 MG: 10 INJECTION, SOLUTION EPIDURAL; INFILTRATION; INTRACAUDAL; PERINEURAL at 08:01

## 2023-01-17 RX ADMIN — DEXAMETHASONE SODIUM PHOSPHATE 10 MG: 10 INJECTION INTRAMUSCULAR; INTRAVENOUS at 08:10

## 2023-01-17 RX ADMIN — SODIUM CHLORIDE: 9 INJECTION, SOLUTION INTRAVENOUS at 11:32

## 2023-01-17 RX ADMIN — Medication 100 MCG: at 11:01

## 2023-01-17 RX ADMIN — ROCURONIUM BROMIDE 10 MG: 10 INJECTION, SOLUTION INTRAVENOUS at 09:36

## 2023-01-17 RX ADMIN — Medication 50 MCG: at 09:42

## 2023-01-17 RX ADMIN — ROCURONIUM BROMIDE 10 MG: 10 INJECTION, SOLUTION INTRAVENOUS at 10:06

## 2023-01-17 RX ADMIN — SODIUM CHLORIDE, POTASSIUM CHLORIDE, SODIUM LACTATE AND CALCIUM CHLORIDE: 600; 310; 30; 20 INJECTION, SOLUTION INTRAVENOUS at 12:51

## 2023-01-17 RX ADMIN — GABAPENTIN 300 MG: 300 CAPSULE ORAL at 21:17

## 2023-01-17 RX ADMIN — PROPOFOL 140 MG: 10 INJECTION, EMULSION INTRAVENOUS at 08:01

## 2023-01-17 RX ADMIN — FENTANYL CITRATE 50 MCG: 50 INJECTION, SOLUTION INTRAMUSCULAR; INTRAVENOUS at 09:32

## 2023-01-17 RX ADMIN — HEPARIN SODIUM 2500 UNITS: 1000 INJECTION INTRAVENOUS; SUBCUTANEOUS at 10:17

## 2023-01-17 RX ADMIN — FENTANYL CITRATE 50 MCG: 50 INJECTION, SOLUTION INTRAMUSCULAR; INTRAVENOUS at 08:01

## 2023-01-17 RX ADMIN — ROCURONIUM BROMIDE 20 MG: 10 INJECTION, SOLUTION INTRAVENOUS at 08:27

## 2023-01-17 RX ADMIN — PHENYLEPHRINE HYDROCHLORIDE 50 MCG/MIN: 10 INJECTION INTRAVENOUS at 08:41

## 2023-01-17 RX ADMIN — Medication 5 MG: at 09:23

## 2023-01-17 RX ADMIN — ONDANSETRON 4 MG: 2 INJECTION INTRAMUSCULAR; INTRAVENOUS at 11:11

## 2023-01-17 RX ADMIN — Medication 50 MCG: at 10:25

## 2023-01-17 RX ADMIN — Medication 100 MCG: at 10:57

## 2023-01-17 RX ADMIN — SODIUM CHLORIDE: 9 INJECTION, SOLUTION INTRAVENOUS at 08:10

## 2023-01-17 RX ADMIN — MONTELUKAST SODIUM 10 MG: 10 TABLET, FILM COATED ORAL at 21:18

## 2023-01-17 RX ADMIN — Medication 50 MCG: at 10:51

## 2023-01-17 RX ADMIN — ROCURONIUM BROMIDE 10 MG: 10 INJECTION, SOLUTION INTRAVENOUS at 09:01

## 2023-01-17 RX ADMIN — Medication 10 MG: at 08:24

## 2023-01-17 RX ADMIN — PROTAMINE SULFATE 60 MG: 10 INJECTION, SOLUTION INTRAVENOUS at 10:51

## 2023-01-17 RX ADMIN — NITROGLYCERIN 10 MCG: 20 INJECTION INTRAVENOUS at 10:45

## 2023-01-17 RX ADMIN — ROCURONIUM BROMIDE 50 MG: 10 INJECTION, SOLUTION INTRAVENOUS at 08:01

## 2023-01-17 RX ADMIN — Medication 50 MCG: at 10:43

## 2023-01-17 RX ADMIN — Medication 50 MCG: at 10:50

## 2023-01-17 RX ADMIN — Medication 2 G: at 08:27

## 2023-01-17 RX ADMIN — GABAPENTIN 300 MG: 300 CAPSULE ORAL at 14:56

## 2023-01-17 RX ADMIN — Medication 10 MG: at 08:10

## 2023-01-17 RX ADMIN — Medication 5 MG: at 09:25

## 2023-01-17 RX ADMIN — HEPARIN SODIUM 9000 UNITS: 1000 INJECTION INTRAVENOUS; SUBCUTANEOUS at 09:21

## 2023-01-17 RX ADMIN — ACETAMINOPHEN 1000 MG: 500 TABLET ORAL at 21:21

## 2023-01-17 RX ADMIN — FENTANYL CITRATE 25 MCG: 50 INJECTION, SOLUTION INTRAMUSCULAR; INTRAVENOUS at 11:16

## 2023-01-17 RX ADMIN — FENTANYL CITRATE 50 MCG: 50 INJECTION, SOLUTION INTRAMUSCULAR; INTRAVENOUS at 08:27

## 2023-01-17 RX ADMIN — SODIUM CHLORIDE: 9 INJECTION, SOLUTION INTRAVENOUS at 07:31

## 2023-01-17 RX ADMIN — SUGAMMADEX 200 MG: 100 INJECTION, SOLUTION INTRAVENOUS at 11:36

## 2023-01-17 ASSESSMENT — ENCOUNTER SYMPTOMS: SHORTNESS OF BREATH: 1

## 2023-01-17 NOTE — OP NOTE
Operative Note      Patient: Beatriz Coughlin  YOB: 1945  MRN: [id number]    Date of Procedure: 1/17/2023    Pre-Op Diagnosis: Right carotid stenosis with ulcerated plaque    Post-Op Diagnosis: Same       Procedure:  Right carotid endarterectomy with bovine pericardial patch angioplasty using 2 patches starting underneath the clavicle and extending to the distal extracranial internal carotid artery. Surgeon(s):  Laney Ray MD    Assistant:   Radha Quiroz    Anesthesia: General    Estimated Blood Loss (mL): 961    Complications: None    Specimens:   ID Type Source Tests Collected by Time Destination   A : RIGHT CAROTID ARTERY PLAQUE Tissue Carotid Arteries SURGICAL PATHOLOGY Laney Ray MD 1/17/2023 0358        Implants:  Implant Name Type Inv. Item Serial No.  Lot No. LRB No. Used Action   GRAFT VASC W0.8XL8CM THK0.35-0.75MM CAR PERICARD PROC BOV - SN/A Vascular grafts GRAFT VASC W0.8XL8CM THK0.35-0.75MM CAR PERICARD PROC BOV N/A [facility] [employer]-WD [id number] Right 1 Implanted   GRAFT VASC W0.8XL8CM THK0.35-0.75MM CAR PERICARD PROC BOV - [id number] Vascular grafts GRAFT VASC W0.8XL8CM THK0.35-0.75MM CAR PERICARD PROC BOV [id number] [address] [facility] INC-WD [id number] Right 1 Implanted         Drains:   Closed/Suction Drain Right; Anterior Neck Bulb (Active)       Findings: This was a challenging case and that the plaque was extremely long and required an extended endarterectomy from underneath the clavicle up to well above the hypoglossal nerve. The hypoglossal nerve was seen and preserved. The glossopharyngeal nerve was seen and preserved. The vagus nerve was seen and preserved. The ansa was transected at 2 branch points. The omohyoid was also transected to gain proximal exposure. I was unable to keep the shunt in place due to the length of the repair as I could not retract the shunt enough to perform endarterectomy underneath the clavicle.   The duplex ultrasonography was used to investigate the repair at the conclusion the case showing no intimal flaps and no clamp injuries. There was excellent color filling from [id number] and there was no back wall debris. The peak systolic velocity of the internal carotid artery was 40 to 50 cm/s. The external carotid artery remained widely patent as well. The patient woke moving all 4 extremities without apparent neurologic deficit. He was protruding his tongue without difficulty and without deviation. Detailed Description of Procedure: The patient was brought to the operating room and placed in the supine position with the arms tucked at the sides. The patient was identified by name and time out was held after general anesthetic was established and an endotracheal airway was established. An arterial line was established and appropriate IV access was established. Antibiotics were given. The right neck face and chest were prepped and draped in sterile fashion. A longitudinal incision was made on the anterior border of the right sternocleidomastoid muscle through which Bovie cautery was used to dissect down to the level of the jugular vein. The jugular vein was dissected on its medial aspect and the facial vein was identified ligated and transected. The common carotid artery was identified and dissected in a circumferential fashion up to the bifurcation at which point dissection continued along the lateral surface of the internal carotid artery to a disease-free portion of the internal carotid artery. It was dissected circumferentially at that location so that the clamp could be placed later. Once adequate dissection was established and the hypoglossal and vagus nerves were identified and preserved the patient was given a systemic dose of heparin and 3 minutes were allowed for circulation time. A Keyshawn bulldog clamp was then placed across the internal carotid artery at the disease-free portion.   A Rinda  was then placed on the proximal common carotid artery and the external carotid artery was dissected on its undersurface with scissors. A profundofemoral clamp was placed on the external carotid artery for control. A longitudinal arteriotomy was made with 11 blade and Arevalo scissors and extended from the common carotid up to and including the internal carotid up to the disease-free portion of the internal carotid artery. A 3 x 4 shunt was then placed first in the distal internal carotid artery and allowed to backbleed and fastened in place with a Judah shunt clamp. The shunt was allowed to backbleed and de-aired. It was then used to irrigate the common carotid stump to allow any debris or air evacuated from that location. It was fastened in place with a Judah shunt clamp in the common carotid artery. A Chris tourniquet was then used to fasten the proximal portion of the shunt in place by advancing the shunt in a retrograde fashion down the common carotid artery and pulling the Chris tourniquet taut around the shunt. The proximal Judah shunt clamp was then removed. The shunt was inspected for flow with continuous-wave Doppler and was normal.  A dental freer was used with forceps to remove the plaque in a standard endarterectomy technique. The shunt was dislodged proximally due to traction on the shunt to retracted and I decided since the backflow was so robust not to replace it. The distal clamp was reestablished with a Keyshawn bulldog clamp and the proximal clamp was reestablished with a baby renal clamp. Endpoints were established by gentle traction and with Arevalo scissors. Excellent endpoints were achieved both proximally and distally. Forced heparin saline was then used to inspect the back wall and any debris was then removed with ring forceps.     The patch was then fastened in place at the distal apex and a 6-0 Prolene with a C1 needle was used for the anastomosis of the patch to the arterial wall on both sides of the arteriotomy. It was closed down to the midportion of the arteriotomy. A second patch was then placed from the proximal apex up to meet the first patch also using 6-0 Prolene on a C1 needle. Both sides of the arteriotomy were closed up to the previously placed patch. Approximately 13 cm of arteriotomy was closed. .  The 2 endpoints of the patches were then closed with the already used suture as those 2 sutures were tied on each side of the arteriotomy. 1 suture was then used to close the transition point of the 2 patches. Before complete closure there was backbleeding from both ends first from proximal and then from distal.  Just before complete closure of the arteriotomy was de-aired in a similar fashion. The remainder of the arteriotomy was closed. The common carotid clamp and external carotid clamp were removed. 10 heartbeats were allowed to elapse before removing the distal internal carotid artery clamp. Duplex ultrasonography was then performed and the findings were as described above. The patient was given a reversal dose of protamine and FloSeal was then established around the repair. 5 minutes were allowed to elapse to inspect for any bleeding of which there was none. The wound was then closed in 2 layers using 4-0 Vicryl in an interrupted simple style at the level of the platysma and 4-0 nylon in an interrupted vertical mattress style at the level of the skin. This was done over a 7 flat Ajit-Moreland drain. The drain had minimal drainage. The patient was awakened extubated and returned to the recovery room in stable condition moving all 4 extremities with no apparent neurologic deficit. The patient tolerated this procedure well and there were no apparent complications. The patient will remain in the unit until at least tomorrow morning and likely be discharged thereafter.     Electronically signed by Zack Pena MD on 1/17/2023 at 11:30 AM

## 2023-01-17 NOTE — INTERVAL H&P NOTE
Update History & Physical    The patient's History and Physical of January 17,  was reviewed with the patient and I examined the patient. There was no change. The surgical site was confirmed by the patient and me. The patient continues to have his right carotid stenosis with an ulcerated plaque. We had discussed previously that this should be repaired to reduce his risk of stroke. He is on the appropriate medications and we will be going to the operating room today for repair. Plan: The risks, benefits, expected outcome, and alternative to the recommended procedure have been discussed with the patient. Patient understands and wants to proceed with the procedure.      Electronically signed by Davey Martin MD on 1/17/2023 at 6:31 AM

## 2023-01-17 NOTE — PROGRESS NOTES
Informed Dr. Delgado Enter with vascular surgery that patient's JOHN drain fell out during ambulation. Will continue to monitor.

## 2023-01-17 NOTE — ANESTHESIA POSTPROCEDURE EVALUATION
Department of Anesthesiology  Postprocedure Note    Patient: Lc Gagnon  MRN: [id number]  YOB: 1945  Date of evaluation: 1/17/2023      Procedure Summary     Date: 01/17/23 Room / Location: [address]    Anesthesia Start: 3224 Anesthesia Stop: 6713    Procedure: CAROTID ENDARTERECTOMY WITH 0.8 CM X 8 CM XENOSURE BOVINE PATCH X2 (Right: Neck) Diagnosis:       Stenosis of right carotid artery      (CAROTID ARTERY STENOSIS)    Surgeons: Norman Umaña MD Responsible Provider: Monica Winkler MD    Anesthesia Type: general ASA Status: 4          Anesthesia Type: No value filed.     Lianna Phase I: Lianna Score: 9    Lianna Phase II:    POST-OP ANESTHESIA NOTE       BP (!) 102/40   Pulse 50   Temp 97.8 °F (36.6 °C)   Resp 17   SpO2 96%    Pain Assessment: None - Denies Pain               Anesthesia Post Evaluation    Patient location during evaluation: ICU  Patient participation: complete - patient participated  Level of consciousness: awake  Pain score: 0  Airway patency: patent  Nausea & Vomiting: no nausea and no vomiting  Complications: no  Cardiovascular status: hemodynamically stable  Respiratory status: acceptable  Hydration status: stable

## 2023-01-17 NOTE — PROGRESS NOTES
Patient admitted, consent signed and questions answered. Patient ready for procedure. Call light to reach with side rails up 2 of 2.  Patient poor historian regarding home medications

## 2023-01-17 NOTE — ANESTHESIA PRE PROCEDURE
Department of Anesthesiology  Preprocedure Note       Name:  Heide Alatorre   Age:  76 y.o.  :  1945                                          MRN:  [id number]         Date:  2023      Surgeon: Castillo Jordan):  Collins Armenta MD    Procedure: CAROTID ENDARTERECTOMY (Right)    Medications prior to admission:   Prior to Admission medications    Medication Sig Start Date End Date Taking? Authorizing Provider   albuterol (ACCUNEB) 0.63 MG/3ML nebulizer solution Take 1 ampule by nebulization every 6 hours as needed for Wheezing Currently using bid    Historical Provider, MD   fluticasone (FLONASE) 50 MCG/ACT nasal spray 1 spray by Each Nostril route daily Uses prn    Historical Provider, MD   b complex vitamins capsule Take 1 capsule by mouth daily    Historical Provider, MD   mexiletine (MEXITIL) 200 MG capsule Take 200 mg by mouth 2 times daily    Historical Provider, MD   montelukast (SINGULAIR) 10 MG tablet Take 10 mg by mouth nightly    Historical Provider, MD   LUTEIN PO Take 2 tablets by mouth daily    Historical Provider, MD   Cholecalciferol (VITAMIN D3 PO) Take by mouth daily    Historical Provider, MD   fluticasone propionate (FLOVENT) 100 MCG/BLIST AEPB inhaler Inhale 1 puff into the lungs 2 times daily  Patient not taking: Reported on 2022  Historical Provider, MD   furosemide (LASIX) 40 MG tablet Take 0.5 tablets by mouth daily 19   Lucien Smith,    Respiratory Therapy Supplies (NEBULIZER COMPRESSOR) KIT 1 kit by Does not apply route once for 1 dose 6/10/18 12/25/18  Thania Lopez MD   Respiratory Therapy Supplies (NEBULIZER COMPRESSOR) KIT 1 kit by Does not apply route once for 1 dose 6/10/18 6/10/18  Thania Lopez MD   warfarin (COUMADIN) 6 MG tablet Take 6 mg by mouth in the morning.     Historical Provider, MD   atorvastatin (LIPITOR) 40 MG tablet Take 1 tablet by mouth nightly 16   Young Geronimo DO   lisinopril (PRINIVIL;ZESTRIL) 10 MG tablet Take 1 tablet by mouth daily 12/13/16   Bartolo Smith, DO   spironolactone (ALDACTONE) 25 MG tablet Take 1 tablet by mouth daily 12/13/16   Bartolo Smith DO   albuterol (VENTOLIN HFA) 108 (90 BASE) MCG/ACT inhaler Inhale 2 puffs into the lungs every 6 hours as needed for Wheezing. 10/10/14   Kathy Wilson, DO   sotalol (BETAPACE) 80 MG tablet Take 1 tablet by mouth 2 times daily. 5/19/14   Solomon Christensen, DO   aspirin 81 MG chewable tablet Take 1 tablet by mouth daily. Patient taking differently: Take 81 mg by mouth daily Stop 7 days prior to sx per Dr. Violetta Lundborg 11/26/13   Tamir Roy PA-C       Current medications:    Current Facility-Administered Medications   Medication Dose Route Frequency Provider Last Rate Last Admin    0.9 % sodium chloride infusion   IntraVENous Continuous Catherine Yeboah MD           Allergies: Allergies   Allergen Reactions    Dust Mite Mixed Allergen Ext [Mite (D. Farinae)]     Pcn [Penicillins] Itching     Patient denies. Problem List:    Patient Active Problem List   Diagnosis Code    Ventricular tachycardia, monomorphic I47.29    Coronary artery disease involving native coronary artery of native heart without angina pectoris I25.10    Tobacco use Z72.0    Spell of dizziness R42    Hyponatremia E87.1    ICD (implantable cardioverter-defibrillator) discharge Z45.02    HTN (hypertension), benign I10    Hyperkalemia E87.5    Bradycardia R00.1    Hyperphosphatemia E83.39    MI (myocardial infarction) ([facility] I21.9    Cardiac arrest ([facility] I46.9    Hyperglycemia R73.9    Acute on chronic congestive heart failure (HCC) I50.9    Shortness of breath R06.02    Ischemic cardiomyopathy N23.2    Systolic dysfunction with acute on chronic heart failure (HCC) - Efx 30% I50.23    Acute on chronic diastolic heart failure (HCC) I50.33    Left ventricular apical thrombus I51.3    Azotemia R79.89    Hematoma of groin S30. 1XXA    Disorder of scapula - sclerosis, 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 M89.9    Pseudoaneurysm (HCC) I72.9    Thrombocytopenia (HCC) D69.6    LETICIA (acute kidney injury) ([address] N17.9    Elevated serum lactate dehydrogenase R74.02    Congestive heart failure (HCC) I50.9    Myocardial infarction (HCC) I21.9    Syncope and collapse R55    AICD malfunction, initial encounter T82.118A    Chronic systolic heart failure (HCC) I50.22    Serum lipase elevation R74.8    Heart block, AV - bifascicular I44.30    Stenosis of left carotid artery I65.22    S/P carotid endarterectomy Z98.890    Stenosis of right carotid artery I65.21       Past Medical History:        Diagnosis Date    Accident 1967    [place] accident in the [email or web address] \"jumping school\". Numbness and tingling for 1 year from the low back to bilateral mid thighs.  Arthritis     states no pain from arthritis    Bronchitis     CAD (coronary artery disease) 11/19/2013    cabg x 3  [facility]    Cancer ([address]     head and cheek skin cancer    Cardiac arrest ([address] 11/2013    While playing squash.     Good exercise tolerance     pt plays golf, bowling but cannot play competive squash anymore (he passes out), still works full time ()    Hx of blood clots 12/2017    heart    Hyperkalemia 05/19/2014    Hypertension, essential     Ischemic cardiomyopathy 12/08/2016    Left ventricular apical thrombus 12/09/2016    MI (myocardial infarction) ([address] 11/2013    Pseudoaneurysm ([facility] - right groin 12/12/2016    Post cardiac catheterization    Sinus drainage     Snores     no sleep apnea    Systolic dysfunction with acute on chronic heart failure (HCC) - Efx 30% 12/08/2016    Wellness examination     Dr. Rebeka Rogers last seen 7/2022   [place], [place]       Past Surgical History:        Procedure Laterality Date    ABDOMEN SURGERY Right 07/19/2019    EXCISION RIGHT SHOULDER MASS performed by Sruthi Truong DO at [address] [place] [address]  12/08/2016    No stents placed per pt   Danielle [place] CARDIAC CATHETERIZATION  11/18/2013    CARDIAC CATHETERIZATION  04/07/2022    CARDIAC DEFIBRILLATOR PLACEMENT  2013    right chest    CAROTID ENDARTERECTOMY Left 8/11/2022    LEFT CAROTID ENDARTERECTOMY WITH XENOSURE PATCH ANGIOPLASTY performed by Bela Arguelles MD at [address] [facility]  11/19/2013    x 3 vessell    OTHER SURGICAL HISTORY Right 07/19/2019    EXCISION RIGHT SHOULDER MASS     TONSILLECTOMY      VASECTOMY         Social History:    Social History     Tobacco Use    Smoking status: Never    Smokeless tobacco: Former     Types: Chew    Tobacco comments:     Does not smoke cigars any longer   Substance Use Topics    Alcohol use: Yes     Comment: couple drinks per week at most                                Counseling given: Not Answered  Tobacco comments: Does not smoke cigars any longer      Vital Signs (Current): There were no vitals filed for this visit.                                            BP Readings from Last 3 Encounters:   12/02/22 (!) 154/68   08/26/22 118/64   08/12/22 (!) 126/59       NPO Status:                                                                                 BMI:   Wt Readings from Last 3 Encounters:   12/02/22 183 lb (83 kg)   08/26/22 187 lb (84.8 kg)   08/11/22 186 lb (84.4 kg)     There is no height or weight on file to calculate BMI.    CBC:   Lab Results   Component Value Date/Time    WBC 9.4 08/05/2022 02:53 PM    RBC 4.61 08/05/2022 02:53 PM    HGB 14.2 08/05/2022 02:53 PM    HCT 43.3 08/05/2022 02:53 PM    MCV 93.9 08/05/2022 02:53 PM    RDW 13.6 08/05/2022 02:53 PM     08/05/2022 02:53 PM       CMP:   Lab Results   Component Value Date/Time     08/12/2022 05:05 AM    K 4.4 08/12/2022 05:05 AM     08/12/2022 05:05 AM    CO2 23 08/12/2022 05:05 AM    BUN 18 08/12/2022 05:05 AM    CREATININE 0.79 08/12/2022 05:05 AM    GFRAA >60 08/12/2022 05:05 AM    LABGLOM >60 08/12/2022 05:05 AM    GLUCOSE 133 08/12/2022 05:05 AM    PROT 7.4 04/24/2019 08:19 AM    CALCIUM 8.4 08/12/2022 05:05 AM    BILITOT 0.53 04/24/2019 08:19 AM    ALKPHOS 173 04/24/2019 08:19 AM    AST 38 04/24/2019 08:19 AM    ALT 58 04/24/2019 08:19 AM       POC Tests: No results for input(s): POCGLU, POCNA, POCK, POCCL, POCBUN, POCHEMO, POCHCT in the last 72 hours. Coags:   Lab Results   Component Value Date/Time    PROTIME 21.4 12/21/2022 08:20 AM    PROTIME 12.8 08/11/2022 07:34 AM    INR 2.1 12/21/2022 08:20 AM    APTT 29.0 08/05/2022 02:53 PM       HCG (If Applicable): No results found for: PREGTESTUR, PREGSERUM, HCG, HCGQUANT     ABGs: No results found for: PHART, PO2ART, HEW0WOF, KCW0BYU, BEART, M6FAAVEL     Type & Screen (If Applicable):  No results found for: LABABO, LABRH    EKG 8/2022  Sinus bradycardia with 1st degree A-V block  Right bundle branch block  Left anterior fascicular block   Bifascicular block VH, may be normal variant  Abnormal ECG  When compared with ECG of 06-FEB-2022 12:03,  No significant change was found    Cardiac cat 4/2022   Multi-vessel Coronary Artery Disease.    Patent LIMA-LAD and SVG-RPDA.    Occluded LCX and occluded SVG-OM. with right to left collaterals.    Mildly impaired ventricular function.    No significant change from cardiac cath 2016.         Anesthesia Evaluation  Patient summary reviewed no history of anesthetic complications:   Airway: Mallampati: III  TM distance: >3 FB   Neck ROM: full  Mouth opening: > = 3 FB   Dental:          Pulmonary: breath sounds clear to auscultation  (+) shortness of breath:                             Cardiovascular:    (+) hypertension:, pacemaker: AICD, past MI:, CAD:, CABG/stent:, dysrhythmias: ventricular tachycardia, CHF: systolic, hyperlipidemia      ECG reviewed  Rhythm: regular  Rate: normal           Beta Blocker:  Dose within 24 Hrs         Neuro/Psych:   Negative Neuro/Psych ROS              GI/Hepatic/Renal: Neg GI/Hepatic/Renal ROS  (+) morbid obesity Endo/Other: Negative Endo/Other ROS                    Abdominal:             Vascular:   + PVD, aortic or cerebral, . Other Findings:             Anesthesia Plan      general     ASA 4       Induction: intravenous. arterial line  MIPS: Postoperative opioids intended, Prophylactic antiemetics administered and Postoperative trial extubation. Anesthetic plan and risks discussed with patient. Plan discussed with CRNA.                     Philemon Habermann, MD   1/17/2023

## 2023-01-17 NOTE — ANESTHESIA PRE PROCEDURE
Department of Anesthesiology  Preprocedure Note       Name:  Mamie Recinos   Age:  76 y.o.  :  1945                                          MRN:  [id number]         Date:  2023      Surgeon: Davina Mccoy):  Toni Bar MD    Procedure: CAROTID ENDARTERECTOMY (Right)    Medications prior to admission:   Prior to Admission medications    Medication Sig Start Date End Date Taking? Authorizing Provider   albuterol (ACCUNEB) 0.63 MG/3ML nebulizer solution Take 1 ampule by nebulization every 6 hours as needed for Wheezing Currently using bid    Historical Provider, MD   fluticasone (FLONASE) 50 MCG/ACT nasal spray 1 spray by Each Nostril route daily Uses prn    Historical Provider, MD   b complex vitamins capsule Take 1 capsule by mouth daily    Historical Provider, MD   mexiletine (MEXITIL) 200 MG capsule Take 200 mg by mouth 2 times daily    Historical Provider, MD   montelukast (SINGULAIR) 10 MG tablet Take 10 mg by mouth nightly    Historical Provider, MD   LUTEIN PO Take 2 tablets by mouth daily    Historical Provider, MD   Cholecalciferol (VITAMIN D3 PO) Take by mouth daily    Historical Provider, MD   fluticasone propionate (FLOVENT) 100 MCG/BLIST AEPB inhaler Inhale 1 puff into the lungs 2 times daily  Patient not taking: Reported on 2022  Historical Provider, MD   furosemide (LASIX) 40 MG tablet Take 0.5 tablets by mouth daily 19   Juanjo Carty DO   Respiratory Therapy Supplies (NEBULIZER COMPRESSOR) KIT 1 kit by Does not apply route once for 1 dose 6/10/18 12/25/18  Andrew Linder MD   Respiratory Therapy Supplies (NEBULIZER COMPRESSOR) KIT 1 kit by Does not apply route once for 1 dose 6/10/18 6/10/18  Andrew Linder MD   warfarin (COUMADIN) 6 MG tablet Take 6 mg by mouth in the morning.     Historical Provider, MD   atorvastatin (LIPITOR) 40 MG tablet Take 1 tablet by mouth nightly 16   Eder Carty DO   lisinopril (PRINIVIL;ZESTRIL) 10 MG tablet Take 1 tablet by mouth daily 12/13/16   Sheryl Adan, DO   spironolactone (ALDACTONE) 25 MG tablet Take 1 tablet by mouth daily 12/13/16   Sheryl Adan, DO   albuterol (VENTOLIN HFA) 108 (90 BASE) MCG/ACT inhaler Inhale 2 puffs into the lungs every 6 hours as needed for Wheezing. 10/10/14   Curt Sneed, DO   sotalol (BETAPACE) 80 MG tablet Take 1 tablet by mouth 2 times daily. 5/19/14   Yuni Vargas, DO   aspirin 81 MG chewable tablet Take 1 tablet by mouth daily. Patient taking differently: Take 81 mg by mouth daily Stop 7 days prior to sx per Dr. Elizabeth Resendez 11/26/13   Мария Barraza PA-C       Current medications:    Current Facility-Administered Medications   Medication Dose Route Frequency Provider Last Rate Last Admin    0.9 % sodium chloride infusion   IntraVENous Continuous Hansel MD Maureen           Allergies: Allergies   Allergen Reactions    Pcn [Penicillins] Itching     Patient denies. Problem List:    Patient Active Problem List   Diagnosis Code    Ventricular tachycardia, monomorphic I47.29    Coronary artery disease involving native coronary artery of native heart without angina pectoris I25.10    Tobacco use Z72.0    Spell of dizziness R42    Hyponatremia E87.1    ICD (implantable cardioverter-defibrillator) discharge Z45.02    HTN (hypertension), benign I10    Hyperkalemia E87.5    Bradycardia R00.1    Hyperphosphatemia E83.39    MI (myocardial infarction) ([facility] I21.9    Cardiac arrest ([facility] I46.9    Hyperglycemia R73.9    Acute on chronic congestive heart failure (HCC) I50.9    Shortness of breath R06.02    Ischemic cardiomyopathy D46.8    Systolic dysfunction with acute on chronic heart failure (HCC) - Efx 30% I50.23    Acute on chronic diastolic heart failure (HCC) I50.33    Left ventricular apical thrombus I51.3    Azotemia R79.89    Hematoma of groin S30. 1XXA    Disorder of scapula - sclerosis, 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 M89.9    Pseudoaneurysm (HCC) I72.9    Thrombocytopenia (HCC) D69.6    LETICIA (acute kidney injury) ([facility] N17.9    Elevated serum lactate dehydrogenase R74.02    Congestive heart failure (HCC) I50.9    Myocardial infarction (HCC) I21.9    Syncope and collapse R55    AICD malfunction, initial encounter T82.118A    Chronic systolic heart failure (HCC) I50.22    Serum lipase elevation R74.8    Heart block, AV - bifascicular I44.30    Stenosis of left carotid artery I65.22    S/P carotid endarterectomy Z98.890    Stenosis of right carotid artery I65.21       Past Medical History:        Diagnosis Date    Accident 1967    [place] accident in the [employer] \"jumping school\". Numbness and tingling for 1 year from the low back to bilateral mid thighs.  Arthritis     states no pain from arthritis    Bronchitis     CAD (coronary artery disease) 11/19/2013    cabg x 3  [facility]    Cancer ([facility]     head and cheek skin cancer    Cardiac arrest ([facility] 11/2013    While playing squash.     Good exercise tolerance     pt plays golf, bowling but cannot play competive squash anymore (he passes out), still works full time ()    Hx of blood clots 12/2017    heart    Hyperkalemia 05/19/2014    Hypertension, essential     Ischemic cardiomyopathy 12/08/2016    Left ventricular apical thrombus 12/09/2016    MI (myocardial infarction) ([facility] 11/2013    Pseudoaneurysm ([facility] - right groin 12/12/2016    Post cardiac catheterization    Sinus drainage     Snores     no sleep apnea    Systolic dysfunction with acute on chronic heart failure (HCC) - Efx 30% 12/08/2016    Wellness examination     Dr. Satnam Zhu last seen 7/2022   [facility]       Past Surgical History:        Procedure Laterality Date    ABDOMEN SURGERY Right 07/19/2019    EXCISION RIGHT SHOULDER MASS performed by Pita Burr DO at [address] [place] [address]  12/08/2016    No stents placed per pt    CARDIAC CATHETERIZATION  11/18/2013    CARDIAC CATHETERIZATION 04/07/2022    CARDIAC DEFIBRILLATOR PLACEMENT  2013    right chest    CAROTID ENDARTERECTOMY Left 8/11/2022    LEFT CAROTID ENDARTERECTOMY WITH XENOSURE PATCH ANGIOPLASTY performed by Indiana Le MD at [address] [place]  11/19/2013    x 3 vessell    OTHER SURGICAL HISTORY Right 07/19/2019    EXCISION RIGHT SHOULDER MASS     TONSILLECTOMY      VASECTOMY         Social History:    Social History     Tobacco Use    Smoking status: Never    Smokeless tobacco: Former     Types: Chew    Tobacco comments:     Does not smoke cigars any longer   Substance Use Topics    Alcohol use: Yes     Comment: couple drinks per week at most                                Counseling given: Not Answered  Tobacco comments: Does not smoke cigars any longer      Vital Signs (Current): There were no vitals filed for this visit.                                            BP Readings from Last 3 Encounters:   12/02/22 (!) 154/68   08/26/22 118/64   08/12/22 (!) 126/59       NPO Status:                                                                                 BMI:   Wt Readings from Last 3 Encounters:   12/02/22 183 lb (83 kg)   08/26/22 187 lb (84.8 kg)   08/11/22 186 lb (84.4 kg)     There is no height or weight on file to calculate BMI.    CBC:   Lab Results   Component Value Date/Time    WBC 9.4 08/05/2022 02:53 PM    RBC 4.61 08/05/2022 02:53 PM    HGB 14.2 08/05/2022 02:53 PM    HCT 43.3 08/05/2022 02:53 PM    MCV 93.9 08/05/2022 02:53 PM    RDW 13.6 08/05/2022 02:53 PM     08/05/2022 02:53 PM       CMP:   Lab Results   Component Value Date/Time     08/12/2022 05:05 AM    K 4.4 08/12/2022 05:05 AM     08/12/2022 05:05 AM    CO2 23 08/12/2022 05:05 AM    BUN 18 08/12/2022 05:05 AM    CREATININE 0.79 08/12/2022 05:05 AM    GFRAA >60 08/12/2022 05:05 AM    LABGLOM >60 08/12/2022 05:05 AM    GLUCOSE 133 08/12/2022 05:05 AM    PROT 7.4 04/24/2019 08:19 AM CALCIUM 8.4 08/12/2022 05:05 AM    BILITOT 0.53 04/24/2019 08:19 AM    ALKPHOS 173 04/24/2019 08:19 AM    AST 38 04/24/2019 08:19 AM    ALT 58 04/24/2019 08:19 AM       POC Tests: No results for input(s): POCGLU, POCNA, POCK, POCCL, POCBUN, POCHEMO, POCHCT in the last 72 hours. Coags:   Lab Results   Component Value Date/Time    PROTIME 21.4 12/21/2022 08:20 AM    PROTIME 12.8 08/11/2022 07:34 AM    INR 2.1 12/21/2022 08:20 AM    APTT 29.0 08/05/2022 02:53 PM       HCG (If Applicable): No results found for: PREGTESTUR, PREGSERUM, HCG, HCGQUANT     ABGs: No results found for: PHART, PO2ART, NGS8ENP, LYB4VTE, BEART, T4HJYDDX     Type & Screen (If Applicable):  No results found for: LABABO, LABRH      EKG 8/2022  Sinus bradycardia with 1st degree A-V block  Right bundle branch block  Left anterior fascicular block   Bifascicular block VH, may be normal variant  Abnormal ECG  When compared with ECG of 06-FEB-2022 12:03,  No significant change was found    Cardiac cat 4/2022   Multi-vessel Coronary Artery Disease.    Patent LIMA-LAD and SVG-RPDA.    Occluded LCX and occluded SVG-OM. with right to left collaterals.    Mildly impaired ventricular function.    No significant change from cardiac cath 2016.         Anesthesia Evaluation  Patient summary reviewed no history of anesthetic complications:   Airway: Mallampati: III  TM distance: >3 FB   Neck ROM: full  Mouth opening: > = 3 FB   Dental:          Pulmonary: breath sounds clear to auscultation  (+) shortness of breath:                             Cardiovascular:    (+) hypertension:, pacemaker: AICD, past MI:, CAD:, CABG/stent:, dysrhythmias: ventricular tachycardia, CHF: systolic, hyperlipidemia        Rhythm: regular  Rate: normal           Beta Blocker:  Dose within 24 Hrs         Neuro/Psych:   Negative Neuro/Psych ROS              GI/Hepatic/Renal: Neg GI/Hepatic/Renal ROS            Endo/Other: Negative Endo/Other ROS   (+) : arthritis:., malignancy/cancer. Abdominal:             Vascular:   + PVD, aortic or cerebral, .        Other Findings:             1.           Latasha Winslow MD   1/17/2023

## 2023-01-17 NOTE — H&P
Patient: Lindsay Rodriguez  : 1945  MRN: [id number]  DOS: 2022     Referring provider:  No ref. provider found            HPI:  Lindsay Rodriguez is a 76 y.o. male who returns to the office for follow-up regarding his left carotid endarterectomy. There was a very extensive repair requiring 2 patches and shortening of the artery. Duplex ultrasonography of both carotids show no significant stenosis on the left with a 50 to 69% stenosis on the right more closely associated to 60 to 70%. CTA previously showed a carotid atherosclerotic plaque on the right with a large ulcer. The left side had greater than 80% stenosis requiring repair first.  At this point given the large ulceration I would recommend repair of the right side with carotid endarterectomy. We discussed this today at length and he understands. He has had no other significant problems since that time. He has not had stroke, TIA or amaurosis fugax. He did well following the first operation. Recall that he does have chronic congestive heart failure with an EF of 30% in the past.  He also had cardiac catheterization done in 2022 revealing a patent left internal mammary to LAD bypass. The circumflex was occluded. There were other stenoses on the right. There is a vein graft originating from the aorta which anastomosis to the first obtuse marginal and also 1 that anastomosis to the right PDA. There was no intervention done during that catheterization. Past Medical History        Past Medical History:   Diagnosis Date    Accident 5     [place] accident in the [address] [place] [address] \"jumping school\". Numbness and tingling for 1 year from the low back to bilateral mid thighs. Arthritis       states no pain from arthritis    Bronchitis      CAD (coronary artery disease) 2013     cabg x 3  [facility]    Cancer ([facility]       head and cheek skin cancer    Cardiac arrest ([facility] 2013     While playing squash.     Good exercise tolerance       pt plays golf, bowling but cannot play [employer] squash anymore (he passes out), still works full time ()    Hx of blood clots 12/2017     heart    Hyperkalemia 05/19/2014    Hypertension, essential      Ischemic cardiomyopathy 12/08/2016    Left ventricular apical thrombus 12/09/2016    MI (myocardial infarction) ([facility] 11/2013    Pseudoaneurysm ([facility] - right groin 12/12/2016     Post cardiac catheterization    Sinus drainage      Snores       no sleep apnea    Systolic dysfunction with acute on chronic heart failure (HCC) - Efx 30% 12/08/2016    Wellness examination       Dr. Teddy Black last seen 7/2022   [place], [place]         Family History         Family History   Problem Relation Age of Onset    Hypertension Father      Stroke Father      Stroke Maternal Grandfather      Hypertension Paternal Grandmother      Hypertension Paternal Grandfather      No Known Problems Mother           age 80    Other Father           vascular     Heart Failure Sister           MI    Heart Failure Brother           MI         Social History               Socioeconomic History    Marital status: Single       Spouse name: Not on file    Number of children: Not on file    Years of education: Not on file    Highest education level: Not on file   Occupational History    Not on file   Tobacco Use    Smoking status: Never    Smokeless tobacco: Former       Types: Chew    Tobacco comments:       Does not smoke cigars any longer   Vaping Use    Vaping Use: Never used   Substance and Sexual Activity    Alcohol use: Yes       Comment: couple drinks per week at most    Drug use: No    Sexual activity: Not on file   Other Topics Concern    Not on file   Social History Narrative     ** Merged History Encounter **            Social Determinants of Health      Financial Resource Strain: Not on file   Food Insecurity: Not on file   Transportation Needs: Not on file   Physical Activity: Not on file   Stress: Not on file   Social Connections: Not on file   Intimate Partner Violence: Not on file   Housing Stability: Not on file         Past Surgical History         Past Surgical History:   Procedure Laterality Date    ABDOMEN SURGERY Right 07/19/2019     EXCISION RIGHT SHOULDER MASS performed by Wendi Chandler DO at [address] [facility] [facility]   12/08/2016     No stents placed per pt    CARDIAC CATHETERIZATION   11/18/2013    CARDIAC CATHETERIZATION   04/07/2022    CARDIAC DEFIBRILLATOR PLACEMENT   2013     right chest    CAROTID ENDARTERECTOMY Left 8/11/2022     LEFT CAROTID ENDARTERECTOMY WITH Otelia Skeeters PATCH ANGIOPLASTY performed by Lida Hermosillo MD at [address]   11/19/2013     x 3 vessell    OTHER SURGICAL HISTORY Right 07/19/2019     EXCISION RIGHT SHOULDER MASS     TONSILLECTOMY        VASECTOMY             Review of Systems   Constitutional:  Negative for activity change, appetite change, fever and unexpected weight change. HENT:  Negative for congestion, trouble swallowing and voice change. Eyes:  Negative for pain and visual disturbance. Respiratory:  Negative for cough, chest tightness and shortness of breath. Cardiovascular:  Negative for chest pain and palpitations. Gastrointestinal:  Negative for abdominal distention, abdominal pain and vomiting. Endocrine: Negative for cold intolerance and heat intolerance. Genitourinary:  Negative for dysuria, flank pain and hematuria. Musculoskeletal:  Negative for joint swelling and neck pain. Skin:  Negative for color change and rash. Allergic/Immunologic: Negative for immunocompromised state. Neurological:  Negative for syncope, speech difficulty, weakness, numbness and headaches. Hematological:  Negative for adenopathy. Psychiatric/Behavioral:  Negative for agitation and confusion.        Vitals        Vitals:     12/02/22 0909 12/02/22 0913   BP: (!) 156/72 (!) 154/68   Site: Left Upper Arm Left Upper Arm Position: Sitting Sitting   Cuff Size: Medium Adult Medium Adult   Pulse: 54     Resp: 16     Temp: 97.3 °F (36.3 °C)     TempSrc: Temporal     SpO2: 97%     Weight: 183 lb (83 kg)     Height: 5' 7\" (1.702 m)               Physical Exam  Constitutional:       General: He is not in acute distress. Appearance: He is not ill-appearing. HENT:      Mouth/Throat:      Mouth: Mucous membranes are moist.      Pharynx: Oropharynx is clear. Eyes:      General: No scleral icterus. Extraocular Movements: Extraocular movements intact. Conjunctiva/sclera: Conjunctivae normal.   Neck:      Thyroid: No thyroid mass or thyromegaly. Cardiovascular:      Comments: He continues to have adequate distal pulses bilaterally. His feet are warm and well-perfused. He has no neurologic symptoms. His neck is soft and has a surgical scar on the left from carotid endarterectomy. The right side has an audible bruit. His heart currently has a regular rate and rhythm with no murmurs rubs or gallops. Pulmonary:      Effort: No respiratory distress. Breath sounds: No rales. Abdominal:      General: There is no distension. Palpations: There is no mass. Tenderness: There is no abdominal tenderness. There is no guarding or rebound. Musculoskeletal:      Cervical back: No rigidity or tenderness. Lymphadenopathy:      Cervical: No cervical adenopathy. Skin:     Coloration: Skin is not jaundiced. Findings: No rash. Neurological:      General: No focal deficit present. Mental Status: He is alert and oriented to person, place, and time. Cranial Nerves: No cranial nerve deficit. Psychiatric:         Mood and Affect: Mood normal.         Assessment:  1. Stenosis of right carotid artery             Plan: At this time given the ulcerated nature of the right carotid plaque which is at least a grade B if not grade C ulcer I would recommend repair of the right side with endarterectomy.   He understands my stance on this position and understands that ulcerated plaques do have higher stroke risk than not ulcerated plaques less than 70%. He also understands that the same risks apply for the right side including bleeding, infection, hematoma, nerve damage, permanent neurologic disability, stroke, TIA, myocardial infarction, cerebral hyperperfusion syndrome and death. I spent quite some time looking at the previous CTA done earlier this year to evaluate the right side. I think this is the best option given the velocities which are well into the 50 to 69% stenosis range more closely associated to 60 to 70%. He agrees to proceed after the first of next year which is only approximately 1 month away. We will schedule him for this procedure at that time.      Electronically signed by:  Lida Hermosillo MD

## 2023-01-17 NOTE — ANESTHESIA PROCEDURE NOTES
Arterial Line:    An arterial line was placed using ultrasound guidance, in the OR for the following indication(s): continuous blood pressure monitoring and blood sampling needed. A 20 gauge (size), 1 and 3/4 inch (length), Arrow (type) catheter was placed, Seldinger technique not used, into the left radial artery, secured by tape and Tegaderm. Anesthesia type: General    Events:  patient tolerated procedure well with no complications and EBL < 5mL. 1/17/2023 8:07 AM1/17/2023 8:09 AM  Anesthesiologist: Alberto Morton MD  Resident/CRNA: TEE Foster - CRNA  Other anesthesia staff: Maria Luz Jaquez RN  Performed:  Other anesthesia staff   Preanesthetic Checklist  Completed: patient identified, IV checked, site marked, risks and benefits discussed, surgical/procedural consents, equipment checked, pre-op evaluation, timeout performed, anesthesia consent given, oxygen available, monitors applied/VS acknowledged, fire risk safety assessment completed and verbalized and blood product R/B/A discussed and consented

## 2023-01-18 VITALS
RESPIRATION RATE: 16 BRPM | DIASTOLIC BLOOD PRESSURE: 56 MMHG | HEART RATE: 45 BPM | OXYGEN SATURATION: 91 % | TEMPERATURE: 97 F | SYSTOLIC BLOOD PRESSURE: 104 MMHG

## 2023-01-18 LAB
ABSOLUTE EOS #: <0.03 K/UL (ref 0–0.44)
ABSOLUTE IMMATURE GRANULOCYTE: 0.06 K/UL (ref 0–0.3)
ABSOLUTE LYMPH #: 1.71 K/UL (ref 1.1–3.7)
ABSOLUTE MONO #: 0.79 K/UL (ref 0.1–1.2)
ANION GAP SERPL CALCULATED.3IONS-SCNC: 10 MMOL/L (ref 9–17)
BASOPHILS # BLD: 0 % (ref 0–2)
BASOPHILS ABSOLUTE: <0.03 K/UL (ref 0–0.2)
BUN BLDV-MCNC: 27 MG/DL (ref 8–23)
CALCIUM SERPL-MCNC: 8.2 MG/DL (ref 8.6–10.4)
CHLORIDE BLD-SCNC: 105 MMOL/L (ref 98–107)
CO2: 22 MMOL/L (ref 20–31)
CREAT SERPL-MCNC: 1.09 MG/DL (ref 0.7–1.2)
EOSINOPHILS RELATIVE PERCENT: 0 % (ref 1–4)
GFR SERPL CREATININE-BSD FRML MDRD: >60 ML/MIN/1.73M2
GLUCOSE BLD-MCNC: 130 MG/DL (ref 70–99)
HCT VFR BLD CALC: 35.2 % (ref 40.7–50.3)
HEMOGLOBIN: 11.5 G/DL (ref 13–17)
IMMATURE GRANULOCYTES: 0 %
LYMPHOCYTES # BLD: 12 % (ref 24–43)
MCH RBC QN AUTO: 30.7 PG (ref 25.2–33.5)
MCHC RBC AUTO-ENTMCNC: 32.7 G/DL (ref 28.4–34.8)
MCV RBC AUTO: 93.9 FL (ref 82.6–102.9)
MONOCYTES # BLD: 6 % (ref 3–12)
NRBC AUTOMATED: 0 PER 100 WBC
PDW BLD-RTO: 14.9 % (ref 11.8–14.4)
PLATELET # BLD: 179 K/UL (ref 138–453)
PMV BLD AUTO: 12.3 FL (ref 8.1–13.5)
POTASSIUM SERPL-SCNC: 4.9 MMOL/L (ref 3.7–5.3)
RBC # BLD: 3.75 M/UL (ref 4.21–5.77)
RBC # BLD: ABNORMAL 10*6/UL
SEG NEUTROPHILS: 82 % (ref 36–65)
SEGMENTED NEUTROPHILS ABSOLUTE COUNT: 11.86 K/UL (ref 1.5–8.1)
SODIUM BLD-SCNC: 137 MMOL/L (ref 135–144)
SURGICAL PATHOLOGY REPORT: NORMAL
WBC # BLD: 14.4 K/UL (ref 3.5–11.3)

## 2023-01-18 PROCEDURE — 2580000003 HC RX 258: Performed by: STUDENT IN AN ORGANIZED HEALTH CARE EDUCATION/TRAINING PROGRAM

## 2023-01-18 PROCEDURE — 85025 COMPLETE CBC W/AUTO DIFF WBC: CPT

## 2023-01-18 PROCEDURE — 80048 BASIC METABOLIC PNL TOTAL CA: CPT

## 2023-01-18 PROCEDURE — 6370000000 HC RX 637 (ALT 250 FOR IP): Performed by: STUDENT IN AN ORGANIZED HEALTH CARE EDUCATION/TRAINING PROGRAM

## 2023-01-18 RX ORDER — OXYCODONE HYDROCHLORIDE 5 MG/1
5 TABLET ORAL EVERY 8 HOURS PRN
Qty: 21 TABLET | Refills: 0 | Status: SHIPPED | OUTPATIENT
Start: 2023-01-18 | End: 2023-01-25

## 2023-01-18 RX ADMIN — LISINOPRIL 10 MG: 10 TABLET ORAL at 08:43

## 2023-01-18 RX ADMIN — MEXILETINE HYDROCHLORIDE 200 MG: 200 CAPSULE ORAL at 08:43

## 2023-01-18 RX ADMIN — FUROSEMIDE 40 MG: 40 TABLET ORAL at 08:43

## 2023-01-18 RX ADMIN — SPIRONOLACTONE 25 MG: 25 TABLET ORAL at 08:43

## 2023-01-18 RX ADMIN — SODIUM CHLORIDE, PRESERVATIVE FREE 10 ML: 5 INJECTION INTRAVENOUS at 08:46

## 2023-01-18 RX ADMIN — SOTALOL HYDROCHLORIDE 80 MG: 80 TABLET ORAL at 08:43

## 2023-01-18 RX ADMIN — GABAPENTIN 300 MG: 300 CAPSULE ORAL at 08:43

## 2023-01-18 NOTE — PROGRESS NOTES
Writer reviewed discharge instructions with the patient at this time. Stroke education provided as a part of this. Patient states understanding. PIV x2 removed without problems. Patient calling family to notify of discharge for transportation.

## 2023-01-18 NOTE — PROGRESS NOTES
POST-OPERATIVE PROGRESS NOTE    Patient: Sedonia Shone    DATE: 1/17/2023    Surgery: Right carotid endarterectomy with bovine pericardial patch angioplasty     Subjective:   Pt states that he is doing well. Resting comfortably in chair. JOHN drain was accidentally removed shortly after completion of procedure. No bleeding or complications noted. Dressings in place over R lateral neck, fluffs with tegaderm, inferior aspect of dressing lightly saturated with serosanguinous drainage. Pt's pain at the surgical site has improved and pain is well controlled with PRN tylenol. Neck is soft and mildly tender, no evidence of expanding or pulsatile hematoma. Tolerating regular diet and voiding spontaneously. No bowel movements reported. Increasing activity without difficulty. Pt has no complaints at this time. Objective:  Vital signs and Nurse's note reviewed  Post-op vital signs: stable    BP (!) 121/54   Pulse (!) 46   Temp 97.9 °F (36.6 °C) (Oral)   Resp 17   SpO2 96%    Gen:  A&Ox3, NAD  Neck: Surgical dressings in place, lightly saturated with minimal serosanguinous output, neck is soft and minimally tender to palpation, no evidence of expanding or pulsatile hematoma  CV: RRR  Resp: LCTAB, no wheeze or rhonchi  Abd:  Soft, nttp, nd, wounds clean, dry and intact; no erythema induration or exudate  Ext:  Warm, no cyanosis or edema    Assessment:   POD # 0 S/P right carotid endarterectomy with bovine pericardial patch angioplasty  Recovering well post-op     Plan:    Continue current care  Pain control: tylenol   Diet: regular  Increase activity as tolerated. Wound care : maintain surgical dressing in place, monitor for excess bleeding, expanding or pulsatile hematoma.      Electronically signed by Jacobo Mathew DO  on 1/17/2023 at 8:20 PM

## 2023-01-18 NOTE — PROGRESS NOTES
Patient discharged at this time with documented belongings. Patients sister transporting patient home.

## 2023-01-18 NOTE — PROGRESS NOTES
SPIRITUAL CARE DEPARTMENT - [facility]  PROGRESS NOTE    Shift date: 1/18/2023   Shift day: Wednesday   Shift # 1    Room # 1025/1025-01   Name: Jennifer Rod                Christian: Joanshannan Quinteros [address] of [place]: [address] [place], [place]    Referral: Routine Visit    Admit Date & Time: 1/17/2023  6:04 AM    Assessment:  Jennifer Rod is a 76 y.o. male in the hospital because of \"Stenosis of right Carotid artery\". Upon entering the room writer observes pt sitting in chair. Pt appeared to be in good spirits and hopeful about his recovery. He said he expects to go home today. He spoke briefly of praying this morning and of his Sikhism. Intervention:  Writer introduced self and title as  Writer offered space for patient  to express feelings, needs, and concerns and provided a ministry presence.  assured him of prayers. Outcome:  Patient expressed appreciation for visit. Plan:  Chaplains will remain available to offer spiritual and emotional support as needed. Electronically signed by Halima Henderson on 1/18/2023 at 10:56 AM.  [facility]  [phone]        01/18/23 1055   Encounter Summary   Service Provided For: Patient   Referral/Consult From: [facility]   Support System Unknown   Last Encounter  01/18/23   Complexity of Encounter Low   Begin Time 1010   End Time  1020   Total Time Calculated 10 min   Encounter    Type Initial Screen/Assessment   Assessment/Intervention/Outcome   Assessment Calm; Hopeful   Intervention Active listening;Explored/Affirmed feelings, thoughts, concerns;Prayer (assurance of)/[place]   Outcome Engaged in conversation;Expressed feelings, needs, and concerns;Expressed Gratitude;Receptive

## 2023-01-18 NOTE — PROGRESS NOTES
CLINICAL PHARMACY NOTE: MEDS TO BEDS    Total # of Prescriptions Filled: 1   The following medications were delivered to the patient:  Oxy 5     Additional Documentation:

## 2023-01-18 NOTE — CARE COORDINATION
Case Management Assessment  Initial Evaluation    Date/Time of Evaluation: 1/18/2023 8:54 AM  Assessment Completed by: Nubia Hickey RN    If patient is discharged prior to next notation, then this note serves as note for discharge by case management. Patient Name: Irineo Rodriguez                   YOB: 1945  Diagnosis: Stenosis of right carotid artery [I65.21]  S/P carotid endarterectomy [Z98.890]                   Date / Time: 1/17/2023  6:04 AM    Patient Admission Status: Inpatient   Readmission Risk (Low < 19, Mod (19-27), High > 27): Readmission Risk Score: 12.5    Current PCP: Anya Solis MD  PCP verified by CM? (P) Yes Anya Solis MD)    Chart Reviewed: Yes      History Provided by: (P) Patient  Patient Orientation: (P) Alert and Oriented, Person, Place, Situation    Patient Cognition: (P) Alert    Hospitalization in the last 30 days (Readmission):  No    If yes, Readmission Assessment in CM Navigator will be completed.     Advance Directives:      Code Status: Full Code   Patient's Primary Decision Maker is: (P) Legal Next of Kin      Discharge Planning:    Patient lives with: (P) Alone Type of Home: (P) House  Primary Care Giver: (P) Self  Patient Support Systems include: (P) Family Members   Current Financial resources: (P) Medicare  Current community resources: (P) None  Current services prior to admission: (P) Durable Medical Equipment            Current DME: (P) Home Aerosol            Type of Home Care services:  (P) None    ADLS  Prior functional level: (P) Independent in ADLs/IADLs  Current functional level: (P) Independent in ADLs/IADLs    PT AM-PAC:   /24  OT AM-PAC:   /24    Family can provide assistance at DC: (P) Yes  Would you like Case Management to discuss the discharge plan with any other family members/significant others, and if so, who? (P) No  Plans to Return to Present Housing: (P) Yes  Other Identified Issues/Barriers to RETURNING to current housing: none  Potential Assistance needed at discharge: (P) N/A            Potential DME:  none  Patient expects to discharge to: (P) [employer] Insurance for transportation at discharge: (P) Family (Sister to transport)    Financial    Payor: Roslaba Landa / Plan: Hayden Mcdermott / Product Type: *No Product type* /     Does insurance require precert for SNF: N/A    Potential assistance Purchasing Medications: (P) No  Meds-to-Beds request:        [address] [place] [address] [address] [place] [address] [phone] Fausto Rivers [phone]  [address] [place] [id number]  Phone: [phone] Fax: [phone]      Notes:    Factors facilitating achievement of predicted outcomes: Family support, Cooperative, Pleasant, and Sense of humor    Barriers to discharge: Pain    Additional Case Management Notes: Transitional planning: Plans to return home independently. Has a ride. Sister will transport. Address, emergency contact, PCP and insurance confirmed with [employer]ecetent. No additional needs. The Plan for Transition of Care is related to the following treatment goals of Stenosis of right carotid artery [I65.21]  S/P carotid endarterectomy [L49.080]    IF APPLICABLE: The Patient and/or patient representative Dario Morelos and his family were provided with a choice of provider and agrees with the discharge plan. Freedom of choice list with basic dialogue that supports the patient's individualized plan of care/goals and shares the quality data associated with the providers was provided to: (P) Patient   Patient Representative Name:       The Patient and/or Patient Representative Agree with the Discharge Plan?  (P) Yes    Adina Herrera RN  Case Management Department  Ph: [phone] Fax: [phone]

## 2023-01-18 NOTE — PROGRESS NOTES
Division of Vascular Surgery             Progress Note      Name: Heide Alatorre  MRN: [id number]         Overnight Events:      none      Subjective:     Patient was seen examined at bedside, no overnight events. Patient underwent right carotid endarterectomy yesterday without complication. Patient reports he is doing well, pain controlled, voiding, ambulating the halls, tolerating diet. VSS, afebrile. Physical Exam:     Vitals:  BP (!) 121/54   Pulse (!) 42   Temp 98.2 °F (36.8 °C)   Resp 16   SpO2 95%       General appearance - alert, well appearing and in no acute distress  Mental status - oriented to person, place and time with normal affect  Head - normocephalic and atraumatic  Neck - supple, right incision well approximated with sutures without hematoma, discoloration, or drainage. Chest - clear to auscultation, normal effort  Heart - normal rate, regular rhythm  Abdomen - soft, non-tender, non-distended,  Neurological - normal speech, no focal findings or movement disorder noted, cranial nerves II through XII grossly intact  Extremities - peripheral pulses palpable, no pedal edema or calf pain with palpation  Skin - no gross lesions, rashes, or induration noted      Imaging:   No results found. Assessment:     77M s/p right carotid endarterectomy 1/17/23      Plan:     Patient seen and examined, doing well  Diet: Regular  Continue aspirin and Lipitor. Resume all home medications  Ambulating the halls without difficulty  Pain well controlled  Voiding  Dispo: Home today      ----------------------------------------  Lisa Can [facility]: [phone]  C: [phone]

## 2023-01-18 NOTE — DISCHARGE INSTR - DIET
Good nutrition is important when healing from an illness, injury, or surgery. Follow any nutrition recommendations given to you during your hospital stay. If you were given an oral nutrition supplement while in the hospital, continue to take this supplement at home. You can take it with meals, in-between meals, and/or before bedtime. These supplements can be purchased at most local grocery stores, pharmacies, and chain [employer]-stores. If you have any questions about your diet or nutrition, call the hospital and ask for the dietitian. General diet as tolerated.

## 2023-01-18 NOTE — DISCHARGE INSTRUCTIONS
Follow up with Dr. Kimberli Cruz in 2 weeks for suture removal  Resume your home medications including Warfarin  Continue to take Aspirin 81 daily and Lipitor  No bowling until seen at your first post op appointment  Activity: walking is appropriate, no heavy lifting with the right arm more than 15lbs for 2 weeks

## 2023-01-18 NOTE — DISCHARGE INSTR - ACTIVITY
No bowling until seen at your first post op appointment  Activity: walking is appropriate, no heavy lifting with the right arm more than 15lbs for 2 weeks

## 2023-01-18 NOTE — CARE COORDINATION
Discharge 1 [facility] Case Management Department  Written by: Juany Benoit RN    Patient Name: Sharmon Lennox  Attending Provider: Bela Arguelles MD  Admit Date: 2023  6:04 AM  MRN: [id number]  Account: [de-identified]                     : 1945  Discharge Date:       Disposition: home    Juany Benoit RN

## 2023-02-03 ENCOUNTER — OFFICE VISIT (OUTPATIENT)
Dept: VASCULAR SURGERY | Age: 78
End: 2023-02-03

## 2023-02-03 VITALS
HEART RATE: 58 BPM | DIASTOLIC BLOOD PRESSURE: 71 MMHG | HEIGHT: 67 IN | WEIGHT: 190 LBS | SYSTOLIC BLOOD PRESSURE: 155 MMHG | OXYGEN SATURATION: 96 % | RESPIRATION RATE: 16 BRPM | TEMPERATURE: 97 F | BODY MASS INDEX: 29.82 KG/M2

## 2023-02-03 DIAGNOSIS — I65.23 BILATERAL CAROTID ARTERY STENOSIS: Primary | ICD-10-CM

## 2023-02-03 DIAGNOSIS — Z09 POSTOPERATIVE EXAMINATION: ICD-10-CM

## 2023-02-03 PROCEDURE — 99024 POSTOP FOLLOW-UP VISIT: CPT | Performed by: SURGERY

## 2023-02-03 NOTE — PROGRESS NOTES
Postop Progress Note    Subjective    Nat Oneal presents to the office for postop follow up. For his recent right carotid endarterectomy. He is doing well and has no complaints. He is eating well and swallowing well. He has no voice changes. He is moving all 4 extremities without problems. Objective    Vitals:    02/03/23 0815   BP: (!) 155/71   Pulse:    Resp:    Temp:    SpO2:      General: alert, cooperative and no distress  Incision: healing well. There is no seroma. There is no hematoma. There are no signs of infection. The sutures were removed today. He is moving all 4 extremities and has no deviation of the tongue. He is ambulating normally. Assessment  Doing well postoperatively. Plan  We will see him back to the office in 3 months with carotid duplex to make certain that the repair is doing well and to investigate the other side. He understands and agrees and we will see him at that time.     Electronically signed by Faisal Ibrahim MD on 2/3/2023 at 8:59 AM

## 2023-02-06 ENCOUNTER — HOSPITAL ENCOUNTER (OUTPATIENT)
Age: 78
Discharge: HOME | End: 2023-02-06
Payer: MEDICARE

## 2023-02-06 LAB
INR PPP: 2.8
PROTHROMBIN TIME: 27.1 SEC (ref 9.1–12.3)

## 2023-02-06 PROCEDURE — 36415 COLL VENOUS BLD VENIPUNCTURE: CPT

## 2023-02-06 PROCEDURE — 85610 PROTHROMBIN TIME: CPT

## 2023-03-23 ENCOUNTER — HOSPITAL ENCOUNTER (OUTPATIENT)
Age: 78
Discharge: HOME | End: 2023-03-23
Payer: MEDICARE

## 2023-03-23 LAB
INR PPP: 2
PROTHROMBIN TIME: 22.4 SEC (ref 11.7–14.9)

## 2023-03-23 PROCEDURE — 85610 PROTHROMBIN TIME: CPT

## 2023-05-03 ENCOUNTER — HOSPITAL ENCOUNTER (OUTPATIENT)
Dept: VASCULAR LAB | Age: 78
Discharge: HOME | End: 2023-05-03
Payer: MEDICARE

## 2023-05-03 DIAGNOSIS — I65.23 BILATERAL CAROTID ARTERY STENOSIS: ICD-10-CM

## 2023-05-03 PROCEDURE — 93880 EXTRACRANIAL BILAT STUDY: CPT

## 2023-05-05 ENCOUNTER — OFFICE VISIT (OUTPATIENT)
Dept: VASCULAR SURGERY | Age: 78
End: 2023-05-05

## 2023-05-05 VITALS
OXYGEN SATURATION: 97 % | BODY MASS INDEX: 29.03 KG/M2 | TEMPERATURE: 98 F | SYSTOLIC BLOOD PRESSURE: 159 MMHG | HEIGHT: 67 IN | WEIGHT: 185 LBS | HEART RATE: 56 BPM | DIASTOLIC BLOOD PRESSURE: 73 MMHG | RESPIRATION RATE: 17 BRPM

## 2023-05-05 DIAGNOSIS — I65.23 BILATERAL CAROTID ARTERY STENOSIS: Primary | ICD-10-CM

## 2023-05-05 ASSESSMENT — ENCOUNTER SYMPTOMS
ABDOMINAL PAIN: 0
COLOR CHANGE: 0
VOMITING: 0
COUGH: 0
EYE PAIN: 0
VOICE CHANGE: 0
ABDOMINAL DISTENTION: 0
SHORTNESS OF BREATH: 0
TROUBLE SWALLOWING: 0
CHEST TIGHTNESS: 0

## 2023-05-05 NOTE — PROGRESS NOTES
[facility]  [address] [facility]  [address] [place] [address]  Dept: [phone]     Patient: Miah Coombs  : 1945  MRN: [id number]  DOS: 2023            HPI:  Miah Coombs is a [de-identified] y.o. male who returns to the office regarding lateral carotid endarterectomies. The last which was the right which was done on . The 1 prior was done in 2022. His carotid duplex looks quite good with less than 50% stenosis bilaterally. He has not had symptoms of stroke, TIA or amaurosis fugax. 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 He is on the appropriate medications including aspirin and atorvastatin. He takes Coumadin for cardiac reasons as well. Review of Systems   Constitutional:  Negative for activity change, appetite change, fever and unexpected weight change. HENT:  Negative for congestion, trouble swallowing and voice change. Eyes:  Negative for pain and visual disturbance. Respiratory:  Negative for cough, chest tightness and shortness of breath. Cardiovascular:  Negative for chest pain and palpitations. Gastrointestinal:  Negative for abdominal distention, abdominal pain and vomiting. Endocrine: Negative for cold intolerance and heat intolerance. Genitourinary:  Negative for dysuria, flank pain and hematuria. Musculoskeletal:  Negative for joint swelling and neck pain. Skin:  Negative for color change and rash. Allergic/Immunologic: Negative for immunocompromised state. Neurological:  Negative for syncope, speech difficulty, weakness, numbness and headaches. Hematological:  Negative for adenopathy. Psychiatric/Behavioral:  Negative for agitation and confusion.       Vitals:    23 0812 23 0814   BP: (!) 155/73 (!) 159/73   Site: Right Upper Arm Right Upper Arm   Position: Sitting Sitting   Cuff Size: Medium Adult Medium Adult   Pulse: 56    Resp: 17

## 2023-11-02 ENCOUNTER — APPOINTMENT (OUTPATIENT)
Dept: GENERAL RADIOLOGY | Age: 78
End: 2023-11-02
Payer: MEDICARE

## 2023-11-02 ENCOUNTER — HOSPITAL ENCOUNTER (EMERGENCY)
Age: 78
Discharge: HOME | End: 2023-11-02
Attending: EMERGENCY MEDICINE
Payer: MEDICARE

## 2023-11-02 VITALS
HEART RATE: 47 BPM | TEMPERATURE: 97.6 F | RESPIRATION RATE: 13 BRPM | OXYGEN SATURATION: 93 % | BODY MASS INDEX: 27.41 KG/M2 | WEIGHT: 175 LBS | DIASTOLIC BLOOD PRESSURE: 52 MMHG | SYSTOLIC BLOOD PRESSURE: 91 MMHG

## 2023-11-02 DIAGNOSIS — R07.9 CHEST PAIN, UNSPECIFIED TYPE: Primary | ICD-10-CM

## 2023-11-02 LAB
ANION GAP SERPL CALCULATED.3IONS-SCNC: 10 MMOL/L (ref 9–17)
BASOPHILS # BLD: 0.11 K/UL (ref 0–0.2)
BASOPHILS NFR BLD: 1 % (ref 0–2)
BNP SERPL-MCNC: 343 PG/ML
BUN SERPL-MCNC: 30 MG/DL (ref 8–23)
CALCIUM SERPL-MCNC: 9 MG/DL (ref 8.6–10.4)
CHLORIDE SERPL-SCNC: 105 MMOL/L (ref 98–107)
CO2 SERPL-SCNC: 22 MMOL/L (ref 20–31)
CREAT SERPL-MCNC: 1.2 MG/DL (ref 0.7–1.2)
EOSINOPHIL # BLD: 0.71 K/UL (ref 0–0.44)
EOSINOPHILS RELATIVE PERCENT: 9 % (ref 1–4)
ERYTHROCYTE [DISTWIDTH] IN BLOOD BY AUTOMATED COUNT: 13.6 % (ref 11.8–14.4)
GFR SERPL CREATININE-BSD FRML MDRD: >60 ML/MIN/1.73M2
GLUCOSE SERPL-MCNC: 122 MG/DL (ref 70–99)
HCT VFR BLD AUTO: 43.6 % (ref 40.7–50.3)
HGB BLD-MCNC: 14.4 G/DL (ref 13–17)
IMM GRANULOCYTES # BLD AUTO: <0.03 K/UL (ref 0–0.3)
IMM GRANULOCYTES NFR BLD: 0 %
INR PPP: 2.2
LYMPHOCYTES NFR BLD: 3.31 K/UL (ref 1.1–3.7)
LYMPHOCYTES RELATIVE PERCENT: 43 % (ref 24–43)
MCH RBC QN AUTO: 30.9 PG (ref 25.2–33.5)
MCHC RBC AUTO-ENTMCNC: 33 G/DL (ref 28.4–34.8)
MCV RBC AUTO: 93.6 FL (ref 82.6–102.9)
MONOCYTES NFR BLD: 0.64 K/UL (ref 0.1–1.2)
MONOCYTES NFR BLD: 8 % (ref 3–12)
NEUTROPHILS NFR BLD: 39 % (ref 36–65)
NEUTS SEG NFR BLD: 3.02 K/UL (ref 1.5–8.1)
NRBC BLD-RTO: 0 PER 100 WBC
PLATELET # BLD AUTO: 193 K/UL (ref 138–453)
PMV BLD AUTO: 12.1 FL (ref 8.1–13.5)
POTASSIUM SERPL-SCNC: 4.6 MMOL/L (ref 3.7–5.3)
PROTHROMBIN TIME: 23.9 SEC (ref 11.7–14.9)
RBC # BLD AUTO: 4.66 M/UL (ref 4.21–5.77)
SODIUM SERPL-SCNC: 137 MMOL/L (ref 135–144)
TROPONIN I SERPL HS-MCNC: 12 NG/L (ref 0–22)
TROPONIN I SERPL HS-MCNC: 12 NG/L (ref 0–22)
WBC OTHER # BLD: 7.8 K/UL (ref 3.5–11.3)

## 2023-11-02 PROCEDURE — 73030 X-RAY EXAM OF SHOULDER: CPT

## 2023-11-02 PROCEDURE — 99285 EMERGENCY DEPT VISIT HI MDM: CPT | Performed by: EMERGENCY MEDICINE

## 2023-11-02 PROCEDURE — 71045 X-RAY EXAM CHEST 1 VIEW: CPT

## 2023-11-02 PROCEDURE — 85610 PROTHROMBIN TIME: CPT

## 2023-11-02 PROCEDURE — 85025 COMPLETE CBC W/AUTO DIFF WBC: CPT

## 2023-11-02 PROCEDURE — 80048 BASIC METABOLIC PNL TOTAL CA: CPT

## 2023-11-02 PROCEDURE — 84484 ASSAY OF TROPONIN QUANT: CPT

## 2023-11-02 PROCEDURE — 93005 ELECTROCARDIOGRAM TRACING: CPT | Performed by: STUDENT IN AN ORGANIZED HEALTH CARE EDUCATION/TRAINING PROGRAM

## 2023-11-02 PROCEDURE — 83880 ASSAY OF NATRIURETIC PEPTIDE: CPT

## 2023-11-02 ASSESSMENT — ENCOUNTER SYMPTOMS
NAUSEA: 0
DIARRHEA: 0
VOMITING: 0
SHORTNESS OF BREATH: 0
CONSTIPATION: 0
ABDOMINAL DISTENTION: 0
ABDOMINAL PAIN: 0
COUGH: 0

## 2023-11-02 ASSESSMENT — PAIN - FUNCTIONAL ASSESSMENT: PAIN_FUNCTIONAL_ASSESSMENT: 0-10

## 2023-11-02 ASSESSMENT — HEART SCORE: ECG: 1

## 2023-11-02 ASSESSMENT — PAIN SCALES - GENERAL: PAINLEVEL_OUTOF10: 2

## 2023-11-02 NOTE — ED NOTES
Pt presents to ED with left shoulder pain and chest tightness. Pt has Hx of CABG. Pt denies injuries, states the pain started this morning. Pt is alert and oriented.       Hank Ruelas RN  11/02/23 1024

## 2023-11-02 NOTE — ED PROVIDER NOTES
[address] [place] [address] ED  Emergency Department Encounter  Emergency Medicine Resident     Pt Name:Erik Sales  MRN: [id number]  [facility] 1945  Date of evaluation: 11/2/23  PCP:  Alejandro Morgan MD  Note Started: 4:46 PM EDT      CHIEF COMPLAINT       Chief Complaint   Patient presents with    Chest Pain       HISTORY OF PRESENT ILLNESS  (Location/Symptom, Timing/Onset, Context/Setting, Quality, Duration, Modifying Factors, Severity.)      Jimena Scott is a 78 y.o. male who returns to the emergency department with left-sided chest tightness, also reporting left-sided shoulder pain. Patient was bowling yesterday when the shoulder pain started, states that he pulls with his right hand on his left. Also was doing some carpentry work on cabinets. Unsure of any strain he may have done. But denies any falls or injury. Patient also has a history of CABG, has a defibrillator in place and is scheduled to have it changed out on 11/8. Denies any difficulty breathing, nausea, vomiting or diaphoresis. PAST MEDICAL / SURGICAL / SOCIAL / FAMILY HISTORY      has a past medical history of Accident, Arthritis, Bronchitis, CAD (coronary artery disease), Cancer ([address]), Cardiac arrest ([address]), Good exercise tolerance, Hx of blood clots, Hyperkalemia, Hypertension, essential, Ischemic cardiomyopathy, Left ventricular apical thrombus, MI (myocardial infarction) ([address]), Pseudoaneurysm (HCC) - right groin, Sinus drainage, Snores, Systolic dysfunction with acute on chronic heart failure (HCC) - Efx 30%, and Wellness examination. has a past surgical history that includes Coronary artery bypass graft (11/19/2013); Vasectomy; Tonsillectomy; Colonoscopy; Cardiac defibrillator placement (2013); Cardiac catheterization (12/08/2016); Cardiac catheterization (11/18/2013); other surgical history (Right, 07/19/2019); Abdomen surgery (Right, 07/19/2019); Cardiac catheterization (04/07/2022);  Carotid endarterectomy (Left, CABG, also complaining of left-sided shoulder pain that appears to be musculoskeletal in nature. Will obtain x-ray to rule out fracture, dislocation versus arthritis. Concern for muscle strain versus rotator cuff injury as well. Due to patient's cardiac history patient is at increased risk, did discuss possible placement in the observation unit for cardiology evaluation with patient. Cardiac work-up pending to evaluate for ACS, arrhythmia. Amount and/or Complexity of Data Reviewed  Labs: ordered. Radiology: ordered. ECG/medicine tests: ordered. EKG      All EKG's are interpreted by the Emergency Department Physician who either signs or Co-signs this chart in the absence of a cardiologist.    Heart Score:  History: 1  EC  Patient Age: 1  *Risk factors for Atherosclerotic disease: Hypertension; Hypercholesterolemia; Coronary Artery Disease  Risk Factors: 2  Troponin: 1  Heart Score Total: 7       EMERGENCY DEPARTMENT COURSE:      ED Course as of 23 1646   Thu 2023   0817 Chest x-ray shows mild atelectasis, shoulder x-ray shows degenerative changes as well as stable Scapular sclerosis [SS]   4057 Discussed results with patient, he states he would like to go home and can follow-up with his cardiologist and PCP. Did discuss risks and benefits and outpatient elevated risk with a heart score of 7, he does have decision-making capacity at this time and understands these risks. [SS]      ED Course User Index  [SS] Tonia Vickers MD       PROCEDURES:  None     CONSULTS:  None    CRITICAL CARE:  There was significant risk of life threatening deterioration of patient's condition requiring my direct management. Please MDM for Critical care time. FINAL IMPRESSION      1.  Chest pain, unspecified type          DISPOSITION / PLAN     DISPOSITION Decision To Discharge 2023 09:21:28 AM      PATIENT REFERRED TO:  Megan Morales MD  [address] [place] [address]

## 2023-11-02 NOTE — DISCHARGE INSTRUCTIONS
Please follow-up with your cardiologist and PCP, call their offices today. We did offer you admission for continued observation and cardiology evaluation due to your high risk. Your heart score was 7, you do have decision-making capacity and made the decision to follow-up outpatient. If any of your symptoms return or change please return to the emergency department immediately. PLEASE RETURN TO THE EMERGENCY DEPARTMENT IMMEDIATELY for worsening symptoms of increasing pain, shortness of breath, feeling of your heart fluttering or racing, swelling to your feet, unable to lay flat, or if you develop any concerning symptoms such as: high fever not relieved by acetaminophen (Tylenol) and/or ibuprofen (Motrin / Advil), chills, persistent nausea and/or vomiting, loss of consciousness, numbness, weakness or tingling in the arms or legs or change in color of the extremities, changes in mental status, persistent headache, blurry vision, loss of bladder / bowel control, unable to follow up with your physician, or other any other care or concern.

## 2023-11-04 LAB
EKG ATRIAL RATE: 64 BPM
EKG P AXIS: 83 DEGREES
EKG P-R INTERVAL: 270 MS
EKG Q-T INTERVAL: 466 MS
EKG QRS DURATION: 158 MS
EKG QTC CALCULATION (BAZETT): 480 MS
EKG R AXIS: -62 DEGREES
EKG T AXIS: 63 DEGREES
EKG VENTRICULAR RATE: 64 BPM

## 2023-11-04 PROCEDURE — 93010 ELECTROCARDIOGRAM REPORT: CPT | Performed by: INTERNAL MEDICINE

## 2023-11-08 ENCOUNTER — HOSPITAL ENCOUNTER (OUTPATIENT)
Age: 78
Setting detail: OUTPATIENT SURGERY
Discharge: HOME | End: 2023-11-08
Attending: INTERNAL MEDICINE | Admitting: INTERNAL MEDICINE
Payer: MEDICARE

## 2023-11-08 VITALS
HEART RATE: 52 BPM | WEIGHT: 175 LBS | DIASTOLIC BLOOD PRESSURE: 52 MMHG | TEMPERATURE: 98.2 F | RESPIRATION RATE: 15 BRPM | SYSTOLIC BLOOD PRESSURE: 119 MMHG | HEIGHT: 67 IN | OXYGEN SATURATION: 97 % | BODY MASS INDEX: 27.47 KG/M2

## 2023-11-08 DIAGNOSIS — Z45.010 PACEMAKER BATTERY DEPLETION: ICD-10-CM

## 2023-11-08 LAB
BUN BLD-MCNC: 29 MG/DL (ref 8–26)
CHLORIDE BLD-SCNC: 109 MMOL/L (ref 98–107)
ECHO BSA: 1.94 M2
EGFR, POC: >60 ML/MIN/1.73M2
GLUCOSE BLD-MCNC: 106 MG/DL (ref 74–100)
HCT VFR BLD AUTO: 45 % (ref 41–53)
PLATELET # BLD AUTO: 184 K/UL (ref 138–453)
POC CREATININE: 1.1 MG/DL (ref 0.51–1.19)
POC HEMOGLOBIN (CALC): 15.5 G/DL (ref 13.5–17.5)
POC INR: 1
POTASSIUM BLD-SCNC: 4.7 MMOL/L (ref 3.5–4.5)
PROTHROMBIN TIME, POC: 12.3 SEC (ref 10.4–14.2)
SARS-COV-2 RDRP RESP QL NAA+PROBE: NOT DETECTED
SODIUM BLD-SCNC: 143 MMOL/L (ref 138–146)
SPECIMEN DESCRIPTION: NORMAL

## 2023-11-08 PROCEDURE — 82947 ASSAY GLUCOSE BLOOD QUANT: CPT

## 2023-11-08 PROCEDURE — 99153 MOD SED SAME PHYS/QHP EA: CPT | Performed by: INTERNAL MEDICINE

## 2023-11-08 PROCEDURE — 85014 HEMATOCRIT: CPT

## 2023-11-08 PROCEDURE — 2580000003 HC RX 258: Performed by: INTERNAL MEDICINE

## 2023-11-08 PROCEDURE — 33213 INSERT PULSE GEN DUAL LEADS: CPT | Performed by: INTERNAL MEDICINE

## 2023-11-08 PROCEDURE — 99152 MOD SED SAME PHYS/QHP 5/>YRS: CPT | Performed by: INTERNAL MEDICINE

## 2023-11-08 PROCEDURE — 82435 ASSAY OF BLOOD CHLORIDE: CPT

## 2023-11-08 PROCEDURE — 6360000002 HC RX W HCPCS: Performed by: INTERNAL MEDICINE

## 2023-11-08 PROCEDURE — 84132 ASSAY OF SERUM POTASSIUM: CPT

## 2023-11-08 PROCEDURE — 82565 ASSAY OF CREATININE: CPT

## 2023-11-08 PROCEDURE — 85049 AUTOMATED PLATELET COUNT: CPT

## 2023-11-08 PROCEDURE — 33249 INSJ/RPLCMT DEFIB W/LEAD(S): CPT

## 2023-11-08 PROCEDURE — 84295 ASSAY OF SERUM SODIUM: CPT

## 2023-11-08 PROCEDURE — 85610 PROTHROMBIN TIME: CPT

## 2023-11-08 PROCEDURE — 87635 SARS-COV-2 COVID-19 AMP PRB: CPT

## 2023-11-08 PROCEDURE — 7100000011 HC PHASE II RECOVERY - ADDTL 15 MIN: Performed by: INTERNAL MEDICINE

## 2023-11-08 PROCEDURE — C1722 AICD, SINGLE CHAMBER: HCPCS | Performed by: INTERNAL MEDICINE

## 2023-11-08 PROCEDURE — 2500000003 HC RX 250 WO HCPCS: Performed by: INTERNAL MEDICINE

## 2023-11-08 PROCEDURE — 93005 ELECTROCARDIOGRAM TRACING: CPT | Performed by: INTERNAL MEDICINE

## 2023-11-08 PROCEDURE — C1889 IMPLANT/INSERT DEVICE, NOC: HCPCS | Performed by: INTERNAL MEDICINE

## 2023-11-08 PROCEDURE — 7100000010 HC PHASE II RECOVERY - FIRST 15 MIN: Performed by: INTERNAL MEDICINE

## 2023-11-08 PROCEDURE — 84520 ASSAY OF UREA NITROGEN: CPT

## 2023-11-08 DEVICE — ENVELOPE CMRM6133 ABSORB LRG MR
Type: IMPLANTABLE DEVICE | Status: FUNCTIONAL
Brand: TYRX™

## 2023-11-08 DEVICE — ICD DVPA2D1 COBALT XT VR MRI DF1 USA
Type: IMPLANTABLE DEVICE | Status: FUNCTIONAL
Brand: COBALT™ XT VR MRI SURESCAN™

## 2023-11-08 RX ORDER — SODIUM CHLORIDE 9 MG/ML
INJECTION, SOLUTION INTRAVENOUS CONTINUOUS
Status: DISCONTINUED | OUTPATIENT
Start: 2023-11-08 | End: 2023-11-08

## 2023-11-08 RX ORDER — SODIUM CHLORIDE 0.9 % (FLUSH) 0.9 %
5-40 SYRINGE (ML) INJECTION PRN
Status: DISCONTINUED | OUTPATIENT
Start: 2023-11-08 | End: 2023-11-08 | Stop reason: HOSPADM

## 2023-11-08 RX ORDER — SODIUM CHLORIDE 0.9 % (FLUSH) 0.9 %
5-40 SYRINGE (ML) INJECTION EVERY 12 HOURS SCHEDULED
Status: DISCONTINUED | OUTPATIENT
Start: 2023-11-08 | End: 2023-11-08 | Stop reason: HOSPADM

## 2023-11-08 RX ORDER — ACETAMINOPHEN 325 MG/1
650 TABLET ORAL EVERY 4 HOURS PRN
Status: DISCONTINUED | OUTPATIENT
Start: 2023-11-08 | End: 2023-11-08 | Stop reason: HOSPADM

## 2023-11-08 RX ORDER — LIDOCAINE HYDROCHLORIDE AND EPINEPHRINE 10; 10 MG/ML; UG/ML
INJECTION, SOLUTION INFILTRATION; PERINEURAL PRN
Status: DISCONTINUED | OUTPATIENT
Start: 2023-11-08 | End: 2023-11-08 | Stop reason: HOSPADM

## 2023-11-08 RX ORDER — MIDAZOLAM HYDROCHLORIDE 1 MG/ML
INJECTION INTRAMUSCULAR; INTRAVENOUS PRN
Status: DISCONTINUED | OUTPATIENT
Start: 2023-11-08 | End: 2023-11-08 | Stop reason: HOSPADM

## 2023-11-08 RX ORDER — FENTANYL CITRATE 50 UG/ML
INJECTION, SOLUTION INTRAMUSCULAR; INTRAVENOUS PRN
Status: DISCONTINUED | OUTPATIENT
Start: 2023-11-08 | End: 2023-11-08 | Stop reason: HOSPADM

## 2023-11-08 RX ORDER — SODIUM CHLORIDE 9 MG/ML
INJECTION, SOLUTION INTRAVENOUS PRN
Status: DISCONTINUED | OUTPATIENT
Start: 2023-11-08 | End: 2023-11-08 | Stop reason: HOSPADM

## 2023-11-08 RX ADMIN — SODIUM CHLORIDE: 9 INJECTION, SOLUTION INTRAVENOUS at 08:24

## 2023-11-08 RX ADMIN — VANCOMYCIN HYDROCHLORIDE 1000 MG: 1 INJECTION, POWDER, LYOPHILIZED, FOR SOLUTION INTRAVENOUS at 09:56

## 2023-11-08 NOTE — H&P
[facility]  HISTORY AND PHYSICAL             Date:   11/8/2023  Patient name: Viky Mackey  Date of admission:  11/8/2023  7:21 AM  MRN:   [id number]  YOB: 1945      Reason for Admission:  ICD battery depletion    CHIEF COMPLAINT:  Stable ischemic cardiomyopathy; h/o VT     History Obtained From:  Patient and medical record    HISTORY OF PRESENT ILLNESS:      The patient is a 78 y.o gentleman with h/o CAD, CM, VT and ICD in-situ, here for ICD revision. His ICD was evaluated on recent office visit from 9/21 and was found to be functioning normally, with no recent arrhythmia, but within two months of reaching the elective replacement indicator. He has been doing well, remaining active with work and having no problems of any fatigue, CP, SOB or any exertional intolerance. He has had no palpitation, lightheadedness, syncope or any ICD shocks, and has had no problems with his ICD site. His BP has remained under good control, and he has had no edema or any weight gain. Medical History    1. VF arrest during exercise on 11/18/2013. Subsequent demonstration of 3-vessel CAD with serial mid-LAD lesion at 80% and 70%, totally occluded mid-circumflex, evidence of left-to-left collaterals and subtotal proximal RCA stenosis. 2. Urgent bypass surgery on 11/20/2013 at [facility] with LIMA-LAD, SVG-OM and SVG-PDA. 3. LVEF acutely 30%, increasing to 45% postoperatively with evidence of small inferior segment of hypokinesis. 4. Post bypass, EP study on 11/25/2013 demonstrated persistently inducible monomorphic VT (cycle length 215 ms) with evidence of underlying sinus bradycardia and prolonged HV interval.     5. Subsequent implantation of Medtronic Evera XT VR defibrillator on 11/25/2013 at [facility]. The patient has a Medtronic [id number] lead. 6. Hypertension.      7. Ventricular tachycardia with hospitalization in 05/2014 for clinical VT, cycle length near 300 ms; sotalol 6/10/18 12/25/18  Jade Le MD   Respiratory Therapy Supplies (NEBULIZER COMPRESSOR) KIT 1 kit by Does not apply route once for 1 dose 6/10/18 6/10/18  Jade Le MD   spironolactone (ALDACTONE) 25 MG tablet Take 1 tablet by mouth daily 12/13/16   Nina Rosenbaum, DO   albuterol (VENTOLIN HFA) 108 (90 BASE) MCG/ACT inhaler Inhale 2 puffs into the lungs every 6 hours as needed for Wheezing. 10/10/14   Yung Maxwell, DO   sotalol (BETAPACE) 80 MG tablet Take 1 tablet by mouth 2 times daily. 5/19/14   Erik Lofton, DO   aspirin 81 MG chewable tablet Take 1 tablet by mouth daily. Patient taking differently: Take 1 tablet by mouth daily Stop 7 days prior to sx per Dr. Klaus Draper 11/26/13   Renata AUSTIN PA-C       Allergies:  Dust mite mixed allergen ext [mite (d. farinae)] and Pcn [penicillins]    Social History:   reports that he has never smoked. He has quit using smokeless tobacco.  His smokeless tobacco use included chew. He reports current alcohol use. He reports that he does not use drugs. Family History:   Positive for early CAD    REVIEW OF SYSTEMS:    Constitutional: there has been no unanticipated weight loss. There's been No change in energy level, No change in activity level. Eyes: No visual changes or diplopia. No scleral icterus. ENT: No Headaches, hearing loss or vertigo. No mouth sores or sore throat. Cardiovascular: No problem  Respiratory: No previous reported problems  Gastrointestinal: No abdominal pain, appetite loss, blood in stools. No change in bowel or bladder habits. Genitourinary: No dysuria, trouble voiding, or hematuria. Musculoskeletal:  No gait disturbance, No weakness or joint complaints. Integumentary: No rash or pruritis. Neurological: No headache, diplopia, change in muscle strength, numbness or tingling. No change in gait, balance, coordination, mood, affect, memory, mentation, behavior. Psychiatric: No anxiety, or depression.   Endocrine: No

## 2023-11-08 NOTE — BRIEF OP NOTE
Brief Postoperative Note      Patient: Benoit Malin  YOB: 1945  MRN: [id number]      Date of Procedure: 11/8/2023      Pre-Op Diagnosis Codes:     * Pacemaker battery depletion [Z45.010]    Post-Op Diagnosis:   Same       Procedure(s):  lyons / icd replacement     1)  ICD revision, single-chamber   2) Intraoperative lead testing  3) Consious sedation    Surgeon(s): Barry Foote MD    Assistant:  None    Anesthesia: IV Sedation; IV Versed and Fentanyl    Estimated Blood Loss (mL):  10 mL    Complications:  None    Specimens: None    Implants:  Implant Name Type Inv. Item Serial No.  Lot No. LRB No. Used Action   ICD [place] XT VR MRI DF1 SGL CHAMBER - [id number] Cardiac ICD/CRT-D ICD COBALT XT VR MRI DF1 SGL CHAMBER [id number] [facility] CARDIAC [facility]  N/A 1 Implanted   ENVELOPE PACEMKR L W2.9XL3.3IN ABSRB ANTIBACT TYRX - [id number]  ENVELOPE PACEMKR L W2.9XL3.3IN ABSRB ANTIBACT TYRX  [facility] CARDIAC [facility] [id number] N/A 1 Implanted   ENVELOPE PACEMKR L W2.9XL3.3IN ABSRB ANTIBACT TYRX - [id number]  ENVELOPE PACEMKR L W2.9XL3.3IN ABSRB ANTIBACT TYRX  [facility] CARDIAC [facility] [id number] N/A 1 Implanted         Drains:  None    FINDINGS:  The Medtronic [id number] lead appeared in excellent condition with all impedances and thresholds stable and WNL. The new Medtronic [place] XT VR MRI ICD is programmed in two-zone tachyarrhythmia detection with fast VT  msec and VF TDI of 290 msec, with burst and ramp ATP programmed as initial FVT therapies. All shock energies are programmed to 40 J in reversed polarity. PLAN:  Bedrest for 3 hours then discharge later today, with f/u in one week at [address] [place] [address]. Pt will be instructed to resume warfarin tonight and to resume ASA in 48 hrs.       Electronically signed by Barry Foote MD on 11/8/2023 at 11:30 AM

## 2023-11-08 NOTE — PROGRESS NOTES
Patient admitted, consent signed and questions answered. Patient ready for procedure. Call light to reach with side rails up 2 of 2. Chest hair clipped with writer and Silvina ALMANZA present. No family at bedside with patient. History and physical complete.

## 2023-11-08 NOTE — PROGRESS NOTES
Ambulated to bathroom and in halls. Gait steady. .     All discharge instructions reviewed, questions answered, paper signed and given copy. Patient discharged per [facility] with SISTER and belongings.

## 2023-11-08 NOTE — PROGRESS NOTES
Received post ICD replacement procedure to [facility] room 5. Assessment obtained. Restrictions reviewed with patient. Post procedure pathway initiated. Right chest site soft , dressing dry and intact. No hematoma noted.  \

## 2023-11-08 NOTE — DISCHARGE INSTRUCTIONS
DISCHARGE INSTRUCTIONS FOR ICD/PACEMAKER REPLACEMENTS/DR WHEELER        -REMOVE DRESSING IN 24 HOURS AND LEAVE CLEAN, DRY AND OPEN TO AIR     -NO SHOWERING OR BATHS FOR 7 DAYS    -HOLD ASPRIN FOR NEXT 48 HOURS    -NO DRIVING FOR 24 HOURS    -NO HEAVY LIFTING WITH AFFECTED ARM FOR 2 - 3 DAYS     -AVOID ACTIVITIES THAT MAY RESULT IN HIGH IMPACT OR STRESS AT INCISIONAL SITE     -AVOID ANYTHING THAT WILL RUB AGAINST THE INCISION     -WATCH FOR SIGNS OF INFECTION WHICH INCLUDE: REDNESS / Tesha Adjutant / DRAINAGE FROM INCISION / INCREASE SWELLING / TEMPERATURE BY MOUTH 101 OR GREATER     -IF YOU ARE ON BLOOD THINNER CHECK WITH YOUR DOCTOR WHEN TO RESUME MEDICATION     -CARRY DEVICE ID AND HOME MEDICATION LIST WITH YOU AT ALL TIMES. -ANY QUESTIONS OR CONCERNS PLEASE CONTACT YOUR DOCTOR AND/OR SEEK HELP                 SEDATION / ANALGESIA INFORMATION / HOME GOING ADVICE  You have received the sedation/analgesia medication during your visit    Sedation/analgesia is used during short medical procedures under controlled supervision. The medication will produce a strong relaxation. You will be able to hear, speak and follow instructions, but your memory and alertness will be decreased. You will be able to swallow and breathe on your own. During sedation/analgesia your blood pressure, heart and breathing will be watched closely. After the procedure, you may not remember what was said or done. You may have the following effects from the medication. \" Drowsiness, dizziness, sleepiness or confusion. \" Difficulty remembering or delayed reaction times. \" Loss of fine muscle control or difficulty with your balance especially while walking. \" Difficulty focusing or blurred vision. You may not be aware of slight changes in your behavior and/or your reaction time because of the medication used during the procedure. Therefore you should follow these instructions. \" Have someone responsible help you with your care.   \" Do not drive for 24 hours. \" Do not operate equipment for 24 hours (lawnmowers, power tools, kitchen accessories, stove). \" Do not drink any alcoholic beverages for a minimum of 24 hours. \" Do not make important personal, legal or business decisions for 24 hours. \" You may experience dizziness or lightheadedness. Move slowly and carefully, do not make sudden position changes. \" Drink extra amounts of fluids today. \" Increase your diet as tolerated (unless you have received specific instructions from your doctor). \" If you feel nauseated, continue with liquids until the nausea is gone. \" Notify your physician if you have not urinated within 8 hours after the procedure. \" Resume your medications unless otherwise instructed. Learning About ICD Shocks  What are ICDs and ICD shocks? An implantable cardioverter-defibrillator (ICD) is a device placed under your skin and connected to your heart with wires. It is always checking your heart. If it detects a life-threatening rapid heart rhythm, it tries to slow the rhythm to get it back to normal. If the dangerous rhythm does not stop, the ICD sends an electric shock to the heart to restore a normal rhythm. The device then goes back to its watchful mode. The idea of living with an ICD and getting shocked worries some people. The shock can be uncomfortable. It may feel like you are being kicked in the chest. For many people, getting a shock can cause anxiety and depression. It's normal to be worried about living with an ICD. After all, you don't know when a shock might occur, and a shock could be a reminder that your heart is not as healthy as it could be. But an ICD is an important part of your treatment. It can save your life. If you take a few simple steps, you can feel better about having an ICD. How can you get over your fears about the ICD? Know your ICD treatment  Learn how the ICD works, what it does, and how it keeps you safe.  This can help reduce any

## 2023-11-10 LAB
EKG ATRIAL RATE: 50 BPM
EKG P AXIS: 89 DEGREES
EKG P-R INTERVAL: 308 MS
EKG Q-T INTERVAL: 488 MS
EKG QRS DURATION: 154 MS
EKG QTC CALCULATION (BAZETT): 444 MS
EKG R AXIS: -63 DEGREES
EKG T AXIS: 12 DEGREES
EKG VENTRICULAR RATE: 50 BPM

## 2023-11-10 PROCEDURE — 93010 ELECTROCARDIOGRAM REPORT: CPT | Performed by: INTERNAL MEDICINE

## 2023-11-15 LAB — ECHO BSA: 1.94 M2

## 2023-11-15 NOTE — PROCEDURES
[address] [place] [address]                            CARDIAC CATHETERIZATION    PATIENT NAME: Atif Bravo                      :        1945  MED REC NO:   [id number]                             ROOM:       [facility]  ACCOUNT NO:   [de-identified]                           ADMIT DATE: 2023  PROVIDER:     Елена Zelaya MD    DATE OF PROCEDURE:  2023    PROCEDURES:  1. ICD revision, single chamber. 2.  Conscious sedation. 3.  Intraoperative lead testing. PREOPERATIVE DIAGNOSES:  1. Ischemic cardiomyopathy. 2.  Ventricular tachycardia. 3.  ICD battery depletion. POSTOPERATIVE DIAGNOSES:  1. Ischemic cardiomyopathy. 2.  ICD in situ. SURGEON:  Елена Zelaya MD    PULSE GENERATOR:  1. The new pulse generator is Medtronic [place] XT VR MRI, serial number  [id number], implanted on 2023.  2.  The explanted ICD was Medtronic Evera XT DR, serial number  [id number], implanted on 2013 and explanted on 2023. LEADS:  1. The right ventricular lead is Medtronic [id number], serial number  [id number], implanted on 2013. COMPLICATIONS:  None. EBL:  10 mL. PROCEDURE:  After informed consent was obtained, the patient was brought  to the cardiac catheterization laboratory in the fasting, unsedated  state. He was placed on the fluoroscopy table and was prepped and  draped in sterile fashion for a left pectoral device revision. Conscious sedation was administered in the form of IV Versed and  fentanyl and he remained stable during the case with O2 saturations  never falling below 90%. A total of 20 mL of 1% aqueous lidocaine with epinephrine was used to  infiltrate subcutaneous tissues of the left infraclavicular fossa  surrounding the ICD site.   Using a HumanAPI plasma electrocautery  blade, 2.5-inch incision was then developed and immediately adjacent to  the old scar line of

## 2024-01-04 PROBLEM — Z95.810 ICD (IMPLANTABLE CARDIOVERTER-DEFIBRILLATOR) IN PLACE: Status: ACTIVE | Noted: 2024-01-04

## 2024-03-07 ENCOUNTER — HOSPITAL ENCOUNTER (EMERGENCY)
Age: 79
Discharge: HOME | End: 2024-03-07
Attending: EMERGENCY MEDICINE
Payer: MEDICARE

## 2024-03-07 ENCOUNTER — APPOINTMENT (OUTPATIENT)
Dept: GENERAL RADIOLOGY | Age: 79
End: 2024-03-07
Payer: MEDICARE

## 2024-03-07 VITALS
BODY MASS INDEX: 26.52 KG/M2 | TEMPERATURE: 97.3 F | RESPIRATION RATE: 13 BRPM | WEIGHT: 175 LBS | HEART RATE: 45 BPM | SYSTOLIC BLOOD PRESSURE: 112 MMHG | OXYGEN SATURATION: 98 % | DIASTOLIC BLOOD PRESSURE: 61 MMHG | HEIGHT: 68 IN

## 2024-03-07 DIAGNOSIS — R07.9 CHEST PAIN, UNSPECIFIED TYPE: Primary | ICD-10-CM

## 2024-03-07 LAB
ANION GAP SERPL CALCULATED.3IONS-SCNC: 9 MMOL/L (ref 9–16)
BASOPHILS # BLD: 0.07 K/UL (ref 0–0.2)
BASOPHILS NFR BLD: 1 % (ref 0–2)
BUN SERPL-MCNC: 25 MG/DL (ref 8–23)
CALCIUM SERPL-MCNC: 9 MG/DL (ref 8.6–10.4)
CHLORIDE SERPL-SCNC: 106 MMOL/L (ref 98–107)
CO2 SERPL-SCNC: 23 MMOL/L (ref 20–31)
CREAT SERPL-MCNC: 1.2 MG/DL (ref 0.7–1.2)
EOSINOPHIL # BLD: 0.44 K/UL (ref 0–0.44)
EOSINOPHILS RELATIVE PERCENT: 6 % (ref 1–4)
ERYTHROCYTE [DISTWIDTH] IN BLOOD BY AUTOMATED COUNT: 14.6 % (ref 11.8–14.4)
GFR SERPL CREATININE-BSD FRML MDRD: >60 ML/MIN/1.73M2
GLUCOSE SERPL-MCNC: 132 MG/DL (ref 74–99)
HCT VFR BLD AUTO: 41.3 % (ref 40.7–50.3)
HGB BLD-MCNC: 13.4 G/DL (ref 13–17)
IMM GRANULOCYTES # BLD AUTO: 0.04 K/UL (ref 0–0.3)
IMM GRANULOCYTES NFR BLD: 1 %
LYMPHOCYTES NFR BLD: 2.58 K/UL (ref 1.1–3.7)
LYMPHOCYTES RELATIVE PERCENT: 33 % (ref 24–43)
MCH RBC QN AUTO: 30.4 PG (ref 25.2–33.5)
MCHC RBC AUTO-ENTMCNC: 32.4 G/DL (ref 28.4–34.8)
MCV RBC AUTO: 93.7 FL (ref 82.6–102.9)
MONOCYTES NFR BLD: 0.4 K/UL (ref 0.1–1.2)
MONOCYTES NFR BLD: 5 % (ref 3–12)
NEUTROPHILS NFR BLD: 54 % (ref 36–65)
NEUTS SEG NFR BLD: 4.33 K/UL (ref 1.5–8.1)
NRBC BLD-RTO: 0 PER 100 WBC
PLATELET # BLD AUTO: 194 K/UL (ref 138–453)
PMV BLD AUTO: 12.2 FL (ref 8.1–13.5)
POTASSIUM SERPL-SCNC: 4.7 MMOL/L (ref 3.7–5.3)
RBC # BLD AUTO: 4.41 M/UL (ref 4.21–5.77)
RBC # BLD: ABNORMAL 10*6/UL
SODIUM SERPL-SCNC: 138 MMOL/L (ref 136–145)
TROPONIN I SERPL HS-MCNC: 14 NG/L (ref 0–22)
TROPONIN I SERPL HS-MCNC: 14 NG/L (ref 0–22)
WBC OTHER # BLD: 7.9 K/UL (ref 3.5–11.3)

## 2024-03-07 PROCEDURE — 71045 X-RAY EXAM CHEST 1 VIEW: CPT

## 2024-03-07 PROCEDURE — 84484 ASSAY OF TROPONIN QUANT: CPT

## 2024-03-07 PROCEDURE — 93005 ELECTROCARDIOGRAM TRACING: CPT | Performed by: STUDENT IN AN ORGANIZED HEALTH CARE EDUCATION/TRAINING PROGRAM

## 2024-03-07 PROCEDURE — 99285 EMERGENCY DEPT VISIT HI MDM: CPT

## 2024-03-07 PROCEDURE — 80048 BASIC METABOLIC PNL TOTAL CA: CPT

## 2024-03-07 PROCEDURE — 85025 COMPLETE CBC W/AUTO DIFF WBC: CPT

## 2024-03-07 ASSESSMENT — ENCOUNTER SYMPTOMS
BACK PAIN: 0
SORE THROAT: 0
VOMITING: 0
CHEST TIGHTNESS: 0
COUGH: 0
SINUS PAIN: 0
SHORTNESS OF BREATH: 0
RHINORRHEA: 0
WHEEZING: 0
DIARRHEA: 0
ABDOMINAL PAIN: 0
NAUSEA: 0

## 2024-03-07 ASSESSMENT — PAIN DESCRIPTION - LOCATION: LOCATION: CHEST

## 2024-03-07 ASSESSMENT — PAIN SCALES - GENERAL: PAINLEVEL_OUTOF10: 3

## 2024-03-07 ASSESSMENT — PAIN - FUNCTIONAL ASSESSMENT: PAIN_FUNCTIONAL_ASSESSMENT: 0-10

## 2024-03-07 NOTE — ED PROVIDER NOTES
[facility] ED  Emergency Department Encounter  Emergency Medicine Resident     Pt Name:Erik Olea  MRN: [id number]  Birthdate 1945  Date of evaluation: 3/7/24  PCP:  Albertina Cleaning MD  Note Started: 12:06 PM EST      CHIEF COMPLAINT       Chief Complaint   Patient presents with    Chest Pain     Chest tightness started \"earlier this am, in the middle of the night\"    Shortness of Breath     Dyspnea while at rest       HISTORY OF PRESENT ILLNESS  (Location/Symptom, Timing/Onset, Context/Setting, Quality, Duration, Modifying Factors, Severity.)      Erik Olea is a 79 y.o. male with PMH including CAD s/p CABG x 3 in 2013 after a V-fib arrest, HTN, HFpEF with LVEF of 30%--> 45%, and Medtronic Evera XT VR defibrillator on 11/25/2013 who presents emergency department with chest discomfort since this morning.  Patient states that the left side of his chest is \"catching.\"  He denies associated shortness of breath, nausea, diaphoresis, vomiting, abdominal pain.  He is well-appearing on arrival.  Heart rate in the 40s to 50s.  Of note patient does have several pacing spikes on monitor.  Plan for Medtronic evaluation.  He denies fever, chills, shakes, dizziness, lightheadedness, episodes of syncope.    PAST MEDICAL / SURGICAL / SOCIAL / FAMILY HISTORY      has a past medical history of Accident, Arthritis, Bronchitis, CAD (coronary artery disease), Cancer (HCC), Cardiac arrest (HCC), Good exercise tolerance, Hx of blood clots, Hyperkalemia, Hypertension, essential, Ischemic cardiomyopathy, Left ventricular apical thrombus, MI (myocardial infarction) (HCC), Pseudoaneurysm (HCC) - right groin, Sinus drainage, Snores, Systolic dysfunction with acute on chronic heart failure (HCC) - Efx 30%, and Wellness examination.       has a past surgical history that includes Coronary artery bypass graft (11/19/2013); Vasectomy; Tonsillectomy; Colonoscopy; Cardiac defibrillator placement (2013); Cardiac  Course User Index  [GC] Keyshawn Chester DO       PROCEDURES:  N/a    CONSULTS:  None    CRITICAL CARE:  There was significant risk of life threatening deterioration of patient's condition requiring my direct management. Critical care time 00 minutes, excluding any documented procedures.    FINAL IMPRESSION      1. Chest pain, unspecified type          DISPOSITION / PLAN     DISPOSITION Decision To Discharge 03/07/2024 12:01:26 PM      PATIENT REFERRED TO:  Albertina Cleaning MD  [address] [address]  [phone]    Call in 1 day  for ED follow-up      DISCHARGE MEDICATIONS:  New Prescriptions    No medications on file       Keyshawn Chester DO  Emergency Medicine Resident    (Please note that portions of this note were completed with a voice recognition program.  Efforts were made to edit the dictations but occasionally words are mis-transcribed.)

## 2024-03-07 NOTE — DISCHARGE INSTRUCTIONS
Seen in the emergency department for chest pain.  Unremarkable workup.  Cardiac enzymes around baseline.  Chest x-ray without pneumonia.  Other lab work unremarkable.  Of note I did speak with [employer] about your pacemaker/defibrillator.  It did pace several beats here in the emergency department which indicated that your heart rate was below the rate of 40.  You were asymptomatic during this.  Please follow-up with your cardiologist as soon as possible within the next 1 to 2 days.  Please follow-up with your primary care within the next 1 to 2 days.  Please continue to take your medications as prescribed.  Please return the emergency department if you develop worsening chest pain, shortness of breath, feelings of your heart fluttering in her chest, episodes of passing out, dizziness on standing, fevers, or any other concerning symptoms

## 2024-03-07 NOTE — ED NOTES
Pacer spikes noted on monitor  Patient denied having a pacer, only a defibrillator  Pacemaker interogated  Dr. Chester and Dr. Quesada informed

## 2024-03-07 NOTE — ED NOTES
Per Dr. Chester pacemaker is set at HR of 40  Dr. Chester informed of HR 42-44  Dr. Chester verbalizes this is ok  Patient is asymptomatic at this time

## 2024-03-07 NOTE — ED PROVIDER NOTES
[facility]     Emergency Department     Faculty Attestation    I performed a history and physical examination of the patient and discussed management with the resident. I have reviewed and agree with the resident’s findings including all diagnostic interpretations, and treatment plans as written at the time of my review. Any areas of disagreement are noted on the chart. I was personally present for the key portions of any procedures. I have documented in the chart those procedures where I was not present during the key portions. For Physician Assistant/ Nurse Practitioner cases/documentation I have personally evaluated this patient and have completed at least one if not all key elements of the E/M (history, physical exam, and MDM). Additional findings are as noted.    PtName: Erik Olea  MRN: [id number]  Birthdate 1945  Date of evaluation: 3/7/24  Note Started: 8:25 AM EST    Primary Care Physician: Albertina Cleaning MD        History: This is a 79 y.o. male who presents to the Emergency Department with complaint of chest tightness.  Patient states he is has sensation some chest tightness pressure on the left side of his chest that has been there since yesterday.  Says he normally has these pressures sensation that usually resolves after he gets up and starts moving his routine.  Patient dates this morning he did not have resolution of the symptoms and the reason he came to the emergency room for further evaluation.  He denies any associated shortness of breath, nausea, vomiting or diaphoresis.  There is no radiation of his symptoms.      Physical:   height is 1.727 m (5' 8\") and weight is 79.4 kg (175 lb). His oral temperature is 97.3 °F (36.3 °C). His blood pressure is 169/82 (abnormal) and his pulse is 53. His respiration is 16 and oxygen saturation is 98%.  Lungs are clear to auscultation bilaterally, heart bradycardic with a regular rhythm, abdomen is

## 2024-03-07 NOTE — ED NOTES
Patient to room 30 from home  Patient is an 79 year old male  Patient has a cardiac history, CABG x3 and defibrillator  Patient verbalizes heart rate is normally 48-50  Patient verbalizes he went bowling last night and then did not have a comfortable night  He felt restless all night and had chest tightness through out the night (while trying to sleep and this am)  Verbalizes shortness of breath (\"but not really shortness of breath\") even while at rest  Denies n/v  HR 47-50 and SaO2 98-99%, on monitor  NAD  Pt alert and oriented x4, talking in complete sentences, respirations even and unlabored.   Pt acting age appropriate.   White board updated, will continue to asses, call light at side  [place] given    Dr. Chester in to assess patient

## 2024-03-07 NOTE — ED NOTES
Patient verbalizes he feels well,  Chest tightness and shortness of breath have subsided  Patient verbalizes he would like to go home if not needed to be admitted  Dr. Chester informed per patient's request

## 2024-03-08 LAB
EKG ATRIAL RATE: 54 BPM
EKG P AXIS: 64 DEGREES
EKG P-R INTERVAL: 278 MS
EKG Q-T INTERVAL: 504 MS
EKG QRS DURATION: 162 MS
EKG QTC CALCULATION (BAZETT): 477 MS
EKG R AXIS: -57 DEGREES
EKG T AXIS: 16 DEGREES
EKG VENTRICULAR RATE: 54 BPM

## 2024-03-08 PROCEDURE — 93010 ELECTROCARDIOGRAM REPORT: CPT | Performed by: INTERNAL MEDICINE

## 2024-03-11 ENCOUNTER — HOSPITAL ENCOUNTER (OUTPATIENT)
Age: 79
Discharge: HOME | End: 2024-03-11
Payer: MEDICARE

## 2024-03-11 LAB
INR PPP: 2.5
PROTHROMBIN TIME: 26.7 SEC (ref 11.7–14.9)

## 2024-03-11 PROCEDURE — 36415 COLL VENOUS BLD VENIPUNCTURE: CPT

## 2024-03-11 PROCEDURE — 85610 PROTHROMBIN TIME: CPT

## 2024-03-14 ENCOUNTER — ANTI-COAG VISIT (OUTPATIENT)
Dept: PHARMACY | Age: 79
End: 2024-03-14

## 2024-03-14 DIAGNOSIS — I51.3 LEFT VENTRICULAR APICAL THROMBUS: Primary | ICD-10-CM

## 2024-03-23 ENCOUNTER — HOSPITAL ENCOUNTER (INPATIENT)
Age: 79
LOS: 2 days | Discharge: HOME | DRG: 948 | End: 2024-03-25
Attending: EMERGENCY MEDICINE | Admitting: STUDENT IN AN ORGANIZED HEALTH CARE EDUCATION/TRAINING PROGRAM
Payer: MEDICARE

## 2024-03-23 ENCOUNTER — APPOINTMENT (OUTPATIENT)
Dept: GENERAL RADIOLOGY | Age: 79
DRG: 948 | End: 2024-03-23
Payer: MEDICARE

## 2024-03-23 DIAGNOSIS — Z45.02 DEFIBRILLATOR DISCHARGE: Primary | ICD-10-CM

## 2024-03-23 DIAGNOSIS — I21.02 ST ELEVATION MYOCARDIAL INFARCTION INVOLVING LEFT ANTERIOR DESCENDING (LAD) CORONARY ARTERY (HCC): ICD-10-CM

## 2024-03-23 LAB
ANION GAP SERPL CALCULATED.3IONS-SCNC: 15 MMOL/L (ref 9–16)
BASOPHILS # BLD: 0.09 K/UL (ref 0–0.2)
BASOPHILS NFR BLD: 1 % (ref 0–2)
BUN SERPL-MCNC: 37 MG/DL (ref 8–23)
CALCIUM SERPL-MCNC: 9.2 MG/DL (ref 8.6–10.4)
CHLORIDE SERPL-SCNC: 101 MMOL/L (ref 98–107)
CO2 SERPL-SCNC: 22 MMOL/L (ref 20–31)
CREAT SERPL-MCNC: 1.5 MG/DL (ref 0.7–1.2)
EOSINOPHIL # BLD: 0.6 K/UL (ref 0–0.44)
EOSINOPHILS RELATIVE PERCENT: 7 % (ref 1–4)
ERYTHROCYTE [DISTWIDTH] IN BLOOD BY AUTOMATED COUNT: 14.1 % (ref 11.8–14.4)
GFR SERPL CREATININE-BSD FRML MDRD: 46 ML/MIN/1.73M2
GLUCOSE SERPL-MCNC: 139 MG/DL (ref 74–99)
HCT VFR BLD AUTO: 45.3 % (ref 40.7–50.3)
HGB BLD-MCNC: 14.9 G/DL (ref 13–17)
IMM GRANULOCYTES # BLD AUTO: <0.03 K/UL (ref 0–0.3)
IMM GRANULOCYTES NFR BLD: 0 %
INR PPP: 2.2
LYMPHOCYTES NFR BLD: 2.97 K/UL (ref 1.1–3.7)
LYMPHOCYTES RELATIVE PERCENT: 33 % (ref 24–43)
MCH RBC QN AUTO: 30.5 PG (ref 25.2–33.5)
MCHC RBC AUTO-ENTMCNC: 32.9 G/DL (ref 28.4–34.8)
MCV RBC AUTO: 92.6 FL (ref 82.6–102.9)
MONOCYTES NFR BLD: 0.63 K/UL (ref 0.1–1.2)
MONOCYTES NFR BLD: 7 % (ref 3–12)
NEUTROPHILS NFR BLD: 52 % (ref 36–65)
NEUTS SEG NFR BLD: 4.65 K/UL (ref 1.5–8.1)
NRBC BLD-RTO: 0 PER 100 WBC
PLATELET # BLD AUTO: 211 K/UL (ref 138–453)
PMV BLD AUTO: 11.8 FL (ref 8.1–13.5)
POTASSIUM SERPL-SCNC: 4.7 MMOL/L (ref 3.7–5.3)
PROTHROMBIN TIME: 24 SEC (ref 11.7–14.9)
RBC # BLD AUTO: 4.89 M/UL (ref 4.21–5.77)
SODIUM SERPL-SCNC: 138 MMOL/L (ref 136–145)
TROPONIN I SERPL HS-MCNC: 12 NG/L (ref 0–22)
TROPONIN I SERPL HS-MCNC: 13 NG/L (ref 0–22)
WBC OTHER # BLD: 9 K/UL (ref 3.5–11.3)

## 2024-03-23 PROCEDURE — 6370000000 HC RX 637 (ALT 250 FOR IP): Performed by: STUDENT IN AN ORGANIZED HEALTH CARE EDUCATION/TRAINING PROGRAM

## 2024-03-23 PROCEDURE — 80048 BASIC METABOLIC PNL TOTAL CA: CPT

## 2024-03-23 PROCEDURE — 36415 COLL VENOUS BLD VENIPUNCTURE: CPT

## 2024-03-23 PROCEDURE — 99285 EMERGENCY DEPT VISIT HI MDM: CPT

## 2024-03-23 PROCEDURE — 84484 ASSAY OF TROPONIN QUANT: CPT

## 2024-03-23 PROCEDURE — 4B02XTZ MEASUREMENT OF CARDIAC DEFIBRILLATOR, EXTERNAL APPROACH: ICD-10-PCS | Performed by: EMERGENCY MEDICINE

## 2024-03-23 PROCEDURE — 85610 PROTHROMBIN TIME: CPT

## 2024-03-23 PROCEDURE — 1200000000 HC SEMI PRIVATE

## 2024-03-23 PROCEDURE — 93005 ELECTROCARDIOGRAM TRACING: CPT

## 2024-03-23 PROCEDURE — 85025 COMPLETE CBC W/AUTO DIFF WBC: CPT

## 2024-03-23 PROCEDURE — 99223 1ST HOSP IP/OBS HIGH 75: CPT | Performed by: STUDENT IN AN ORGANIZED HEALTH CARE EDUCATION/TRAINING PROGRAM

## 2024-03-23 PROCEDURE — 71046 X-RAY EXAM CHEST 2 VIEWS: CPT

## 2024-03-23 PROCEDURE — 2580000003 HC RX 258: Performed by: STUDENT IN AN ORGANIZED HEALTH CARE EDUCATION/TRAINING PROGRAM

## 2024-03-23 RX ORDER — LISINOPRIL 10 MG/1
10 TABLET ORAL DAILY
Status: DISCONTINUED | OUTPATIENT
Start: 2024-03-23 | End: 2024-03-25 | Stop reason: HOSPADM

## 2024-03-23 RX ORDER — MONTELUKAST SODIUM 10 MG/1
10 TABLET ORAL NIGHTLY
Status: DISCONTINUED | OUTPATIENT
Start: 2024-03-23 | End: 2024-03-25 | Stop reason: HOSPADM

## 2024-03-23 RX ORDER — POTASSIUM CHLORIDE 7.45 MG/ML
10 INJECTION INTRAVENOUS PRN
Status: DISCONTINUED | OUTPATIENT
Start: 2024-03-23 | End: 2024-03-25 | Stop reason: HOSPADM

## 2024-03-23 RX ORDER — POTASSIUM CHLORIDE 20 MEQ/1
40 TABLET, EXTENDED RELEASE ORAL PRN
Status: DISCONTINUED | OUTPATIENT
Start: 2024-03-23 | End: 2024-03-25 | Stop reason: HOSPADM

## 2024-03-23 RX ORDER — ACETAMINOPHEN 650 MG/1
650 SUPPOSITORY RECTAL EVERY 6 HOURS PRN
Status: DISCONTINUED | OUTPATIENT
Start: 2024-03-23 | End: 2024-03-25 | Stop reason: HOSPADM

## 2024-03-23 RX ORDER — MEXILETINE HYDROCHLORIDE 200 MG/1
200 CAPSULE ORAL 3 TIMES DAILY
Status: DISCONTINUED | OUTPATIENT
Start: 2024-03-23 | End: 2024-03-25 | Stop reason: HOSPADM

## 2024-03-23 RX ORDER — ACETAMINOPHEN 325 MG/1
650 TABLET ORAL EVERY 6 HOURS PRN
Status: DISCONTINUED | OUTPATIENT
Start: 2024-03-23 | End: 2024-03-25 | Stop reason: HOSPADM

## 2024-03-23 RX ORDER — WARFARIN SODIUM 2.5 MG/1
2.5 TABLET ORAL DAILY
Status: DISCONTINUED | OUTPATIENT
Start: 2024-03-23 | End: 2024-03-24

## 2024-03-23 RX ORDER — SPIRONOLACTONE 25 MG/1
25 TABLET ORAL DAILY
Status: DISCONTINUED | OUTPATIENT
Start: 2024-03-23 | End: 2024-03-25 | Stop reason: HOSPADM

## 2024-03-23 RX ORDER — ATORVASTATIN CALCIUM 40 MG/1
40 TABLET, FILM COATED ORAL NIGHTLY
Status: DISCONTINUED | OUTPATIENT
Start: 2024-03-23 | End: 2024-03-25 | Stop reason: HOSPADM

## 2024-03-23 RX ORDER — SODIUM CHLORIDE 0.9 % (FLUSH) 0.9 %
5-40 SYRINGE (ML) INJECTION EVERY 12 HOURS SCHEDULED
Status: DISCONTINUED | OUTPATIENT
Start: 2024-03-23 | End: 2024-03-25 | Stop reason: HOSPADM

## 2024-03-23 RX ORDER — ALBUTEROL SULFATE 90 UG/1
2 AEROSOL, METERED RESPIRATORY (INHALATION) EVERY 6 HOURS PRN
Status: DISCONTINUED | OUTPATIENT
Start: 2024-03-23 | End: 2024-03-25 | Stop reason: HOSPADM

## 2024-03-23 RX ORDER — ALBUTEROL SULFATE 2.5 MG/3ML
3 SOLUTION RESPIRATORY (INHALATION) EVERY 6 HOURS PRN
Status: DISCONTINUED | OUTPATIENT
Start: 2024-03-23 | End: 2024-03-25 | Stop reason: HOSPADM

## 2024-03-23 RX ORDER — SODIUM CHLORIDE 0.9 % (FLUSH) 0.9 %
5-40 SYRINGE (ML) INJECTION PRN
Status: DISCONTINUED | OUTPATIENT
Start: 2024-03-23 | End: 2024-03-25 | Stop reason: HOSPADM

## 2024-03-23 RX ORDER — MAGNESIUM SULFATE IN WATER 40 MG/ML
2000 INJECTION, SOLUTION INTRAVENOUS PRN
Status: DISCONTINUED | OUTPATIENT
Start: 2024-03-23 | End: 2024-03-25 | Stop reason: HOSPADM

## 2024-03-23 RX ORDER — SOTALOL HYDROCHLORIDE 80 MG/1
80 TABLET ORAL 2 TIMES DAILY
Status: DISCONTINUED | OUTPATIENT
Start: 2024-03-23 | End: 2024-03-25 | Stop reason: HOSPADM

## 2024-03-23 RX ORDER — SODIUM CHLORIDE 9 MG/ML
INJECTION, SOLUTION INTRAVENOUS PRN
Status: DISCONTINUED | OUTPATIENT
Start: 2024-03-23 | End: 2024-03-25 | Stop reason: HOSPADM

## 2024-03-23 RX ORDER — POLYETHYLENE GLYCOL 3350 17 G/17G
17 POWDER, FOR SOLUTION ORAL DAILY PRN
Status: DISCONTINUED | OUTPATIENT
Start: 2024-03-23 | End: 2024-03-25 | Stop reason: HOSPADM

## 2024-03-23 RX ORDER — ASPIRIN 81 MG/1
81 TABLET, CHEWABLE ORAL DAILY
Status: DISCONTINUED | OUTPATIENT
Start: 2024-03-23 | End: 2024-03-25 | Stop reason: HOSPADM

## 2024-03-23 RX ADMIN — SODIUM CHLORIDE, PRESERVATIVE FREE 10 ML: 5 INJECTION INTRAVENOUS at 20:00

## 2024-03-23 RX ADMIN — MONTELUKAST SODIUM 10 MG: 10 TABLET, FILM COATED ORAL at 20:00

## 2024-03-23 RX ADMIN — ASPIRIN 81 MG: 81 TABLET, CHEWABLE ORAL at 20:40

## 2024-03-23 RX ADMIN — LISINOPRIL 10 MG: 10 TABLET ORAL at 20:00

## 2024-03-23 RX ADMIN — ATORVASTATIN CALCIUM 40 MG: 40 TABLET, FILM COATED ORAL at 20:00

## 2024-03-23 RX ADMIN — MEXILETINE HYDROCHLORIDE 200 MG: 200 CAPSULE ORAL at 20:40

## 2024-03-23 RX ADMIN — SPIRONOLACTONE 25 MG: 25 TABLET ORAL at 20:00

## 2024-03-23 ASSESSMENT — PAIN DESCRIPTION - DESCRIPTORS: DESCRIPTORS: TIGHTNESS

## 2024-03-23 ASSESSMENT — PAIN - FUNCTIONAL ASSESSMENT: PAIN_FUNCTIONAL_ASSESSMENT: 0-10

## 2024-03-23 ASSESSMENT — PAIN SCALES - GENERAL
PAINLEVEL_OUTOF10: 3
PAINLEVEL_OUTOF10: 0

## 2024-03-23 ASSESSMENT — PAIN DESCRIPTION - LOCATION: LOCATION: CHEST

## 2024-03-23 NOTE — PROGRESS NOTES
[facility]  PROGRESS NOTE    Shift date: 3.23.2024  Shift day: Saturday   Shift # 2    Room # KELSEY/KELSEY   Name: Erik Olea                Gnosticism: unknown   Place of Restoration: unknown    Referral: Routine Visit    Admit Date & Time: 3/23/2024  4:22 PM    Assessment:  Erik Olea is a 79 y.o. male in the hospital. Upon entering the room writer observes the patient calm and coping.  Appears to be slightly passive and expresses no immediate needs at this time.        Intervention:  Writer introduced self and title as  Writer offered space for the patient  to express feelings, needs, and concerns and provided a ministry presence.     Outcome:  Patient remains calm and coping.      Plan:  Chaplains will remain available to offer spiritual and emotional support as needed.      Electronically signed by Chaplain Tiny, on 3/23/2024 at 7:21 PM.  [facility]  [phone]       03/23/24 1830   Encounter Summary   Encounter Overview/Reason  Initial Encounter   Service Provided For: Patient   Referral/Consult From: [facility]   Support System Family members   Last Encounter  03/23/24   Complexity of Encounter Low   Begin Time 1830   End Time  1840   Total Time Calculated 10 min   Assessment/Intervention/Outcome   Assessment Calm;Coping   Intervention Active listening;Discussed illness injury and it’s impact;Explored/Affirmed feelings, thoughts, concerns   Outcome Coping     Electronically signed by Charanjit Ivan on 3/23/2024 at 7:21 PM

## 2024-03-23 NOTE — ED NOTES
The following labs were labeled with appropriate pt sticker and tubed to lab:     [] Blue     [] Lavender   [] on ice  [x] Green/yellow  [] Green/black [] on ice  [] Grey  [] on ice  [] Yellow  [] Red  [] Pink  [] Type/ Screen  [] ABG  [] VBG    [] COVID-19 swab    [] Rapid  [] PCR  [] Flu swab  [] Peds Viral Panel     [] Urine Sample  [] Fecal Sample  [] Pelvic Cultures  [] Blood Cultures  [] X 2  [] STREP Cultures  [] Wound Cultures

## 2024-03-23 NOTE — ED PROVIDER NOTES
Note Started: 5:14 PM EDT         [facility]     Emergency Department     Faculty Attestation    I performed a history and physical examination of the patient and discussed management with the resident. I reviewed the resident’s note and agree with the documented findings and plan of care. Any areas of disagreement are noted on the chart. I was personally present for the key portions of any procedures. I have documented in the chart those procedures where I was not present during the key portions. I have reviewed the emergency nurses triage note. I agree with the chief complaint, past medical history, past surgical history, allergies, medications, social and family history as documented unless otherwise noted below.        For Physician Assistant/ Nurse Practitioner cases/documentation I have personally evaluated this patient and have completed at least one if not all key elements of the E/M (history, physical exam, and MDM). Additional findings are as noted.  I have personally seen and evaluated the patient.  I find the patient's history and physical exam are consistent with the NP/PA documentation.  I agree with the care provided, treatment rendered, disposition and follow-up plan.    79-year-old male with a history of pacer/AICD in place, history of VT presenting with fatigue and AICD firing 2 days ago.  States that he may have pushed himself too hard at work (works as a ), and felt the device go off.  He was okay immediately following, but has been feeling slightly short of breath today.  No swelling or cough.    Exam:  General : Laying on the bed, awake, alert, and in no acute distress  CV : normal rate and regular rhythm  Lungs : Breathing comfortably on room air with no tachypnea, hypoxia, or increased work of breathing    DDx: CHF, ACS, arrhythmia    Plan:  Interrogation of Medtronic pacemaker/AICD  Cardiac enzymes  Admission for cardiology evaluation    Medical Decision

## 2024-03-23 NOTE — ED NOTES
ED to inpatient nurses report      Chief Complaint:  Chief Complaint   Patient presents with    Chest Pain    Shortness of Breath     Present to ED from: home    MOA:     LOC: alert and orientated to name, place, date  Mobility: Independent  Oxygen Baseline: room air    Current needs required: room air   Pending ED orders: none  Present condition: stable    Why did the patient come to the ED? Defibrillator shocked him on Thursday, not feeling well since waking up this morning  What is the plan? Cardiology consult in AM  Any procedures or intervention occur? EKG, IV, labs, medtronic defibrillator interpreted  Any safety concerns?? none    Mental Status:  Level of Consciousness: Alert (0)    Psych Assessment:      Vital signs   Vitals:    03/23/24 1619 03/23/24 1645   BP: (!) 185/84    Pulse: 68    Resp: 16    Temp:  98 °F (36.7 °C)   TempSrc:  Oral   SpO2: 97%    Weight: 79.8 kg (176 lb)    Height: 1.727 m (5' 8\")         Vitals:  Patient Vitals for the past 24 hrs:   BP Temp Temp src Pulse Resp SpO2 Height Weight   03/23/24 1645 -- 98 °F (36.7 °C) Oral -- -- -- -- --   03/23/24 1619 (!) 185/84 -- -- 68 16 97 % 1.727 m (5' 8\") 79.8 kg (176 lb)      Visit Vitals  BP (!) 185/84   Pulse 68   Temp 98 °F (36.7 °C) (Oral)   Resp 16   Ht 1.727 m (5' 8\")   Wt 79.8 kg (176 lb)   SpO2 97%   BMI 26.76 kg/m²        LDAs:   Peripheral IV 03/23/24 Left;Dorsal Forearm (Active)   Site Assessment Clean, dry & intact 03/23/24 1633   Line Status Blood return noted;Brisk blood return;Specimen collected;Flushed;Normal saline locked 03/23/24 1633   Phlebitis Assessment No symptoms 03/23/24 1633   Infiltration Assessment 0 03/23/24 1633   Dressing Status New dressing applied;Clean, dry & intact 03/23/24 1633   Dressing Type Transparent 03/23/24 1633       Ambulatory Status:  No data recorded    Diagnosis:  DISPOSITION Admitted 03/23/2024 06:15:37 PM   Final diagnoses:   Defibrillator discharge        Consults:  IP CONSULT TO CARDIOLOGY  IP

## 2024-03-23 NOTE — ED NOTES
ED to inpatient nurses report      Chief Complaint:  Chief Complaint   Patient presents with    Chest Pain    Shortness of Breath     Present to ED from: home    MOA:     LOC: alert and orientated to name, place, date  Mobility: Independent  Oxygen Baseline: room air     Current needs required: n/a   Pending ED orders: n/a  Present condition: stable    Why did the patient come to the ED? AICD fired 2 days ago, since has been fatigued, states he otherwise feels okay  What is the plan? Admit/cardiology consult   Any procedures or intervention occur? N/a  Any safety concerns??    Mental Status:  Level of Consciousness: Alert (0)    Psych Assessment:      Vital signs   Vitals:    03/23/24 1619   BP: (!) 185/84   Pulse: 68   Resp: 16   SpO2: 97%   Weight: 79.8 kg (176 lb)   Height: 1.727 m (5' 8\")        Vitals:  Patient Vitals for the past 24 hrs:   BP Pulse Resp SpO2 Height Weight   03/23/24 1619 (!) 185/84 68 16 97 % 1.727 m (5' 8\") 79.8 kg (176 lb)      Visit Vitals  BP (!) 185/84   Pulse 68   Resp 16   Ht 1.727 m (5' 8\")   Wt 79.8 kg (176 lb)   SpO2 97%   BMI 26.76 kg/m²        LDAs:   Peripheral IV 03/23/24 Left;Dorsal Forearm (Active)   Site Assessment Clean, dry & intact 03/23/24 1633   Line Status Blood return noted;Brisk blood return;Specimen collected;Flushed;Normal saline locked 03/23/24 1633   Phlebitis Assessment No symptoms 03/23/24 1633   Infiltration Assessment 0 03/23/24 1633   Dressing Status New dressing applied;Clean, dry & intact 03/23/24 1633   Dressing Type Transparent 03/23/24 1633       Ambulatory Status:  No data recorded    Diagnosis:  DISPOSITION Admitted 03/23/2024 06:15:37 PM   Final diagnoses:   Defibrillator discharge        Consults:  IP CONSULT TO CARDIOLOGY  IP CONSULT TO HOSPITALIST  PHARMACY TO DOSE WARFARIN     Treatment Team:   Treatment Team: Attending Provider: Garo Maldonado MD; Registered Nurse: Audelia Meléndez RN; Resident: Abad Young MD; Consulting Physician:  CAPSULE    Take 1 capsule by mouth daily    CHOLECALCIFEROL (VITAMIN D3 PO)    Take by mouth daily    FUROSEMIDE (LASIX) 40 MG TABLET    Take 0.5 tablets by mouth daily    LISINOPRIL (PRINIVIL;ZESTRIL) 10 MG TABLET    Take 1 tablet by mouth daily    LUTEIN PO    Take 2 tablets by mouth daily    MEXILETINE (MEXITIL) 200 MG CAPSULE    Take 1 capsule by mouth 3 times daily    MONTELUKAST (SINGULAIR) 10 MG TABLET    Take 1 tablet by mouth nightly    RESPIRATORY THERAPY SUPPLIES (NEBULIZER COMPRESSOR) KIT    1 kit by Does not apply route once for 1 dose    RESPIRATORY THERAPY SUPPLIES (NEBULIZER COMPRESSOR) KIT    1 kit by Does not apply route once for 1 dose    SOTALOL (BETAPACE) 80 MG TABLET    Take 1 tablet by mouth 2 times daily.    SPIRONOLACTONE (ALDACTONE) 25 MG TABLET    Take 1 tablet by mouth daily    WARFARIN (COUMADIN) 6 MG TABLET    TAKE 1 TABLET ON SAT, SUN, MON, TUE AND TAKE 1 AND 1/2 TABLETS ON WEDNESDAY, THURSDAY, AND FRIDAY     Orders Placed This Encounter   Medications    sodium chloride flush 0.9 % injection 5-40 mL    sodium chloride flush 0.9 % injection 5-40 mL    0.9 % sodium chloride infusion    OR Linked Order Group     potassium chloride (KLOR-CON M) extended release tablet 40 mEq     potassium bicarb-citric acid (EFFER-K) effervescent tablet 40 mEq     potassium chloride 10 mEq/100 mL IVPB (Peripheral Line)    magnesium sulfate 2000 mg in 50 mL IVPB premix    polyethylene glycol (GLYCOLAX) packet 17 g    OR Linked Order Group     acetaminophen (TYLENOL) tablet 650 mg     acetaminophen (TYLENOL) suppository 650 mg       SURGICAL HISTORY       Past Surgical History:   Procedure Laterality Date    ABDOMEN SURGERY Right 07/19/2019    EXCISION RIGHT SHOULDER MASS performed by Jairon Valdes DO at [facility] OR    CARDIAC CATHETERIZATION  12/08/2016    No stents placed per pt    CARDIAC CATHETERIZATION  11/18/2013    CARDIAC CATHETERIZATION  04/07/2022    CARDIAC DEFIBRILLATOR PLACEMENT  2013    right

## 2024-03-23 NOTE — ED PROVIDER NOTES
[facility] ED  Emergency Department Encounter  Emergency Medicine Resident     Pt Name:Erik Olea  MRN: [id number]  Birthdate 1945  Date of evaluation: 3/23/24  PCP:  Albertina Cleaning MD  Note Started: 6:03 PM EDT      CHIEF COMPLAINT       Chief Complaint   Patient presents with    Chest Pain    Shortness of Breath       HISTORY OF PRESENT ILLNESS  (Location/Symptom, Timing/Onset, Context/Setting, Quality, Duration, Modifying Factors, Severity.)      Erik Olea is a 79 y.o. male who presents with his defibrillator firing.  Patient states 2 days ago he was working in his garage when his defibrillator fired.  He states that he experienced pain during that time however afterwards felt okay.  He states over the last 1 to 2 days ago he has been fatigued.  He denies any chest pain, shortness of breath, abdominal pain, nausea, vomiting.  He states his defibrillator has not fired since that time.  States he is not sure if his defibrillator is a pacemaker or not.     PAST MEDICAL / SURGICAL / SOCIAL / FAMILY HISTORY      has a past medical history of Accident, Arthritis, Bronchitis, CAD (coronary artery disease), Cancer (HCC), Cardiac arrest (HCC), Good exercise tolerance, Hx of blood clots, Hyperkalemia, Hypertension, essential, Ischemic cardiomyopathy, Left ventricular apical thrombus, MI (myocardial infarction) (HCC), Pseudoaneurysm (HCC) - right groin, Sinus drainage, Snores, Systolic dysfunction with acute on chronic heart failure (HCC) - Efx 30%, and Wellness examination.     has a past surgical history that includes Coronary artery bypass graft (11/19/2013); Vasectomy; Tonsillectomy; Colonoscopy; Cardiac defibrillator placement (2013); Cardiac catheterization (12/08/2016); Cardiac catheterization (11/18/2013); other surgical history (Right, 07/19/2019); Abdomen surgery (Right, 07/19/2019); Cardiac catheterization (04/07/2022); Carotid endarterectomy (Left, 08/11/2022); Carotid endarterectomy    Cardiovascular:  Negative for chest pain.   Gastrointestinal:  Negative for abdominal pain, diarrhea, nausea and vomiting.   Neurological:  Negative for weakness and headaches.       PHYSICAL EXAM      INITIAL VITALS:   BP (!) 185/84   Pulse 68   Resp 16   Ht 1.727 m (5' 8\")   Wt 79.8 kg (176 lb)   SpO2 97%   BMI 26.76 kg/m²     Physical Exam  Constitutional:       Appearance: He is well-developed.   Cardiovascular:      Rate and Rhythm: Normal rate and regular rhythm.   Pulmonary:      Effort: Pulmonary effort is normal.      Breath sounds: Normal breath sounds. No decreased breath sounds, wheezing or rhonchi.   Abdominal:      Palpations: Abdomen is soft.      Tenderness: There is no abdominal tenderness.   Musculoskeletal:      Right lower leg: No tenderness. No edema.      Left lower leg: No tenderness. No edema.   Skin:     General: Skin is warm and dry.      Capillary Refill: Capillary refill takes less than 2 seconds.   Neurological:      Mental Status: He is alert.           DDX/DIAGNOSTIC RESULTS / EMERGENCY DEPARTMENT COURSE / MDM     Medical Decision Making  Erik Olea is a 79 y.o. male who presents with his defibrillator firing 2 days ago.  Patient is GCS 15, nontoxic appearing, not in acute distress, speaking full sentences, able to ambulate under their own power.  Patient is afebrile, normotensive, nontachycardic, satting well on room air.  Examination reveals lungs are clear to auscultation bilaterally.  Abdomen soft nontender.  Peripheral pulses are 2+ throughout.  At this time patient has minimal complaints other than some fatigue.  Will plan on obtaining cardiac workup including CBC, EKG, BMP, chest x-ray, troponin.  Will consult with cardiology to interrogate the pacemaker.      Amount and/or Complexity of Data Reviewed  Labs: ordered.  Radiology: ordered.  ECG/medicine tests: ordered.    Risk  Decision regarding hospitalization.        EKG  Normal sinus rhythm, left axis deviation,

## 2024-03-23 NOTE — H&P
[facility]  Office: [phone]  Quang Noe DO, Kimani Cannon DO, Bubba Wyatt DO, Johnny Christopher DO, Hira Khan MD, Vielka Escobar MD, Adarsh Bowen MD, Frida Horan MD,  Angel Warner MD, Bryon Lopez MD, Rebeca Christine MD,  Kenrick Solorio DO, Mau Chávez MD, Pankaj Scott MD, David Noe DO, Tarsha Blancas MD,  Jorge L Forbes DO, Liane Avery MD, Barbara Schumacher MD, Na Maldonado MD, Patrick Borrego MD,  Francisco J Mares MD, Lindsey Gonzalez MD, Vic Geronimo MD, Foreign Kumar MD, Juan Lopez MD, Camila German MD, Rogelio Richardson DO, Toribio Marti DO, Bello Orellana MD,  Jose Alejandro Bernard MD, Shirley Waterhouse, CNP,  Mitali Padilla, CNP, Jd Garcia, CNP,  Maria Luz Peterson, DNP, Connie Cheek, CNP, Ivory Warren, CNP, Cathi Butt CNP, Idalia Nassar, CNP, Skye García, CNP, Kristina Smith, PA-C, Clary Flannery, PA-C, Mindy Garcia, CNP, Yadi Odonnell, CNP, Jennifer Mac, CNP, Kae Smith, CNS, Ting Bermudez, CNP, Radha Florez, CNP, Tracy Schwab, CNP         [facility]   IN-PATIENT SERVICE   [facility]    HISTORY AND PHYSICAL EXAMINATION            Date:   3/23/2024  Patient name:  Erik Olea  Date of admission:  3/23/2024  4:22 PM  MRN:   [id number]  Account:  [id number]  YOB: 1945  PCP:    Albertina Cleaning MD  Room:   01/01  Code Status:    Full Code    Chief Complaint:     Chief Complaint   Patient presents with    Chest Pain    Shortness of Breath       History Obtained From:     patient, electronic medical record    History of Present Illness:       79-year-old male past medical history of V-fib, CAD, CHF with reduced ejection fraction, AICD, V. tach, and bifascicular block presents emergency department for fatigue for 2 days.  Patient states that he felt his ICD discharge on Thursday when he was having sexual activity.  However he has not had a symptom so therefore he did not come into the ED.  But, he did  shortness of breath, cough, congestion, wheezing  CARDIOVASCULAR:  negative for chest pain, palpitations  GASTROINTESTINAL:  negative for nausea, vomiting, diarrhea, constipation, change in bowel habits, abdominal pain   GENITOURINARY:  negative for difficulty of urination, burning with urination, frequency   INTEGUMENT:  negative for rash, skin lesions, easy bruising   HEMATOLOGIC/LYMPHATIC:  negative for swelling/edema   ALLERGIC/IMMUNOLOGIC:  negative for urticaria , itching  ENDOCRINE:  negative increase in drinking, increase in urination, hot or cold intolerance  MUSCULOSKELETAL:  negative joint pains, muscle aches, swelling of joints  NEUROLOGICAL:  negative for headaches, dizziness, lightheadedness, numbness, pain, tingling extremities  BEHAVIOR/PSYCH:  negative for depression, anxiety    Physical Exam:   BP (!) 185/84   Pulse 68   Resp 16   Ht 1.727 m (5' 8\")   Wt 79.8 kg (176 lb)   SpO2 97%   BMI 26.76 kg/m²   No data recorded.    No results for input(s): \"POCGLU\" in the last 72 hours.  No intake or output data in the 24 hours ending 03/23/24 1817    General Appearance: alert, well appearing, and in no acute distress  Mental status: oriented to person, place, and time  Head: normocephalic, atraumatic  Eye: no icterus, redness, pupils equal and reactive, extraocular eye movements intact, conjunctiva clear  Ear: normal external ear, no discharge, hearing intact  Nose: no drainage noted  Mouth: mucous membranes moist  Neck: supple, no carotid bruits, thyroid not palpable  Lungs: Bilateral equal air entry, clear to ausculation, no wheezing, rales or rhonchi, normal effort  Cardiovascular: normal rate, regular rhythm, no murmur, gallop, rub  Abdomen: Soft, nontender, nondistended, normal bowel sounds, no hepatomegaly or splenomegaly  Neurologic: There are no new focal motor or sensory deficits, normal muscle tone and bulk, no abnormal sensation, normal speech, cranial nerves II through XII grossly

## 2024-03-23 NOTE — ED NOTES
Patient presents to ED for evaluation of not feeling well since waking up today. Patient has medtronic pacemaker/defibrillator in right chest and reports that it shocked him on Thursday. Patient reports he felt fine so he did not feel he needed to come to ED at that time.  Medtronic pacemaker/defibrillator interrogated.  Patient is alert and oriented x4, answering questions appropriately. Respirations even and unlabored. Patient changed into gown, placed on full cardiac monitor, BP cuff, and pulse ox. EKG completed. IV established. Call light within reach. Will continue to monitor.

## 2024-03-24 LAB
ALBUMIN SERPL-MCNC: 4.2 G/DL (ref 3.5–5.2)
ALBUMIN/GLOB SERPL: 2 {RATIO} (ref 1–2.5)
ALP SERPL-CCNC: 166 U/L (ref 40–129)
ALT SERPL-CCNC: 38 U/L (ref 10–50)
ANION GAP SERPL CALCULATED.3IONS-SCNC: 10 MMOL/L (ref 9–16)
AST SERPL-CCNC: 35 U/L (ref 10–50)
BILIRUB SERPL-MCNC: 0.4 MG/DL (ref 0–1.2)
BUN SERPL-MCNC: 33 MG/DL (ref 8–23)
CALCIUM SERPL-MCNC: 9.1 MG/DL (ref 8.6–10.4)
CHLORIDE SERPL-SCNC: 104 MMOL/L (ref 98–107)
CO2 SERPL-SCNC: 24 MMOL/L (ref 20–31)
CREAT SERPL-MCNC: 1.2 MG/DL (ref 0.7–1.2)
GFR SERPL CREATININE-BSD FRML MDRD: >60 ML/MIN/1.73M2
GLUCOSE SERPL-MCNC: 91 MG/DL (ref 74–99)
INR PPP: 2.1
POTASSIUM SERPL-SCNC: 4.8 MMOL/L (ref 3.7–5.3)
PROT SERPL-MCNC: 6.6 G/DL (ref 6.6–8.7)
PROTHROMBIN TIME: 23.4 SEC (ref 11.7–14.9)
SODIUM SERPL-SCNC: 138 MMOL/L (ref 136–145)

## 2024-03-24 PROCEDURE — 6360000002 HC RX W HCPCS: Performed by: STUDENT IN AN ORGANIZED HEALTH CARE EDUCATION/TRAINING PROGRAM

## 2024-03-24 PROCEDURE — 2580000003 HC RX 258: Performed by: STUDENT IN AN ORGANIZED HEALTH CARE EDUCATION/TRAINING PROGRAM

## 2024-03-24 PROCEDURE — 36415 COLL VENOUS BLD VENIPUNCTURE: CPT

## 2024-03-24 PROCEDURE — 85610 PROTHROMBIN TIME: CPT

## 2024-03-24 PROCEDURE — 99223 1ST HOSP IP/OBS HIGH 75: CPT | Performed by: INTERNAL MEDICINE

## 2024-03-24 PROCEDURE — 80053 COMPREHEN METABOLIC PANEL: CPT

## 2024-03-24 PROCEDURE — 99233 SBSQ HOSP IP/OBS HIGH 50: CPT | Performed by: STUDENT IN AN ORGANIZED HEALTH CARE EDUCATION/TRAINING PROGRAM

## 2024-03-24 PROCEDURE — 94760 N-INVAS EAR/PLS OXIMETRY 1: CPT

## 2024-03-24 PROCEDURE — 6370000000 HC RX 637 (ALT 250 FOR IP): Performed by: STUDENT IN AN ORGANIZED HEALTH CARE EDUCATION/TRAINING PROGRAM

## 2024-03-24 PROCEDURE — 94640 AIRWAY INHALATION TREATMENT: CPT

## 2024-03-24 PROCEDURE — 1200000000 HC SEMI PRIVATE

## 2024-03-24 RX ORDER — WARFARIN SODIUM 3 MG/1
6 TABLET ORAL DAILY
Status: DISCONTINUED | OUTPATIENT
Start: 2024-03-24 | End: 2024-03-25

## 2024-03-24 RX ORDER — DIPHENHYDRAMINE HCL 25 MG
25 TABLET ORAL EVERY 6 HOURS PRN
Status: DISCONTINUED | OUTPATIENT
Start: 2024-03-24 | End: 2024-03-25 | Stop reason: HOSPADM

## 2024-03-24 RX ADMIN — MEXILETINE HYDROCHLORIDE 200 MG: 200 CAPSULE ORAL at 08:24

## 2024-03-24 RX ADMIN — MEXILETINE HYDROCHLORIDE 200 MG: 200 CAPSULE ORAL at 21:37

## 2024-03-24 RX ADMIN — SPIRONOLACTONE 25 MG: 25 TABLET ORAL at 08:24

## 2024-03-24 RX ADMIN — ATORVASTATIN CALCIUM 40 MG: 40 TABLET, FILM COATED ORAL at 21:37

## 2024-03-24 RX ADMIN — SODIUM CHLORIDE, PRESERVATIVE FREE 10 ML: 5 INJECTION INTRAVENOUS at 08:24

## 2024-03-24 RX ADMIN — SOTALOL HYDROCHLORIDE 80 MG: 80 TABLET ORAL at 21:36

## 2024-03-24 RX ADMIN — LISINOPRIL 10 MG: 10 TABLET ORAL at 08:24

## 2024-03-24 RX ADMIN — MEXILETINE HYDROCHLORIDE 200 MG: 200 CAPSULE ORAL at 14:35

## 2024-03-24 RX ADMIN — DIPHENHYDRAMINE HYDROCHLORIDE 25 MG: 25 TABLET ORAL at 21:36

## 2024-03-24 RX ADMIN — SODIUM CHLORIDE, PRESERVATIVE FREE 10 ML: 5 INJECTION INTRAVENOUS at 21:40

## 2024-03-24 RX ADMIN — ASPIRIN 81 MG: 81 TABLET, CHEWABLE ORAL at 08:24

## 2024-03-24 RX ADMIN — ALBUTEROL SULFATE 3 ML: 2.5 SOLUTION RESPIRATORY (INHALATION) at 18:08

## 2024-03-24 RX ADMIN — WARFARIN SODIUM 6 MG: 3 TABLET ORAL at 17:34

## 2024-03-24 NOTE — PROGRESS NOTES
Physical Therapy        Physical Therapy Cancel Note      DATE: 3/24/2024    NAME: Erik Olea  MRN: [id number]   : 1945      Patient not seen this date for Physical Therapy due to:    Patient is independent. Jacinto de la rosa DC PT.      Electronically signed by Clair Osuna PT on 3/24/2024 at 9:07 AM

## 2024-03-24 NOTE — PROGRESS NOTES
Pharmacy Note  Warfarin Consult    Erik Olea is a 79 y.o. male for whom pharmacy has been consulted to manage warfarin therapy.     Consulting Physician: Dr. Maldonado  Reason for Admission: Defibrillator discharge    Warfarin dose prior to admission: 6mg daily  Warfarin indication: LV thrombus  Target INR range: 2-3     Past Medical History:   Diagnosis Date    Accident 1967    Doss accident in the [employer] \"[employer]  Numbness and tingling for 1 year from the low back to bilateral mid thighs.    Arthritis     states no pain from arthritis    Bronchitis     CAD (coronary artery disease) 11/19/2013    cabg x 3  [facility]    Cancer ([facility])     head and cheek skin cancer    Cardiac arrest ([facility]) 11/2013    While playing squash.    Good exercise tolerance     pt plays golf, bowling but cannot play [employer] squash anymore (he passes out), still works full time ()    Hx of blood clots 12/2017    heart    Hyperkalemia 05/19/2014    Hypertension, essential     Ischemic cardiomyopathy 12/08/2016    Left ventricular apical thrombus 12/09/2016    MI (myocardial infarction) ([facility]) 11/2013    Pseudoaneurysm ([facility]) - right groin 12/12/2016    Post cardiac catheterization    Sinus drainage     Snores     no sleep apnea    Systolic dysfunction with acute on chronic heart failure ([facility]) - Efx 30% 12/08/2016    Wellness examination     Dr. Albertina Cleaning last seen 7/2022   [place], [place]                Recent Labs     03/24/24  0452   INR 2.1     Recent Labs     03/23/24  1643   HGB 14.9   HCT 45.3          Current warfarin drug-drug interactions: aspirin, acetaminophen      Date             INR        Dose   3/24/2024            2.1       6mg    Daily PT/INR while inpatient. PT/INR ordered to start 3/25 with morning labs.    Thank you for the consult.  Will continue to follow.

## 2024-03-24 NOTE — PROGRESS NOTES
Occupational Therapy    [facility]  Occupational Therapy Not Seen Note    DATE: 3/24/2024    NAME: Erik Olea  MRN: [id number]   : 1945      Patient not seen this date for Occupational Therapy due to:    Patient at baseline functional level with no acute OT needs. Pt engaging in functional mobility in room environment without AD. Writer provided education on OT services, pt reports no concerns/acute OT deficits and this date. Writer educating on defer of eval and pt agreeable. Writer will defer OT evaluation at this time. Please reorder OT if future needs arise.         Electronically signed by FRDEY Moy/L  on 3/24/2024 at 9:36 AM

## 2024-03-24 NOTE — CONSULTS
Respiratory Therapy Supplies (NEBULIZER COMPRESSOR) KIT 1 kit by Does not apply route once for 1 dose 6/10/18 6/10/18  Woodrow Valdez MD   spironolactone (ALDACTONE) 25 MG tablet Take 1 tablet by mouth daily 12/13/16   Jakub Kidd,    albuterol (VENTOLIN HFA) 108 (90 BASE) MCG/ACT inhaler Inhale 2 puffs into the lungs every 6 hours as needed for Wheezing. 10/10/14   Tavo Rivera,    sotalol (BETAPACE) 80 MG tablet Take 1 tablet by mouth 2 times daily. 5/19/14   Kimani Cannon, DO   aspirin 81 MG chewable tablet Take 1 tablet by mouth daily. 11/26/13   Audelia Gonzalez PA-C       Allergies:  Dust mite mixed allergen ext [mite (d. farinae)] and Pcn [penicillins]    Social History:   reports that he has never smoked. He has quit using smokeless tobacco.  His smokeless tobacco use included chew. He reports current alcohol use. He reports that he does not use drugs.     Family History: family history includes Heart Failure in his brother and sister; Hypertension in his father, paternal grandfather, and paternal grandmother; No Known Problems in his mother; Other in his father; Stroke in his father and maternal grandfather.     REVIEW OF SYSTEMS:    Constitutional: Negative for fatigue, weight loss, loss of appetite   Cardiovascular: as per HPI  Respiratory: as per HPI  Gastrointestinal: Negative for abdominal pain, N/V  Genitourinary: No dysuria, trouble voiding, or hematuria.  Musculoskeletal:  No gait disturbance, No weakness or joint complaints.  Neurological: No headache, diplopia, change in muscle strength, numbness or tingling. No change in gate.   Endocrine: No temperature intolerance. No excessive thirst, fluid intake, or urination. No tremor.  Hematologic/Lymphatic: No abnormal bruising or bleeding    PHYSICAL EXAM:      BP (!) 125/55   Pulse (!) 47   Temp 97.6 °F (36.4 °C) (Oral)   Resp 14   Ht 1.727 m (5' 8\")   Wt 79.8 kg (176 lb)   SpO2 95%   BMI 26.76 kg/m²    Constitutional and  Right groin pseudoaneurysm 12/2016 after catheterization, resolved without surgery.     12. ECHO 3/7/2022 Global LV systolic function is moderately reduced; estimated EF 35-40%. Technically difficult study, not all measurements obtained. Mild mitral regurgitation.     13. STRESS 3/7/2022 Moderate size moderate intensity anterolateral ischemia ( reversible defect). Basal inferolateral infarction ( irreversible/fixed defect). Mild LV systolic dysfunction, LV ejection fraction 40%     14. CATH on 4/7/2022 with no new PCI. MV-CAD with patent LIMA_LAD and SVG-RPDA. Occluded LCx and occluded SVG-OM with right to left collaterals. No significant change from CATH in 2016.    LABS:   CBC:   Recent Labs     03/23/24  1643   WBC 9.0   HGB 14.9   HCT 45.3        BMP:   Recent Labs     03/23/24  1643 03/24/24  0452    138   K 4.7 4.8   CO2 22 24   BUN 37* 33*   CREATININE 1.5* 1.2   LABGLOM 46* >60   GLUCOSE 139* 91     BNP: No results for input(s): \"BNP\" in the last 72 hours.  PT/INR:   Recent Labs     03/23/24 2128 03/24/24  0452   PROTIME 24.0* 23.4*   INR 2.2 2.1     APTT:No results for input(s): \"APTT\" in the last 72 hours.  CARDIAC ENZYMES:No results for input(s): \"CKTOTAL\", \"CKMB\", \"CKMBINDEX\", \"TROPONINI\" in the last 72 hours.    ASSESSMENT:  Single episode SVT followed by fast V. tach at the rate of 180 terminated with AICD shock  CAD s/p CABG x 3, last cath 2022 showed patent LIMA-LAD, SVG-RPDA, known occluded SVG-OM and left circumflex with right to left collaterals.  History of V. tach s/p AICD in place  Ischemic cardiomyopathy with last LVEF 40%.  Stable with no CHF on exam  History of LV thrombus in 2016, initiated on Coumadin  Essential hypertension  Hyperlipidemia    RECOMMENDATIONS:  Continue home dose sotalol and mexiletine.  Continue aspirin, statin  Continue lisinopril and Aldactone.  Continue warfarin.  Pharmacy to dose  Will obtain EP consultation for further recommendation  Obtain 2D echo to

## 2024-03-24 NOTE — PROGRESS NOTES
Pharmacy Note  Warfarin Consult follow-up      Recent Labs     03/24/24  0452   INR 2.1     Recent Labs     03/23/24  1643   HGB 14.9   HCT 45.3          Significant Drug-Drug Interactions:  New warfarin drug-drug interactions: none  Discontinued drug-drug interactions: none      Notes:                   Warfarin 6 mg daily  Daily PT/INR while inpatient.    Josh Lam, PharmD, [facility]  3/24/2024  9:28 AM

## 2024-03-24 NOTE — PLAN OF CARE
Problem: Chronic Conditions and Co-morbidities  Goal: Patient's chronic conditions and co-morbidity symptoms are monitored and maintained or improved  3/24/2024 1837 by Fadia Parrish RN  Outcome: Progressing  3/24/2024 0604 by Alice Quinteros RN  Outcome: Progressing     Problem: Pain  Goal: Verbalizes/displays adequate comfort level or baseline comfort level  3/24/2024 1837 by Fadia Parrish, RN  Outcome: Progressing  3/24/2024 0604 by Alice Quinteros, RN  Outcome: Progressing

## 2024-03-24 NOTE — CARE COORDINATION
Case Management Assessment  Initial Evaluation    Date/Time of Evaluation: 3/24/2024 8:15 AM  Assessment Completed by: Sparkle Llanes    If patient is discharged prior to next notation, then this note serves as note for discharge by case management.    Patient Name: Erik Olea                   YOB: 1945  Diagnosis: Defibrillator discharge [Z45.02]                   Date / Time: 3/23/2024  4:22 PM    Patient Admission Status: Inpatient   Readmission Risk (Low < 19, Mod (19-27), High > 27): Readmission Risk Score: 13.4    Current PCP: Albertina Cleaning MD  PCP verified by CM? (P) Yes    Chart Reviewed: Yes      History Provided by: (P) Patient  Patient Orientation: (P) Alert and Oriented    Patient Cognition: (P) Alert    Hospitalization in the last 30 days (Readmission):  No    If yes, Readmission Assessment in  Navigator will be completed.    Advance Directives:      Code Status: Full Code   Patient's Primary Decision Maker is: (P) Legal Next of Kin      Discharge Planning:    Patient lives with: (P) Alone Type of Home: (P) House  Primary Care Giver: (P) Self  Patient Support Systems include: (P) Children   Current Financial resources: (P) Medicare  Current community resources: (P) None  Current services prior to admission: (P) None            Current DME:              Type of Home Care services:  (P) None    ADLS  Prior functional level: (P) Independent in ADLs/IADLs  Current functional level: (P) Independent in ADLs/IADLs    PT AM-PAC:   /24  OT AM-PAC:   /24    Family can provide assistance at DC: (P) No  Would you like Case Management to discuss the discharge plan with any other family members/significant others, and if so, who? (P) No  Plans to Return to Present Housing: (P) Yes  Other Identified Issues/Barriers to RETURNING to current housing: medical complications  Potential Assistance needed at discharge: (P) N/A            Potential DME:    Patient expects to discharge to: (P) House  Plan for

## 2024-03-24 NOTE — PROGRESS NOTES
[facility]  Office: [phone]  Quang Noe DO, Kimani Cannon DO, Bubba Wyatt DO, Johnny Christopher DO, Hira Khan MD, Vielka Escobar MD, Adarsh Bowen MD, Frida Horan MD,  Angel Warner MD, Bryon Lopez MD, Rebeca Christine MD,  Kenrick Solorio DO, Mau Chávez MD, Pankaj Scott MD, David Noe DO, Tarsha Blancas MD,  Jorge L Forbes DO, Liane Avery MD, Barbara Schumacher MD, Na Maldonado MD, Patrick Borrego MD,  Francisco J Mares MD, Lindsey Gonzalez MD, Vic Gernoimo MD, Foreign Kumar MD, Juan Lopez MD, Camila German MD, Rogelio Richardson DO, Toribio Marti DO, Bello Orellana MD,  Jose Alejandro Bernard MD, Shirley Waterhouse, CNP,  Mitali Padilla, CNP, Jd Garcia, CNP,  Maria Luz Peterson, DNP, Connie Cheek, CNP, Ivory Warren, CNP, Cathi Butt CNP, Idalia Nassar, CNP, Skye García, CNP, Kristina Smith, PA-C, Clary Flannery, PA-C, Mindy Garcia, CNP, Yadi Odonnell, CNP, Jennifer Mac, CNP, Kae Smith, CNS, Ting Bermudez, CNP, Radha Florez, CNP, Tracy Schwab, CNP         [facility]   IN-PATIENT SERVICE   [facility]    Progress Note    3/24/2024    8:16 AM    Name:   Erik Olea  MRN:     [id number]     Acct:      [id number]   Room:   0161/0161-01   Day:  1  Admit Date:  3/23/2024  4:22 PM    PCP:   Albertina Cleaning MD  Code Status:  Full Code    Subjective:     C/C:   Chief Complaint   Patient presents with    Chest Pain    Shortness of Breath     Interval History Status: not changed.     NAEO  Waiting for echo and EP recs     Brief History:     79-year-old male past medical history of V-fib, CAD, CHF with reduced ejection fraction, AICD, V. tach, and bifascicular block presents emergency department for fatigue for 2 days.  Patient states that he felt his ICD discharge on Thursday when he was having sexual activity.  However he has not had a symptom so therefore he did not come into the ED.  But, he did come in today for fatigue and    ALT 38   ALKPHOS 166*   BILITOT 0.4     ABG:  Lab Results   Component Value Date/Time    POCPH 7.48 11/25/2013 11:48 AM    POCPCO2 36 11/25/2013 11:48 AM    POCPO2 72 11/25/2013 11:48 AM    POCHCO3 26.4 11/25/2013 11:48 AM    NBEA NOT REPORTED 11/25/2013 11:48 AM    PBEA 3 11/25/2013 11:48 AM    STB8OSD 27 11/25/2013 11:48 AM    NRSP9HCD 95 11/25/2013 11:48 AM    FIO2 NOT REPORTED 12/07/2016 01:36 AM     Lab Results   Component Value Date/Time    SPECIAL NOT REPORTED 11/19/2013 03:52 PM     Lab Results   Component Value Date/Time    CULTURE NORMAL RESPIRATORY MANDI 11/19/2013 03:49 PM    CULTURE  11/19/2013 03:49 PM     [address] [address] [phone]       Radiology:  XR CHEST (2 VW)    Result Date: 3/23/2024  No active cardiopulmonary disease.       Physical Examination:        General appearance:  alert, cooperative and no distress  Mental Status:  oriented to person, place and time and normal affect  Lungs:  clear to auscultation bilaterally, normal effort  Heart:  regular rate and rhythm, no murmur  Abdomen:  soft, nontender, nondistended, normal bowel sounds, no masses, hepatomegaly, splenomegaly  Extremities:  no edema, redness, tenderness in the calves  Skin:  no gross lesions, rashes, induration    Assessment:        Hospital Problems             Last Modified POA    * (Principal) Defibrillator discharge 3/23/2024 Yes       Plan:        AICD discharge- Cardio following. Ep consulted. Pending echo. AICD interrogation showed 1 episode of SVT followed by fast V. tach at the rate of 180 followed by 1 shock with a sensation of rhythm. Cont sotalol and mexiletine.   History of apical thrombus-Coumadin.  Pharmacy to dose.  Monitor labs daily. Pending echo. If no thrombus on echo- cardio states they may discontinue coumadin.   History of CHF with reduced ejection fraction-ECHO 3/7/2022 Global LV systolic function is moderately reduced; estimated EF 35-40%.  Patient appears

## 2024-03-24 NOTE — PROGRESS NOTES
Discussed patient with EP, Dr. Arredondo.  Recommended to continue on sotalol and mexiletine. He will see him as an outpatient in 2 to 3 weeks upon discharge.  The patient is stable for discharge from an electrophysiological standpoint. Discharge once echo is done.      Brenda   Cv fellow

## 2024-03-25 ENCOUNTER — APPOINTMENT (OUTPATIENT)
Age: 79
DRG: 948 | End: 2024-03-25
Payer: MEDICARE

## 2024-03-25 VITALS
RESPIRATION RATE: 17 BRPM | HEART RATE: 56 BPM | HEIGHT: 68 IN | WEIGHT: 176 LBS | DIASTOLIC BLOOD PRESSURE: 68 MMHG | TEMPERATURE: 97 F | SYSTOLIC BLOOD PRESSURE: 130 MMHG | BODY MASS INDEX: 26.67 KG/M2 | OXYGEN SATURATION: 97 %

## 2024-03-25 LAB
ECHO AO ROOT DIAM: 3.7 CM
ECHO AO ROOT INDEX: 1.91 CM/M2
ECHO AV MEAN GRADIENT: 2 MMHG
ECHO AV MEAN VELOCITY: 0.7 M/S
ECHO AV PEAK GRADIENT: 5 MMHG
ECHO AV PEAK VELOCITY: 1.1 M/S
ECHO AV VELOCITY RATIO: 0.73
ECHO AV VTI: 22.4 CM
ECHO BSA: 1.96 M2
ECHO LA AREA 2C: 31.2 CM2
ECHO LA AREA 4C: 29.1 CM2
ECHO LA DIAMETER INDEX: 1.96 CM/M2
ECHO LA DIAMETER: 3.8 CM
ECHO LA MAJOR AXIS: 6.3 CM
ECHO LA MINOR AXIS: 6.5 CM
ECHO LA TO AORTIC ROOT RATIO: 1.03
ECHO LA VOL BP: 112 ML (ref 18–58)
ECHO LA VOL MOD A2C: 120 ML (ref 18–58)
ECHO LA VOL MOD A4C: 105 ML (ref 18–58)
ECHO LA VOL/BSA BIPLANE: 58 ML/M2 (ref 16–34)
ECHO LA VOLUME INDEX MOD A2C: 62 ML/M2 (ref 16–34)
ECHO LA VOLUME INDEX MOD A4C: 54 ML/M2 (ref 16–34)
ECHO LV EDV A2C: 129 ML
ECHO LV EDV A4C: 128 ML
ECHO LV EDV INDEX A4C: 66 ML/M2
ECHO LV EDV NDEX A2C: 66 ML/M2
ECHO LV EJECTION FRACTION A2C: 50 %
ECHO LV EJECTION FRACTION A4C: 59 %
ECHO LV EJECTION FRACTION BIPLANE: 55 % (ref 55–100)
ECHO LV ESV A2C: 64 ML
ECHO LV ESV A4C: 53 ML
ECHO LV ESV INDEX A2C: 33 ML/M2
ECHO LV ESV INDEX A4C: 27 ML/M2
ECHO LV FRACTIONAL SHORTENING: 11 % (ref 28–44)
ECHO LV INTERNAL DIMENSION DIASTOLE INDEX: 2.78 CM/M2
ECHO LV INTERNAL DIMENSION DIASTOLIC: 5.4 CM (ref 4.2–5.9)
ECHO LV INTERNAL DIMENSION SYSTOLIC INDEX: 2.47 CM/M2
ECHO LV INTERNAL DIMENSION SYSTOLIC: 4.8 CM
ECHO LV IVSD: 1.1 CM (ref 0.6–1)
ECHO LV MASS 2D: 234.8 G (ref 88–224)
ECHO LV MASS INDEX 2D: 121 G/M2 (ref 49–115)
ECHO LV POSTERIOR WALL DIASTOLIC: 1.1 CM (ref 0.6–1)
ECHO LV RELATIVE WALL THICKNESS RATIO: 0.41
ECHO LVOT AV VTI INDEX: 0.75
ECHO LVOT MEAN GRADIENT: 1 MMHG
ECHO LVOT PEAK GRADIENT: 3 MMHG
ECHO LVOT PEAK VELOCITY: 0.8 M/S
ECHO LVOT VTI: 16.7 CM
ECHO MV LVOT VTI INDEX: 1.22
ECHO MV MAX VELOCITY: 0.6 M/S
ECHO MV MEAN GRADIENT: 1 MMHG
ECHO MV MEAN VELOCITY: 0.4 M/S
ECHO MV PEAK GRADIENT: 1 MMHG
ECHO MV VTI: 20.4 CM
ECHO RV BASAL DIMENSION: 4.4 CM
ECHO RV FREE WALL PEAK S': 8 CM/S
ECHO RV TAPSE: 1.4 CM (ref 1.7–?)
ECHO TV REGURGITANT MAX VELOCITY: 2.4 M/S
ECHO TV REGURGITANT PEAK GRADIENT: 23 MMHG
EKG ATRIAL RATE: 55 BPM
EKG ATRIAL RATE: 63 BPM
EKG P AXIS: 62 DEGREES
EKG P AXIS: 63 DEGREES
EKG P-R INTERVAL: 284 MS
EKG P-R INTERVAL: 306 MS
EKG Q-T INTERVAL: 480 MS
EKG Q-T INTERVAL: 488 MS
EKG QRS DURATION: 158 MS
EKG QRS DURATION: 164 MS
EKG QTC CALCULATION (BAZETT): 466 MS
EKG QTC CALCULATION (BAZETT): 491 MS
EKG R AXIS: -62 DEGREES
EKG R AXIS: -62 DEGREES
EKG T AXIS: 49 DEGREES
EKG T AXIS: 62 DEGREES
EKG VENTRICULAR RATE: 55 BPM
EKG VENTRICULAR RATE: 63 BPM
INR PPP: 1.8
PROTHROMBIN TIME: 20.3 SEC (ref 11.7–14.9)

## 2024-03-25 PROCEDURE — 99239 HOSP IP/OBS DSCHRG MGMT >30: CPT | Performed by: STUDENT IN AN ORGANIZED HEALTH CARE EDUCATION/TRAINING PROGRAM

## 2024-03-25 PROCEDURE — 2580000003 HC RX 258: Performed by: STUDENT IN AN ORGANIZED HEALTH CARE EDUCATION/TRAINING PROGRAM

## 2024-03-25 PROCEDURE — 93321 DOPPLER ECHO F-UP/LMTD STD: CPT | Performed by: INTERNAL MEDICINE

## 2024-03-25 PROCEDURE — 6370000000 HC RX 637 (ALT 250 FOR IP): Performed by: STUDENT IN AN ORGANIZED HEALTH CARE EDUCATION/TRAINING PROGRAM

## 2024-03-25 PROCEDURE — 93308 TTE F-UP OR LMTD: CPT | Performed by: INTERNAL MEDICINE

## 2024-03-25 PROCEDURE — 36415 COLL VENOUS BLD VENIPUNCTURE: CPT

## 2024-03-25 PROCEDURE — 93325 DOPPLER ECHO COLOR FLOW MAPG: CPT | Performed by: INTERNAL MEDICINE

## 2024-03-25 PROCEDURE — 93010 ELECTROCARDIOGRAM REPORT: CPT | Performed by: INTERNAL MEDICINE

## 2024-03-25 PROCEDURE — C8924 2D TTE W OR W/O FOL W/CON,FU: HCPCS

## 2024-03-25 PROCEDURE — 6360000002 HC RX W HCPCS: Performed by: STUDENT IN AN ORGANIZED HEALTH CARE EDUCATION/TRAINING PROGRAM

## 2024-03-25 PROCEDURE — 85610 PROTHROMBIN TIME: CPT

## 2024-03-25 PROCEDURE — 93005 ELECTROCARDIOGRAM TRACING: CPT | Performed by: STUDENT IN AN ORGANIZED HEALTH CARE EDUCATION/TRAINING PROGRAM

## 2024-03-25 RX ORDER — HYDRALAZINE HYDROCHLORIDE 20 MG/ML
10 INJECTION INTRAMUSCULAR; INTRAVENOUS EVERY 6 HOURS PRN
Status: DISCONTINUED | OUTPATIENT
Start: 2024-03-25 | End: 2024-03-25 | Stop reason: HOSPADM

## 2024-03-25 RX ORDER — WARFARIN SODIUM 3 MG/1
9 TABLET ORAL
Status: DISCONTINUED | OUTPATIENT
Start: 2024-03-25 | End: 2024-03-25 | Stop reason: HOSPADM

## 2024-03-25 RX ADMIN — MEXILETINE HYDROCHLORIDE 200 MG: 200 CAPSULE ORAL at 08:25

## 2024-03-25 RX ADMIN — MONTELUKAST SODIUM 10 MG: 10 TABLET, FILM COATED ORAL at 02:11

## 2024-03-25 RX ADMIN — SOTALOL HYDROCHLORIDE 80 MG: 80 TABLET ORAL at 08:25

## 2024-03-25 RX ADMIN — MEXILETINE HYDROCHLORIDE 200 MG: 200 CAPSULE ORAL at 14:26

## 2024-03-25 RX ADMIN — PERFLUTREN 1 ML: 6.52 INJECTION, SUSPENSION INTRAVENOUS at 15:05

## 2024-03-25 RX ADMIN — LISINOPRIL 10 MG: 10 TABLET ORAL at 08:25

## 2024-03-25 RX ADMIN — ASPIRIN 81 MG: 81 TABLET, CHEWABLE ORAL at 08:25

## 2024-03-25 RX ADMIN — SPIRONOLACTONE 25 MG: 25 TABLET ORAL at 08:25

## 2024-03-25 RX ADMIN — SODIUM CHLORIDE, PRESERVATIVE FREE 10 ML: 5 INJECTION INTRAVENOUS at 08:25

## 2024-03-25 NOTE — PROGRESS NOTES
RN notified cardiology that echo has resulted, writer inquiring if patient is OK for discharge. Dr Paniagua states patient can be discharged.

## 2024-03-25 NOTE — DISCHARGE SUMMARY
[facility]  Office: [phone]  Quang Noe DO, Kimani Cannon DO, Bubba Wyatt DO, Johnny Christopher DO, Hira Khan MD, Vielka Escobar MD, Adarsh Bowen MD, Frida Horan MD,  Angel Warner MD, Bryon Lopez MD, Rebeca Christine MD,  Kenrick Solorio DO, Mau Chávez MD, Pankaj Scott MD, David Noe DO, Tarsha Blancas MD,  Jorge L Forbes DO, Liane Avery MD, Barbara Schumacher MD, Na Maldonado MD, Patrick Borrego MD,  Francisco J Mares MD, Lindsey Gonzalez MD, Vic Geronimo MD, Foreign Kumar MD, Juan Lopez MD, Camila German MD, Rogelio Richardson DO, Toribio Marti DO, Bello Orellana MD,  Jose Alejnadro Bernard MD, Shirley Waterhouse, CNP,  Mitali Padilla, CNP, Jd Garcia, CNP,  Maria Luz Peterson, DNP, Connie Cheek, CNP, Ivory Warren, CNP, Cathi Butt CNP, Idalia Nassar, CNP, Skye García, CNP, Kristina Smith, PA-C, Clary Flannery, PA-C, Mindy Garcia, CNP, Yadi Odonnell, CNP, Jennifer Mac, CNP, Kae Smith, CNS, Ting Bermudez, CNP, Radha Florez, CNP, Tracy Schwab, CNP         [facility]   IN-PATIENT SERVICE   [facility]    Discharge Summary     Patient ID: Erik Olea  :  1945   MRN: [id number]     ACCOUNT:  [id number]   Patient's PCP: Albertina Cleaning MD  Admit Date: 3/23/2024   Discharge Date: 3/25/2024     Length of Stay: 2  Code Status:  Full Code  Admitting Physician: Garo Maldonado MD  Discharge Physician: Garo Maldonado MD     Active Discharge Diagnoses:     Hospital Problem Lists:  Principal Problem:    Defibrillator discharge  Resolved Problems:    * No resolved hospital problems. *      Admission Condition:  fair     Discharged Condition: fair    Hospital Stay:     Hospital Course:    79-year-old male past medical history of V-fib, CAD, CHF with reduced ejection fraction, AICD, V. tach, and bifascicular block presents emergency department for fatigue for 2 days.  Patient states that he felt his ICD discharge on Thursday when  he was having sexual activity.  However he has not had a symptom so therefore he did not come into the ED.  But, he did come in today for fatigue and mention that he had a discharge exertion.  Cardiology was called in the ED and was recommended patient be admitted for further workup and evaluation.  Patient denies chest pain, shortness breath, nausea, cough, headache, numbness or tingling.  He states that similar presentation had been 3 weeks ago it was admitted for and discharged same day.  He states that at that time he was also having sexual activity which resulted in a discharge with ICD.      Dr. Arredondo. Recommended to continue on sotalol and mexiletine. He will see him as an outpatient in 2 to 3 weeks upon discharge. The patient is stable for discharge from an electrophysiological standpoint     Significant therapeutic interventions: echo 3/25    Significant Diagnostic Studies:   Labs / Micro:  CBC:   Lab Results   Component Value Date/Time    WBC 9.0 03/23/2024 04:43 PM    RBC 4.89 03/23/2024 04:43 PM    HGB 14.9 03/23/2024 04:43 PM    HCT 45.3 03/23/2024 04:43 PM    MCV 92.6 03/23/2024 04:43 PM    MCH 30.5 03/23/2024 04:43 PM    MCHC 32.9 03/23/2024 04:43 PM    RDW 14.1 03/23/2024 04:43 PM     03/23/2024 04:43 PM     BMP:    Lab Results   Component Value Date/Time    GLUCOSE 91 03/24/2024 04:52 AM     03/24/2024 04:52 AM    K 4.8 03/24/2024 04:52 AM     03/24/2024 04:52 AM    CO2 24 03/24/2024 04:52 AM    ANIONGAP 10 03/24/2024 04:52 AM    BUN 33 03/24/2024 04:52 AM    CREATININE 1.2 03/24/2024 04:52 AM    BUNCRER NOT REPORTED 04/24/2019 08:19 AM    CALCIUM 9.1 03/24/2024 04:52 AM    LABGLOM >60 03/24/2024 04:52 AM    GFRAA >60 08/12/2022 05:05 AM    GFR      08/12/2022 05:05 AM        Radiology:  XR CHEST (2 VW)    Result Date: 3/23/2024  No active cardiopulmonary disease.       Consultations:    Consults:     Final Specialist Recommendations/Findings:   IP CONSULT TO CARDIOLOGY  IP

## 2024-03-25 NOTE — PROGRESS NOTES
Pharmacy Note  Warfarin Consult follow-up    Warfarin dose prior to admission: 6 mg daily (per medication management clinic, last visit 3/14; also confirmed with the patient)  Warfarin indication: LV thrombus  Target INR range: 2-3    Recent Labs     03/25/24  0221   INR 1.8     Recent Labs     03/23/24  1643   HGB 14.9   HCT 45.3          Current warfarin drug-drug interactions: aspirin    Date INR Dose   3/24 2.1 6 mg   3/25 1.8       Notes:  - INR subtherapeutic, will order Warfarin 9 mg x1 today                   Daily PT/INR while inpatient.     Lydia Liu, PharmD, 3/25/2024 12:51 PM

## 2024-03-25 NOTE — PROGRESS NOTES
Writer provided patient with discharge education. Pt verbalizes understanding. Pt discharged safely and with all belongings.

## 2024-03-25 NOTE — PROGRESS NOTES
Congestive Heart Failure Education completed and charted. CHF booklet given. Patient was receptive to education.    Discussed the  importance of medication compliance.    Discussed the importance of a heart healthy diet. Discussed 2000 mg sodium-restricted daily diet.  Patient instructed to limit fluid intake to  1.5 to 2 liters per day.    Patient instructed to weigh self at the same time of each day each morning, reinforced teaching to monitor for 3-5 lb weight increase over 1-2 days notify physician if change noted.      Signs and symptoms of CHF discussed with patient, such as feeling more tired than normal, feeling short of breath, coughing that increases when lying down, sudden weight gain, swelling of the feet, legs or belly.  Patient verbalized understanding to notify physician office if these symptoms occur.  2016 EF 30%   Echo pending

## 2024-03-25 NOTE — PLAN OF CARE
Problem: Chronic Conditions and Co-morbidities  Goal: Patient's chronic conditions and co-morbidity symptoms are monitored and maintained or improved  3/25/2024 1748 by Ramona Florian RN  Outcome: Adequate for Discharge  3/25/2024 0600 by Arlene Escalera RN  Outcome: Progressing     Problem: Pain  Goal: Verbalizes/displays adequate comfort level or baseline comfort level  3/25/2024 1748 by Ramona Florian RN  Outcome: Adequate for Discharge  3/25/2024 0600 by Arlene Escalera RN  Outcome: Progressing

## 2024-03-25 NOTE — PLAN OF CARE
Problem: Chronic Conditions and Co-morbidities  Goal: Patient's chronic conditions and co-morbidity symptoms are monitored and maintained or improved  3/25/2024 0600 by Arlene Escalera RN  Outcome: Progressing  3/24/2024 1837 by Fadia Parrish RN  Outcome: Progressing     Problem: Pain  Goal: Verbalizes/displays adequate comfort level or baseline comfort level  3/25/2024 0600 by Arlene Escalera, RN  Outcome: Progressing  3/24/2024 1837 by Fadia Parrish RN  Outcome: Progressing

## 2024-04-02 NOTE — PROGRESS NOTES
Physician Progress Note      PATIENT:               CORIN FARMER  CSN #:                  [id number]  :                       1945  ADMIT DATE:       3/23/2024 4:22 PM  DISCH DATE:        3/25/2024 5:35 PM  RESPONDING  PROVIDER #:        Garo Maldonado MD          QUERY TEXT:    Pt admitted with fatigue. Pt noted to have LETICIA and AICD firing interrogation   showed SVT. If possible, please document in progress notes and discharge   summary the etiology of fatigue.    The medical record reflects the following:  Risk Factors: 79 yr old hx Vfib, CAD, CHF w/ reduced EF, Ef 30%, AICD  Clinical Indicators: , he did come in today for fatigue and mention that he   had a discharge exertion.  denies chest pain, shortness breath, nausea, cough, headache, numbness or   tingling.  Troponin 13-12, creatinine of 1.5 which is now improved to 1.2.  AICD interrogation: Single episode SVT followed by fast V. tach at the rate of   180 terminated with AICD shock  Treatment: IVF, lab monitoring, Cardiology consult, continue home meds   sotalol, mexiletine, asa, statin, lisinopril, aldactone    Thank you in advance for your time and consideration! Please reach out for any   questions.  Jill Horne RN, CCDS, CRCR Supervisor [phone]  Options provided:  -- fatigue due to SVT/ AICD firing  -- fatigue due to LETICIA  -- fatigue due to SVT/ AICD firing and LETICIA  -- Other - I will add my own diagnosis  -- Disagree - Not applicable / Not valid  -- Disagree - Clinically unable to determine / Unknown  -- Refer to Clinical Documentation Reviewer    PROVIDER RESPONSE TEXT:    This patient has fatigue due to SVT/AICD firing.    Query created by: Jill Horne on 2024 7:04 AM      Electronically signed by:  Garo Maldonado MD 2024 7:19 AM

## 2024-04-09 ENCOUNTER — HOSPITAL ENCOUNTER (OUTPATIENT)
Dept: PHARMACY | Age: 79
Setting detail: THERAPIES SERIES
Discharge: HOME | End: 2024-04-09
Payer: MEDICARE

## 2024-04-09 DIAGNOSIS — I51.3 LEFT VENTRICULAR APICAL THROMBUS: Primary | ICD-10-CM

## 2024-04-09 LAB
INR BLD: 2.6
PROTIME: 31.6 SECONDS

## 2024-04-09 PROCEDURE — 85610 PROTHROMBIN TIME: CPT

## 2024-04-09 PROCEDURE — 99212 OFFICE O/P EST SF 10 MIN: CPT

## 2024-04-09 NOTE — PROGRESS NOTES
Medication Management Service, Warfarin Management  [facility], [phone]  First visit to ACS Office.  Date: 2024     Education provided on indication and mechanism of warfarin, compliance, appropriate follow-up & monitoring, dietary and medication interactions, potential thromboembolic & bleeding complications, when to seek medical care, and office policy.    Patient has been on warfarin for years.    Patient states compliant with regimen.  No bleeding or thromboembolic side effects noted.  No significant med or dietary changes.  No significant recent illness or disease state changes.     Subjective:   Erik Olea is a 79 y.o. male who presents to clinic today for anticoagulation monitoring and adjustment.  Patient seen in clinic for warfarin management due to  Indication:    Left ventricular apical thrombus .   INR goal: of 2.0-3.0.  Duration of therapy: indefinite.    Assessment and PLAN   PT/INR done in office per protocol.   INR today is 2.6, therapeutic.        Plan:  Will continue current regimen of warfarin 6 mg daily. Using warfarin 6 mg tablets.  Recheck INR in 4 week(s).   Patient seen in room # 1.    Patient verbalized understanding of dosing directions and information discussed. Dosing schedule given to patient. Progress note sent to referring office.     CPA and HIPAA forms rosa isela in office today.     Amy FOX. Ph., CACP, Clinical Pharmacist  Anticoagulation Services, [facility]  2024  9:15 AM      For Pharmacy Admin Tracking Only    Intervention Detail: Adherence Monitorin  Total # of Interventions Recommended: 1  Total # of Interventions Accepted: 1  Time Spent (min): 30

## 2024-05-06 ENCOUNTER — HOSPITAL ENCOUNTER (OUTPATIENT)
Dept: PHARMACY | Age: 79
Setting detail: THERAPIES SERIES
Discharge: HOME | End: 2024-05-06
Payer: MEDICARE

## 2024-05-06 DIAGNOSIS — I51.3 LEFT VENTRICULAR APICAL THROMBUS: Primary | ICD-10-CM

## 2024-05-06 LAB
INR BLD: 3.7
PROTIME: 45 SECONDS

## 2024-05-06 PROCEDURE — 85610 PROTHROMBIN TIME: CPT

## 2024-05-06 PROCEDURE — 99212 OFFICE O/P EST SF 10 MIN: CPT

## 2024-05-06 RX ORDER — WARFARIN SODIUM 6 MG/1
TABLET ORAL
Qty: 110 TABLET | Refills: 3 | Status: SHIPPED
Start: 2024-05-06 | End: 2024-05-06

## 2024-05-06 RX ORDER — WARFARIN SODIUM 6 MG/1
TABLET ORAL
Qty: 90 TABLET | Refills: 1 | Status: SHIPPED
Start: 2024-05-06

## 2024-05-06 NOTE — PROGRESS NOTES
Medication Management Service, Warfarin Management  [facility], [phone]  Visit Date: 2024   Subjective:   Erik Olea is a 79 y.o. male who presents to clinic today for anticoagulation monitoring and adjustment.  Patient seen in clinic for warfarin management due to  Indication:    Left ventricular thrombus .   INR goal: of 2.0-3.0.  Duration of therapy: indefinite.    Assessment and PLAN   PT/INR done in office per protocol.   INR today is 3.7, supratherapeutic, patient reports he did not have his usual  green vegetable for this past week, but usually has only one.  Denies any recent alcohol intake and other usual causes for jump in INR.  Per discussion patient does not eat much in overall dietary intake.         Plan:  Will hold dose today and reduce regimen by 7.1% to warfarin 3mg on  only, 6mg all other days of the week.   Using warfarin 6 mg tablets.  Recheck INR in 1.5 week(s).   Patient seen in room # 3.    ED. OP. = Discussed recent med changes; increase in Mexiletine.  Has been experiencing orthostatic hypotension.  Discussed diagnosis for warfarin use, referral indicate LVT but that is an old Dx and does not warrant lifelong warfarin therapy.  I called [facility] and left  requesting new referral with updated Dx.     Updated sig on script.     Patient verbalized understanding of dosing directions and information discussed. Dosing schedule given to patient. Progress note sent to referring office.       Jacinta Ireland [facility], CACP  Clinical Pharmacist Medication Management  2024  11:24 AM      For Pharmacy Admin Tracking Only    Intervention Detail: Adherence Monitorin and Dose Adjustment: 1, reason: Improve Adherence, Therapy De-escalation  Total # of Interventions Recommended: 2  Total # of Interventions Accepted: 2  Time Spent (min):  25

## 2024-05-07 DIAGNOSIS — I65.23 BILATERAL CAROTID ARTERY STENOSIS: Primary | ICD-10-CM

## 2024-05-17 ENCOUNTER — HOSPITAL ENCOUNTER (OUTPATIENT)
Dept: PHARMACY | Age: 79
Setting detail: THERAPIES SERIES
Discharge: HOME | End: 2024-05-17
Payer: MEDICARE

## 2024-05-17 DIAGNOSIS — I51.3 LEFT VENTRICULAR APICAL THROMBUS: Primary | ICD-10-CM

## 2024-05-17 LAB
INR BLD: 2.2
PROTIME: 25.9 SECONDS

## 2024-05-17 PROCEDURE — 99211 OFF/OP EST MAY X REQ PHY/QHP: CPT

## 2024-05-17 PROCEDURE — 85610 PROTHROMBIN TIME: CPT

## 2024-05-17 NOTE — PROGRESS NOTES
Medication Management Service, Warfarin Management  [facility], [phone]  First visit to ACS Office.  Date: 5/17/2024     Education provided on indication and mechanism of warfarin, compliance, appropriate follow-up & monitoring, dietary and medication interactions, potential thromboembolic & bleeding complications, when to seek medical care, and office policy.    Patient has been on warfarin for years.    Patient states compliant with regimen.  No bleeding or thromboembolic side effects noted.  No significant med or dietary changes.  No significant recent illness or disease state changes.     Subjective:   Erik Olea is a 79 y.o. male who presents to clinic today for anticoagulation monitoring and adjustment.  Patient seen in clinic for warfarin management due to  Indication:    Left ventricular apical thrombus .   INR goal: of 2.0-3.0.  Duration of therapy: indefinite.    Assessment and PLAN   PT/INR done in office per protocol.   INR today is 2.2, therapeutic.        Plan:  Will continue current regimen of warfarin  3 mg Mon; 6 mg all other days of the week.    Using warfarin 6 mg tablets.  Recheck INR in 4 week(s).   Patient seen in room # 1.    Patient verbalized understanding of dosing directions and information discussed. Dosing schedule given to patient. Progress note sent to referring office.      Amy FOX. Ph., CACP, Clinical Pharmacist  Anticoagulation Services, [facility]  5/17/2024  8:36 AM      For Pharmacy Admin Tracking Only    Intervention Detail:   Total # of Interventions Recommended: 0  Total # of Interventions Accepted: 0  Time Spent (min): 20

## 2024-05-24 ENCOUNTER — HOSPITAL ENCOUNTER (OUTPATIENT)
Dept: VASCULAR LAB | Age: 79
End: 2024-05-24
Attending: SURGERY
Payer: MEDICARE

## 2024-05-24 ENCOUNTER — OFFICE VISIT (OUTPATIENT)
Dept: VASCULAR SURGERY | Age: 79
End: 2024-05-24
Payer: MEDICARE

## 2024-05-24 VITALS
HEIGHT: 68 IN | BODY MASS INDEX: 27.83 KG/M2 | WEIGHT: 183.6 LBS | SYSTOLIC BLOOD PRESSURE: 123 MMHG | RESPIRATION RATE: 18 BRPM | TEMPERATURE: 97.7 F | DIASTOLIC BLOOD PRESSURE: 68 MMHG | OXYGEN SATURATION: 96 % | HEART RATE: 53 BPM

## 2024-05-24 DIAGNOSIS — I65.23 BILATERAL CAROTID ARTERY STENOSIS: Primary | ICD-10-CM

## 2024-05-24 DIAGNOSIS — I65.23 BILATERAL CAROTID ARTERY STENOSIS: ICD-10-CM

## 2024-05-24 LAB
VAS LEFT CCA DIST EDV: 43.9 CM/S
VAS LEFT CCA DIST PSV: 270 CM/S
VAS LEFT CCA MID EDV: 47 CM/S
VAS LEFT CCA MID PSV: 246 CM/S
VAS LEFT CCA PROX EDV: 19.2 CM/S
VAS LEFT CCA PROX PSV: 63.7 CM/S
VAS LEFT ECA EDV: 9.32 CM/S
VAS LEFT ECA PSV: 75.2 CM/S
VAS LEFT ICA DIST EDV: 34.3 CM/S
VAS LEFT ICA DIST PSV: 90.4 CM/S
VAS LEFT ICA MID EDV: 51.8 CM/S
VAS LEFT ICA MID PSV: 120 CM/S
VAS LEFT ICA PROX EDV: 25.2 CM/S
VAS LEFT ICA PROX PSV: 86.2 CM/S
VAS LEFT ICA/CCA PSV: 0.44
VAS LEFT VERTEBRAL EDV: 15.2 CM/S
VAS LEFT VERTEBRAL PSV: 60 CM/S
VAS RIGHT CCA DIST EDV: 39.2 CM/S
VAS RIGHT CCA DIST PSV: 248 CM/S
VAS RIGHT CCA MID EDV: 11 CM/S
VAS RIGHT CCA MID PSV: 68 CM/S
VAS RIGHT CCA PROX EDV: 10.5 CM/S
VAS RIGHT CCA PROX PSV: 60.6 CM/S
VAS RIGHT ECA EDV: 22 CM/S
VAS RIGHT ECA PSV: 224 CM/S
VAS RIGHT ICA DIST EDV: 30.7 CM/S
VAS RIGHT ICA DIST PSV: 82.9 CM/S
VAS RIGHT ICA MID EDV: 35 CM/S
VAS RIGHT ICA MID PSV: 109 CM/S
VAS RIGHT ICA PROX EDV: 22 CM/S
VAS RIGHT ICA PROX PSV: 218 CM/S
VAS RIGHT ICA/CCA PSV: 0.88
VAS RIGHT VERTEBRAL EDV: 6.3 CM/S
VAS RIGHT VERTEBRAL PSV: 23.5 CM/S

## 2024-05-24 PROCEDURE — 1036F TOBACCO NON-USER: CPT | Performed by: SURGERY

## 2024-05-24 PROCEDURE — 3078F DIAST BP <80 MM HG: CPT | Performed by: SURGERY

## 2024-05-24 PROCEDURE — 93880 EXTRACRANIAL BILAT STUDY: CPT

## 2024-05-24 PROCEDURE — 99213 OFFICE O/P EST LOW 20 MIN: CPT | Performed by: SURGERY

## 2024-05-24 PROCEDURE — G8427 DOCREV CUR MEDS BY ELIG CLIN: HCPCS | Performed by: SURGERY

## 2024-05-24 PROCEDURE — 93880 EXTRACRANIAL BILAT STUDY: CPT | Performed by: SURGERY

## 2024-05-24 PROCEDURE — G8417 CALC BMI ABV UP PARAM F/U: HCPCS | Performed by: SURGERY

## 2024-05-24 PROCEDURE — 3074F SYST BP LT 130 MM HG: CPT | Performed by: SURGERY

## 2024-05-24 PROCEDURE — 1123F ACP DISCUSS/DSCN MKR DOCD: CPT | Performed by: SURGERY

## 2024-05-24 NOTE — PROGRESS NOTES
[facility]  [address]  [place] [address]  Dept: [phone]     Patient: Erik Olea  : 1945  MRN: [id number]  DOS: 2024            HPI:  Erik Olea is a 79 y.o. male who returns to the office regarding his carotid arteries.  Recall that he had bilateral carotid endarterectomy.  These were staged procedures of course.  The right was done before the left.  His carotid duplex does have some elevated velocities mostly in the common carotid artery just at the proximal end of the repair.  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  He has distal internal carotid artery velocities are actually quite good and in the normal range.  He has not had stroke, TIA or amaurosis fugax.    Review of Systems    Vitals:    24 0914   BP: 123/68   Site: Left Upper Arm   Position: Sitting   Cuff Size: Medium Adult   Pulse: 53   Resp: 18   Temp: 97.7 °F (36.5 °C)   TempSrc: Temporal   SpO2: 96%   Weight: 83.3 kg (183 lb 9.6 oz)   Height: 1.727 m (5' 8\")          Physical Exam  On examination he does have a low rumbling bruit bilaterally consistent with his proximal repair neointimal hyperplasia.  I did examine his carotid duplex examination today and I see no significant stenoses.  His common carotid velocities are high in the mid and distal common carotid.  His internal carotid velocities are normal.  Assessment:  1. Bilateral carotid artery stenosis          Plan:  At this point we will continue with yearly follow-up with carotid duplex examinations.  He understands and agrees with these plans.  We will see him in a year.    Electronically signed by:  Erik Chen MD

## 2024-06-13 ENCOUNTER — HOSPITAL ENCOUNTER (OUTPATIENT)
Dept: PHARMACY | Age: 79
Setting detail: THERAPIES SERIES
Discharge: HOME | End: 2024-06-13
Payer: MEDICARE

## 2024-06-13 DIAGNOSIS — I51.3 LEFT VENTRICULAR APICAL THROMBUS: Primary | ICD-10-CM

## 2024-06-13 LAB
INR BLD: 2.1
PROTIME: 25.5 SECONDS

## 2024-06-13 PROCEDURE — 99211 OFF/OP EST MAY X REQ PHY/QHP: CPT

## 2024-06-13 PROCEDURE — 85610 PROTHROMBIN TIME: CPT

## 2024-06-16 ENCOUNTER — HOSPITAL ENCOUNTER (INPATIENT)
Age: 79
LOS: 1 days | Discharge: HOME | DRG: 281 | End: 2024-06-17
Attending: EMERGENCY MEDICINE | Admitting: STUDENT IN AN ORGANIZED HEALTH CARE EDUCATION/TRAINING PROGRAM
Payer: MEDICARE

## 2024-06-16 ENCOUNTER — APPOINTMENT (OUTPATIENT)
Dept: GENERAL RADIOLOGY | Age: 79
DRG: 281 | End: 2024-06-16
Payer: MEDICARE

## 2024-06-16 DIAGNOSIS — I21.4 NSTEMI (NON-ST ELEVATED MYOCARDIAL INFARCTION) (HCC): Primary | ICD-10-CM

## 2024-06-16 PROBLEM — Z95.1 S/P CABG (CORONARY ARTERY BYPASS GRAFT): Status: ACTIVE | Noted: 2024-06-16

## 2024-06-16 LAB
ANION GAP SERPL CALCULATED.3IONS-SCNC: 9 MMOL/L (ref 9–16)
ANTI-XA UNFRAC HEPARIN: <0.1 IU/L
BNP SERPL-MCNC: 254 PG/ML (ref 0–300)
BUN SERPL-MCNC: 29 MG/DL (ref 8–23)
CA-I BLD-SCNC: 1.12 MMOL/L (ref 1.13–1.33)
CALCIUM SERPL-MCNC: 9.1 MG/DL (ref 8.6–10.4)
CHLORIDE SERPL-SCNC: 106 MMOL/L (ref 98–107)
CO2 SERPL-SCNC: 24 MMOL/L (ref 20–31)
CREAT SERPL-MCNC: 1.3 MG/DL (ref 0.7–1.2)
ERYTHROCYTE [DISTWIDTH] IN BLOOD BY AUTOMATED COUNT: 13.6 % (ref 11.8–14.4)
GFR, ESTIMATED: 56 ML/MIN/1.73M2
GLUCOSE SERPL-MCNC: 97 MG/DL (ref 74–99)
HCT VFR BLD AUTO: 39.8 % (ref 40.7–50.3)
HGB BLD-MCNC: 13.1 G/DL (ref 13–17)
INR PPP: 3
MAGNESIUM SERPL-MCNC: 2.3 MG/DL (ref 1.6–2.4)
MCH RBC QN AUTO: 30.2 PG (ref 25.2–33.5)
MCHC RBC AUTO-ENTMCNC: 32.9 G/DL (ref 28.4–34.8)
MCV RBC AUTO: 91.7 FL (ref 82.6–102.9)
NRBC BLD-RTO: 0 PER 100 WBC
PARTIAL THROMBOPLASTIN TIME: 37.3 SEC (ref 23–36.5)
PHOSPHATE SERPL-MCNC: 1.8 MG/DL (ref 2.5–4.5)
PLATELET # BLD AUTO: 217 K/UL (ref 138–453)
PMV BLD AUTO: 11.8 FL (ref 8.1–13.5)
POTASSIUM SERPL-SCNC: 4.5 MMOL/L (ref 3.7–5.3)
PROTHROMBIN TIME: 30 SEC (ref 11.7–14.9)
RBC # BLD AUTO: 4.34 M/UL (ref 4.21–5.77)
SODIUM SERPL-SCNC: 139 MMOL/L (ref 136–145)
TROPONIN I SERPL HS-MCNC: 105 NG/L (ref 0–22)
TROPONIN I SERPL HS-MCNC: 120 NG/L (ref 0–22)
TROPONIN I SERPL HS-MCNC: 78 NG/L (ref 0–22)
TROPONIN I SERPL HS-MCNC: 82 NG/L (ref 0–22)
WBC OTHER # BLD: 7.7 K/UL (ref 3.5–11.3)

## 2024-06-16 PROCEDURE — 85730 THROMBOPLASTIN TIME PARTIAL: CPT

## 2024-06-16 PROCEDURE — 93005 ELECTROCARDIOGRAM TRACING: CPT | Performed by: STUDENT IN AN ORGANIZED HEALTH CARE EDUCATION/TRAINING PROGRAM

## 2024-06-16 PROCEDURE — 96374 THER/PROPH/DIAG INJ IV PUSH: CPT

## 2024-06-16 PROCEDURE — 84484 ASSAY OF TROPONIN QUANT: CPT

## 2024-06-16 PROCEDURE — 99223 1ST HOSP IP/OBS HIGH 75: CPT | Performed by: INTERNAL MEDICINE

## 2024-06-16 PROCEDURE — 6370000000 HC RX 637 (ALT 250 FOR IP): Performed by: STUDENT IN AN ORGANIZED HEALTH CARE EDUCATION/TRAINING PROGRAM

## 2024-06-16 PROCEDURE — 99223 1ST HOSP IP/OBS HIGH 75: CPT | Performed by: STUDENT IN AN ORGANIZED HEALTH CARE EDUCATION/TRAINING PROGRAM

## 2024-06-16 PROCEDURE — 85610 PROTHROMBIN TIME: CPT

## 2024-06-16 PROCEDURE — 2580000003 HC RX 258: Performed by: STUDENT IN AN ORGANIZED HEALTH CARE EDUCATION/TRAINING PROGRAM

## 2024-06-16 PROCEDURE — 80048 BASIC METABOLIC PNL TOTAL CA: CPT

## 2024-06-16 PROCEDURE — 83735 ASSAY OF MAGNESIUM: CPT

## 2024-06-16 PROCEDURE — 82330 ASSAY OF CALCIUM: CPT

## 2024-06-16 PROCEDURE — 83880 ASSAY OF NATRIURETIC PEPTIDE: CPT

## 2024-06-16 PROCEDURE — 85520 HEPARIN ASSAY: CPT

## 2024-06-16 PROCEDURE — 2500000003 HC RX 250 WO HCPCS: Performed by: STUDENT IN AN ORGANIZED HEALTH CARE EDUCATION/TRAINING PROGRAM

## 2024-06-16 PROCEDURE — 71045 X-RAY EXAM CHEST 1 VIEW: CPT

## 2024-06-16 PROCEDURE — 36415 COLL VENOUS BLD VENIPUNCTURE: CPT

## 2024-06-16 PROCEDURE — 84100 ASSAY OF PHOSPHORUS: CPT

## 2024-06-16 PROCEDURE — 85027 COMPLETE CBC AUTOMATED: CPT

## 2024-06-16 PROCEDURE — 2060000000 HC ICU INTERMEDIATE R&B

## 2024-06-16 PROCEDURE — 99285 EMERGENCY DEPT VISIT HI MDM: CPT

## 2024-06-16 RX ORDER — SOTALOL HYDROCHLORIDE 80 MG/1
80 TABLET ORAL 2 TIMES DAILY
Status: DISCONTINUED | OUTPATIENT
Start: 2024-06-16 | End: 2024-06-17 | Stop reason: HOSPADM

## 2024-06-16 RX ORDER — POTASSIUM CHLORIDE 7.45 MG/ML
10 INJECTION INTRAVENOUS PRN
Status: DISCONTINUED | OUTPATIENT
Start: 2024-06-16 | End: 2024-06-17 | Stop reason: HOSPADM

## 2024-06-16 RX ORDER — HEPARIN SODIUM 1000 [USP'U]/ML
2000 INJECTION, SOLUTION INTRAVENOUS; SUBCUTANEOUS PRN
Status: DISCONTINUED | OUTPATIENT
Start: 2024-06-16 | End: 2024-06-17 | Stop reason: HOSPADM

## 2024-06-16 RX ORDER — ALBUTEROL SULFATE 2.5 MG/3ML
3 SOLUTION RESPIRATORY (INHALATION) EVERY 6 HOURS PRN
Status: DISCONTINUED | OUTPATIENT
Start: 2024-06-16 | End: 2024-06-17 | Stop reason: HOSPADM

## 2024-06-16 RX ORDER — ATORVASTATIN CALCIUM 80 MG/1
80 TABLET, FILM COATED ORAL NIGHTLY
Status: DISCONTINUED | OUTPATIENT
Start: 2024-06-16 | End: 2024-06-17 | Stop reason: HOSPADM

## 2024-06-16 RX ORDER — ASPIRIN 81 MG/1
324 TABLET, CHEWABLE ORAL ONCE
Status: COMPLETED | OUTPATIENT
Start: 2024-06-16 | End: 2024-06-16

## 2024-06-16 RX ORDER — NITROGLYCERIN 0.4 MG/1
0.4 TABLET SUBLINGUAL EVERY 5 MIN PRN
Status: DISCONTINUED | OUTPATIENT
Start: 2024-06-16 | End: 2024-06-17 | Stop reason: HOSPADM

## 2024-06-16 RX ORDER — ONDANSETRON 4 MG/1
4 TABLET, ORALLY DISINTEGRATING ORAL EVERY 8 HOURS PRN
Status: DISCONTINUED | OUTPATIENT
Start: 2024-06-16 | End: 2024-06-17 | Stop reason: HOSPADM

## 2024-06-16 RX ORDER — SODIUM CHLORIDE 0.9 % (FLUSH) 0.9 %
5-40 SYRINGE (ML) INJECTION EVERY 12 HOURS SCHEDULED
Status: DISCONTINUED | OUTPATIENT
Start: 2024-06-16 | End: 2024-06-17 | Stop reason: HOSPADM

## 2024-06-16 RX ORDER — ASPIRIN 81 MG/1
81 TABLET, CHEWABLE ORAL DAILY
Status: DISCONTINUED | OUTPATIENT
Start: 2024-06-16 | End: 2024-06-17 | Stop reason: HOSPADM

## 2024-06-16 RX ORDER — SODIUM CHLORIDE 9 MG/ML
INJECTION, SOLUTION INTRAVENOUS PRN
Status: DISCONTINUED | OUTPATIENT
Start: 2024-06-16 | End: 2024-06-17 | Stop reason: HOSPADM

## 2024-06-16 RX ORDER — HEPARIN SODIUM 10000 [USP'U]/100ML
5-30 INJECTION, SOLUTION INTRAVENOUS CONTINUOUS
Status: DISCONTINUED | OUTPATIENT
Start: 2024-06-16 | End: 2024-06-17 | Stop reason: HOSPADM

## 2024-06-16 RX ORDER — CALCIUM GLUCONATE 20 MG/ML
2000 INJECTION, SOLUTION INTRAVENOUS ONCE
Status: COMPLETED | OUTPATIENT
Start: 2024-06-16 | End: 2024-06-16

## 2024-06-16 RX ORDER — HEPARIN SODIUM 1000 [USP'U]/ML
4000 INJECTION, SOLUTION INTRAVENOUS; SUBCUTANEOUS PRN
Status: DISCONTINUED | OUTPATIENT
Start: 2024-06-16 | End: 2024-06-17 | Stop reason: HOSPADM

## 2024-06-16 RX ORDER — SPIRONOLACTONE 25 MG/1
25 TABLET ORAL DAILY
Status: DISCONTINUED | OUTPATIENT
Start: 2024-06-16 | End: 2024-06-17 | Stop reason: HOSPADM

## 2024-06-16 RX ORDER — ONDANSETRON 2 MG/ML
4 INJECTION INTRAMUSCULAR; INTRAVENOUS EVERY 6 HOURS PRN
Status: DISCONTINUED | OUTPATIENT
Start: 2024-06-16 | End: 2024-06-17 | Stop reason: HOSPADM

## 2024-06-16 RX ORDER — ACETAMINOPHEN 325 MG/1
650 TABLET ORAL EVERY 6 HOURS PRN
Status: DISCONTINUED | OUTPATIENT
Start: 2024-06-16 | End: 2024-06-17 | Stop reason: HOSPADM

## 2024-06-16 RX ORDER — POTASSIUM CHLORIDE 20 MEQ/1
40 TABLET, EXTENDED RELEASE ORAL PRN
Status: DISCONTINUED | OUTPATIENT
Start: 2024-06-16 | End: 2024-06-17 | Stop reason: HOSPADM

## 2024-06-16 RX ORDER — MONTELUKAST SODIUM 10 MG/1
10 TABLET ORAL NIGHTLY
Status: DISCONTINUED | OUTPATIENT
Start: 2024-06-16 | End: 2024-06-17 | Stop reason: HOSPADM

## 2024-06-16 RX ORDER — MEXILETINE HYDROCHLORIDE 200 MG/1
200 CAPSULE ORAL 3 TIMES DAILY
Status: DISCONTINUED | OUTPATIENT
Start: 2024-06-16 | End: 2024-06-17 | Stop reason: HOSPADM

## 2024-06-16 RX ORDER — FUROSEMIDE 20 MG/1
40 TABLET ORAL DAILY
Status: DISCONTINUED | OUTPATIENT
Start: 2024-06-16 | End: 2024-06-16

## 2024-06-16 RX ORDER — FUROSEMIDE 10 MG/ML
40 INJECTION INTRAMUSCULAR; INTRAVENOUS DAILY
Status: DISCONTINUED | OUTPATIENT
Start: 2024-06-16 | End: 2024-06-17 | Stop reason: HOSPADM

## 2024-06-16 RX ORDER — MAGNESIUM SULFATE 1 G/100ML
1000 INJECTION INTRAVENOUS PRN
Status: DISCONTINUED | OUTPATIENT
Start: 2024-06-16 | End: 2024-06-17 | Stop reason: HOSPADM

## 2024-06-16 RX ORDER — LISINOPRIL 10 MG/1
10 TABLET ORAL DAILY
Status: DISCONTINUED | OUTPATIENT
Start: 2024-06-16 | End: 2024-06-17 | Stop reason: HOSPADM

## 2024-06-16 RX ORDER — SODIUM CHLORIDE 0.9 % (FLUSH) 0.9 %
10 SYRINGE (ML) INJECTION PRN
Status: DISCONTINUED | OUTPATIENT
Start: 2024-06-16 | End: 2024-06-17 | Stop reason: HOSPADM

## 2024-06-16 RX ORDER — ATORVASTATIN CALCIUM 80 MG/1
80 TABLET, FILM COATED ORAL NIGHTLY
Status: DISCONTINUED | OUTPATIENT
Start: 2024-06-16 | End: 2024-06-16

## 2024-06-16 RX ORDER — ACETAMINOPHEN 650 MG/1
650 SUPPOSITORY RECTAL EVERY 6 HOURS PRN
Status: DISCONTINUED | OUTPATIENT
Start: 2024-06-16 | End: 2024-06-17 | Stop reason: HOSPADM

## 2024-06-16 RX ORDER — HEPARIN SODIUM 1000 [USP'U]/ML
4000 INJECTION, SOLUTION INTRAVENOUS; SUBCUTANEOUS ONCE
Status: DISCONTINUED | OUTPATIENT
Start: 2024-06-16 | End: 2024-06-16

## 2024-06-16 RX ADMIN — MEXILETINE HYDROCHLORIDE 200 MG: 200 CAPSULE ORAL at 20:08

## 2024-06-16 RX ADMIN — FUROSEMIDE 40 MG: 20 TABLET ORAL at 11:17

## 2024-06-16 RX ADMIN — CALCIUM GLUCONATE 2000 MG: 20 INJECTION, SOLUTION INTRAVENOUS at 06:54

## 2024-06-16 RX ADMIN — MEXILETINE HYDROCHLORIDE 200 MG: 200 CAPSULE ORAL at 11:19

## 2024-06-16 RX ADMIN — ASPIRIN 81 MG 324 MG: 81 TABLET ORAL at 07:05

## 2024-06-16 RX ADMIN — LISINOPRIL 10 MG: 10 TABLET ORAL at 11:18

## 2024-06-16 RX ADMIN — ACETAMINOPHEN 650 MG: 325 TABLET ORAL at 20:12

## 2024-06-16 RX ADMIN — ATORVASTATIN CALCIUM 80 MG: 80 TABLET, FILM COATED ORAL at 20:07

## 2024-06-16 RX ADMIN — ASPIRIN 81 MG 81 MG: 81 TABLET ORAL at 11:18

## 2024-06-16 RX ADMIN — MEXILETINE HYDROCHLORIDE 200 MG: 200 CAPSULE ORAL at 14:05

## 2024-06-16 RX ADMIN — MONTELUKAST SODIUM 10 MG: 10 TABLET, FILM COATED ORAL at 20:08

## 2024-06-16 RX ADMIN — SODIUM CHLORIDE, PRESERVATIVE FREE 10 ML: 5 INJECTION INTRAVENOUS at 20:09

## 2024-06-16 RX ADMIN — SOTALOL HYDROCHLORIDE 80 MG: 80 TABLET ORAL at 14:04

## 2024-06-16 RX ADMIN — SPIRONOLACTONE 25 MG: 25 TABLET ORAL at 11:20

## 2024-06-16 ASSESSMENT — PAIN SCALES - GENERAL
PAINLEVEL_OUTOF10: 0
PAINLEVEL_OUTOF10: 3

## 2024-06-16 ASSESSMENT — PAIN DESCRIPTION - DESCRIPTORS: DESCRIPTORS: ACHING;DULL

## 2024-06-16 ASSESSMENT — PAIN DESCRIPTION - LOCATION: LOCATION: HEAD

## 2024-06-16 ASSESSMENT — PAIN DESCRIPTION - PAIN TYPE: TYPE: ACUTE PAIN

## 2024-06-16 ASSESSMENT — PAIN - FUNCTIONAL ASSESSMENT: PAIN_FUNCTIONAL_ASSESSMENT: ACTIVITIES ARE NOT PREVENTED

## 2024-06-16 NOTE — PLAN OF CARE
Problem: Discharge Planning  Goal: Discharge to home or other facility with appropriate resources  Outcome: Progressing  Flowsheets  Taken 6/16/2024 1620  Discharge to home or other facility with appropriate resources: Arrange for needed discharge resources and transportation as appropriate  Taken 6/16/2024 1610  Discharge to home or other facility with appropriate resources: Arrange for needed discharge resources and transportation as appropriate     Problem: Safety - Adult  Goal: Free from fall injury  Outcome: Progressing

## 2024-06-16 NOTE — CONSULTS
Admission 12/2016 at [place] for congestive heart failure with cardiac catheterization showing LVEF 40% with occluded SVG-OM1, however, with patent LIMA-LAD and patent SVG-RPDA, with the left circumflex territory getting good right-to-left collaterals.     10. Finding of small apical LV thrombus on admission 12/2016, warfarin started.     11. Right groin pseudoaneurysm 12/2016 after catheterization, resolved without surgery.     12. ECHO 3/7/2022 Global LV systolic function is moderately reduced; estimated EF 35-40%. Technically difficult study, not all measurements obtained. Mild mitral regurgitation.     13. STRESS 3/7/2022 Moderate size moderate intensity anterolateral ischemia ( reversible defect). Basal inferolateral infarction ( irreversible/fixed defect). Mild LV systolic dysfunction, LV ejection fraction 40%     14. CATH on 4/7/2022 with no new PCI. MV-CAD with patent LIMA_LAD and SVG-RPDA. Occluded LCx and occluded SVG-OM with right to left collaterals. No significant change from CATH in 2016.    EKG  Pending    Echo  3/2024 TTE       Left Ventricle: Normal left ventricular systolic function with a visually estimated EF of 55 - 60%. Left ventricle size is normal. Mildly increased wall thickness. Normal wall motion. There are prominent trabeculations. There is a false chord.    Right Ventricle: Right ventricle is mildly dilated. Lead present in the right ventricle. Mildly reduced systolic function. TAPSE is abnormal. TDI systolic excursion is abnormal.    Aortic Valve: Mild regurgitation.    Mitral Valve: Thickened leaflet. Mild to moderate regurgitation.    Tricuspid Valve: Mild regurgitation.    Left Atrium: Left atrium is severely dilated.    Image quality is adequate.     IMPRESSION:      Flat troponin elevation with underlying LETICIA and no chest pain or new EKG changes consistent with supply/demand mismatch.  CAD s/p CABG x 3, last cath 2022 showed patent LIMA-LAD, SVG-RPDA, known occluded SVG-OM and

## 2024-06-16 NOTE — ED NOTES
ED to inpatient nurses report      Chief Complaint:  Chief Complaint   Patient presents with    Fatigue     Present to ED from: home    MOA:     LOC: alert and orientated to name, place, date  Mobility: Independent  Oxygen Baseline: 95%    Current needs required: room air    Pending ED orders: none   Present condition: stable     Why did the patient come to the ED? SOB   What is the plan? CARDIOLOGY CONSULT   Any procedures or intervention occur? Labs, EKG  Any safety concerns?? No     Mental Status:       Psych Assessment:   Psychosocial  Psychosocial (WDL): Within Defined Limits  Vital signs   Vitals:    06/16/24 0527 06/16/24 0530 06/16/24 0615 06/16/24 0737   BP:   125/61    Pulse:  61 54    Resp:  16 14    Temp: 97.8 °F (36.6 °C)      TempSrc: Oral      SpO2:  98% 95%    Weight:    79.4 kg (175 lb)        Vitals:  Patient Vitals for the past 24 hrs:   BP Temp Temp src Pulse Resp SpO2 Weight   06/16/24 0737 -- -- -- -- -- -- 79.4 kg (175 lb)   06/16/24 0615 125/61 -- -- 54 14 95 % --   06/16/24 0530 -- -- -- 61 16 98 % --   06/16/24 0527 -- 97.8 °F (36.6 °C) Oral -- -- -- --   06/16/24 0522 120/75 -- -- 62 18 -- --      Visit Vitals  /61   Pulse 54   Temp 97.8 °F (36.6 °C) (Oral)   Resp 14   Wt 79.4 kg (175 lb)   SpO2 95%   BMI 26.61 kg/m²        LDAs:   Peripheral IV 06/16/24 Distal;Posterior;Right Forearm (Active)   Site Assessment Clean, dry & intact 06/16/24 0604       Ambulatory Status:  Presents to emergency department  because of falls (Syncope, seizure, or loss of consciousness): No, Age > 70: Yes, Altered Mental Status, Intoxication with alcohol or substance confusion (Disorientation, impaired judgment, poor safety awaremess, or inability to follow instructions): No, Impaired Mobility: Ambulates or transfers with assistive devices or assistance; Unable to ambulate or transer.: No, Nursing Judgement: Yes    Diagnosis:  DISPOSITION Decision To Admit 06/16/2024 08:11:08 AM   Final diagnoses:  Notable for the following components:    Protime 30.0 (*)     All other components within normal limits   CALCIUM, IONIZED - Abnormal; Notable for the following components:    Calcium, Ionized 1.12 (*)     All other components within normal limits   PHOSPHORUS - Abnormal; Notable for the following components:    Phosphorus 1.8 (*)     All other components within normal limits   TROPONIN - Abnormal; Notable for the following components:    Troponin, High Sensitivity 120 (*)     All other components within normal limits   TROPONIN - Abnormal; Notable for the following components:    Troponin, High Sensitivity 105 (*)     All other components within normal limits   BASIC METABOLIC PANEL - Abnormal; Notable for the following components:    BUN 29 (*)     Creatinine 1.3 (*)     Est, Glom Filt Rate 56 (*)     All other components within normal limits   CBC - Abnormal; Notable for the following components:    Hematocrit 39.8 (*)     All other components within normal limits   MAGNESIUM   BRAIN NATRIURETIC PEPTIDE   ANTI-XA, UNFRACTIONATED HEPARIN   ANTI-XA, UNFRACTIONATED HEPARIN   ANTI-XA, UNFRACTIONATED HEPARIN       Electronically signed by Ailyn Lehman RN on 6/16/2024 at 8:12 AM

## 2024-06-16 NOTE — H&P
06/16/24  6:03 AM   Result Value Ref Range    Protime 30.0 (H) 11.7 - 14.9 sec    INR 3.0    Calcium, Ionized    Collection Time: 06/16/24  6:03 AM   Result Value Ref Range    Calcium, Ionized 1.12 (L) 1.13 - 1.33 mmol/L   Phosphorus    Collection Time: 06/16/24  6:03 AM   Result Value Ref Range    Phosphorus 1.8 (L) 2.5 - 4.5 mg/dL   Magnesium    Collection Time: 06/16/24  6:03 AM   Result Value Ref Range    Magnesium 2.3 1.6 - 2.4 mg/dL   Troponin    Collection Time: 06/16/24  6:03 AM   Result Value Ref Range    Troponin, High Sensitivity 120 (HH) 0 - 22 ng/L   Basic Metabolic Panel    Collection Time: 06/16/24  6:03 AM   Result Value Ref Range    Sodium 139 136 - 145 mmol/L    Potassium 4.5 3.7 - 5.3 mmol/L    Chloride 106 98 - 107 mmol/L    CO2 24 20 - 31 mmol/L    Anion Gap 9 9 - 16 mmol/L    Glucose 97 74 - 99 mg/dL    BUN 29 (H) 8 - 23 mg/dL    Creatinine 1.3 (H) 0.70 - 1.20 mg/dL    Est, Glom Filt Rate 56 (L) >60 mL/min/1.73m2    Calcium 9.1 8.6 - 10.4 mg/dL   CBC    Collection Time: 06/16/24  6:03 AM   Result Value Ref Range    WBC 7.7 3.5 - 11.3 k/uL    RBC 4.34 4.21 - 5.77 m/uL    Hemoglobin 13.1 13.0 - 17.0 g/dL    Hematocrit 39.8 (L) 40.7 - 50.3 %    MCV 91.7 82.6 - 102.9 fL    MCH 30.2 25.2 - 33.5 pg    MCHC 32.9 28.4 - 34.8 g/dL    RDW 13.6 11.8 - 14.4 %    Platelets 217 138 - 453 k/uL    MPV 11.8 8.1 - 13.5 fL    NRBC Automated 0.0 0.0 per 100 WBC   Brain Natriuretic Peptide    Collection Time: 06/16/24  6:03 AM   Result Value Ref Range    Pro- 0 - 300 pg/mL   Troponin    Collection Time: 06/16/24  6:43 AM   Result Value Ref Range    Troponin, High Sensitivity 105 (HH) 0 - 22 ng/L       Imaging/Diagnostics:  XR CHEST PORTABLE    Result Date: 6/16/2024  1. No acute cardiopulmonary process. 2. Status post median sternotomy for CABG.       Assessment :      Hospital Problems             Last Modified POA    * (Principal) NSTEMI (non-ST elevated myocardial infarction) ([facility]) 6/16/2024 Yes        Plan:     Patient status inpatient in the Progressive Unit/Step down    NSTEMI with significant history for CAD s/p CABG x 3, V. tach s/p AICD and LV thrombus  Continue home aspirin and Lipitor  Hold Coumadin, when INR less than 2, recommended starting heparin gtt per cardiology  Monitor O2 status  Cardiac diet    HFrEF-no current decompensation  Continue home Lasix 40 mg daily  Continue home Aldactone    HTN/HLD/history of V. tach  Continue home sotalol 80 mg p.o. twice daily and mexiletine 200 mg p.o. 3 times daily  ICD interrogation    Consultations:   IP CONSULT TO CARDIOLOGY  IP CONSULT TO INTERNAL MEDICINE  IP CONSULT TO CARDIOLOGY    Patient is admitted as inpatient status because of co-morbidities listed above, severity of signs and symptoms as outlined, requirement for current medical therapies and most importantly because of direct risk to patient if care not provided in a hospital setting.  Expected length of stay > 48 hours.    Camila German MD  6/16/2024  8:39 AM    Copy sent to Albertina Han MD     show

## 2024-06-16 NOTE — ED PROVIDER NOTES
[facility] ED  Emergency Department Encounter  Emergency Medicine Resident     Pt Name:Erik Olea  MRN: [id number]  Birthdate 1945  Date of evaluation: 6/16/24  PCP:  Albertina Cleaning MD  Note Started: 6:42 AM EDT      CHIEF COMPLAINT       Chief Complaint   Patient presents with    Fatigue       HISTORY OF PRESENT ILLNESS  (Location/Symptom, Timing/Onset, Context/Setting, Quality, Duration, Modifying Factors, Severity.)      Erik Olea is a 79 y.o. male who presents emergency department for shortness of breath.  Generalized weakness.  He states he woke up with the shortness of breath, attempted his home nebulizer treatment with minimal relief.  He denies any lightheadedness, dizziness, falls or loss of consciousness.  He is on Coumadin, is unsure of why.  On chart review he is on it for a left ventricular apical thrombus.  Patient denies any chest pain, nausea or vomiting.    PAST MEDICAL / SURGICAL / SOCIAL / FAMILY HISTORY      has a past medical history of Accident, Arthritis, Bronchitis, CAD (coronary artery disease), Cancer (HCC), Cardiac arrest (HCC), Good exercise tolerance, Hx of blood clots, Hyperkalemia, Hypertension, essential, Ischemic cardiomyopathy, Left ventricular apical thrombus, MI (myocardial infarction) (HCC), Pseudoaneurysm (HCC) - right groin, Sinus drainage, Snores, Systolic dysfunction with acute on chronic heart failure (HCC) - Efx 30%, and Wellness examination.       has a past surgical history that includes Coronary artery bypass graft (11/19/2013); Vasectomy; Tonsillectomy; Colonoscopy; Cardiac defibrillator placement (2013); Cardiac catheterization (12/08/2016); Cardiac catheterization (11/18/2013); other surgical history (Right, 07/19/2019); Abdomen surgery (Right, 07/19/2019); Cardiac catheterization (04/07/2022); Carotid endarterectomy (Left, 08/11/2022); Carotid endarterectomy (Right, 01/17/2023); other surgical history (Right, 11/08/2023); and ep device

## 2024-06-16 NOTE — ED NOTES
Pt presents to Ed by Ems. Pt states he had weakness and fatigue this morning, pt states he felt progressively worse throughout the day. Pt said he had sob earlier but doesn't now. Pt had taken his inhaler earlier in the day. Pt had pacemaker put in 2013. Pt on blood thinner currently. Pt was given 1 nitro prior to arrival. Pt was hypertensive prior to arrival 166/80.      Pt is A&OX4, placed on full monitor, EKG done, IV established, changed into gown and given warm blankets. Labs drawn, labeled, and sent to lab. White board updated, and patient is updated on plan of care. Will continue to monitor.

## 2024-06-16 NOTE — ED PROVIDER NOTES
[facility]     Emergency Department     Faculty Attestation    I performed a history and physical examination of the patient and discussed management with the resident. I have reviewed and agree with the resident’s findings including all diagnostic interpretations, and treatment plans as written. Any areas of disagreement are noted on the chart. I was personally present for the key portions of any procedures. I have documented in the chart those procedures where I was not present during the key portions. I have reviewed the emergency nurses triage note. I agree with the chief complaint, past medical history, past surgical history, allergies, medications, social and family history as documented unless otherwise noted below. Documentation of the HPI, Physical Exam and Medical Decision Making performed by scribyan is based on my personal performance of the HPI, PE and MDM. For Physician Assistant/ Nurse Practitioner cases/documentation I have personally evaluated this patient and have completed at least one if not all key elements of the E/M (history, physical exam, and MDM). Additional findings are as noted.    Note Started: 5:29 AM EDT     78 yo M c/o sob / fatigue, no nausea, no cp, no fever,   PE      vss gcs 15 MADISYN, radial pulse =, d pedal pulse =  Abdomen nontender, no distention, no rigidity, no guarding  No calf swelling, no calf tenderness    -elevated trop, cardiology request vitk, heparin,     EKG Interpretation    Interpreted by me  Normal sinus, PAC, heart rate 61, no ischemia, left axis, QT corrected 487    CRITICAL CARE: There was a high probability of clinically significant/life threatening deterioration in this patient's condition which required my urgent intervention.  Total critical care time was 15 minutes.  This excludes any time for separately reportable procedures.       Humberto Serrano DO  06/16/24 0536        Humberto Mendes,   06/16/24 0724

## 2024-06-16 NOTE — ED PROVIDER NOTES
[facility]  Emergency Department  Emergency Medicine Resident Sign-out     Care of Erik Olea was assumed from Dr. Ramos and is being seen for Fatigue  .  The patient's initial evaluation and plan have been discussed with the prior provider who initially evaluated the patient.     EMERGENCY DEPARTMENT COURSE / MEDICAL DECISION MAKING:       MEDICATIONS GIVEN:  Orders Placed This Encounter   Medications    calcium gluconate 2,000 mg in sodium chloride 100 mL    aspirin chewable tablet 324 mg       LABS / RADIOLOGY:     Results for orders placed or performed during the hospital encounter of 06/16/24   APTT   Result Value Ref Range    APTT 37.3 (H) 23.0 - 36.5 sec   Protime-INR   Result Value Ref Range    Protime 30.0 (H) 11.7 - 14.9 sec    INR 3.0    Calcium, Ionized   Result Value Ref Range    Calcium, Ionized 1.12 (L) 1.13 - 1.33 mmol/L   Phosphorus   Result Value Ref Range    Phosphorus 1.8 (L) 2.5 - 4.5 mg/dL   Magnesium   Result Value Ref Range    Magnesium 2.3 1.6 - 2.4 mg/dL   Troponin   Result Value Ref Range    Troponin, High Sensitivity 120 (HH) 0 - 22 ng/L   Basic Metabolic Panel   Result Value Ref Range    Sodium 139 136 - 145 mmol/L    Potassium 4.5 3.7 - 5.3 mmol/L    Chloride 106 98 - 107 mmol/L    CO2 24 20 - 31 mmol/L    Anion Gap 9 9 - 16 mmol/L    Glucose 97 74 - 99 mg/dL    BUN 29 (H) 8 - 23 mg/dL    Creatinine 1.3 (H) 0.70 - 1.20 mg/dL    Est, Glom Filt Rate 56 (L) >60 mL/min/1.73m2    Calcium 9.1 8.6 - 10.4 mg/dL   CBC   Result Value Ref Range    WBC 7.7 3.5 - 11.3 k/uL    RBC 4.34 4.21 - 5.77 m/uL    Hemoglobin 13.1 13.0 - 17.0 g/dL    Hematocrit 39.8 (L) 40.7 - 50.3 %    MCV 91.7 82.6 - 102.9 fL    MCH 30.2 25.2 - 33.5 pg    MCHC 32.9 28.4 - 34.8 g/dL    RDW 13.6 11.8 - 14.4 %    Platelets 217 138 - 453 k/uL    MPV 11.8 8.1 - 13.5 fL    NRBC Automated 0.0 0.0 per 100 WBC   Brain Natriuretic Peptide   Result Value Ref Range    Pro- 0 - 300 pg/mL

## 2024-06-16 NOTE — ED PROVIDER NOTES
FACULTY SIGN-OUT  ADDENDUM       Patient: Erik Olea   MRN: [id number]  PCP:  Albertina Cleaning MD  Note Started: 6/16/24, 7:22 AM EDT  Attestation  I was available and discussed any additional care issues that arose and coordinated the management plans with the resident(s) caring for the patient during my duty period. Any areas of disagreement with resident's documentation of care or procedures are noted on the chart. I was personally present for the key portions of any/all procedures during my duty period. I have documented in the chart those procedures where I was not present during the key portions.   The patient's initial evaluation and plan have been discussed with the prior provider who initially evaluated the patient.      Pertinent Comments:  The patient is a 79 y.o. male taken in signout with generalized fatigue with known LV thrombus currently on Coumadin but now with possible non-STEMI and cardiology wishing vitamin K being given and at the same time to transition to heparin  We are awaiting admission    ED COURSE      The patient was given the following medications:  Orders Placed This Encounter   Medications    calcium gluconate 2,000 mg in sodium chloride 100 mL    aspirin chewable tablet 324 mg       RECENT VITALS:   BP: 125/61  Pulse: 54  Respirations: 14  Temp: 97.8 °F (36.6 °C) SpO2: 95 %    (Please note that portions of this note were completed with a voice recognition program.  Efforts were made to edit the dictations but occasionally words are mis-transcribed.)    Jose Quach MD [facility]  Attending Emergency Medicine Physician       Jose Quach MD  06/16/24 0791

## 2024-06-17 VITALS
SYSTOLIC BLOOD PRESSURE: 110 MMHG | TEMPERATURE: 98.2 F | RESPIRATION RATE: 19 BRPM | HEART RATE: 54 BPM | DIASTOLIC BLOOD PRESSURE: 61 MMHG | WEIGHT: 187.39 LBS | BODY MASS INDEX: 28.4 KG/M2 | HEIGHT: 68 IN | OXYGEN SATURATION: 93 %

## 2024-06-17 PROBLEM — F41.9 ANXIETY: Status: ACTIVE | Noted: 2024-06-17

## 2024-06-17 LAB
ANION GAP SERPL CALCULATED.3IONS-SCNC: 8 MMOL/L (ref 9–16)
BASOPHILS # BLD: 0.06 K/UL (ref 0–0.2)
BASOPHILS NFR BLD: 1 % (ref 0–2)
BUN SERPL-MCNC: 23 MG/DL (ref 8–23)
CALCIUM SERPL-MCNC: 8.9 MG/DL (ref 8.6–10.4)
CHLORIDE SERPL-SCNC: 102 MMOL/L (ref 98–107)
CHOLEST SERPL-MCNC: 99 MG/DL (ref 0–199)
CHOLESTEROL/HDL RATIO: 3
CO2 SERPL-SCNC: 25 MMOL/L (ref 20–31)
CREAT SERPL-MCNC: 1 MG/DL (ref 0.7–1.2)
EKG ATRIAL RATE: 50 BPM
EKG ATRIAL RATE: 52 BPM
EKG ATRIAL RATE: 53 BPM
EKG ATRIAL RATE: 61 BPM
EKG P AXIS: 108 DEGREES
EKG P AXIS: 61 DEGREES
EKG P AXIS: 75 DEGREES
EKG P AXIS: 77 DEGREES
EKG P-R INTERVAL: 248 MS
EKG P-R INTERVAL: 258 MS
EKG P-R INTERVAL: 282 MS
EKG P-R INTERVAL: 286 MS
EKG Q-T INTERVAL: 484 MS
EKG Q-T INTERVAL: 498 MS
EKG Q-T INTERVAL: 506 MS
EKG Q-T INTERVAL: 506 MS
EKG QRS DURATION: 148 MS
EKG QRS DURATION: 154 MS
EKG QTC CALCULATION (BAZETT): 461 MS
EKG QTC CALCULATION (BAZETT): 463 MS
EKG QTC CALCULATION (BAZETT): 474 MS
EKG QTC CALCULATION (BAZETT): 487 MS
EKG R AXIS: -62 DEGREES
EKG R AXIS: -63 DEGREES
EKG R AXIS: -64 DEGREES
EKG R AXIS: -65 DEGREES
EKG T AXIS: 57 DEGREES
EKG T AXIS: 65 DEGREES
EKG T AXIS: 69 DEGREES
EKG T AXIS: 76 DEGREES
EKG VENTRICULAR RATE: 50 BPM
EKG VENTRICULAR RATE: 52 BPM
EKG VENTRICULAR RATE: 53 BPM
EKG VENTRICULAR RATE: 61 BPM
EOSINOPHIL # BLD: 0.47 K/UL (ref 0–0.44)
EOSINOPHILS RELATIVE PERCENT: 6 % (ref 1–4)
ERYTHROCYTE [DISTWIDTH] IN BLOOD BY AUTOMATED COUNT: 13.5 % (ref 11.8–14.4)
GFR, ESTIMATED: 78 ML/MIN/1.73M2
GLUCOSE SERPL-MCNC: 109 MG/DL (ref 74–99)
HCT VFR BLD AUTO: 39.8 % (ref 40.7–50.3)
HDLC SERPL-MCNC: 29 MG/DL
HGB BLD-MCNC: 13 G/DL (ref 13–17)
IMM GRANULOCYTES # BLD AUTO: <0.03 K/UL (ref 0–0.3)
IMM GRANULOCYTES NFR BLD: 0 %
LDLC SERPL CALC-MCNC: 48 MG/DL (ref 0–100)
LYMPHOCYTES NFR BLD: 1.79 K/UL (ref 1.1–3.7)
LYMPHOCYTES RELATIVE PERCENT: 24 % (ref 24–43)
MAGNESIUM SERPL-MCNC: 1.9 MG/DL (ref 1.6–2.4)
MCH RBC QN AUTO: 30.1 PG (ref 25.2–33.5)
MCHC RBC AUTO-ENTMCNC: 32.7 G/DL (ref 28.4–34.8)
MCV RBC AUTO: 92.1 FL (ref 82.6–102.9)
MONOCYTES NFR BLD: 0.69 K/UL (ref 0.1–1.2)
MONOCYTES NFR BLD: 9 % (ref 3–12)
NEUTROPHILS NFR BLD: 60 % (ref 36–65)
NEUTS SEG NFR BLD: 4.56 K/UL (ref 1.5–8.1)
NRBC BLD-RTO: 0 PER 100 WBC
PLATELET # BLD AUTO: 205 K/UL (ref 138–453)
PMV BLD AUTO: 11.3 FL (ref 8.1–13.5)
POTASSIUM SERPL-SCNC: 4.5 MMOL/L (ref 3.7–5.3)
RBC # BLD AUTO: 4.32 M/UL (ref 4.21–5.77)
SODIUM SERPL-SCNC: 135 MMOL/L (ref 136–145)
TRIGL SERPL-MCNC: 109 MG/DL
VLDLC SERPL CALC-MCNC: 22 MG/DL
WBC OTHER # BLD: 7.6 K/UL (ref 3.5–11.3)

## 2024-06-17 PROCEDURE — 2580000003 HC RX 258: Performed by: STUDENT IN AN ORGANIZED HEALTH CARE EDUCATION/TRAINING PROGRAM

## 2024-06-17 PROCEDURE — 6370000000 HC RX 637 (ALT 250 FOR IP): Performed by: STUDENT IN AN ORGANIZED HEALTH CARE EDUCATION/TRAINING PROGRAM

## 2024-06-17 PROCEDURE — 36415 COLL VENOUS BLD VENIPUNCTURE: CPT

## 2024-06-17 PROCEDURE — 6360000002 HC RX W HCPCS: Performed by: STUDENT IN AN ORGANIZED HEALTH CARE EDUCATION/TRAINING PROGRAM

## 2024-06-17 PROCEDURE — 85025 COMPLETE CBC W/AUTO DIFF WBC: CPT

## 2024-06-17 PROCEDURE — 99239 HOSP IP/OBS DSCHRG MGMT >30: CPT | Performed by: NURSE PRACTITIONER

## 2024-06-17 PROCEDURE — 99233 SBSQ HOSP IP/OBS HIGH 50: CPT

## 2024-06-17 PROCEDURE — 80061 LIPID PANEL: CPT

## 2024-06-17 PROCEDURE — 6360000002 HC RX W HCPCS: Performed by: NURSE PRACTITIONER

## 2024-06-17 PROCEDURE — 83735 ASSAY OF MAGNESIUM: CPT

## 2024-06-17 PROCEDURE — 6370000000 HC RX 637 (ALT 250 FOR IP): Performed by: NURSE PRACTITIONER

## 2024-06-17 PROCEDURE — 80048 BASIC METABOLIC PNL TOTAL CA: CPT

## 2024-06-17 RX ORDER — BUSPIRONE HYDROCHLORIDE 10 MG/1
10 TABLET ORAL 3 TIMES DAILY
Status: DISCONTINUED | OUTPATIENT
Start: 2024-06-17 | End: 2024-06-17 | Stop reason: HOSPADM

## 2024-06-17 RX ORDER — BUSPIRONE HYDROCHLORIDE 10 MG/1
10 TABLET ORAL 3 TIMES DAILY
Qty: 90 TABLET | Refills: 0 | Status: SHIPPED | OUTPATIENT
Start: 2024-06-17 | End: 2024-07-17

## 2024-06-17 RX ORDER — WARFARIN SODIUM 3 MG/1
6 TABLET ORAL DAILY
Status: DISCONTINUED | OUTPATIENT
Start: 2024-06-17 | End: 2024-06-17

## 2024-06-17 RX ORDER — NITROGLYCERIN 0.4 MG/1
0.4 TABLET SUBLINGUAL EVERY 5 MIN PRN
Qty: 25 TABLET | Refills: 3 | Status: SHIPPED | OUTPATIENT
Start: 2024-06-17

## 2024-06-17 RX ORDER — HYDRALAZINE HYDROCHLORIDE 20 MG/ML
10 INJECTION INTRAMUSCULAR; INTRAVENOUS ONCE
Status: COMPLETED | OUTPATIENT
Start: 2024-06-17 | End: 2024-06-17

## 2024-06-17 RX ORDER — ATORVASTATIN CALCIUM 80 MG/1
80 TABLET, FILM COATED ORAL NIGHTLY
Qty: 30 TABLET | Refills: 0 | Status: SHIPPED | OUTPATIENT
Start: 2024-06-17

## 2024-06-17 RX ADMIN — SPIRONOLACTONE 25 MG: 25 TABLET ORAL at 09:47

## 2024-06-17 RX ADMIN — LISINOPRIL 10 MG: 10 TABLET ORAL at 09:46

## 2024-06-17 RX ADMIN — MEXILETINE HYDROCHLORIDE 200 MG: 200 CAPSULE ORAL at 13:13

## 2024-06-17 RX ADMIN — ASPIRIN 81 MG 81 MG: 81 TABLET ORAL at 09:46

## 2024-06-17 RX ADMIN — MEXILETINE HYDROCHLORIDE 200 MG: 200 CAPSULE ORAL at 09:46

## 2024-06-17 RX ADMIN — BUSPIRONE HYDROCHLORIDE 10 MG: 10 TABLET ORAL at 15:43

## 2024-06-17 RX ADMIN — SODIUM CHLORIDE, PRESERVATIVE FREE 10 ML: 5 INJECTION INTRAVENOUS at 09:47

## 2024-06-17 RX ADMIN — FUROSEMIDE 40 MG: 10 INJECTION, SOLUTION INTRAMUSCULAR; INTRAVENOUS at 09:46

## 2024-06-17 RX ADMIN — HYDRALAZINE HYDROCHLORIDE 10 MG: 20 INJECTION INTRAMUSCULAR; INTRAVENOUS at 04:10

## 2024-06-17 RX ADMIN — SOTALOL HYDROCHLORIDE 80 MG: 80 TABLET ORAL at 09:46

## 2024-06-17 NOTE — PROGRESS NOTES
CLINICAL PHARMACY NOTE: MEDS TO BEDS    Total # of Prescriptions Filled: 3   The following medications were delivered to the patient:  Buspirone 10 mg  Nitroglycerin  0.4 mg  Atorvastatin 80 mg    Additional Documentation:  Delivered to pt on 6.17 at 5:05 pm.  Pt had copay of 8.98 and paid via [employer]

## 2024-06-17 NOTE — PLAN OF CARE
Problem: Discharge Planning  Goal: Discharge to home or other facility with appropriate resources  6/17/2024 0006 by Kinza Camargo, RN  Outcome: Progressing  Flowsheets (Taken 6/16/2024 2008)  Discharge to home or other facility with appropriate resources: Identify barriers to discharge with patient and caregiver     Problem: ABCDS Injury Assessment  Goal: Absence of physical injury  Outcome: Progressing     Problem: Safety - Adult  Goal: Free from fall injury  6/17/2024 0006 by Kinza Camargo, RN  Outcome: Progressing     Problem: Pain  Goal: Verbalizes/displays adequate comfort level or baseline comfort level  Outcome: Progressing

## 2024-06-17 NOTE — PROGRESS NOTES
0356 Notified Skye Root NP that pt's BP was 107/78 HR 53. New orders rec'vd.     0518 Notified Skye Root NP that reassessment of BP was 132/75 after hydralazine.

## 2024-06-17 NOTE — PLAN OF CARE
Problem: Discharge Planning  Goal: Discharge to home or other facility with appropriate resources  6/17/2024 0930 by Mary Beth Bermudez RN  Outcome: Progressing  Flowsheets (Taken 6/17/2024 0800)  Discharge to home or other facility with appropriate resources: Identify barriers to discharge with patient and caregiver  6/17/2024 0006 by Kinza Camargo RN  Outcome: Progressing  Flowsheets (Taken 6/16/2024 2008)  Discharge to home or other facility with appropriate resources: Identify barriers to discharge with patient and caregiver     Problem: ABCDS Injury Assessment  Goal: Absence of physical injury  6/17/2024 0930 by Mary Beth Bermudez RN  Outcome: Progressing  6/17/2024 0006 by Kinza Camargo RN  Outcome: Progressing     Problem: Safety - Adult  Goal: Free from fall injury  6/17/2024 0930 by Mary Beth Bermudez RN  Outcome: Progressing  6/17/2024 0006 by Kinza Camargo RN  Outcome: Progressing     Problem: Pain  Goal: Verbalizes/displays adequate comfort level or baseline comfort level  6/17/2024 0930 by Mary Beth Bermudez RN  Outcome: Progressing  6/17/2024 0006 by Kinza Camargo RN  Outcome: Progressing

## 2024-06-17 NOTE — PROGRESS NOTES
[facility]  Progress Note                   Date:   6/17/2024  Patient name: Erik Olea  Date of admission:  6/16/2024  5:22 AM  MRN:   [id number]  YOB: 1945  PCP: Albertina Cleaning MD    Reason for Admission: NSTEMI (non-ST elevated myocardial infarction) (HCC) [I21.4]    Subjective:       Clinical Changes /Abnormalities:Patient seen and examined in room. Denies CP or SOB. No acute CV events overnight. Labs, vitals, and tele reviewed.     Review of Systems    Medications:   Scheduled Meds:   aspirin  81 mg Oral Daily    lisinopril  10 mg Oral Daily    mexiletine  200 mg Oral TID    sotalol  80 mg Oral BID    spironolactone  25 mg Oral Daily    atorvastatin  80 mg Oral Nightly    sodium chloride flush  5-40 mL IntraVENous 2 times per day    montelukast  10 mg Oral Nightly    furosemide  40 mg IntraVENous Daily     Continuous Infusions:   [Held by provider] heparin (PORCINE) Infusion      sodium chloride       CBC:   Recent Labs     06/16/24  0603 06/17/24  0512   WBC 7.7 7.6   HGB 13.1 13.0    205     BMP:    Recent Labs     06/16/24  0603 06/17/24  0816    135*   K 4.5 4.5    102   CO2 24 25   BUN 29* 23   CREATININE 1.3* 1.0   GLUCOSE 97 109*     Hepatic:No results for input(s): \"AST\", \"ALT\", \"BILITOT\", \"ALKPHOS\" in the last 72 hours.    Invalid input(s): \"ALB\"  Troponin:   Recent Labs     06/16/24  0643 06/16/24  1528 06/16/24  1736   TROPHS 105* 78* 82*     BNP: No results for input(s): \"BNP\" in the last 72 hours.  Lipids:   Recent Labs     06/17/24  0816   CHOL 99   HDL 29*     INR:   Recent Labs     06/16/24  0603   INR 3.0     Medical Hx   1. VF arrest during exercise on 11/18/2013. Subsequent demonstration of 3-vessel CAD with serial mid-LAD lesion at 80% and 70%, totally occluded mid-circumflex, evidence of left-to-left collaterals and subtotal proximal RCA stenosis.     2. Urgent bypass surgery on 11/20/2013 at [facility] with LIMA-LAD, SVG-OM and  (187 lb 6.3 oz)   SpO2 91%   BMI 28.50 kg/m²   General appearance: alert and cooperative with exam  HEENT: Head: Normocephalic, no lesions, without obvious abnormality.  Neck:no JVD, trachea midline, no adenopathy  Lungs: Clear to auscultation  Heart: Regular rate and rhythm, s1/s2 auscultated, no murmurs  Abdomen: soft, non-tender, bowel sounds active  Extremities: no edema  Neurologic: not done    Echo  3/2024 TTE       Left Ventricle: Normal left ventricular systolic function with a visually estimated EF of 55 - 60%. Left ventricle size is normal. Mildly increased wall thickness. Normal wall motion. There are prominent trabeculations. There is a false chord.    Right Ventricle: Right ventricle is mildly dilated. Lead present in the right ventricle. Mildly reduced systolic function. TAPSE is abnormal. TDI systolic excursion is abnormal.    Aortic Valve: Mild regurgitation.    Mitral Valve: Thickened leaflet. Mild to moderate regurgitation.    Tricuspid Valve: Mild regurgitation.    Left Atrium: Left atrium is severely dilated.    Image quality is adequate.    Assessment / Acute Cardiac Problems:   Flat troponin elevation with underlying LETICIA and no chest pain or new EKG changes consistent with supply/demand mismatch.  CAD s/p CABG x 3, last cath 2022 showed patent LIMA-LAD, SVG-RPDA, known occluded SVG-OM and left circumflex with right to left collaterals.  History of V. tach s/p AICD (Medtronic Cobalt XT VR ) in place  Ischemic cardiomyopathy with last LVEF 40%.  Stable with no CHF on exam  History of LV thrombus in 2016, initiated on Coumadin  Essential hypertension  Hyperlipidemia  Coronary artery disease left ICA stenosis 75 to 99% status post left CEA in August 2022    Patient Active Problem List:     Ventricular tachycardia, monomorphic (HCC)     CAD in native artery     Tobacco use     Spell of dizziness     Hyponatremia     HTN (hypertension), benign     Hyperkalemia     Bradycardia

## 2024-06-17 NOTE — CARE COORDINATION
Case Management Assessment  Initial Evaluation    Date/Time of Evaluation: 6/17/2024 5:07 PM  Assessment Completed by: Leandra Abraham    If patient is discharged prior to next notation, then this note serves as note for discharge by case management.    Patient Name: Erik Olea                   YOB: 1945  Diagnosis: NSTEMI (non-ST elevated myocardial infarction) (HCC) [I21.4]                   Date / Time: 6/16/2024  5:22 AM    Patient Admission Status: Inpatient   Readmission Risk (Low < 19, Mod (19-27), High > 27): Readmission Risk Score: 14    Current PCP: Albertina Cleaning MD  PCP verified by CM? (P) Yes (Dr Jef Eng)    Chart Reviewed: Yes      History Provided by: (P) Patient  Patient Orientation: (P) Alert and Oriented, Person, Place, Situation, Self    Patient Cognition: (P) Alert    Hospitalization in the last 30 days (Readmission):  No    If yes, Readmission Assessment in CM Navigator will be completed.    Advance Directives:      Code Status: Full Code   Patient's Primary Decision Maker is: (P) Legal Next of Kin      Discharge Planning:    Patient lives with: (P) Alone Type of Home: (P) House  Primary Care Giver: (P) Self  Patient Support Systems include: (P) Family Members, Friends/Neighbors   Current Financial resources: (P) Medicare  Current community resources: (P) None  Current services prior to admission: (P) None            Current DME:              Type of Home Care services:  (P) None    ADLS  Prior functional level: (P) Independent in ADLs/IADLs  Current functional level: (P) Independent in ADLs/IADLs    PT AM-PAC:   /24  OT AM-PAC:   /24    Family can provide assistance at DC: (P) Yes  Would you like Case Management to discuss the discharge plan with any other family members/significant others, and if so, who? (P) No  Plans to Return to Present Housing: (P) Yes  Other Identified Issues/Barriers to RETURNING to current housing: stairs, lives alone  Potential Assistance needed at  discharge: (P) N/A            Potential DME:    Patient expects to discharge to: (P) House  Plan for transportation at discharge: (P) Family    Financial    Payor: [employer] MEDICARE / Plan: [employer] GOLD PLUS HMO / Product Type: *No Product type* /     Does insurance require precert for SNF: Yes    Potential assistance Purchasing Medications: (P) No  Meds-to-Beds request: Yes      [id number] - [place], [place] - [address] -  [phone] - F [phone]  [address] [address]  Phone: [phone] Fax: [phone]    [facility] - [facility] [address] - P [phone] - F [phone] [address] [address]  Phone: [phone] Fax: [phone]      Notes:    Factors facilitating achievement of predicted outcomes: Family support, Friend support, Motivated, Cooperative, and Pleasant    Barriers to discharge: Limited family support, Decreased endurance, and Stairs at home    Additional Case Management Notes: plan is to return home independent and has transportation     The Plan for Transition of Care is related to the following treatment goals of NSTEMI (non-ST elevated myocardial infarction) (HCC) [I21.4]    IF APPLICABLE: The Patient and/or patient representative Erik and his family were provided with a choice of provider and agrees with the discharge plan. Freedom of choice list with basic dialogue that supports the patient's individualized plan of care/goals and shares the quality data associated with the providers was provided to: (P) Patient   Patient Representative Name:       The Patient and/or Patient Representative Agree with the Discharge Plan? (P) Yes    Leandra Abraham RN/CM  Case Management Department  Ph: [phone]

## 2024-06-17 NOTE — PLAN OF CARE
Problem: Discharge Planning  Goal: Discharge to home or other facility with appropriate resources  6/17/2024 1525 by Mary Beth Bermudez RN  Outcome: Adequate for Discharge  6/17/2024 0930 by Mary Beth Bermudez RN  Outcome: Progressing  Flowsheets (Taken 6/17/2024 0800)  Discharge to home or other facility with appropriate resources: Identify barriers to discharge with patient and caregiver     Problem: ABCDS Injury Assessment  Goal: Absence of physical injury  6/17/2024 1525 by Mary Beth Bermudez RN  Outcome: Adequate for Discharge  6/17/2024 0930 by Mary Beth Bermudez RN  Outcome: Progressing     Problem: Safety - Adult  Goal: Free from fall injury  6/17/2024 1525 by Mary Beth Bermudez RN  Outcome: Adequate for Discharge  6/17/2024 0930 by Mary Beth Bermudez RN  Outcome: Progressing     Problem: Pain  Goal: Verbalizes/displays adequate comfort level or baseline comfort level  6/17/2024 1525 by Mary Beth Bermudez RN  Outcome: Adequate for Discharge  6/17/2024 0930 by Mary Beth Bermudez RN  Outcome: Progressing

## 2024-06-17 NOTE — PROGRESS NOTES
[facility]  Office: [phone]  Quang Noe DO, Kimani Cannon DO, Bubba Wyatt DO, Johnny Christopher DO, Hira Khan MD, Vielka Escobar MD, Adarsh Bowen MD, Frida Horan MD,  Angel Warner MD, Bryon Lopez MD, Rebeca Christine MD,  Kenrick Solorio DO, Mau Chávez MD, Pankaj Scott MD, David Noe DO, Tarsha Blancas MD,  Jorge L Forbes DO, Liane Avery MD, Barbara Schumacher MD, Na Maldonado MD, Patrick Borrego MD,  Francisco J Mares MD, Lindsey Gonzalez MD, Vic Geronimo MD, Foreign Kumar MD, Juan Lopez MD, Camila German MD, Rogelio Richardson DO, Toribio Marti DO, Bello Orellana MD,  Jose Alejandro Bernard MD, Shirley Waterhouse, CNP,  Mitali Padilla CNP, Jd Garcia, CNP,  Maria Luz Peterson, DNP, Connie Cheek, CNP, Ivory Warren, CNP, Idalia Nassar, CNP, Skye García, CNP, Kristina Smith, PA-C, Clary Flannery PA-C, Mindy Garcia, CNP, Yadi Odonnell, CNP, Aditya Fox, CNP, Jennifer Mac, CNP, Cathi Butt, CNP, Kae Smith, CNS, Ting Bermudez, CNP, Radha Florez CNP, Tracy Schwab, CNP         [facility]   IN-PATIENT SERVICE   [facility]    Progress Note    6/17/2024    11:35 AM    Name:   Erik Olea  MRN:     [id number]     Acct:      [id number]   Room:   0417/0417-02   Day:  1  Admit Date:  6/16/2024  5:22 AM    PCP:   Albertina Cleaning MD  Code Status:  Full Code    Subjective:     C/C:   Chief Complaint   Patient presents with    Fatigue     Interval History Status: improved.     Patient seen and evaluated in room resting in bed.  States that he thinks he had an acute anxiety attack which led to his chest pain and shortness of breath.  He also endorses postnasal drip which triggered the shortness of breath    Brief History:     Erik Olea is a 79 y.o. male with PMHx of left ventricular apical thrombus on Coumadin, stenosis left carotid artery s/p carotid endarterectomy, tobacco abuse, HTN, MI, CAD s/p CABG, HFrEF with AICD who

## 2024-06-18 NOTE — DISCHARGE SUMMARY
[facility]  Office: [phone]  Quang Noe DO, Kimani Cannon DO, Bubba Wyatt DO, Johnny Christopher DO, Hira Khan MD, Vielka Escobar MD, Adarsh Bowen MD, Frida Horan MD,  Angel Warner MD, Bryon Lopez MD, Rebeca Christine MD,  Kenrick Solorio DO, Mau Chávez MD, Pankaj Scott MD, Daivd Noe DO, Tarsha Blancas MD,  Jorge L Forbes DO, Liane Avery MD, Barbara Schumacher MD, Na Maldonado MD, Patrick Borrego MD,  Francisco J Mares MD, Lindsey Gonzalez MD, Vic Geronimo MD, Foreign Kumar MD, Juan Lopez MD, Camila German MD, Rogelio Richardson DO, Toribio Marti DO, Bello Orellana MD,  Jose Alejandro Bernard MD, Shirley Waterhouse, CNP,  Mitali Padilla CNP, Jd Garcia, CNP,  Maria Luz Peterson, GILBERTO, Connie Cheek, CNP, Ivory Warren, CNP, Idalia Nassar CNP, Skye García, CNP, Kristina Smith, PA-C, Clary Flannery PA-C, Mindy Garcia, CNP, Yadi Odonnell, CNP, Aditya Fox, CNP, Jennifer Mac, CNP, Cathi Butt, CNP, Kae Smith, CNS, Ting Bermudez, CNP, Radha Florez CNP, Tracy Schwab, CNP         [facility]   IN-PATIENT SERVICE   [facility]    Discharge Summary     Patient ID: Erik Olea  :  1945   MRN: [id number]     ACCOUNT:  [id number]   Patient's PCP: Albertina Cleaning MD  Admit Date: 2024   Discharge Date: 24  Length of Stay: 1  Code Status:  Prior  Admitting Physician: No admitting provider for patient encounter.  Discharge Physician: TEE Marquis - NP     Active Discharge Diagnoses:     Hospital Problem Lists:  Principal Problem:    NSTEMI (non-ST elevated myocardial infarction) (HCC)  Active Problems:    S/P CABG (coronary artery bypass graft)    CAD in native artery    S/P ICD (internal cardiac defibrillator) procedure    Anxiety  Resolved Problems:    * No resolved hospital problems. *      Admission Condition:  fair     Discharged Condition: stable    Hospital Stay:     Hospital Course:  Erik Olea is a  tablet  Take 1 tablet by mouth daily.     b complex vitamins capsule     furosemide 40 MG tablet  Commonly known as: LASIX  TAKE 1 TABLET EVERY DAY     lisinopril 10 MG tablet  Commonly known as: PRINIVIL;ZESTRIL  TAKE 1 TABLET EVERY DAY     LUTEIN PO     mexiletine 200 MG capsule  Commonly known as: MEXITIL  Take 1 capsule by mouth 3 times daily     montelukast 10 MG tablet  Commonly known as: SINGULAIR     * Nebulizer Compressor Kit  1 kit by Does not apply route once for 1 dose     * Nebulizer Compressor Kit  1 kit by Does not apply route once for 1 dose     sotalol 80 MG tablet  Commonly known as: BETAPACE  TAKE 1 TABLET TWICE DAILY     spironolactone 25 MG tablet  Commonly known as: ALDACTONE  TAKE 1 TABLET EVERY DAY     VITAMIN D3 PO           * This list has 4 medication(s) that are the same as other medications prescribed for you. Read the directions carefully, and ask your doctor or other care provider to review them with you.                STOP taking these medications      fluticasone propionate 100 MCG/BLIST Aepb inhaler  Commonly known as: FLOVENT     warfarin 6 MG tablet  Commonly known as: COUMADIN               Where to Get Your Medications        These medications were sent to [address] -  [phone] - F [phone]  [address] [place] [address]      Phone: [phone]   atorvastatin 80 MG tablet  busPIRone 10 MG tablet  nitroGLYCERIN 0.4 MG SL tablet         No discharge procedures on file.    Time Spent on discharge is  36 mins in patient examination, evaluation, counseling as well as medication reconciliation, prescriptions for required medications, discharge plan and follow up.    Electronically signed by   TEE Marquis NP  6/18/2024  4:14 PM      Thank you Albertina Han MD for the opportunity to be involved in this patient's care.

## 2024-06-27 ENCOUNTER — TELEPHONE (OUTPATIENT)
Dept: PHARMACY | Age: 79
End: 2024-06-27

## 2024-06-27 NOTE — TELEPHONE ENCOUNTER
Current referral has a Dx of left Apical thrombus in 2016.  Left vm at [facility] nurse line requesting new referral.     Jacinta Ireland, Mitch, CACP  Clinical Pharmacist Medication Management  6/27/2024  11:00 AM

## 2024-07-15 NOTE — PROGRESS NOTES
Physician Progress Note      PATIENT:               CROIN FARMER  CSN #:                  [id number]  :                       1945  ADMIT DATE:       2024 5:22 AM  DISCH DATE:        2024 5:51 PM  RESPONDING  PROVIDER #:        TIFFANIE Jackson NP          QUERY TEXT:    Pt admitted on  with SOB and generalized weakness.  Noted documentation of   NSTEMI in H&P and discharge summary on . Per cardiology consult on    of flat troponin elevation with underlying LETICIA and no chest pain or new EKG   changes consistent with supply demand mismatch. troponin 120>105>78>82. Please   clarify one of the following:    The medical record reflects the following:  Risk Factors: Age of 79 ischemic cardiomyopathy, dyslipidemia, history of cad   s/p cabg and LV thrombus  Clinical Indicators :Admitted on  with SOB and generalized weakness.    Noted documentation of NSTEMI in H&P and discharge summary on . Per   cardiology consult on  of flat troponin elevation with underlying LETICIA and   no chest pain or new EKG changes consistent with supply demand mismatch.   troponin 120>105>78>82  Treatment: EKG Cardiology consult, Troponin monitoring,    Thank You Ebenezer ALMANZA BSN CCDS  Email [email or web address]  Cell [phone]  office hours M-F 6am to 2:30p  Options provided:  -- NSTEMI ruled out and Demand ischemia confirmed  -- NSTEMI ruled out and non ischemic myocardial injury confirmed  -- NSTEMI ruled out  -- NSTEMI confirmed  -- Other - I will add my own diagnosis  -- Disagree - Not applicable / Not valid  -- Disagree - Clinically unable to determine / Unknown  -- Refer to Clinical Documentation Reviewer    PROVIDER RESPONSE TEXT:    The diagnosis of NSTEMI was ruled out    Query created by: Dasha Sneed on 2024 6:56 AM      Electronically signed by:  TIFFANIE Jackson NP 7/15/2024 7:22 AM

## 2024-08-27 ENCOUNTER — APPOINTMENT (OUTPATIENT)
Dept: GENERAL RADIOLOGY | Age: 79
DRG: 683 | End: 2024-08-27
Payer: MEDICARE

## 2024-08-27 ENCOUNTER — HOSPITAL ENCOUNTER (INPATIENT)
Age: 79
LOS: 2 days | Discharge: HOME | DRG: 683 | End: 2024-08-29
Attending: EMERGENCY MEDICINE | Admitting: FAMILY MEDICINE
Payer: MEDICARE

## 2024-08-27 DIAGNOSIS — R79.89 ELEVATED TROPONIN: ICD-10-CM

## 2024-08-27 DIAGNOSIS — I47.20 V-TACH (HCC): Primary | ICD-10-CM

## 2024-08-27 DIAGNOSIS — N17.9 AKI (ACUTE KIDNEY INJURY) (HCC): ICD-10-CM

## 2024-08-27 PROBLEM — I47.10 SVT (SUPRAVENTRICULAR TACHYCARDIA) (HCC): Status: ACTIVE | Noted: 2024-08-27

## 2024-08-27 LAB
ALBUMIN SERPL-MCNC: 4.3 G/DL (ref 3.5–5.2)
ALBUMIN/GLOB SERPL: 2 {RATIO} (ref 1–2.5)
ALP SERPL-CCNC: 166 U/L (ref 40–129)
ALT SERPL-CCNC: 31 U/L (ref 10–50)
ANION GAP SERPL CALCULATED.3IONS-SCNC: 12 MMOL/L (ref 9–16)
AST SERPL-CCNC: 29 U/L (ref 10–50)
BASOPHILS # BLD: 0.06 K/UL (ref 0–0.2)
BASOPHILS NFR BLD: 1 % (ref 0–2)
BILIRUB SERPL-MCNC: 0.2 MG/DL (ref 0–1.2)
BNP SERPL-MCNC: 874 PG/ML (ref 0–300)
BUN SERPL-MCNC: 36 MG/DL (ref 8–23)
CALCIUM SERPL-MCNC: 8.9 MG/DL (ref 8.6–10.4)
CHLORIDE SERPL-SCNC: 104 MMOL/L (ref 98–107)
CO2 SERPL-SCNC: 20 MMOL/L (ref 20–31)
CREAT SERPL-MCNC: 1.6 MG/DL (ref 0.7–1.2)
EOSINOPHIL # BLD: 0.51 K/UL (ref 0–0.44)
EOSINOPHILS RELATIVE PERCENT: 6 % (ref 1–4)
ERYTHROCYTE [DISTWIDTH] IN BLOOD BY AUTOMATED COUNT: 13.8 % (ref 11.8–14.4)
GFR, ESTIMATED: 43 ML/MIN/1.73M2
GLUCOSE SERPL-MCNC: 142 MG/DL (ref 74–99)
HCT VFR BLD AUTO: 37.4 % (ref 40.7–50.3)
HGB BLD-MCNC: 12.7 G/DL (ref 13–17)
IMM GRANULOCYTES # BLD AUTO: <0.03 K/UL (ref 0–0.3)
IMM GRANULOCYTES NFR BLD: 0 %
LYMPHOCYTES NFR BLD: 2.5 K/UL (ref 1.1–3.7)
LYMPHOCYTES RELATIVE PERCENT: 28 % (ref 24–43)
MAGNESIUM SERPL-MCNC: 2.4 MG/DL (ref 1.6–2.4)
MCH RBC QN AUTO: 30.8 PG (ref 25.2–33.5)
MCHC RBC AUTO-ENTMCNC: 34 G/DL (ref 28.4–34.8)
MCV RBC AUTO: 90.6 FL (ref 82.6–102.9)
MONOCYTES NFR BLD: 0.66 K/UL (ref 0.1–1.2)
MONOCYTES NFR BLD: 7 % (ref 3–12)
NEUTROPHILS NFR BLD: 58 % (ref 36–65)
NEUTS SEG NFR BLD: 5.15 K/UL (ref 1.5–8.1)
NRBC BLD-RTO: 0 PER 100 WBC
PLATELET # BLD AUTO: 197 K/UL (ref 138–453)
PMV BLD AUTO: 12.1 FL (ref 8.1–13.5)
POTASSIUM SERPL-SCNC: 4.7 MMOL/L (ref 3.7–5.3)
PROT SERPL-MCNC: 7.1 G/DL (ref 6.6–8.7)
RBC # BLD AUTO: 4.13 M/UL (ref 4.21–5.77)
SODIUM SERPL-SCNC: 136 MMOL/L (ref 136–145)
TROPONIN I SERPL HS-MCNC: 57 NG/L (ref 0–22)
TROPONIN I SERPL HS-MCNC: 59 NG/L (ref 0–22)
WBC OTHER # BLD: 8.9 K/UL (ref 3.5–11.3)

## 2024-08-27 PROCEDURE — 99285 EMERGENCY DEPT VISIT HI MDM: CPT

## 2024-08-27 PROCEDURE — 84484 ASSAY OF TROPONIN QUANT: CPT

## 2024-08-27 PROCEDURE — 83880 ASSAY OF NATRIURETIC PEPTIDE: CPT

## 2024-08-27 PROCEDURE — 71046 X-RAY EXAM CHEST 2 VIEWS: CPT

## 2024-08-27 PROCEDURE — 99223 1ST HOSP IP/OBS HIGH 75: CPT | Performed by: STUDENT IN AN ORGANIZED HEALTH CARE EDUCATION/TRAINING PROGRAM

## 2024-08-27 PROCEDURE — 1200000000 HC SEMI PRIVATE

## 2024-08-27 PROCEDURE — 83735 ASSAY OF MAGNESIUM: CPT

## 2024-08-27 PROCEDURE — 85025 COMPLETE CBC W/AUTO DIFF WBC: CPT

## 2024-08-27 PROCEDURE — 80053 COMPREHEN METABOLIC PANEL: CPT

## 2024-08-27 PROCEDURE — 2580000003 HC RX 258

## 2024-08-27 PROCEDURE — 93005 ELECTROCARDIOGRAM TRACING: CPT

## 2024-08-27 RX ORDER — ONDANSETRON 4 MG/1
4 TABLET, ORALLY DISINTEGRATING ORAL EVERY 8 HOURS PRN
Status: DISCONTINUED | OUTPATIENT
Start: 2024-08-27 | End: 2024-08-29 | Stop reason: HOSPADM

## 2024-08-27 RX ORDER — ONDANSETRON 2 MG/ML
4 INJECTION INTRAMUSCULAR; INTRAVENOUS EVERY 6 HOURS PRN
Status: DISCONTINUED | OUTPATIENT
Start: 2024-08-27 | End: 2024-08-29 | Stop reason: HOSPADM

## 2024-08-27 RX ORDER — ATORVASTATIN CALCIUM 80 MG/1
80 TABLET, FILM COATED ORAL NIGHTLY
Status: DISCONTINUED | OUTPATIENT
Start: 2024-08-27 | End: 2024-08-29 | Stop reason: HOSPADM

## 2024-08-27 RX ORDER — SODIUM CHLORIDE 0.9 % (FLUSH) 0.9 %
10 SYRINGE (ML) INJECTION PRN
Status: DISCONTINUED | OUTPATIENT
Start: 2024-08-27 | End: 2024-08-29 | Stop reason: HOSPADM

## 2024-08-27 RX ORDER — MAGNESIUM SULFATE 1 G/100ML
1000 INJECTION INTRAVENOUS PRN
Status: DISCONTINUED | OUTPATIENT
Start: 2024-08-27 | End: 2024-08-29 | Stop reason: HOSPADM

## 2024-08-27 RX ORDER — SOTALOL HYDROCHLORIDE 80 MG/1
80 TABLET ORAL DAILY
Status: DISCONTINUED | OUTPATIENT
Start: 2024-08-28 | End: 2024-08-29 | Stop reason: HOSPADM

## 2024-08-27 RX ORDER — SODIUM CHLORIDE 9 MG/ML
INJECTION, SOLUTION INTRAVENOUS PRN
Status: DISCONTINUED | OUTPATIENT
Start: 2024-08-27 | End: 2024-08-29 | Stop reason: HOSPADM

## 2024-08-27 RX ORDER — POTASSIUM CHLORIDE 1500 MG/1
40 TABLET, EXTENDED RELEASE ORAL PRN
Status: DISCONTINUED | OUTPATIENT
Start: 2024-08-27 | End: 2024-08-29 | Stop reason: HOSPADM

## 2024-08-27 RX ORDER — SOTALOL HYDROCHLORIDE 80 MG/1
80 TABLET ORAL 2 TIMES DAILY
Status: DISCONTINUED | OUTPATIENT
Start: 2024-08-27 | End: 2024-08-27 | Stop reason: DRUGHIGH

## 2024-08-27 RX ORDER — POLYETHYLENE GLYCOL 3350 17 G/17G
17 POWDER, FOR SOLUTION ORAL DAILY PRN
Status: DISCONTINUED | OUTPATIENT
Start: 2024-08-27 | End: 2024-08-29 | Stop reason: HOSPADM

## 2024-08-27 RX ORDER — LISINOPRIL 10 MG/1
10 TABLET ORAL DAILY
Status: DISCONTINUED | OUTPATIENT
Start: 2024-08-28 | End: 2024-08-29 | Stop reason: HOSPADM

## 2024-08-27 RX ORDER — SODIUM CHLORIDE 0.9 % (FLUSH) 0.9 %
5-40 SYRINGE (ML) INJECTION EVERY 12 HOURS SCHEDULED
Status: DISCONTINUED | OUTPATIENT
Start: 2024-08-27 | End: 2024-08-29 | Stop reason: HOSPADM

## 2024-08-27 RX ORDER — MEXILETINE HYDROCHLORIDE 200 MG/1
200 CAPSULE ORAL 3 TIMES DAILY
Status: DISCONTINUED | OUTPATIENT
Start: 2024-08-27 | End: 2024-08-29 | Stop reason: HOSPADM

## 2024-08-27 RX ORDER — POTASSIUM CHLORIDE 7.45 MG/ML
10 INJECTION INTRAVENOUS PRN
Status: DISCONTINUED | OUTPATIENT
Start: 2024-08-27 | End: 2024-08-29 | Stop reason: HOSPADM

## 2024-08-27 RX ORDER — FUROSEMIDE 40 MG
40 TABLET ORAL DAILY
Status: DISCONTINUED | OUTPATIENT
Start: 2024-08-28 | End: 2024-08-29 | Stop reason: HOSPADM

## 2024-08-27 RX ORDER — SODIUM CHLORIDE 9 MG/ML
INJECTION, SOLUTION INTRAVENOUS CONTINUOUS
Status: DISCONTINUED | OUTPATIENT
Start: 2024-08-27 | End: 2024-08-29

## 2024-08-27 RX ORDER — ACETAMINOPHEN 325 MG/1
650 TABLET ORAL EVERY 6 HOURS PRN
Status: DISCONTINUED | OUTPATIENT
Start: 2024-08-27 | End: 2024-08-29 | Stop reason: HOSPADM

## 2024-08-27 RX ORDER — ASPIRIN 81 MG/1
81 TABLET, CHEWABLE ORAL DAILY
Status: DISCONTINUED | OUTPATIENT
Start: 2024-08-28 | End: 2024-08-29 | Stop reason: HOSPADM

## 2024-08-27 RX ORDER — 0.9 % SODIUM CHLORIDE 0.9 %
1000 INTRAVENOUS SOLUTION INTRAVENOUS ONCE
Status: COMPLETED | OUTPATIENT
Start: 2024-08-27 | End: 2024-08-27

## 2024-08-27 RX ORDER — SPIRONOLACTONE 25 MG/1
25 TABLET ORAL DAILY
Status: DISCONTINUED | OUTPATIENT
Start: 2024-08-28 | End: 2024-08-29 | Stop reason: HOSPADM

## 2024-08-27 RX ORDER — ENOXAPARIN SODIUM 100 MG/ML
40 INJECTION SUBCUTANEOUS DAILY
Status: DISCONTINUED | OUTPATIENT
Start: 2024-08-28 | End: 2024-08-29 | Stop reason: HOSPADM

## 2024-08-27 RX ORDER — ACETAMINOPHEN 650 MG/1
650 SUPPOSITORY RECTAL EVERY 6 HOURS PRN
Status: DISCONTINUED | OUTPATIENT
Start: 2024-08-27 | End: 2024-08-29 | Stop reason: HOSPADM

## 2024-08-27 RX ADMIN — SODIUM CHLORIDE 1000 ML: 9 INJECTION, SOLUTION INTRAVENOUS at 20:48

## 2024-08-27 ASSESSMENT — PAIN - FUNCTIONAL ASSESSMENT: PAIN_FUNCTIONAL_ASSESSMENT: NONE - DENIES PAIN

## 2024-08-27 ASSESSMENT — LIFESTYLE VARIABLES
HOW MANY STANDARD DRINKS CONTAINING ALCOHOL DO YOU HAVE ON A TYPICAL DAY: PATIENT DOES NOT DRINK
HOW OFTEN DO YOU HAVE A DRINK CONTAINING ALCOHOL: NEVER

## 2024-08-27 NOTE — ED NOTES
Pt presents to the ED thru triage with c/o HTN and elevated heart rate.   Pt states around lunchtime today, he checked his heart rate on his watch and saw that it was 150. Pt took three of his home Nitro and noticed improvement of his rate. Then about 45 minutes PTA, pt noticed his BP was elevate with systolic pressure of 170.   Pt has hx of triple bypass in 2013. Pt also has a defibrillator to the R upper chest. Pt denies feeling the device fire.   Pt is in no distress, RR even and unlabored. Pt awake, alert and oriented x4.   Pt changed into gown, and placed on cardiac monitor, EKG done, IV established, labs collected.   Call light within reach.

## 2024-08-27 NOTE — ED PROVIDER NOTES
MG chewable tablet Take 1 tablet by mouth daily. 11/26/13  Yes Audelia Gonzalez, CIRA   fluticasone propionate (FLOVENT) 100 MCG/BLIST AEPB inhaler Inhale 1 puff into the lungs 2 times daily  Patient not taking: Reported on 8/5/2022 8/12/22  Provider, MD Aamir   Respiratory Therapy Supplies (NEBULIZER COMPRESSOR) KIT 1 kit by Does not apply route once for 1 dose 6/10/18 12/25/18  Woodrow Valdez MD   Respiratory Therapy Supplies (NEBULIZER COMPRESSOR) KIT 1 kit by Does not apply route once for 1 dose 6/10/18 6/10/18  Woodrow Valdez MD         PHYSICAL EXAM      INITIAL VITALS:   BP (!) 153/63   Pulse 50   Temp 97.6 °F (36.4 °C) (Oral)   Resp 16   Ht 1.702 m (5' 7\")   Wt 80.7 kg (178 lb)   SpO2 98%   BMI 27.88 kg/m²     Physical Exam  Vitals reviewed.   Constitutional:       General: He is not in acute distress.  HENT:      Head: Normocephalic and atraumatic.   Cardiovascular:      Rate and Rhythm: Regular rhythm. Bradycardia present.      Pulses: Normal pulses.   Pulmonary:      Effort: Pulmonary effort is normal.      Breath sounds: Normal breath sounds.   Abdominal:      Palpations: Abdomen is soft.      Tenderness: There is no abdominal tenderness.   Musculoskeletal:      Right lower leg: No edema.      Left lower leg: No edema.   Skin:     General: Skin is warm and dry.   Neurological:      General: No focal deficit present.      Mental Status: He is alert and oriented to person, place, and time.           DDX/DIAGNOSTIC RESULTS / EMERGENCY DEPARTMENT COURSE / MDM     Medical Decision Making  79-year-old male with history of CABG x 3, ventricular tachycardia arrest, ICD in place presents due to concerns for heart racing, high blood pressure that he noticed at home today.  Patient states his heart rate went up to about the 150s this afternoon.  He took 3 nitroglycerin at home and attempt to improve his heart rate.  He then noticed his blood pressure was elevated 170 systolic.  Mild associated chest  pressure.  No shortness of breath, headaches.  States he is taking all of his home medications as prescribed.  Upon arrival patient is hypertensive, bradycardic, vital signs otherwise within normal.  He is alert, awake, no acute distress on exam.  Heart is regular rate and rhythm, lungs are clear to auscultation bilaterally.  He is in no respiratory distress.  2+ pulses in all 4 extremities.  No lower extremity edema.  Abdomen is soft and nontender.  DDx: ACS, arrhythmia, electrolyte derangement, anemia, pneumonia, CHF exacerbation.  Plan for labs, imaging, pacemaker interrogation, reevaluation, likely admission    Amount and/or Complexity of Data Reviewed  Labs: ordered. Decision-making details documented in ED Course.  Radiology: ordered.  ECG/medicine tests: ordered.    Risk  Prescription drug management.  Decision regarding hospitalization.          EMERGENCY DEPARTMENT COURSE:  EKG at 1846, rate of 67, sinus rhythm with left axis deviation, first-degree AV block with frequent PVCs, nonspecific EKG    ED Course as of 08/28/24 0244   Tue Aug 27, 2024   2044 Cxr without acute cardiopulmonary process [TD]   2044 Troponin, High Sensitivity(!!): 57  Decreased from June when he was admitted for NSTEMI [TD]   2045 Creatinine(!): 1.6  Worse than normal with normal EF, does not appear fluid overloaded [TD]   2134 Troponin, High Sensitivity(!!): 59  Increased by 2 [TD]   2155 Device interrogated- functioning well    SVT this morning with rates in 160s  1207 fast vt episode at home today, overdrive pacemaker terminated event    [TD]   2237 Spoke with cardiology fellow who recommends telemetry monitoring overnight and they will see the patient in the morning [TD]   2247 [facility] [TD]      ED Course User Index  [TD] Cierra Call DO         CONSULTS:  IP CONSULT TO CARDIOLOGY  IP CONSULT TO HOSPITALIST        FINAL IMPRESSION      1. V-tach (HCC)          DISPOSITION / PLAN

## 2024-08-28 ENCOUNTER — APPOINTMENT (OUTPATIENT)
Age: 79
DRG: 683 | End: 2024-08-28
Payer: MEDICARE

## 2024-08-28 LAB
ANION GAP SERPL CALCULATED.3IONS-SCNC: 9 MMOL/L (ref 9–16)
BUN SERPL-MCNC: 26 MG/DL (ref 8–23)
CALCIUM SERPL-MCNC: 8.8 MG/DL (ref 8.6–10.4)
CHLORIDE SERPL-SCNC: 109 MMOL/L (ref 98–107)
CO2 SERPL-SCNC: 22 MMOL/L (ref 20–31)
CREAT SERPL-MCNC: 1.2 MG/DL (ref 0.7–1.2)
CREAT UR-MCNC: 142 MG/DL (ref 39–259)
ECHO BSA: 1.95 M2
ECHO LV EF PHYSICIAN: 45 %
ECHO LV EJECTION FRACTION BIPLANE: 40 % (ref 55–100)
EKG ATRIAL RATE: 67 BPM
EKG P AXIS: 92 DEGREES
EKG P-R INTERVAL: 270 MS
EKG Q-T INTERVAL: 484 MS
EKG QRS DURATION: 150 MS
EKG QTC CALCULATION (BAZETT): 511 MS
EKG R AXIS: -63 DEGREES
EKG T AXIS: 74 DEGREES
EKG VENTRICULAR RATE: 67 BPM
GFR, ESTIMATED: 63 ML/MIN/1.73M2
GLUCOSE SERPL-MCNC: 108 MG/DL (ref 74–99)
POTASSIUM SERPL-SCNC: 4.8 MMOL/L (ref 3.7–5.3)
SODIUM SERPL-SCNC: 140 MMOL/L (ref 136–145)
UUN UR-MCNC: 972 MG/DL (ref 800–1666)

## 2024-08-28 PROCEDURE — 99223 1ST HOSP IP/OBS HIGH 75: CPT | Performed by: INTERNAL MEDICINE

## 2024-08-28 PROCEDURE — 82570 ASSAY OF URINE CREATININE: CPT

## 2024-08-28 PROCEDURE — 99232 SBSQ HOSP IP/OBS MODERATE 35: CPT | Performed by: FAMILY MEDICINE

## 2024-08-28 PROCEDURE — 2580000003 HC RX 258: Performed by: NURSE PRACTITIONER

## 2024-08-28 PROCEDURE — C8924 2D TTE W OR W/O FOL W/CON,FU: HCPCS

## 2024-08-28 PROCEDURE — 84540 ASSAY OF URINE/UREA-N: CPT

## 2024-08-28 PROCEDURE — 97530 THERAPEUTIC ACTIVITIES: CPT

## 2024-08-28 PROCEDURE — 97116 GAIT TRAINING THERAPY: CPT

## 2024-08-28 PROCEDURE — 97162 PT EVAL MOD COMPLEX 30 MIN: CPT

## 2024-08-28 PROCEDURE — 1200000000 HC SEMI PRIVATE

## 2024-08-28 PROCEDURE — 36415 COLL VENOUS BLD VENIPUNCTURE: CPT

## 2024-08-28 PROCEDURE — 80048 BASIC METABOLIC PNL TOTAL CA: CPT

## 2024-08-28 PROCEDURE — 6370000000 HC RX 637 (ALT 250 FOR IP): Performed by: NURSE PRACTITIONER

## 2024-08-28 PROCEDURE — 6360000002 HC RX W HCPCS: Performed by: NURSE PRACTITIONER

## 2024-08-28 PROCEDURE — 93308 TTE F-UP OR LMTD: CPT | Performed by: INTERNAL MEDICINE

## 2024-08-28 PROCEDURE — 6360000002 HC RX W HCPCS: Performed by: STUDENT IN AN ORGANIZED HEALTH CARE EDUCATION/TRAINING PROGRAM

## 2024-08-28 RX ADMIN — MEXILETINE HYDROCHLORIDE 200 MG: 200 CAPSULE ORAL at 08:34

## 2024-08-28 RX ADMIN — PERFLUTREN 1.5 ML: 6.52 INJECTION, SUSPENSION INTRAVENOUS at 14:54

## 2024-08-28 RX ADMIN — MEXILETINE HYDROCHLORIDE 200 MG: 200 CAPSULE ORAL at 14:11

## 2024-08-28 RX ADMIN — SODIUM CHLORIDE, PRESERVATIVE FREE 10 ML: 5 INJECTION INTRAVENOUS at 08:33

## 2024-08-28 RX ADMIN — ACETAMINOPHEN 650 MG: 325 TABLET ORAL at 15:32

## 2024-08-28 RX ADMIN — SOTALOL HYDROCHLORIDE 80 MG: 80 TABLET ORAL at 08:34

## 2024-08-28 RX ADMIN — SODIUM CHLORIDE, PRESERVATIVE FREE 10 ML: 5 INJECTION INTRAVENOUS at 01:34

## 2024-08-28 RX ADMIN — ATORVASTATIN CALCIUM 80 MG: 80 TABLET, FILM COATED ORAL at 20:22

## 2024-08-28 RX ADMIN — ASPIRIN 81 MG: 81 TABLET, CHEWABLE ORAL at 08:34

## 2024-08-28 RX ADMIN — ENOXAPARIN SODIUM 40 MG: 100 INJECTION SUBCUTANEOUS at 08:34

## 2024-08-28 RX ADMIN — MEXILETINE HYDROCHLORIDE 200 MG: 200 CAPSULE ORAL at 20:22

## 2024-08-28 RX ADMIN — SODIUM CHLORIDE: 9 INJECTION, SOLUTION INTRAVENOUS at 01:32

## 2024-08-28 RX ADMIN — SODIUM CHLORIDE: 9 INJECTION, SOLUTION INTRAVENOUS at 15:32

## 2024-08-28 ASSESSMENT — ENCOUNTER SYMPTOMS
BLOOD IN STOOL: 0
COUGH: 0
DIARRHEA: 0
CONSTIPATION: 0
SHORTNESS OF BREATH: 0
CHEST TIGHTNESS: 0
WHEEZING: 0
VOMITING: 0
NAUSEA: 0
ABDOMINAL PAIN: 0

## 2024-08-28 ASSESSMENT — LIFESTYLE VARIABLES
HOW MANY STANDARD DRINKS CONTAINING ALCOHOL DO YOU HAVE ON A TYPICAL DAY: 1 OR 2
HOW OFTEN DO YOU HAVE A DRINK CONTAINING ALCOHOL: 2-4 TIMES A MONTH

## 2024-08-28 ASSESSMENT — PAIN SCALES - GENERAL: PAINLEVEL_OUTOF10: 3

## 2024-08-28 ASSESSMENT — PAIN DESCRIPTION - LOCATION: LOCATION: HEAD

## 2024-08-28 NOTE — CARE COORDINATION
Case Management Assessment  Initial Evaluation    Date/Time of Evaluation: 8/28/2024 3:05 PM  Assessment Completed by: Kat Mobley RN    If patient is discharged prior to next notation, then this note serves as note for discharge by case management.    Patient Name: Erik Olea                   YOB: 1945  Diagnosis: V-tach (HCC) [I47.20]  LETICIA (acute kidney injury) ([facility], [facility]) [N17.9]                   Date / Time: 8/27/2024  7:14 PM    Patient Admission Status: Inpatient   Readmission Risk (Low < 19, Mod (19-27), High > 27): Readmission Risk Score: 15.4    Current PCP: Albertina Cleaning MD  PCP verified by CM? (P) Yes    Chart Reviewed: Yes      History Provided by: (P) Patient  Patient Orientation: (P) Alert and Oriented, Person, Place, Situation, Self    Patient Cognition: (P) Alert    Hospitalization in the last 30 days (Readmission):  No    If yes, Readmission Assessment in CM Navigator will be completed.    Advance Directives:      Code Status: Full Code   Patient's Primary Decision Maker is: (P) Legal Next of Kin      Discharge Planning:    Patient lives with: (P) Alone Type of Home: (P) House  Primary Care Giver: (P) Self  Patient Support Systems include: (P) Family Members, Friends/Neighbors   Current Financial resources: (P) Medicare  Current community resources: (P) None  Current services prior to admission: (P) Durable Medical Equipment            Current DME: (P) Home Aerosol            Type of Home Care services:  (P) None    ADLS  Prior functional level: (P) Independent in ADLs/IADLs  Current functional level: (P) Independent in ADLs/IADLs    PT AM-PAC:   /24  OT AM-PAC:   /24    Family can provide assistance at DC: (P) No  Would you like Case Management to discuss the discharge plan with any other family members/significant others, and if so, who? (P) No  Plans to Return to Present Housing: (P) Yes  Other Identified Issues/Barriers to RETURNING to current housing: none  Potential

## 2024-08-28 NOTE — H&P
[facility]  Office: [phone]  Quang Noe DO, Kimani Cannon DO, Bubba Wyatt DO, Johnny Christopher DO, Hira Khan MD, Vielka Escobar MD, Adarsh Bowen MD, Frida Horan MD,  Angel Warner MD, Bryon Lopez MD, Torbiio Marti DO, Rebeca Christine MD,  Kenrick Solorio DO, Mau Chávez MD, Pankaj Scott MD, David Neo DO, Tarsha Blancas MD, Jose Alejandro Bernard MD, Jorge L Forbes DO, Liane Avery MD, Barbara Schumacher MD, Na Maldonado MD, Patrick Borrego MD,  Rogelio Richardson DO, Bello Orellana MD,  Shirley Waterhouse, CNP,  Mitali Padilla, CNP, Jennifer Mac, CNP, Jd Garcia, CNP,  Maria Luz Peterson, [facility], Connie Cheek, CNP, Ivory Warren, CNP, Cathi Butt, CNP, Idalia Nassar, CNP, Syke García, CNP, Abad Melchor PA-C, Kae Smith, CNS, Ting Bermudez, CNP, Radha Florez, CNP         [facility]   IN-PATIENT SERVICE   [facility]    HISTORY AND PHYSICAL EXAMINATION            Date:   8/27/2024  Patient name:  Erik Olea  Date of admission:  8/27/2024  7:14 PM  MRN:   [id number]  Account:  [id number]  YOB: 1945  PCP:    Albertina Cleaning MD  Room:   [facility]  Code Status:    Full Code    Chief Complaint:     Chief Complaint   Patient presents with    Hypertension    Irregular Heart Beat       History Obtained From:     patient    History of Present Illness:     Erik Olea is a 79 y.o. Non- / non  male who presents with Hypertension and Irregular Heart Beat   and is admitted to the hospital for the management of LETICIA (acute kidney injury) (HCC).    79-year-old male with past medical history of CAD status post CABG in 2013, hypertension, history of LV thrombus was on Coumadin discontinued in June because a recent echo showed no thrombus, history of stenosis of left carotid artery status post carotid enterectomy, tobacco abuse, ischemic cardiomyopathy with improved EF in 2013 it was 35% and most recent echo in March/2024 showed

## 2024-08-28 NOTE — ED PROVIDER NOTES
[facility] ED  Emergency Department  Faculty Sign-Out Addendum     Care of Erik Olea was assumed from previous attending and is being seen for Hypertension and Irregular Heart Beat  .  The patient's initial evaluation and plan have been discussed with the prior provider who initially evaluated the patient.      I reviewed the resident’s note and agree with the documented findings and plan of care. Any areas of disagreement are noted on the chart. I was personally present for the key portions of any procedures. I have documented in the chart those procedures where I was not present during the key portions. I have reviewed the emergency nurses triage note. I agree with the chief complaint, past medical history, past surgical history, allergies, medications, social and family history as documented unless otherwise noted below.      EMERGENCY DEPARTMENT COURSE / MEDICAL DECISION MAKING:       MEDICATIONS GIVEN:  Orders Placed This Encounter   Medications    sodium chloride 0.9 % bolus 1,000 mL    aspirin chewable tablet 81 mg    atorvastatin (LIPITOR) tablet 80 mg    furosemide (LASIX) tablet 40 mg    lisinopril (PRINIVIL;ZESTRIL) tablet 10 mg    mexiletine (MEXITIL) capsule 200 mg    sotalol (BETAPACE) tablet 80 mg    spironolactone (ALDACTONE) tablet 25 mg    sodium chloride flush 0.9 % injection 5-40 mL    sodium chloride flush 0.9 % injection 10 mL    0.9 % sodium chloride infusion    OR Linked Order Group     potassium chloride (KLOR-CON M) extended release tablet 40 mEq     potassium bicarb-citric acid (EFFER-K) effervescent tablet 40 mEq     potassium chloride 10 mEq/100 mL IVPB (Peripheral Line)    magnesium sulfate 1000 mg in dextrose 5% 100 mL IVPB    enoxaparin (LOVENOX) injection 40 mg     Order Specific Question:   Indication of Use     Answer:   Prophylaxis-DVT/PE    OR Linked Order Group     ondansetron (ZOFRAN-ODT) disintegrating tablet 4 mg     ondansetron (ZOFRAN) injection 4 mg

## 2024-08-28 NOTE — CARE COORDINATION
Attempt to see patient for initial assessment. Patient off floor for testing. No family available in room. Will try again later as time allows.

## 2024-08-28 NOTE — PROGRESS NOTES
Spiritual Health Assessment/Progress Note  [facility]    Follow-up,  ,  ,      Name: Erik Olea MRN: [id number]    Age: 79 y.o.     Sex: male   Language: English   Advent: Anabaptist   LETICIA (acute kidney injury) (HCC)     Date: 8/28/2024            Total Time Calculated: 18 min              Spiritual Assessment continued in [facility]        Referral/Consult From: Rounding   Encounter Overview/Reason: Follow-up  Service Provided For: Patient    Jennifer, Belief, Meaning:   Patient identifies as spiritual, is connected with a jennifer tradition or spiritual practice, and has beliefs or practices that help with coping during difficult times  Family/Friends No family/friends present      Importance and Influence:  Patient has spiritual/personal beliefs that influence decisions regarding their health  Family/Friends no family/friends present    Community:  Patient feels well-supported. Support system includes: Friends and Extended family  Family/Friends Other: no family/friends present    Assessment and Plan of Care:   Patient was awake and alert when  entered his room. Patient looked a bit anxious and worried. When asked how he was feeling, patient responded, \"I don't know but I feel a bit anxious about going home.\" Patient said he was raised Anabaptist but not affiliated with a [place]. Patient received sacrament of anointing of the sick.  maintained supportive presence, prayed patient and reassured him that he was in good hands. Patient was very grateful and thankful for the anointing and blessing he received.     Patient Interventions include: Facilitated expression of thoughts and feelings, Explored spiritual coping/struggle/distress and theological reflection, Affirmed coping skills/support systems, and Provided sacramental/Mandaeism ritual  Family/Friends Interventions include: Other: no family/friends present    Patient Plan of Care: Spiritual Care available upon further

## 2024-08-28 NOTE — PROGRESS NOTES
Spiritual Health Assessment/Progress Note  [facility]    Initial Encounter,  ,  ,      Name: Erik Olea MRN: [id number]    Age: 79 y.o.     Sex: male   Language: English   Muslim: Faith   LETICIA (acute kidney injury) (HCC)     Date: 8/28/2024            Total Time Calculated: (P) 10 min              Spiritual Assessment began in [facility] ED        Referral/Consult From: Rounding   Encounter Overview/Reason: Initial Encounter  Service Provided For: Patient    Jennifer, Belief, Meaning:   Patient is connected with a jennifer tradition or spiritual practice  Family/Friends No family/friends present      Importance and Influence:  Patient has no beliefs influential to healthcare decision-making identified during this visit  Family/Friends no family/friends present    Community:  Patient is connected with a spiritual community and feels well-supported. Support system includes: Other: Family  Family/Friends Other: Family not present    Assessment and Plan of Care:     Patient Interventions include: Other:  provided a supportive presence through active listening and words of affirmation.  Family/Friends Interventions include: Other: Family not present.    Patient Plan of Care: Spiritual Care available upon further referral  Family/Friends Plan of Care: Spiritual Care available upon further referral    Electronically signed by Chaplain Paulina on 8/28/2024 at 12:28 AM

## 2024-08-28 NOTE — PROGRESS NOTES
[facility]  Office: [phone]  Quang Noe DO, Kimani Cannon DO, Bubba Wyatt DO, Johnny Christopher DO, Hira Khan MD, Vielka Escobar MD, Adarsh Bowen MD, Frida Horan MD,  Angel Warner MD, Bryon Lopez MD, Rebeca Christine MD,  Kenrick Solorio DO, Mau Chávez MD, Pankaj Scott MD, David Noe DO, Tarsha Blancas MD,  Jorge L Forbes DO, Liane Avery MD, Barbara Schumacher MD, Na Maldonado MD, Patrick Borrego MD,  Francisco J Mares MD, Lindsey Gonzalez MD, Vic Geronimo MD, Foreign Kumar MD, Juan Lopez MD, Camila German MD, Jd aSuceda DO, Rogelio Richardson DO, Toribio Marti DO, Bello Orellana MD,  Jose Alejandro Bernard MD, Shirley Waterhouse, CNP,  Mitali Padilla, CNP, Jd Garcia, CNP,  Maria Luz Peterson, DNP, Connie Cheek, CNP, Ivory Warren, CNP, Idalia Nassar, CNP, Skye García, CNP, Kristina Smith, PA-C, Clary Flannery, PA-C, Mindy Garcia, CNP, Aditya Fox, CNP,  Brooke Hahn, CNP, Jennifer Mac, CNP, Cathi Butt, CNP, Kae Smith, CNS, Ting Bermudez, CNP, Radha Florez, CNP, Tracy Schwab, CNP         [facility]   IN-PATIENT SERVICE   [facility]    Progress Note    8/28/2024    3:45 PM    Name:   Erik Olea  MRN:     [id number]     Acct:      [id number]   Room:   0340/0340-02   Day:  1  Admit Date:  8/27/2024  7:14 PM    PCP:   Albertina Cleaning MD  Code Status:  Full Code    Subjective:     C/C:   Chief Complaint   Patient presents with    Hypertension    Irregular Heart Beat     Interval History Status: improved.     Patient seen and examined at bedside, no acute events overnight.  Feels okay and other than his home episode he is denying any episodes in house   Patient denies any chest pain, shortness of breath, chills, fevers, nausea or vomiting.  Patient vitals, labs and all providers notes were reviewed,from overnight shift and morning updates were noted and discussed with the nurse      Brief History:     Per  8/28/2024 Yes    SVT (supraventricular tachycardia) (HCC) 8/27/2024 Yes    S/P carotid endarterectomy 8/27/2024 Yes    S/P CABG (coronary artery bypass graft) 8/27/2024 Yes    HTN (hypertension), benign 8/27/2024 Yes    S/P ICD (internal cardiac defibrillator) procedure 8/27/2024 Yes    Elevated troponin 8/27/2024 Yes       Plan:            Ventricular tachycardia, monomorphic    SVT     Elevated troponin:   - AICD interrogation which reveals runs of SVT, V-tach, some nonsustained, some sustained and required antitachycardia pacing   -Discussed with cardiology ( Dr Wayne) and the plan is for limited echo and EP consultation  -Continue telemetry monitoring    Known history of ischemic cardiomyopathy:   S/P ICD     Coronary artery disease S/P CABG:  Continue aspirin Plavix and statin    LETICIA: Likely secondary to above, Lasix Aldactone and lisinopril were held on admission and patient on fluids, repeat labs are pending      H/O Carotid artery stenosis : S/P carotid endarterectomy     HTN benign: Currently controlled despite holding some of his medications, will continue to monitor and reassess as needed      Discussed with the patient     Frida Horan MD  8/28/2024  3:45 PM

## 2024-08-28 NOTE — ED PROVIDER NOTES
[facility]     Emergency Department     Faculty Attestation    I performed a history and physical examination of the patient and discussed management with the resident. I reviewed the resident's note and agree with the documented findings and plan of care. Any areas of disagreement are noted on the chart. I was personally present for the key portions of any procedures. I have documented in the chart those procedures where I was not present during the key portions. I have reviewed the emergency nurses triage note. I agree with the chief complaint, past medical history, past surgical history, allergies, medications, social and family history as documented unless otherwise noted below. For Physician Assistant/ Nurse Practitioner cases/documentation I have personally evaluated this patient and have completed at least one if not all key elements of the E/M (history, physical exam, and MDM). Additional findings are as noted.    Chest clear, heart regular rate and rhythm, abdomen soft and nontender, no lower extremity pain or swelling on examination.  History of CABG.  Patient came in because he states his heart rate was fast and we was hypertensive at home earlier this afternoon.       EKG Interpretation    Interpreted by emergency department physician    Rhythm: normal sinus   Rate: normal/67  Axis: Left -63 degrees  Ectopy: PVCs  Conduction: QRS duration 150 ms nonspecific interventricular block  Lateral ST and T wave changes  Q Waves: Poor R wave progression    Clinical Impression: Abnormal EKG    Dami Johnston, III     Dami Johnston MD  08/27/24 2054

## 2024-08-28 NOTE — ED NOTES
ED to inpatient nurses report      Chief Complaint:  Chief Complaint   Patient presents with    Hypertension    Irregular Heart Beat     Present to ED from: Home    MOA:     LOC: alert and orientated to name, place, date  Mobility: Independent  Oxygen Baseline: RA    Current needs required: n/a   Pending ED orders: none  Present condition: stable    Why did the patient come to the ED?   Pt presents to the ED thru triage with c/o HTN and elevated heart rate.   Pt states around lunchtime today, he checked his heart rate on his watch and saw that it was 150. Pt took three of his home Nitro and noticed improvement of his rate. Then about 45 minutes PTA, pt noticed his BP was elevate with systolic pressure of 170.   Pt has hx of triple bypass in 2013. Pt also has a defibrillator to the R upper chest. Pt denies feeling the device fire.   Pt is in no distress, RR even and unlabored. Pt awake, alert and oriented x4.   Pt changed into gown, and placed on cardiac monitor, EKG done, IV established, labs collected.   Call light within reach.   What is the plan?   Admission  Cardiology consult  Any procedures or intervention occur?   Labs, EKG, CXR, device interrogation  Any safety concerns??  N/a    Mental Status:  Level of Consciousness: Alert (0)    Psych Assessment:   Psychosocial  Psychosocial (WDL): Within Defined Limits  Vital signs   Vitals:    08/27/24 2045 08/27/24 2100 08/27/24 2115 08/27/24 2130   BP: 126/64 131/64 128/62 139/67   Pulse: 51 51 51 55   Resp: 16 13 13 13   Temp:       SpO2: 96% 97% 97% 97%   Weight:       Height:            Vitals:  Patient Vitals for the past 24 hrs:   BP Temp Pulse Resp SpO2 Height Weight   08/27/24 2130 139/67 -- 55 13 97 % -- --   08/27/24 2115 128/62 -- 51 13 97 % -- --   08/27/24 2100 131/64 -- 51 13 97 % -- --   08/27/24 2045 126/64 -- 51 16 96 % -- --   08/27/24 2030 123/68 -- 50 17 93 % -- --   08/27/24 2015 118/61 -- (!) 47 13 96 % -- --   08/27/24 2000 129/69 -- 52 12 96 %  bid    ALBUTEROL (VENTOLIN HFA) 108 (90 BASE) MCG/ACT INHALER    Inhale 2 puffs into the lungs every 6 hours as needed for Wheezing.    ASPIRIN 81 MG CHEWABLE TABLET    Take 1 tablet by mouth daily.    ATORVASTATIN (LIPITOR) 80 MG TABLET    Take 1 tablet by mouth nightly    B COMPLEX VITAMINS CAPSULE    Take 1 capsule by mouth daily    BUSPIRONE (BUSPAR) 10 MG TABLET    1 tablet Orally TID for 30 days    CHOLECALCIFEROL (VITAMIN D3 PO)    Take by mouth daily    FLUTICASONE (FLONASE) 50 MCG/ACT NASAL SPRAY        FUROSEMIDE (LASIX) 40 MG TABLET    TAKE 1 TABLET EVERY DAY    LISINOPRIL (PRINIVIL;ZESTRIL) 10 MG TABLET    TAKE 1 TABLET EVERY DAY    LUTEIN PO    Take 2 tablets by mouth daily    MEXILETINE (MEXITIL) 200 MG CAPSULE    Take 1 capsule by mouth 3 times daily    MONTELUKAST (SINGULAIR) 10 MG TABLET    Take 1 tablet by mouth nightly    NITROGLYCERIN (NITROSTAT) 0.4 MG SL TABLET    Place 1 tablet under the tongue every 5 minutes as needed for Chest pain up to max of 3 total doses. If no relief after 1 dose, call 911.    RESPIRATORY THERAPY SUPPLIES (NEBULIZER COMPRESSOR) KIT    1 kit by Does not apply route once for 1 dose    RESPIRATORY THERAPY SUPPLIES (NEBULIZER COMPRESSOR) KIT    1 kit by Does not apply route once for 1 dose    SOTALOL (BETAPACE) 80 MG TABLET    TAKE 1 TABLET TWICE DAILY    SPIRONOLACTONE (ALDACTONE) 25 MG TABLET    TAKE 1 TABLET EVERY DAY     Orders Placed This Encounter   Medications    sodium chloride 0.9 % bolus 1,000 mL       SURGICAL HISTORY       Past Surgical History:   Procedure Laterality Date    ABDOMEN SURGERY Right 07/19/2019    EXCISION RIGHT SHOULDER MASS performed by Jairon Valdes DO at [facility] OR    CARDIAC CATHETERIZATION  12/08/2016    No stents placed per pt    CARDIAC CATHETERIZATION  11/18/2013    CARDIAC CATHETERIZATION  04/07/2022    CARDIAC DEFIBRILLATOR PLACEMENT  2013    right chest    CAROTID ENDARTERECTOMY Left 08/11/2022    LEFT CAROTID ENDARTERECTOMY WITH

## 2024-08-28 NOTE — CONSULTS
(FLONASE) 50 MCG/ACT nasal spray  5/8/24  Yes Aamir Davidson MD   nitroGLYCERIN (NITROSTAT) 0.4 MG SL tablet Place 1 tablet under the tongue every 5 minutes as needed for Chest pain up to max of 3 total doses. If no relief after 1 dose, call 911. 6/17/24  Yes Maria Luz Peterson APRN - NP   atorvastatin (LIPITOR) 80 MG tablet Take 1 tablet by mouth nightly 6/17/24  Yes Maria Luz Peterson APRN - NP   spironolactone (ALDACTONE) 25 MG tablet TAKE 1 TABLET EVERY DAY 5/6/24  Yes Jef Arredondo MD   furosemide (LASIX) 40 MG tablet TAKE 1 TABLET EVERY DAY 5/6/24  Yes Jef Arredondo MD   sotalol (BETAPACE) 80 MG tablet TAKE 1 TABLET TWICE DAILY 5/6/24  Yes Jef Arredondo MD   lisinopril (PRINIVIL;ZESTRIL) 10 MG tablet TAKE 1 TABLET EVERY DAY 5/6/24  Yes Jef Arredondo MD   mexiletine (MEXITIL) 200 MG capsule Take 1 capsule by mouth 3 times daily 12/13/23  Yes Jef Arredondo MD   albuterol (ACCUNEB) 0.63 MG/3ML nebulizer solution Take 3 mLs by nebulization every 6 hours as needed for Wheezing Currently using bid   Yes ProviderAamir MD   b complex vitamins capsule Take 1 capsule by mouth daily   Yes Aamir Davidson MD   montelukast (SINGULAIR) 10 MG tablet Take 1 tablet by mouth nightly   Yes ProvideraAmir MD   LUTEIN PO Take 2 tablets by mouth daily   Yes ProviderAamir MD   Cholecalciferol (VITAMIN D3 PO) Take by mouth daily   Yes ProviderAamir MD   albuterol (VENTOLIN HFA) 108 (90 BASE) MCG/ACT inhaler Inhale 2 puffs into the lungs every 6 hours as needed for Wheezing. 10/10/14  Yes Tavo Rivera DO   aspirin 81 MG chewable tablet Take 1 tablet by mouth daily. 11/26/13  Yes Audelia Gonzalez, CIRA   fluticasone propionate (FLOVENT) 100 MCG/BLIST AEPB inhaler Inhale 1 puff into the lungs 2 times daily  Patient not taking: Reported on 8/5/2022 8/12/22  Provider, MD Aamir   Respiratory Therapy Supplies (NEBULIZER COMPRESSOR) KIT 1 kit by Does not apply route once for  reviewed  Elevation of troponin secondary to LETICIA.  Patient was previously also admitted for elevated troponins secondary to LETICIA.  Will obtain limited echo.  Will consult with Dr. Arredondo  Home mexiletine and sotalol resumed.  Diuretics and ACE inhibitor withheld patient likely dehydrated.      Please wait for final attestation from rounding attending         Duc Jameson MD. PGY-1  Resident, cardiovascular disease   [facility], [place], [place].          Attending Physician Statement:    I have discussed the care of  Erik Olea , including pertinent history and exam findings, with the Cardiology fellow/resident.     I have seen and examined the patient and the key elements of all parts of the encounter have been performed by me. I agree with the assessment, plan and orders as documented by the fellow/resident, after I modified exam findings and plan of treatments, and the final version is my approved version of the assessment.     Additional Comments:    Patient seen examined   Presented to the emergency room due to tachycardia on his Apple watch  He would AICD interrogation which reveals runs of SVT, V-tach, some nonsustained, some sustained and required antitachycardia pacing  He has a chronic ischemic cardiomyopathy, half for F, AICD  Has history of CAD and CABG   Has history of VT for which he follows with EP in his chronically suppressed with sotalol and mexiletine, I wonder if we need to consider Amiodarone.   Will check a limited 2D echocardiogram  Will consult EP  He is also here with LETICIA, diuretics and ACE inhibitors are on hold   He also has minimally elevated troponin, not suggestive of acute coronary syndrome  Further recommendations pending EP eval and echocardiogram    Discussed with patient in detail at bedside. All questions answered. Patient agrees with plan as outlined above.     Thank you for allowing me to participate in the care of this patient, please do not hesitate to call if

## 2024-08-28 NOTE — PROGRESS NOTES
limits  Coordination: Within functional limits  Tone: Normal  Sensation: Intact    PROM RLE (degrees)  RLE General PROM: no structural limitations or contractures noted  AROM RLE (degrees)  RLE AROM: WNL  RLE General AROM: no structural limitations or contractures noted  AROM LLE (degrees)  LLE AROM : WNL  LLE General AROM: no structural limitations or contractures noted  AROM RUE (degrees)  RUE AROM : WNL  AROM LUE (degrees)  LUE AROM : WNL  Strength RLE  Strength RLE: WFL  Strength LLE  Strength LLE: WFL  Strength RUE  Strength RUE: WFL  Strength LUE  Strength LUE: WFL  Strength Other  Other: Pt demonstrates general weakness and deconditioning, rest breaks needed              Transfers  Stand to Sit: Minimal Assistance  Stand Pivot Transfers: Unable to assess           Exercise Treatment: .sit with focus on self ROM to prevent strength decline and increase BP prior to activity.         AM-PAC Basic Mobility - Inpatient   How much help is needed moving from lying on your back to sitting on the side of a flat bed without using bedrails?: None  How much help is needed moving to and from a bed to a chair?: None  How much help is needed standing up from a chair using your arms?: None  How much help is needed walking in hospital room?: None  How much help is needed climbing 3-5 steps with a railing?: A Little              Goals  Short Term Goals  Time Frame for Short Term Goals: 14 visits  Short Term Goal 1: Pt to ambulate 200 ft mod I without AD  Short Term Goal 2: Pt to transfer from alternate surface heights with Mod I demonstrating safety without AD  Short Term Goal 3: Pt to ascend/descend 9 steps with Mod I and rigth railing  Short Term Goal 4: Pt to be provided verbal, written and practical instruction in bilateral LE ROM exercises to improve general strength and activity tolerance.  Patient Goals   Patient Goals : To return home       Education  Patient Education  Education Given To: Patient  Education Provided:

## 2024-08-29 VITALS
DIASTOLIC BLOOD PRESSURE: 65 MMHG | BODY MASS INDEX: 27.65 KG/M2 | HEART RATE: 61 BPM | OXYGEN SATURATION: 97 % | RESPIRATION RATE: 16 BRPM | WEIGHT: 176.15 LBS | TEMPERATURE: 97.5 F | HEIGHT: 67 IN | SYSTOLIC BLOOD PRESSURE: 138 MMHG

## 2024-08-29 LAB
ANION GAP SERPL CALCULATED.3IONS-SCNC: 10 MMOL/L (ref 9–16)
BUN SERPL-MCNC: 22 MG/DL (ref 8–23)
CALCIUM SERPL-MCNC: 8.4 MG/DL (ref 8.6–10.4)
CHLORIDE SERPL-SCNC: 108 MMOL/L (ref 98–107)
CO2 SERPL-SCNC: 19 MMOL/L (ref 20–31)
CREAT SERPL-MCNC: 1 MG/DL (ref 0.7–1.2)
GFR, ESTIMATED: 74 ML/MIN/1.73M2
GLUCOSE SERPL-MCNC: 105 MG/DL (ref 74–99)
INR PPP: 1.1
MAGNESIUM SERPL-MCNC: 1.9 MG/DL (ref 1.6–2.4)
POTASSIUM SERPL-SCNC: 4.6 MMOL/L (ref 3.7–5.3)
PROTHROMBIN TIME: 13.8 SEC (ref 11.7–14.9)
SODIUM SERPL-SCNC: 137 MMOL/L (ref 136–145)

## 2024-08-29 PROCEDURE — 6370000000 HC RX 637 (ALT 250 FOR IP): Performed by: NURSE PRACTITIONER

## 2024-08-29 PROCEDURE — 85610 PROTHROMBIN TIME: CPT

## 2024-08-29 PROCEDURE — 6360000002 HC RX W HCPCS: Performed by: NURSE PRACTITIONER

## 2024-08-29 PROCEDURE — 99239 HOSP IP/OBS DSCHRG MGMT >30: CPT | Performed by: FAMILY MEDICINE

## 2024-08-29 PROCEDURE — 83735 ASSAY OF MAGNESIUM: CPT

## 2024-08-29 PROCEDURE — 80048 BASIC METABOLIC PNL TOTAL CA: CPT

## 2024-08-29 PROCEDURE — 6360000002 HC RX W HCPCS: Performed by: STUDENT IN AN ORGANIZED HEALTH CARE EDUCATION/TRAINING PROGRAM

## 2024-08-29 PROCEDURE — 36415 COLL VENOUS BLD VENIPUNCTURE: CPT

## 2024-08-29 PROCEDURE — 93005 ELECTROCARDIOGRAM TRACING: CPT | Performed by: NURSE PRACTITIONER

## 2024-08-29 PROCEDURE — 6370000000 HC RX 637 (ALT 250 FOR IP): Performed by: STUDENT IN AN ORGANIZED HEALTH CARE EDUCATION/TRAINING PROGRAM

## 2024-08-29 PROCEDURE — 2580000003 HC RX 258: Performed by: NURSE PRACTITIONER

## 2024-08-29 RX ORDER — SOTALOL HYDROCHLORIDE 80 MG/1
40 TABLET ORAL DAILY
Status: DISCONTINUED | OUTPATIENT
Start: 2024-08-29 | End: 2024-08-29 | Stop reason: HOSPADM

## 2024-08-29 RX ORDER — SOTALOL HYDROCHLORIDE 80 MG/1
80 TABLET ORAL 2 TIMES DAILY
Qty: 180 TABLET | Refills: 2 | Status: SHIPPED | OUTPATIENT
Start: 2024-08-29

## 2024-08-29 RX ORDER — SOTALOL HYDROCHLORIDE 80 MG/1
40 TABLET ORAL NIGHTLY
Qty: 60 TABLET | Refills: 3 | Status: SHIPPED | OUTPATIENT
Start: 2024-08-29 | End: 2024-08-29 | Stop reason: HOSPADM

## 2024-08-29 RX ORDER — HYDRALAZINE HYDROCHLORIDE 20 MG/ML
10 INJECTION INTRAMUSCULAR; INTRAVENOUS EVERY 6 HOURS PRN
Status: DISCONTINUED | OUTPATIENT
Start: 2024-08-29 | End: 2024-08-29 | Stop reason: HOSPADM

## 2024-08-29 RX ORDER — SOTALOL HYDROCHLORIDE 80 MG/1
80 TABLET ORAL DAILY
Qty: 180 TABLET | Refills: 2 | Status: SHIPPED | OUTPATIENT
Start: 2024-08-29 | End: 2024-08-29

## 2024-08-29 RX ORDER — BUSPIRONE HYDROCHLORIDE 5 MG/1
10 TABLET ORAL 2 TIMES DAILY
Status: DISCONTINUED | OUTPATIENT
Start: 2024-08-29 | End: 2024-08-29 | Stop reason: HOSPADM

## 2024-08-29 RX ADMIN — SODIUM CHLORIDE, PRESERVATIVE FREE 5 ML: 5 INJECTION INTRAVENOUS at 09:28

## 2024-08-29 RX ADMIN — BUSPIRONE HYDROCHLORIDE 10 MG: 5 TABLET ORAL at 09:25

## 2024-08-29 RX ADMIN — ENOXAPARIN SODIUM 40 MG: 100 INJECTION SUBCUTANEOUS at 09:25

## 2024-08-29 RX ADMIN — SOTALOL HYDROCHLORIDE 80 MG: 80 TABLET ORAL at 09:25

## 2024-08-29 RX ADMIN — SODIUM CHLORIDE: 9 INJECTION, SOLUTION INTRAVENOUS at 01:21

## 2024-08-29 RX ADMIN — BUSPIRONE HYDROCHLORIDE 10 MG: 5 TABLET ORAL at 01:15

## 2024-08-29 RX ADMIN — MEXILETINE HYDROCHLORIDE 200 MG: 200 CAPSULE ORAL at 09:25

## 2024-08-29 RX ADMIN — ASPIRIN 81 MG: 81 TABLET, CHEWABLE ORAL at 09:25

## 2024-08-29 RX ADMIN — HYDRALAZINE HYDROCHLORIDE 10 MG: 20 INJECTION, SOLUTION INTRAMUSCULAR; INTRAVENOUS at 00:43

## 2024-08-29 NOTE — DISCHARGE INSTR - COC
{Done Not Done Date:}    Treatments at the Time of Hospital Discharge:   Respiratory Treatments: ***  Oxygen Therapy:  {Therapy; copd oxygen:52553}  Ventilator:    {[facility] Vent List:516212194}    Rehab Therapies: {THERAPEUTIC INTERVENTION:4333181659}  Weight Bearing Status/Restrictions: { CC Weight Bearin}  Other Medical Equipment (for information only, NOT a DME order):  {EQUIPMENT:745537946}  Other Treatments: ***    Patient's personal belongings (please select all that are sent with patient):  {CHP DME Belongings:092725581}    RN SIGNATURE:  {Esignature:408361151}    CASE MANAGEMENT/SOCIAL WORK SECTION    Inpatient Status Date: ***    Readmission Risk Assessment Score:  Readmission Risk              Risk of Unplanned Readmission:  24           Discharging to Facility/ Agency   Name:   Address:  Phone:  Fax:    Dialysis Facility (if applicable)   Name:  Address:  Dialysis Schedule:  Phone:  Fax:    / signature: {Esignature:385289571}    PHYSICIAN SECTION    Prognosis: {Prognosis:9917533094}    Condition at Discharge: { Patient Condition:684732101}    Rehab Potential (if transferring to Rehab): {Prognosis:7487891228}    Recommended Labs or Other Treatments After Discharge: ***    Physician Certification: I certify the above information and transfer of Erik Olea  is necessary for the continuing treatment of the diagnosis listed and that he requires {Admit to Appropriate Level of Care:39711} for {GREATER/LESS:029628074} 30 days.     Update Admission H&P: {CHP DME Changes in HandP:096647699}    PHYSICIAN SIGNATURE:  {Esignature:241273572}

## 2024-08-29 NOTE — PLAN OF CARE
Problem: Chronic Conditions and Co-morbidities  Goal: Patient's chronic conditions and co-morbidity symptoms are monitored and maintained or improved  8/29/2024 1027 by Syed Woods RN  Outcome: Completed     Problem: Safety - Adult  Goal: Free from fall injury  8/29/2024 1027 by Syed Woods, RN  Outcome: Completed     Problem: Pain  Goal: Verbalizes/displays adequate comfort level or baseline comfort level  8/29/2024 1027 by Syed Woods RN  Outcome: Completed     Problem: ABCDS Injury Assessment  Goal: Absence of physical injury  Outcome: Completed

## 2024-08-29 NOTE — DISCHARGE SUMMARY
[facility]  Office: [phone]  Quang Noe DO, Kimani Cannon DO, Bubba Wyatt DO, Johnny Christopher DO, Hira Khan MD, Vielka Escobar MD, Adarsh Bowen MD, Frida Horan MD,  Angel Warner MD, Bryon Lopez MD, Rebeca Christine MD,  Kenrick Solorio DO, Mau Chávez MD, Pankaj Scott MD, David Noe DO, Tarsha Blancas MD,  Jorge L Forbes DO, Liane Avery MD, Barbara Schumacher MD, Na Maldonado MD, Patrick Borrego MD,  Francisco J Mares MD, Lindsey Gonzalez MD, Vic Geronimo MD, Foreign Kumar MD, Juan Lopez MD, Camila German MD, Jd Sauceda DO, Rogelio Richardson DO, Toribio Marti DO, Bello Orellana MD,  Jose Alejandro Bernard MD, Shirley Waterhouse, CNP,  Mitali Padilla, CNP, Jd Garcia, CNP,  Maria Luz Peterson, GILBERTO, Connie Cheek, CNP, Ivory Warren, CNP, Idalia Nassar, CNP, Skye García, CNP, Kristina Smith, PA-C, Clary Flannery, PA-C, Mindy Garcia, CNP, Aditya Fox, CNP,  Brooke Hahn, CNP, Jennifer Mac, CNP, Cathi Butt, CNP, Kae Smith, CNS, Ting Bermudez, CNP, Radha Florez, CNP, Tracy Schwab, CNP         [facility]   IN-PATIENT SERVICE   [facility]    Discharge Summary     Patient ID: Erik Oela  :  1945   MRN: [id number]     ACCOUNT:  [id number]   Patient's PCP: Albertina Cleaning MD  Admit Date: 2024   Discharge Date: 2024     Length of Stay: 2  Code Status:  Full Code  Admitting Physician: No admitting provider for patient encounter.  Discharge Physician: Frida Horan MD     Active Discharge Diagnoses:     Hospital Problem Lists:  Principal Problem:    V-tach ([facility])  Active Problems:    LETICIA (acute kidney injury) ([facility])    SVT (supraventricular tachycardia) ([facility])    S/P carotid endarterectomy    S/P CABG (coronary artery bypass graft)    HTN (hypertension), benign    S/P ICD (internal cardiac defibrillator) procedure    Elevated troponin  Resolved Problems:    * No resolved hospital problems. *      Admission

## 2024-08-29 NOTE — CONSULTS
[facility]  In PatientCardiology Consult             Date:   8/29/2024  Patient name: Erik Olea  Date of admission:  8/27/2024  7:14 PM  MRN:   [id number]  YOB: 1945        Reason for consultation:  Ventricular tachycardia    CHIEF COMPLAINT:   Palpitation    History Obtained From:  Patient and medical record    HISTORY OF PRESENT ILLNESS:      The patient is a 79 y.o gentleman with h/o HTN, CAD, CABG, cardiomyopathy, VT and ICD in-situ admitted 8/27 for recent palpitation with NSTEMI.  He has had increased anxiety with recent stress, but denies any chest pain, SOB, lightheadedness, syncope or ICD shocks.  Earlier this week he had recent higher BP with HR noted to be around 150 bpm on his smartwatch.  Interrogation of his Medtronic Petoskey XT VR ICD done on admission from 8/27 showed only one episode of VT on 8/27, successfully pace terminate with initial antitachycardia pacing burst.  The device is otherwise functioning normally.      Past Medical History:      1. VF arrest during exercise on 11/18/2013. Subsequent demonstration of 3-vessel CAD with serial mid-LAD lesion at 80% and 70%, totally occluded mid-circumflex, evidence of left-to-left collaterals and subtotal proximal RCA stenosis.     2. Urgent bypass surgery on 11/20/2013 at [place] with LIMA-LAD, SVG-OM and SVG-PDA.     3. LVEF acutely 30%, increasing to 45% postoperatively with evidence of small inferior segment of hypokinesis.     4. Post bypass, EP study on 11/25/2013 demonstrated persistently inducible monomorphic VT (cycle length 215 ms) with evidence of underlying sinus bradycardia and prolonged HV interval.     5. Subsequent implantation of ICD on 11/25/13 at [place]. The patient has a Medtronic 6935 lead.  Subsequent revision to Medtronic Petoskey XT VR on 11/8/23     6. Hypertension.     7. Ventricular tachycardia with hospitalization in 05/2014 for clinical VT, cycle length near 300 ms; sotalol started

## 2024-08-30 LAB
EKG ATRIAL RATE: 65 BPM
EKG P AXIS: 62 DEGREES
EKG P-R INTERVAL: 278 MS
EKG Q-T INTERVAL: 468 MS
EKG QRS DURATION: 150 MS
EKG QTC CALCULATION (BAZETT): 486 MS
EKG R AXIS: -58 DEGREES
EKG T AXIS: 86 DEGREES
EKG VENTRICULAR RATE: 65 BPM

## 2024-09-15 ENCOUNTER — APPOINTMENT (OUTPATIENT)
Dept: GENERAL RADIOLOGY | Age: 79
DRG: 287 | End: 2024-09-15
Payer: MEDICARE

## 2024-09-15 ENCOUNTER — HOSPITAL ENCOUNTER (INPATIENT)
Age: 79
LOS: 4 days | Discharge: ANOTHER HOSPITAL | DRG: 287 | End: 2024-09-19
Attending: EMERGENCY MEDICINE | Admitting: STUDENT IN AN ORGANIZED HEALTH CARE EDUCATION/TRAINING PROGRAM
Payer: MEDICARE

## 2024-09-15 DIAGNOSIS — I20.89 STABLE ANGINA PECTORIS (HCC): ICD-10-CM

## 2024-09-15 DIAGNOSIS — I47.20 V TACH (HCC): ICD-10-CM

## 2024-09-15 DIAGNOSIS — R07.9 CHEST PAIN: ICD-10-CM

## 2024-09-15 DIAGNOSIS — Z45.02 AICD DISCHARGE: Primary | ICD-10-CM

## 2024-09-15 LAB
ALBUMIN SERPL-MCNC: 4.4 G/DL (ref 3.5–5.2)
ALBUMIN/GLOB SERPL: 2 {RATIO} (ref 1–2.5)
ALP SERPL-CCNC: 211 U/L (ref 40–129)
ALT SERPL-CCNC: 18 U/L (ref 10–50)
ANION GAP SERPL CALCULATED.3IONS-SCNC: 15 MMOL/L (ref 9–16)
AST SERPL-CCNC: 26 U/L (ref 10–50)
BASOPHILS # BLD: 0.06 K/UL (ref 0–0.2)
BASOPHILS NFR BLD: 1 % (ref 0–2)
BILIRUB SERPL-MCNC: 0.2 MG/DL (ref 0–1.2)
BNP SERPL-MCNC: 575 PG/ML (ref 0–300)
BUN SERPL-MCNC: 23 MG/DL (ref 8–23)
CALCIUM SERPL-MCNC: 8.9 MG/DL (ref 8.6–10.4)
CHLORIDE SERPL-SCNC: 103 MMOL/L (ref 98–107)
CO2 SERPL-SCNC: 21 MMOL/L (ref 20–31)
CREAT SERPL-MCNC: 1.2 MG/DL (ref 0.7–1.2)
EOSINOPHIL # BLD: 0.48 K/UL (ref 0–0.44)
EOSINOPHILS RELATIVE PERCENT: 6 % (ref 1–4)
ERYTHROCYTE [DISTWIDTH] IN BLOOD BY AUTOMATED COUNT: 14.5 % (ref 11.8–14.4)
GFR, ESTIMATED: 60 ML/MIN/1.73M2
GLUCOSE SERPL-MCNC: 139 MG/DL (ref 74–99)
HCT VFR BLD AUTO: 38.4 % (ref 40.7–50.3)
HGB BLD-MCNC: 12.5 G/DL (ref 13–17)
IMM GRANULOCYTES # BLD AUTO: <0.03 K/UL (ref 0–0.3)
IMM GRANULOCYTES NFR BLD: 0 %
LYMPHOCYTES NFR BLD: 2.79 K/UL (ref 1.1–3.7)
LYMPHOCYTES RELATIVE PERCENT: 35 % (ref 24–43)
MCH RBC QN AUTO: 30.5 PG (ref 25.2–33.5)
MCHC RBC AUTO-ENTMCNC: 32.6 G/DL (ref 28.4–34.8)
MCV RBC AUTO: 93.7 FL (ref 82.6–102.9)
MONOCYTES NFR BLD: 0.6 K/UL (ref 0.1–1.2)
MONOCYTES NFR BLD: 8 % (ref 3–12)
NEUTROPHILS NFR BLD: 50 % (ref 36–65)
NEUTS SEG NFR BLD: 4 K/UL (ref 1.5–8.1)
NRBC BLD-RTO: 0 PER 100 WBC
PLATELET # BLD AUTO: 213 K/UL (ref 138–453)
PMV BLD AUTO: 12.4 FL (ref 8.1–13.5)
POTASSIUM SERPL-SCNC: 4.2 MMOL/L (ref 3.7–5.3)
PROT SERPL-MCNC: 6.9 G/DL (ref 6.6–8.7)
RBC # BLD AUTO: 4.1 M/UL (ref 4.21–5.77)
RBC # BLD: ABNORMAL 10*6/UL
SODIUM SERPL-SCNC: 139 MMOL/L (ref 136–145)
TROPONIN I SERPL HS-MCNC: 13 NG/L (ref 0–22)
TROPONIN I SERPL HS-MCNC: 22 NG/L (ref 0–22)
TROPONIN I SERPL HS-MCNC: 33 NG/L (ref 0–22)
WBC OTHER # BLD: 8 K/UL (ref 3.5–11.3)

## 2024-09-15 PROCEDURE — 85025 COMPLETE CBC W/AUTO DIFF WBC: CPT

## 2024-09-15 PROCEDURE — 83880 ASSAY OF NATRIURETIC PEPTIDE: CPT

## 2024-09-15 PROCEDURE — 93005 ELECTROCARDIOGRAM TRACING: CPT | Performed by: NURSE PRACTITIONER

## 2024-09-15 PROCEDURE — 2060000000 HC ICU INTERMEDIATE R&B

## 2024-09-15 PROCEDURE — 84484 ASSAY OF TROPONIN QUANT: CPT

## 2024-09-15 PROCEDURE — 71045 X-RAY EXAM CHEST 1 VIEW: CPT

## 2024-09-15 PROCEDURE — 80053 COMPREHEN METABOLIC PANEL: CPT

## 2024-09-15 PROCEDURE — 2580000003 HC RX 258: Performed by: NURSE PRACTITIONER

## 2024-09-15 PROCEDURE — 99223 1ST HOSP IP/OBS HIGH 75: CPT | Performed by: STUDENT IN AN ORGANIZED HEALTH CARE EDUCATION/TRAINING PROGRAM

## 2024-09-15 PROCEDURE — 6360000002 HC RX W HCPCS: Performed by: STUDENT IN AN ORGANIZED HEALTH CARE EDUCATION/TRAINING PROGRAM

## 2024-09-15 PROCEDURE — 36415 COLL VENOUS BLD VENIPUNCTURE: CPT

## 2024-09-15 PROCEDURE — 99285 EMERGENCY DEPT VISIT HI MDM: CPT

## 2024-09-15 PROCEDURE — 93005 ELECTROCARDIOGRAM TRACING: CPT

## 2024-09-15 RX ORDER — FLUTICASONE PROPIONATE 50 MCG
1 SPRAY, SUSPENSION (ML) NASAL DAILY
Status: DISCONTINUED | OUTPATIENT
Start: 2024-09-16 | End: 2024-09-18

## 2024-09-15 RX ORDER — ACETAMINOPHEN 650 MG/1
650 SUPPOSITORY RECTAL EVERY 6 HOURS PRN
Status: DISCONTINUED | OUTPATIENT
Start: 2024-09-15 | End: 2024-09-19 | Stop reason: HOSPADM

## 2024-09-15 RX ORDER — SOTALOL HYDROCHLORIDE 80 MG/1
80 TABLET ORAL 2 TIMES DAILY
Status: DISCONTINUED | OUTPATIENT
Start: 2024-09-15 | End: 2024-09-19 | Stop reason: HOSPADM

## 2024-09-15 RX ORDER — POTASSIUM CHLORIDE 7.45 MG/ML
10 INJECTION INTRAVENOUS PRN
Status: DISCONTINUED | OUTPATIENT
Start: 2024-09-15 | End: 2024-09-19 | Stop reason: HOSPADM

## 2024-09-15 RX ORDER — POTASSIUM CHLORIDE 1500 MG/1
40 TABLET, EXTENDED RELEASE ORAL PRN
Status: DISCONTINUED | OUTPATIENT
Start: 2024-09-15 | End: 2024-09-19 | Stop reason: HOSPADM

## 2024-09-15 RX ORDER — ENOXAPARIN SODIUM 100 MG/ML
40 INJECTION SUBCUTANEOUS DAILY
Status: DISCONTINUED | OUTPATIENT
Start: 2024-09-16 | End: 2024-09-19 | Stop reason: HOSPADM

## 2024-09-15 RX ORDER — MEXILETINE HYDROCHLORIDE 200 MG/1
200 CAPSULE ORAL 3 TIMES DAILY
Status: DISCONTINUED | OUTPATIENT
Start: 2024-09-15 | End: 2024-09-19 | Stop reason: HOSPADM

## 2024-09-15 RX ORDER — SODIUM CHLORIDE 0.9 % (FLUSH) 0.9 %
10 SYRINGE (ML) INJECTION PRN
Status: DISCONTINUED | OUTPATIENT
Start: 2024-09-15 | End: 2024-09-19 | Stop reason: HOSPADM

## 2024-09-15 RX ORDER — BUSPIRONE HYDROCHLORIDE 10 MG/1
10 TABLET ORAL 3 TIMES DAILY
Status: DISCONTINUED | OUTPATIENT
Start: 2024-09-15 | End: 2024-09-19 | Stop reason: HOSPADM

## 2024-09-15 RX ORDER — SODIUM CHLORIDE 9 MG/ML
INJECTION, SOLUTION INTRAVENOUS PRN
Status: DISCONTINUED | OUTPATIENT
Start: 2024-09-15 | End: 2024-09-19 | Stop reason: HOSPADM

## 2024-09-15 RX ORDER — NEPHROCAP 1 MG
1 CAP ORAL DAILY
Status: DISCONTINUED | OUTPATIENT
Start: 2024-09-16 | End: 2024-09-19 | Stop reason: HOSPADM

## 2024-09-15 RX ORDER — LISINOPRIL 20 MG/1
10 TABLET ORAL DAILY
Status: DISCONTINUED | OUTPATIENT
Start: 2024-09-16 | End: 2024-09-19 | Stop reason: HOSPADM

## 2024-09-15 RX ORDER — ATORVASTATIN CALCIUM 80 MG/1
80 TABLET, FILM COATED ORAL NIGHTLY
Status: DISCONTINUED | OUTPATIENT
Start: 2024-09-15 | End: 2024-09-19 | Stop reason: HOSPADM

## 2024-09-15 RX ORDER — NITROGLYCERIN 0.4 MG/1
0.4 TABLET SUBLINGUAL EVERY 5 MIN PRN
Status: DISCONTINUED | OUTPATIENT
Start: 2024-09-15 | End: 2024-09-19 | Stop reason: HOSPADM

## 2024-09-15 RX ORDER — FUROSEMIDE 40 MG
40 TABLET ORAL DAILY
Status: DISCONTINUED | OUTPATIENT
Start: 2024-09-16 | End: 2024-09-19 | Stop reason: HOSPADM

## 2024-09-15 RX ORDER — FUROSEMIDE 10 MG/ML
40 INJECTION INTRAMUSCULAR; INTRAVENOUS ONCE
Status: COMPLETED | OUTPATIENT
Start: 2024-09-15 | End: 2024-09-15

## 2024-09-15 RX ORDER — SODIUM CHLORIDE 0.9 % (FLUSH) 0.9 %
5-40 SYRINGE (ML) INJECTION EVERY 12 HOURS SCHEDULED
Status: DISCONTINUED | OUTPATIENT
Start: 2024-09-15 | End: 2024-09-19 | Stop reason: HOSPADM

## 2024-09-15 RX ORDER — ACETAMINOPHEN 325 MG/1
650 TABLET ORAL EVERY 6 HOURS PRN
Status: DISCONTINUED | OUTPATIENT
Start: 2024-09-15 | End: 2024-09-19 | Stop reason: HOSPADM

## 2024-09-15 RX ORDER — MONTELUKAST SODIUM 10 MG/1
10 TABLET ORAL NIGHTLY
Status: DISCONTINUED | OUTPATIENT
Start: 2024-09-15 | End: 2024-09-19 | Stop reason: HOSPADM

## 2024-09-15 RX ORDER — ASPIRIN 81 MG/1
81 TABLET, CHEWABLE ORAL DAILY
Status: DISCONTINUED | OUTPATIENT
Start: 2024-09-16 | End: 2024-09-19 | Stop reason: HOSPADM

## 2024-09-15 RX ORDER — MAGNESIUM SULFATE 1 G/100ML
1000 INJECTION INTRAVENOUS PRN
Status: DISCONTINUED | OUTPATIENT
Start: 2024-09-15 | End: 2024-09-19 | Stop reason: HOSPADM

## 2024-09-15 RX ADMIN — FUROSEMIDE 40 MG: 10 INJECTION, SOLUTION INTRAMUSCULAR; INTRAVENOUS at 23:08

## 2024-09-15 RX ADMIN — SODIUM CHLORIDE, PRESERVATIVE FREE 10 ML: 5 INJECTION INTRAVENOUS at 23:02

## 2024-09-15 ASSESSMENT — PAIN - FUNCTIONAL ASSESSMENT: PAIN_FUNCTIONAL_ASSESSMENT: 0-10

## 2024-09-15 ASSESSMENT — PAIN DESCRIPTION - LOCATION: LOCATION: CHEST

## 2024-09-15 ASSESSMENT — PAIN SCALES - GENERAL
PAINLEVEL_OUTOF10: 0
PAINLEVEL_OUTOF10: 4

## 2024-09-15 ASSESSMENT — LIFESTYLE VARIABLES
HOW OFTEN DO YOU HAVE A DRINK CONTAINING ALCOHOL: NEVER
HOW MANY STANDARD DRINKS CONTAINING ALCOHOL DO YOU HAVE ON A TYPICAL DAY: PATIENT DOES NOT DRINK

## 2024-09-16 LAB
CHOLEST SERPL-MCNC: 106 MG/DL (ref 0–199)
CHOLESTEROL/HDL RATIO: 3
EKG ATRIAL RATE: 48 BPM
EKG ATRIAL RATE: 49 BPM
EKG ATRIAL RATE: 64 BPM
EKG P AXIS: 38 DEGREES
EKG P AXIS: 51 DEGREES
EKG P AXIS: 79 DEGREES
EKG P-R INTERVAL: 276 MS
EKG P-R INTERVAL: 296 MS
EKG P-R INTERVAL: 316 MS
EKG Q-T INTERVAL: 472 MS
EKG Q-T INTERVAL: 518 MS
EKG Q-T INTERVAL: 540 MS
EKG QRS DURATION: 154 MS
EKG QRS DURATION: 156 MS
EKG QRS DURATION: 158 MS
EKG QTC CALCULATION (BAZETT): 462 MS
EKG QTC CALCULATION (BAZETT): 486 MS
EKG QTC CALCULATION (BAZETT): 487 MS
EKG R AXIS: -58 DEGREES
EKG R AXIS: -58 DEGREES
EKG R AXIS: -59 DEGREES
EKG T AXIS: 32 DEGREES
EKG T AXIS: 40 DEGREES
EKG T AXIS: 79 DEGREES
EKG VENTRICULAR RATE: 48 BPM
EKG VENTRICULAR RATE: 49 BPM
EKG VENTRICULAR RATE: 64 BPM
ERYTHROCYTE [DISTWIDTH] IN BLOOD BY AUTOMATED COUNT: 14.3 % (ref 11.8–14.4)
HCT VFR BLD AUTO: 36.9 % (ref 40.7–50.3)
HDLC SERPL-MCNC: 40 MG/DL
HGB BLD-MCNC: 11.7 G/DL (ref 13–17)
LDLC SERPL CALC-MCNC: 54 MG/DL (ref 0–100)
MAGNESIUM SERPL-MCNC: 2.3 MG/DL (ref 1.6–2.4)
MAGNESIUM SERPL-MCNC: 2.4 MG/DL (ref 1.6–2.4)
MCH RBC QN AUTO: 30.2 PG (ref 25.2–33.5)
MCHC RBC AUTO-ENTMCNC: 31.7 G/DL (ref 28.4–34.8)
MCV RBC AUTO: 95.1 FL (ref 82.6–102.9)
NRBC BLD-RTO: 0 PER 100 WBC
PLATELET # BLD AUTO: 196 K/UL (ref 138–453)
PMV BLD AUTO: 12.1 FL (ref 8.1–13.5)
RBC # BLD AUTO: 3.88 M/UL (ref 4.21–5.77)
TRIGL SERPL-MCNC: 63 MG/DL
TROPONIN I SERPL HS-MCNC: 31 NG/L (ref 0–22)
VLDLC SERPL CALC-MCNC: 13 MG/DL
WBC OTHER # BLD: 8.9 K/UL (ref 3.5–11.3)

## 2024-09-16 PROCEDURE — 83735 ASSAY OF MAGNESIUM: CPT

## 2024-09-16 PROCEDURE — 2060000000 HC ICU INTERMEDIATE R&B

## 2024-09-16 PROCEDURE — 36415 COLL VENOUS BLD VENIPUNCTURE: CPT

## 2024-09-16 PROCEDURE — 6360000002 HC RX W HCPCS

## 2024-09-16 PROCEDURE — 6360000002 HC RX W HCPCS: Performed by: NURSE PRACTITIONER

## 2024-09-16 PROCEDURE — 94761 N-INVAS EAR/PLS OXIMETRY MLT: CPT

## 2024-09-16 PROCEDURE — 2580000003 HC RX 258: Performed by: NURSE PRACTITIONER

## 2024-09-16 PROCEDURE — 84484 ASSAY OF TROPONIN QUANT: CPT

## 2024-09-16 PROCEDURE — 94640 AIRWAY INHALATION TREATMENT: CPT

## 2024-09-16 PROCEDURE — 99222 1ST HOSP IP/OBS MODERATE 55: CPT | Performed by: INTERNAL MEDICINE

## 2024-09-16 PROCEDURE — 80061 LIPID PANEL: CPT

## 2024-09-16 PROCEDURE — 6370000000 HC RX 637 (ALT 250 FOR IP): Performed by: NURSE PRACTITIONER

## 2024-09-16 PROCEDURE — 93005 ELECTROCARDIOGRAM TRACING: CPT | Performed by: NURSE PRACTITIONER

## 2024-09-16 PROCEDURE — 99232 SBSQ HOSP IP/OBS MODERATE 35: CPT | Performed by: INTERNAL MEDICINE

## 2024-09-16 PROCEDURE — 85027 COMPLETE CBC AUTOMATED: CPT

## 2024-09-16 RX ORDER — ALBUTEROL SULFATE 0.83 MG/ML
2.5 SOLUTION RESPIRATORY (INHALATION)
Status: DISCONTINUED | OUTPATIENT
Start: 2024-09-16 | End: 2024-09-18

## 2024-09-16 RX ORDER — ALBUTEROL SULFATE 0.83 MG/ML
2.5 SOLUTION RESPIRATORY (INHALATION)
Status: DISCONTINUED | OUTPATIENT
Start: 2024-09-16 | End: 2024-09-16

## 2024-09-16 RX ADMIN — MEXILETINE HYDROCHLORIDE 200 MG: 200 CAPSULE ORAL at 08:01

## 2024-09-16 RX ADMIN — Medication 1 MG: at 08:00

## 2024-09-16 RX ADMIN — SOTALOL HYDROCHLORIDE 80 MG: 80 TABLET ORAL at 19:11

## 2024-09-16 RX ADMIN — BUSPIRONE HYDROCHLORIDE 10 MG: 10 TABLET ORAL at 08:00

## 2024-09-16 RX ADMIN — FUROSEMIDE 40 MG: 40 TABLET ORAL at 08:00

## 2024-09-16 RX ADMIN — BUSPIRONE HYDROCHLORIDE 10 MG: 10 TABLET ORAL at 13:42

## 2024-09-16 RX ADMIN — ALBUTEROL SULFATE 2.5 MG: 2.5 SOLUTION RESPIRATORY (INHALATION) at 07:17

## 2024-09-16 RX ADMIN — ENOXAPARIN SODIUM 40 MG: 100 INJECTION SUBCUTANEOUS at 08:03

## 2024-09-16 RX ADMIN — SODIUM CHLORIDE, PRESERVATIVE FREE 10 ML: 5 INJECTION INTRAVENOUS at 19:12

## 2024-09-16 RX ADMIN — MEXILETINE HYDROCHLORIDE 200 MG: 200 CAPSULE ORAL at 20:56

## 2024-09-16 RX ADMIN — SOTALOL HYDROCHLORIDE 80 MG: 80 TABLET ORAL at 08:00

## 2024-09-16 RX ADMIN — ATORVASTATIN CALCIUM 80 MG: 80 TABLET, FILM COATED ORAL at 19:11

## 2024-09-16 RX ADMIN — ALBUTEROL SULFATE 2.5 MG: 2.5 SOLUTION RESPIRATORY (INHALATION) at 19:45

## 2024-09-16 RX ADMIN — MEXILETINE HYDROCHLORIDE 200 MG: 200 CAPSULE ORAL at 13:41

## 2024-09-16 RX ADMIN — MONTELUKAST 10 MG: 10 TABLET, FILM COATED ORAL at 19:12

## 2024-09-16 RX ADMIN — SODIUM CHLORIDE, PRESERVATIVE FREE 10 ML: 5 INJECTION INTRAVENOUS at 08:01

## 2024-09-16 RX ADMIN — BUSPIRONE HYDROCHLORIDE 10 MG: 10 TABLET ORAL at 19:12

## 2024-09-16 RX ADMIN — LISINOPRIL 10 MG: 20 TABLET ORAL at 08:00

## 2024-09-16 RX ADMIN — ASPIRIN 81 MG 81 MG: 81 TABLET ORAL at 07:59

## 2024-09-16 ASSESSMENT — PAIN SCALES - GENERAL: PAINLEVEL_OUTOF10: 0

## 2024-09-17 ENCOUNTER — APPOINTMENT (OUTPATIENT)
Age: 79
DRG: 287 | End: 2024-09-17
Attending: INTERNAL MEDICINE
Payer: MEDICARE

## 2024-09-17 PROBLEM — I47.20 V TACH (HCC): Status: ACTIVE | Noted: 2024-09-17

## 2024-09-17 PROBLEM — R07.9 CHEST PAIN: Status: ACTIVE | Noted: 2024-09-17

## 2024-09-17 LAB
ANION GAP SERPL CALCULATED.3IONS-SCNC: 12 MMOL/L (ref 9–16)
BUN SERPL-MCNC: 22 MG/DL (ref 8–23)
CALCIUM SERPL-MCNC: 9 MG/DL (ref 8.6–10.4)
CHLORIDE SERPL-SCNC: 103 MMOL/L (ref 98–107)
CO2 SERPL-SCNC: 22 MMOL/L (ref 20–31)
CREAT SERPL-MCNC: 1.3 MG/DL (ref 0.7–1.2)
ECHO AO ROOT DIAM: 3.9 CM
ECHO AO ROOT INDEX: 1.97 CM/M2
ECHO AV AREA PEAK VELOCITY: 2.2 CM2
ECHO AV AREA VTI: 2.2 CM2
ECHO AV AREA/BSA PEAK VELOCITY: 1.1 CM2/M2
ECHO AV AREA/BSA VTI: 1.1 CM2/M2
ECHO AV MEAN GRADIENT: 3 MMHG
ECHO AV MEAN VELOCITY: 0.9 M/S
ECHO AV PEAK GRADIENT: 6 MMHG
ECHO AV PEAK VELOCITY: 1.2 M/S
ECHO AV VELOCITY RATIO: 0.75
ECHO AV VTI: 26.8 CM
ECHO BSA: 2.01 M2
ECHO BSA: 2.01 M2
ECHO EST RA PRESSURE: 0 MMHG
ECHO LA AREA 2C: 27.2 CM2
ECHO LA AREA 4C: 32.8 CM2
ECHO LA DIAMETER INDEX: 2.17 CM/M2
ECHO LA DIAMETER: 4.3 CM
ECHO LA MAJOR AXIS: 7.3 CM
ECHO LA MINOR AXIS: 5.8 CM
ECHO LA TO AORTIC ROOT RATIO: 1.1
ECHO LA VOL MOD A2C: 102 ML (ref 18–58)
ECHO LA VOL MOD A4C: 112 ML (ref 18–58)
ECHO LA VOLUME INDEX MOD A2C: 52 ML/M2 (ref 16–34)
ECHO LA VOLUME INDEX MOD A4C: 57 ML/M2 (ref 16–34)
ECHO LV E' LATERAL VELOCITY: 9 CM/S
ECHO LV E' SEPTAL VELOCITY: 5 CM/S
ECHO LV EDV A2C: 89 ML
ECHO LV EDV A4C: 78 ML
ECHO LV EDV INDEX A4C: 39 ML/M2
ECHO LV EDV NDEX A2C: 45 ML/M2
ECHO LV EJECTION FRACTION A2C: 51 %
ECHO LV EJECTION FRACTION A4C: 38 %
ECHO LV EJECTION FRACTION BIPLANE: 45 % (ref 55–100)
ECHO LV ESV A2C: 44 ML
ECHO LV ESV A4C: 49 ML
ECHO LV ESV INDEX A2C: 22 ML/M2
ECHO LV ESV INDEX A4C: 25 ML/M2
ECHO LV FRACTIONAL SHORTENING: 22 % (ref 28–44)
ECHO LV INTERNAL DIMENSION DIASTOLE INDEX: 2.93 CM/M2
ECHO LV INTERNAL DIMENSION DIASTOLIC: 5.8 CM (ref 4.2–5.9)
ECHO LV INTERNAL DIMENSION SYSTOLIC INDEX: 2.27 CM/M2
ECHO LV INTERNAL DIMENSION SYSTOLIC: 4.5 CM
ECHO LV IVSD: 0.8 CM (ref 0.6–1)
ECHO LV MASS 2D: 175.4 G (ref 88–224)
ECHO LV MASS INDEX 2D: 88.6 G/M2 (ref 49–115)
ECHO LV POSTERIOR WALL DIASTOLIC: 0.8 CM (ref 0.6–1)
ECHO LV RELATIVE WALL THICKNESS RATIO: 0.28
ECHO LVOT AREA: 3.1 CM2
ECHO LVOT AV VTI INDEX: 0.71
ECHO LVOT DIAM: 2 CM
ECHO LVOT MEAN GRADIENT: 1 MMHG
ECHO LVOT PEAK GRADIENT: 3 MMHG
ECHO LVOT PEAK VELOCITY: 0.9 M/S
ECHO LVOT STROKE VOLUME INDEX: 30.3 ML/M2
ECHO LVOT SV: 60 ML
ECHO LVOT VTI: 19.1 CM
ECHO MV A VELOCITY: 0.42 M/S
ECHO MV AREA VTI: 2.1 CM2
ECHO MV E DECELERATION TIME (DT): 222 MS
ECHO MV E VELOCITY: 0.49 M/S
ECHO MV E/A RATIO: 1.17
ECHO MV E/E' LATERAL: 5.44
ECHO MV E/E' RATIO (AVERAGED): 7.62
ECHO MV E/E' SEPTAL: 9.8
ECHO MV LVOT VTI INDEX: 1.46
ECHO MV MAX VELOCITY: 0.8 M/S
ECHO MV MEAN GRADIENT: 1 MMHG
ECHO MV MEAN VELOCITY: 0.4 M/S
ECHO MV PEAK GRADIENT: 3 MMHG
ECHO MV VTI: 27.9 CM
ECHO RIGHT VENTRICULAR SYSTOLIC PRESSURE (RVSP): 27 MMHG
ECHO RV BASAL DIMENSION: 3.1 CM
ECHO RV FREE WALL PEAK S': 15 CM/S
ECHO RV TAPSE: 1.6 CM (ref 1.7–?)
ECHO TV REGURGITANT MAX VELOCITY: 2.61 M/S
ECHO TV REGURGITANT PEAK GRADIENT: 27 MMHG
ERYTHROCYTE [DISTWIDTH] IN BLOOD BY AUTOMATED COUNT: 14.1 % (ref 11.8–14.4)
GFR, ESTIMATED: 57 ML/MIN/1.73M2
GLUCOSE SERPL-MCNC: 110 MG/DL (ref 74–99)
HCT VFR BLD AUTO: 39.8 % (ref 40.7–50.3)
HGB BLD-MCNC: 12.9 G/DL (ref 13–17)
MAGNESIUM SERPL-MCNC: 2.3 MG/DL (ref 1.6–2.4)
MCH RBC QN AUTO: 30.5 PG (ref 25.2–33.5)
MCHC RBC AUTO-ENTMCNC: 32.4 G/DL (ref 28.4–34.8)
MCV RBC AUTO: 94.1 FL (ref 82.6–102.9)
NRBC BLD-RTO: 0 PER 100 WBC
PLATELET # BLD AUTO: 199 K/UL (ref 138–453)
PMV BLD AUTO: 12 FL (ref 8.1–13.5)
POTASSIUM SERPL-SCNC: 4.2 MMOL/L (ref 3.7–5.3)
RBC # BLD AUTO: 4.23 M/UL (ref 4.21–5.77)
SODIUM SERPL-SCNC: 137 MMOL/L (ref 136–145)
TROPONIN I SERPL HS-MCNC: 17 NG/L (ref 0–22)
WBC OTHER # BLD: 8.7 K/UL (ref 3.5–11.3)

## 2024-09-17 PROCEDURE — 93306 TTE W/DOPPLER COMPLETE: CPT | Performed by: INTERNAL MEDICINE

## 2024-09-17 PROCEDURE — C1760 CLOSURE DEV, VASC: HCPCS | Performed by: INTERNAL MEDICINE

## 2024-09-17 PROCEDURE — 99152 MOD SED SAME PHYS/QHP 5/>YRS: CPT | Performed by: INTERNAL MEDICINE

## 2024-09-17 PROCEDURE — 85027 COMPLETE CBC AUTOMATED: CPT

## 2024-09-17 PROCEDURE — 6360000002 HC RX W HCPCS

## 2024-09-17 PROCEDURE — 6360000004 HC RX CONTRAST MEDICATION: Performed by: INTERNAL MEDICINE

## 2024-09-17 PROCEDURE — 36415 COLL VENOUS BLD VENIPUNCTURE: CPT

## 2024-09-17 PROCEDURE — 99232 SBSQ HOSP IP/OBS MODERATE 35: CPT | Performed by: STUDENT IN AN ORGANIZED HEALTH CARE EDUCATION/TRAINING PROGRAM

## 2024-09-17 PROCEDURE — 2580000003 HC RX 258: Performed by: NURSE PRACTITIONER

## 2024-09-17 PROCEDURE — 2500000003 HC RX 250 WO HCPCS: Performed by: INTERNAL MEDICINE

## 2024-09-17 PROCEDURE — 93306 TTE W/DOPPLER COMPLETE: CPT

## 2024-09-17 PROCEDURE — 80048 BASIC METABOLIC PNL TOTAL CA: CPT

## 2024-09-17 PROCEDURE — B2111ZZ FLUOROSCOPY OF MULTIPLE CORONARY ARTERIES USING LOW OSMOLAR CONTRAST: ICD-10-PCS | Performed by: INTERNAL MEDICINE

## 2024-09-17 PROCEDURE — B2151ZZ FLUOROSCOPY OF LEFT HEART USING LOW OSMOLAR CONTRAST: ICD-10-PCS | Performed by: INTERNAL MEDICINE

## 2024-09-17 PROCEDURE — 99233 SBSQ HOSP IP/OBS HIGH 50: CPT | Performed by: NURSE PRACTITIONER

## 2024-09-17 PROCEDURE — 6370000000 HC RX 637 (ALT 250 FOR IP): Performed by: NURSE PRACTITIONER

## 2024-09-17 PROCEDURE — 94640 AIRWAY INHALATION TREATMENT: CPT

## 2024-09-17 PROCEDURE — 99153 MOD SED SAME PHYS/QHP EA: CPT | Performed by: INTERNAL MEDICINE

## 2024-09-17 PROCEDURE — 2060000000 HC ICU INTERMEDIATE R&B

## 2024-09-17 PROCEDURE — 4A023N7 MEASUREMENT OF CARDIAC SAMPLING AND PRESSURE, LEFT HEART, PERCUTANEOUS APPROACH: ICD-10-PCS | Performed by: INTERNAL MEDICINE

## 2024-09-17 PROCEDURE — 2580000003 HC RX 258: Performed by: INTERNAL MEDICINE

## 2024-09-17 PROCEDURE — C1769 GUIDE WIRE: HCPCS | Performed by: INTERNAL MEDICINE

## 2024-09-17 PROCEDURE — 93005 ELECTROCARDIOGRAM TRACING: CPT | Performed by: INTERNAL MEDICINE

## 2024-09-17 PROCEDURE — 2709999900 HC NON-CHARGEABLE SUPPLY: Performed by: INTERNAL MEDICINE

## 2024-09-17 PROCEDURE — 93455 CORONARY ART/GRFT ANGIO S&I: CPT | Performed by: INTERNAL MEDICINE

## 2024-09-17 PROCEDURE — 83735 ASSAY OF MAGNESIUM: CPT

## 2024-09-17 PROCEDURE — C1894 INTRO/SHEATH, NON-LASER: HCPCS | Performed by: INTERNAL MEDICINE

## 2024-09-17 PROCEDURE — 84484 ASSAY OF TROPONIN QUANT: CPT

## 2024-09-17 PROCEDURE — 93459 L HRT ART/GRFT ANGIO: CPT | Performed by: INTERNAL MEDICINE

## 2024-09-17 PROCEDURE — C1892 INTRO/SHEATH,FIXED,PEEL-AWAY: HCPCS | Performed by: INTERNAL MEDICINE

## 2024-09-17 PROCEDURE — 6370000000 HC RX 637 (ALT 250 FOR IP): Performed by: STUDENT IN AN ORGANIZED HEALTH CARE EDUCATION/TRAINING PROGRAM

## 2024-09-17 PROCEDURE — 6360000002 HC RX W HCPCS: Performed by: INTERNAL MEDICINE

## 2024-09-17 PROCEDURE — B2131ZZ FLUOROSCOPY OF MULTIPLE CORONARY ARTERY BYPASS GRAFTS USING LOW OSMOLAR CONTRAST: ICD-10-PCS | Performed by: INTERNAL MEDICINE

## 2024-09-17 RX ORDER — 0.9 % SODIUM CHLORIDE 0.9 %
INTRAVENOUS SOLUTION INTRAVENOUS CONTINUOUS PRN
Status: DISCONTINUED | OUTPATIENT
Start: 2024-09-17 | End: 2024-09-17 | Stop reason: HOSPADM

## 2024-09-17 RX ORDER — PHENYLEPHRINE HYDROCHLORIDE 10 MG/ML
INJECTION INTRAVENOUS PRN
Status: DISCONTINUED | OUTPATIENT
Start: 2024-09-17 | End: 2024-09-17 | Stop reason: HOSPADM

## 2024-09-17 RX ORDER — ACETAMINOPHEN 325 MG/1
650 TABLET ORAL EVERY 4 HOURS PRN
OUTPATIENT
Start: 2024-09-17

## 2024-09-17 RX ORDER — HYDRALAZINE HYDROCHLORIDE 20 MG/ML
INJECTION INTRAMUSCULAR; INTRAVENOUS PRN
Status: DISCONTINUED | OUTPATIENT
Start: 2024-09-17 | End: 2024-09-17 | Stop reason: HOSPADM

## 2024-09-17 RX ORDER — PANTOPRAZOLE SODIUM 40 MG/1
40 TABLET, DELAYED RELEASE ORAL
Status: DISCONTINUED | OUTPATIENT
Start: 2024-09-17 | End: 2024-09-19 | Stop reason: HOSPADM

## 2024-09-17 RX ORDER — SODIUM CHLORIDE 9 MG/ML
INJECTION, SOLUTION INTRAVENOUS PRN
OUTPATIENT
Start: 2024-09-17

## 2024-09-17 RX ORDER — MIDAZOLAM HYDROCHLORIDE 1 MG/ML
INJECTION INTRAMUSCULAR; INTRAVENOUS PRN
Status: DISCONTINUED | OUTPATIENT
Start: 2024-09-17 | End: 2024-09-17 | Stop reason: HOSPADM

## 2024-09-17 RX ORDER — SPIRONOLACTONE 25 MG/1
25 TABLET ORAL DAILY
COMMUNITY

## 2024-09-17 RX ORDER — FENTANYL CITRATE 50 UG/ML
INJECTION, SOLUTION INTRAMUSCULAR; INTRAVENOUS PRN
Status: DISCONTINUED | OUTPATIENT
Start: 2024-09-17 | End: 2024-09-17 | Stop reason: HOSPADM

## 2024-09-17 RX ORDER — SODIUM CHLORIDE 0.9 % (FLUSH) 0.9 %
5-40 SYRINGE (ML) INJECTION PRN
OUTPATIENT
Start: 2024-09-17

## 2024-09-17 RX ORDER — SODIUM CHLORIDE 0.9 % (FLUSH) 0.9 %
5-40 SYRINGE (ML) INJECTION EVERY 12 HOURS SCHEDULED
OUTPATIENT
Start: 2024-09-17

## 2024-09-17 RX ORDER — MORPHINE SULFATE 2 MG/ML
2 INJECTION, SOLUTION INTRAMUSCULAR; INTRAVENOUS ONCE
Status: COMPLETED | OUTPATIENT
Start: 2024-09-17 | End: 2024-09-17

## 2024-09-17 RX ORDER — IOPAMIDOL 755 MG/ML
INJECTION, SOLUTION INTRAVASCULAR PRN
Status: DISCONTINUED | OUTPATIENT
Start: 2024-09-17 | End: 2024-09-17 | Stop reason: HOSPADM

## 2024-09-17 RX ADMIN — LISINOPRIL 10 MG: 20 TABLET ORAL at 08:18

## 2024-09-17 RX ADMIN — SOTALOL HYDROCHLORIDE 80 MG: 80 TABLET ORAL at 19:29

## 2024-09-17 RX ADMIN — ATORVASTATIN CALCIUM 80 MG: 80 TABLET, FILM COATED ORAL at 19:29

## 2024-09-17 RX ADMIN — MONTELUKAST 10 MG: 10 TABLET, FILM COATED ORAL at 19:29

## 2024-09-17 RX ADMIN — BUSPIRONE HYDROCHLORIDE 10 MG: 10 TABLET ORAL at 19:29

## 2024-09-17 RX ADMIN — MORPHINE SULFATE 2 MG: 2 INJECTION, SOLUTION INTRAMUSCULAR; INTRAVENOUS at 03:43

## 2024-09-17 RX ADMIN — SODIUM CHLORIDE, PRESERVATIVE FREE 10 ML: 5 INJECTION INTRAVENOUS at 08:19

## 2024-09-17 RX ADMIN — BUSPIRONE HYDROCHLORIDE 10 MG: 10 TABLET ORAL at 07:02

## 2024-09-17 RX ADMIN — SOTALOL HYDROCHLORIDE 80 MG: 80 TABLET ORAL at 08:18

## 2024-09-17 RX ADMIN — NITROGLYCERIN 0.4 MG: 0.4 TABLET SUBLINGUAL at 03:08

## 2024-09-17 RX ADMIN — PANTOPRAZOLE SODIUM 40 MG: 40 TABLET, DELAYED RELEASE ORAL at 10:45

## 2024-09-17 RX ADMIN — BUSPIRONE HYDROCHLORIDE 10 MG: 10 TABLET ORAL at 14:43

## 2024-09-17 RX ADMIN — ALBUTEROL SULFATE 2.5 MG: 2.5 SOLUTION RESPIRATORY (INHALATION) at 19:23

## 2024-09-17 RX ADMIN — NITROGLYCERIN 0.4 MG: 0.4 TABLET SUBLINGUAL at 03:18

## 2024-09-17 RX ADMIN — SODIUM CHLORIDE, PRESERVATIVE FREE 10 ML: 5 INJECTION INTRAVENOUS at 19:30

## 2024-09-17 RX ADMIN — FUROSEMIDE 40 MG: 40 TABLET ORAL at 08:18

## 2024-09-17 RX ADMIN — MEXILETINE HYDROCHLORIDE 200 MG: 200 CAPSULE ORAL at 20:57

## 2024-09-17 RX ADMIN — MEXILETINE HYDROCHLORIDE 200 MG: 200 CAPSULE ORAL at 14:43

## 2024-09-17 RX ADMIN — ALBUTEROL SULFATE 2.5 MG: 2.5 SOLUTION RESPIRATORY (INHALATION) at 08:26

## 2024-09-17 RX ADMIN — Medication 1 MG: at 08:17

## 2024-09-17 RX ADMIN — MEXILETINE HYDROCHLORIDE 200 MG: 200 CAPSULE ORAL at 08:18

## 2024-09-17 ASSESSMENT — PAIN SCALES - GENERAL
PAINLEVEL_OUTOF10: 0
PAINLEVEL_OUTOF10: 5
PAINLEVEL_OUTOF10: 4
PAINLEVEL_OUTOF10: 5
PAINLEVEL_OUTOF10: 5
PAINLEVEL_OUTOF10: 0
PAINLEVEL_OUTOF10: 2
PAINLEVEL_OUTOF10: 6

## 2024-09-17 ASSESSMENT — PAIN DESCRIPTION - LOCATION
LOCATION: CHEST

## 2024-09-17 ASSESSMENT — PAIN DESCRIPTION - DESCRIPTORS
DESCRIPTORS: PRESSURE

## 2024-09-18 LAB
ANION GAP SERPL CALCULATED.3IONS-SCNC: 8 MMOL/L (ref 9–16)
BUN SERPL-MCNC: 24 MG/DL (ref 8–23)
CALCIUM SERPL-MCNC: 8.8 MG/DL (ref 8.6–10.4)
CHLORIDE SERPL-SCNC: 103 MMOL/L (ref 98–107)
CO2 SERPL-SCNC: 28 MMOL/L (ref 20–31)
CREAT SERPL-MCNC: 1.4 MG/DL (ref 0.7–1.2)
EKG ATRIAL RATE: 65 BPM
EKG P AXIS: 81 DEGREES
EKG P-R INTERVAL: 284 MS
EKG Q-T INTERVAL: 520 MS
EKG QRS DURATION: 158 MS
EKG QTC CALCULATION (BAZETT): 540 MS
EKG R AXIS: -65 DEGREES
EKG T AXIS: 57 DEGREES
EKG VENTRICULAR RATE: 65 BPM
ERYTHROCYTE [DISTWIDTH] IN BLOOD BY AUTOMATED COUNT: 14.4 % (ref 11.8–14.4)
GFR, ESTIMATED: 50 ML/MIN/1.73M2
GLUCOSE SERPL-MCNC: 118 MG/DL (ref 74–99)
HCT VFR BLD AUTO: 36.9 % (ref 40.7–50.3)
HGB BLD-MCNC: 11.8 G/DL (ref 13–17)
MCH RBC QN AUTO: 30.1 PG (ref 25.2–33.5)
MCHC RBC AUTO-ENTMCNC: 32 G/DL (ref 28.4–34.8)
MCV RBC AUTO: 94.1 FL (ref 82.6–102.9)
NRBC BLD-RTO: 0 PER 100 WBC
PLATELET # BLD AUTO: 201 K/UL (ref 138–453)
PMV BLD AUTO: 12.5 FL (ref 8.1–13.5)
POTASSIUM SERPL-SCNC: 4.6 MMOL/L (ref 3.7–5.3)
RBC # BLD AUTO: 3.92 M/UL (ref 4.21–5.77)
SODIUM SERPL-SCNC: 139 MMOL/L (ref 136–145)
WBC OTHER # BLD: 8.9 K/UL (ref 3.5–11.3)

## 2024-09-18 PROCEDURE — 6370000000 HC RX 637 (ALT 250 FOR IP): Performed by: STUDENT IN AN ORGANIZED HEALTH CARE EDUCATION/TRAINING PROGRAM

## 2024-09-18 PROCEDURE — 85027 COMPLETE CBC AUTOMATED: CPT

## 2024-09-18 PROCEDURE — 6360000002 HC RX W HCPCS

## 2024-09-18 PROCEDURE — 6370000000 HC RX 637 (ALT 250 FOR IP): Performed by: NURSE PRACTITIONER

## 2024-09-18 PROCEDURE — 99232 SBSQ HOSP IP/OBS MODERATE 35: CPT | Performed by: STUDENT IN AN ORGANIZED HEALTH CARE EDUCATION/TRAINING PROGRAM

## 2024-09-18 PROCEDURE — 99233 SBSQ HOSP IP/OBS HIGH 50: CPT | Performed by: NURSE PRACTITIONER

## 2024-09-18 PROCEDURE — 2060000000 HC ICU INTERMEDIATE R&B

## 2024-09-18 PROCEDURE — 36415 COLL VENOUS BLD VENIPUNCTURE: CPT

## 2024-09-18 PROCEDURE — 2580000003 HC RX 258: Performed by: NURSE PRACTITIONER

## 2024-09-18 PROCEDURE — 94640 AIRWAY INHALATION TREATMENT: CPT

## 2024-09-18 PROCEDURE — 6360000002 HC RX W HCPCS: Performed by: NURSE PRACTITIONER

## 2024-09-18 PROCEDURE — 80048 BASIC METABOLIC PNL TOTAL CA: CPT

## 2024-09-18 RX ORDER — FLUTICASONE PROPIONATE 50 MCG
1 SPRAY, SUSPENSION (ML) NASAL DAILY PRN
Status: DISCONTINUED | OUTPATIENT
Start: 2024-09-18 | End: 2024-09-19 | Stop reason: HOSPADM

## 2024-09-18 RX ORDER — ALBUTEROL SULFATE 0.83 MG/ML
2.5 SOLUTION RESPIRATORY (INHALATION) EVERY 4 HOURS PRN
Status: DISCONTINUED | OUTPATIENT
Start: 2024-09-18 | End: 2024-09-19 | Stop reason: HOSPADM

## 2024-09-18 RX ADMIN — ALBUTEROL SULFATE 2.5 MG: 2.5 SOLUTION RESPIRATORY (INHALATION) at 12:12

## 2024-09-18 RX ADMIN — ASPIRIN 81 MG 81 MG: 81 TABLET ORAL at 07:59

## 2024-09-18 RX ADMIN — SOTALOL HYDROCHLORIDE 80 MG: 80 TABLET ORAL at 07:58

## 2024-09-18 RX ADMIN — MEXILETINE HYDROCHLORIDE 200 MG: 200 CAPSULE ORAL at 19:54

## 2024-09-18 RX ADMIN — MEXILETINE HYDROCHLORIDE 200 MG: 200 CAPSULE ORAL at 07:58

## 2024-09-18 RX ADMIN — MEXILETINE HYDROCHLORIDE 200 MG: 200 CAPSULE ORAL at 13:17

## 2024-09-18 RX ADMIN — ATORVASTATIN CALCIUM 80 MG: 80 TABLET, FILM COATED ORAL at 19:54

## 2024-09-18 RX ADMIN — ENOXAPARIN SODIUM 40 MG: 100 INJECTION SUBCUTANEOUS at 07:59

## 2024-09-18 RX ADMIN — PANTOPRAZOLE SODIUM 40 MG: 40 TABLET, DELAYED RELEASE ORAL at 07:59

## 2024-09-18 RX ADMIN — LISINOPRIL 10 MG: 20 TABLET ORAL at 07:58

## 2024-09-18 RX ADMIN — BUSPIRONE HYDROCHLORIDE 10 MG: 10 TABLET ORAL at 07:59

## 2024-09-18 RX ADMIN — FUROSEMIDE 40 MG: 40 TABLET ORAL at 07:59

## 2024-09-18 RX ADMIN — Medication 1 MG: at 07:59

## 2024-09-18 RX ADMIN — SOTALOL HYDROCHLORIDE 80 MG: 80 TABLET ORAL at 19:54

## 2024-09-18 RX ADMIN — SODIUM CHLORIDE, PRESERVATIVE FREE 10 ML: 5 INJECTION INTRAVENOUS at 07:58

## 2024-09-18 RX ADMIN — BUSPIRONE HYDROCHLORIDE 10 MG: 10 TABLET ORAL at 19:54

## 2024-09-18 RX ADMIN — FLUTICASONE PROPIONATE 1 SPRAY: 50 SPRAY, METERED NASAL at 13:17

## 2024-09-18 RX ADMIN — BUSPIRONE HYDROCHLORIDE 10 MG: 10 TABLET ORAL at 13:17

## 2024-09-18 RX ADMIN — SODIUM CHLORIDE, PRESERVATIVE FREE 10 ML: 5 INJECTION INTRAVENOUS at 19:54

## 2024-09-18 RX ADMIN — MONTELUKAST 10 MG: 10 TABLET, FILM COATED ORAL at 19:54

## 2024-09-18 ASSESSMENT — PAIN SCALES - GENERAL
PAINLEVEL_OUTOF10: 0

## 2024-09-19 VITALS
WEIGHT: 186 LBS | DIASTOLIC BLOOD PRESSURE: 83 MMHG | HEART RATE: 54 BPM | HEIGHT: 68 IN | SYSTOLIC BLOOD PRESSURE: 100 MMHG | OXYGEN SATURATION: 97 % | TEMPERATURE: 97.5 F | BODY MASS INDEX: 28.19 KG/M2 | RESPIRATION RATE: 14 BRPM

## 2024-09-19 PROCEDURE — 6360000002 HC RX W HCPCS: Performed by: NURSE PRACTITIONER

## 2024-09-19 PROCEDURE — 6370000000 HC RX 637 (ALT 250 FOR IP): Performed by: STUDENT IN AN ORGANIZED HEALTH CARE EDUCATION/TRAINING PROGRAM

## 2024-09-19 PROCEDURE — 99239 HOSP IP/OBS DSCHRG MGMT >30: CPT | Performed by: STUDENT IN AN ORGANIZED HEALTH CARE EDUCATION/TRAINING PROGRAM

## 2024-09-19 PROCEDURE — 6370000000 HC RX 637 (ALT 250 FOR IP): Performed by: NURSE PRACTITIONER

## 2024-09-19 PROCEDURE — 2580000003 HC RX 258: Performed by: NURSE PRACTITIONER

## 2024-09-19 RX ADMIN — Medication 1 MG: at 08:27

## 2024-09-19 RX ADMIN — SODIUM CHLORIDE, PRESERVATIVE FREE 10 ML: 5 INJECTION INTRAVENOUS at 08:29

## 2024-09-19 RX ADMIN — LISINOPRIL 10 MG: 20 TABLET ORAL at 08:28

## 2024-09-19 RX ADMIN — PANTOPRAZOLE SODIUM 40 MG: 40 TABLET, DELAYED RELEASE ORAL at 06:36

## 2024-09-19 RX ADMIN — ENOXAPARIN SODIUM 40 MG: 100 INJECTION SUBCUTANEOUS at 08:30

## 2024-09-19 RX ADMIN — ASPIRIN 81 MG 81 MG: 81 TABLET ORAL at 08:27

## 2024-09-19 RX ADMIN — FUROSEMIDE 40 MG: 40 TABLET ORAL at 08:27

## 2024-09-19 RX ADMIN — BUSPIRONE HYDROCHLORIDE 10 MG: 10 TABLET ORAL at 08:28

## 2024-09-19 RX ADMIN — MEXILETINE HYDROCHLORIDE 200 MG: 200 CAPSULE ORAL at 08:27

## 2024-10-15 PROBLEM — R79.89 ELEVATED TROPONIN: Status: RESOLVED | Noted: 2024-08-27 | Resolved: 2024-10-15

## 2024-10-18 ENCOUNTER — HOSPITAL ENCOUNTER (INPATIENT)
Age: 79
LOS: 1 days | Discharge: HOME | DRG: 291 | End: 2024-10-19
Attending: EMERGENCY MEDICINE | Admitting: STUDENT IN AN ORGANIZED HEALTH CARE EDUCATION/TRAINING PROGRAM
Payer: MEDICARE

## 2024-10-18 ENCOUNTER — APPOINTMENT (OUTPATIENT)
Dept: GENERAL RADIOLOGY | Age: 79
DRG: 291 | End: 2024-10-18
Payer: MEDICARE

## 2024-10-18 DIAGNOSIS — I50.21 ACUTE SYSTOLIC HEART FAILURE (HCC): ICD-10-CM

## 2024-10-18 DIAGNOSIS — R07.9 CHEST PAIN, UNSPECIFIED TYPE: Primary | ICD-10-CM

## 2024-10-18 DIAGNOSIS — N17.9 ACUTE KIDNEY INJURY (HCC): ICD-10-CM

## 2024-10-18 DIAGNOSIS — J81.0 ACUTE PULMONARY EDEMA: ICD-10-CM

## 2024-10-18 DIAGNOSIS — R06.02 SHORTNESS OF BREATH: ICD-10-CM

## 2024-10-18 DIAGNOSIS — I50.23 ACUTE ON CHRONIC SYSTOLIC CONGESTIVE HEART FAILURE (HCC): ICD-10-CM

## 2024-10-18 PROBLEM — I50.9 CONGESTIVE HEART FAILURE, UNSPECIFIED HF CHRONICITY, UNSPECIFIED HEART FAILURE TYPE (HCC): Status: ACTIVE | Noted: 2024-10-18

## 2024-10-18 LAB
ANION GAP SERPL CALCULATED.3IONS-SCNC: 13 MMOL/L (ref 9–16)
BASOPHILS # BLD: 0.07 K/UL (ref 0–0.2)
BASOPHILS NFR BLD: 1 % (ref 0–2)
BNP SERPL-MCNC: 521 PG/ML (ref 0–300)
BODY TEMPERATURE: 37
BUN SERPL-MCNC: 41 MG/DL (ref 8–23)
CA-I BLD-SCNC: 1.13 MMOL/L (ref 1.13–1.33)
CALCIUM SERPL-MCNC: 8.9 MG/DL (ref 8.6–10.4)
CHLORIDE SERPL-SCNC: 108 MMOL/L (ref 98–107)
CO2 SERPL-SCNC: 21 MMOL/L (ref 20–31)
COHGB MFR BLD: 1.1 % (ref 0–5)
CREAT SERPL-MCNC: 1.6 MG/DL (ref 0.7–1.2)
D DIMER PPP FEU-MCNC: 0.54 UG/ML FEU (ref 0–0.57)
EOSINOPHIL # BLD: 0.53 K/UL (ref 0–0.44)
EOSINOPHILS RELATIVE PERCENT: 6 % (ref 1–4)
ERYTHROCYTE [DISTWIDTH] IN BLOOD BY AUTOMATED COUNT: 13.9 % (ref 11.8–14.4)
FIO2 ON VENT: ABNORMAL %
GFR, ESTIMATED: 44 ML/MIN/1.73M2
GLUCOSE SERPL-MCNC: 134 MG/DL (ref 74–99)
HCO3 VENOUS: 22.5 MMOL/L (ref 24–30)
HCT VFR BLD AUTO: 39.8 % (ref 40.7–50.3)
HGB BLD-MCNC: 12.9 G/DL (ref 13–17)
IMM GRANULOCYTES # BLD AUTO: <0.03 K/UL (ref 0–0.3)
IMM GRANULOCYTES NFR BLD: 0 %
LYMPHOCYTES NFR BLD: 3.02 K/UL (ref 1.1–3.7)
LYMPHOCYTES RELATIVE PERCENT: 33 % (ref 24–43)
MAGNESIUM SERPL-MCNC: 2.5 MG/DL (ref 1.6–2.4)
MCH RBC QN AUTO: 30 PG (ref 25.2–33.5)
MCHC RBC AUTO-ENTMCNC: 32.4 G/DL (ref 28.4–34.8)
MCV RBC AUTO: 92.6 FL (ref 82.6–102.9)
MONOCYTES NFR BLD: 0.78 K/UL (ref 0.1–1.2)
MONOCYTES NFR BLD: 9 % (ref 3–12)
NEGATIVE BASE EXCESS, VEN: 1.9 MMOL/L (ref 0–2)
NEUTROPHILS NFR BLD: 51 % (ref 36–65)
NEUTS SEG NFR BLD: 4.7 K/UL (ref 1.5–8.1)
NRBC BLD-RTO: 0 PER 100 WBC
O2 SAT, VEN: 86.7 % (ref 60–85)
PCO2 VENOUS: 39.5 MM HG (ref 39–55)
PH VENOUS: 7.37 (ref 7.32–7.42)
PLATELET # BLD AUTO: 199 K/UL (ref 138–453)
PMV BLD AUTO: 12.7 FL (ref 8.1–13.5)
PO2 VENOUS: 53.8 MM HG (ref 30–50)
POTASSIUM SERPL-SCNC: 4.4 MMOL/L (ref 3.7–5.3)
RBC # BLD AUTO: 4.3 M/UL (ref 4.21–5.77)
SODIUM SERPL-SCNC: 142 MMOL/L (ref 136–145)
TROPONIN I SERPL HS-MCNC: 17 NG/L (ref 0–22)
TROPONIN I SERPL HS-MCNC: 18 NG/L (ref 0–22)
WBC OTHER # BLD: 9.1 K/UL (ref 3.5–11.3)

## 2024-10-18 PROCEDURE — 36415 COLL VENOUS BLD VENIPUNCTURE: CPT

## 2024-10-18 PROCEDURE — 6360000002 HC RX W HCPCS: Performed by: EMERGENCY MEDICINE

## 2024-10-18 PROCEDURE — 2060000000 HC ICU INTERMEDIATE R&B

## 2024-10-18 PROCEDURE — 82805 BLOOD GASES W/O2 SATURATION: CPT

## 2024-10-18 PROCEDURE — 2700000000 HC OXYGEN THERAPY PER DAY

## 2024-10-18 PROCEDURE — 84484 ASSAY OF TROPONIN QUANT: CPT

## 2024-10-18 PROCEDURE — 82330 ASSAY OF CALCIUM: CPT

## 2024-10-18 PROCEDURE — 83735 ASSAY OF MAGNESIUM: CPT

## 2024-10-18 PROCEDURE — 80048 BASIC METABOLIC PNL TOTAL CA: CPT

## 2024-10-18 PROCEDURE — 99285 EMERGENCY DEPT VISIT HI MDM: CPT

## 2024-10-18 PROCEDURE — 93005 ELECTROCARDIOGRAM TRACING: CPT | Performed by: EMERGENCY MEDICINE

## 2024-10-18 PROCEDURE — 0202U NFCT DS 22 TRGT SARS-COV-2: CPT

## 2024-10-18 PROCEDURE — 85379 FIBRIN DEGRADATION QUANT: CPT

## 2024-10-18 PROCEDURE — 85025 COMPLETE CBC W/AUTO DIFF WBC: CPT

## 2024-10-18 PROCEDURE — 83880 ASSAY OF NATRIURETIC PEPTIDE: CPT

## 2024-10-18 PROCEDURE — 6370000000 HC RX 637 (ALT 250 FOR IP): Performed by: EMERGENCY MEDICINE

## 2024-10-18 PROCEDURE — 96374 THER/PROPH/DIAG INJ IV PUSH: CPT

## 2024-10-18 PROCEDURE — 71045 X-RAY EXAM CHEST 1 VIEW: CPT

## 2024-10-18 RX ORDER — BUMETANIDE 0.25 MG/ML
2 INJECTION, SOLUTION INTRAMUSCULAR; INTRAVENOUS DAILY
Status: DISCONTINUED | OUTPATIENT
Start: 2024-10-19 | End: 2024-10-19 | Stop reason: HOSPADM

## 2024-10-18 RX ORDER — LISINOPRIL 5 MG/1
5 TABLET ORAL DAILY
Status: DISCONTINUED | OUTPATIENT
Start: 2024-10-19 | End: 2024-10-19 | Stop reason: HOSPADM

## 2024-10-18 RX ORDER — ASPIRIN 81 MG/1
324 TABLET, CHEWABLE ORAL ONCE
Status: DISCONTINUED | OUTPATIENT
Start: 2024-10-18 | End: 2024-10-18

## 2024-10-18 RX ORDER — ONDANSETRON 2 MG/ML
4 INJECTION INTRAMUSCULAR; INTRAVENOUS EVERY 6 HOURS PRN
Status: DISCONTINUED | OUTPATIENT
Start: 2024-10-18 | End: 2024-10-19 | Stop reason: HOSPADM

## 2024-10-18 RX ORDER — NITROGLYCERIN 20 MG/100ML
5-200 INJECTION INTRAVENOUS CONTINUOUS
Status: CANCELLED | OUTPATIENT
Start: 2024-10-18

## 2024-10-18 RX ORDER — SODIUM CHLORIDE 0.9 % (FLUSH) 0.9 %
10 SYRINGE (ML) INJECTION PRN
Status: DISCONTINUED | OUTPATIENT
Start: 2024-10-18 | End: 2024-10-19 | Stop reason: HOSPADM

## 2024-10-18 RX ORDER — ONDANSETRON 4 MG/1
4 TABLET, ORALLY DISINTEGRATING ORAL EVERY 8 HOURS PRN
Status: DISCONTINUED | OUTPATIENT
Start: 2024-10-18 | End: 2024-10-19 | Stop reason: HOSPADM

## 2024-10-18 RX ORDER — POTASSIUM CHLORIDE 1500 MG/1
40 TABLET, EXTENDED RELEASE ORAL PRN
Status: DISCONTINUED | OUTPATIENT
Start: 2024-10-18 | End: 2024-10-19 | Stop reason: HOSPADM

## 2024-10-18 RX ORDER — ENOXAPARIN SODIUM 100 MG/ML
40 INJECTION SUBCUTANEOUS DAILY
Status: DISCONTINUED | OUTPATIENT
Start: 2024-10-19 | End: 2024-10-19 | Stop reason: HOSPADM

## 2024-10-18 RX ORDER — ASPIRIN 81 MG/1
243 TABLET, CHEWABLE ORAL ONCE
Status: COMPLETED | OUTPATIENT
Start: 2024-10-18 | End: 2024-10-18

## 2024-10-18 RX ORDER — CARVEDILOL 3.12 MG/1
3.12 TABLET ORAL 2 TIMES DAILY WITH MEALS
Status: DISCONTINUED | OUTPATIENT
Start: 2024-10-19 | End: 2024-10-19 | Stop reason: HOSPADM

## 2024-10-18 RX ORDER — SODIUM CHLORIDE 9 MG/ML
INJECTION, SOLUTION INTRAVENOUS PRN
Status: DISCONTINUED | OUTPATIENT
Start: 2024-10-18 | End: 2024-10-19 | Stop reason: HOSPADM

## 2024-10-18 RX ORDER — SODIUM CHLORIDE 0.9 % (FLUSH) 0.9 %
5-40 SYRINGE (ML) INJECTION EVERY 12 HOURS SCHEDULED
Status: DISCONTINUED | OUTPATIENT
Start: 2024-10-19 | End: 2024-10-19 | Stop reason: HOSPADM

## 2024-10-18 RX ORDER — ACETAMINOPHEN 650 MG/1
650 SUPPOSITORY RECTAL EVERY 6 HOURS PRN
Status: DISCONTINUED | OUTPATIENT
Start: 2024-10-18 | End: 2024-10-19 | Stop reason: HOSPADM

## 2024-10-18 RX ORDER — FUROSEMIDE 10 MG/ML
40 INJECTION INTRAMUSCULAR; INTRAVENOUS ONCE
Status: COMPLETED | OUTPATIENT
Start: 2024-10-18 | End: 2024-10-18

## 2024-10-18 RX ORDER — NITROGLYCERIN 20 MG/100ML
5-200 INJECTION INTRAVENOUS CONTINUOUS
Status: DISCONTINUED | OUTPATIENT
Start: 2024-10-18 | End: 2024-10-19 | Stop reason: HOSPADM

## 2024-10-18 RX ORDER — POTASSIUM CHLORIDE 7.45 MG/ML
10 INJECTION INTRAVENOUS PRN
Status: DISCONTINUED | OUTPATIENT
Start: 2024-10-18 | End: 2024-10-19 | Stop reason: HOSPADM

## 2024-10-18 RX ORDER — POLYETHYLENE GLYCOL 3350 17 G/17G
17 POWDER, FOR SOLUTION ORAL DAILY PRN
Status: DISCONTINUED | OUTPATIENT
Start: 2024-10-18 | End: 2024-10-19 | Stop reason: HOSPADM

## 2024-10-18 RX ORDER — MAGNESIUM SULFATE IN WATER 40 MG/ML
2000 INJECTION, SOLUTION INTRAVENOUS PRN
Status: DISCONTINUED | OUTPATIENT
Start: 2024-10-18 | End: 2024-10-19 | Stop reason: HOSPADM

## 2024-10-18 RX ORDER — NITROGLYCERIN 0.4 MG/1
0.4 TABLET SUBLINGUAL EVERY 5 MIN PRN
Status: DISCONTINUED | OUTPATIENT
Start: 2024-10-18 | End: 2024-10-19 | Stop reason: HOSPADM

## 2024-10-18 RX ORDER — ACETAMINOPHEN 325 MG/1
650 TABLET ORAL EVERY 6 HOURS PRN
Status: DISCONTINUED | OUTPATIENT
Start: 2024-10-18 | End: 2024-10-19 | Stop reason: HOSPADM

## 2024-10-18 RX ADMIN — ASPIRIN 81 MG 243 MG: 81 TABLET ORAL at 20:22

## 2024-10-18 RX ADMIN — NITROGLYCERIN 40 MCG/MIN: 20 INJECTION INTRAVENOUS at 20:26

## 2024-10-18 RX ADMIN — FUROSEMIDE 40 MG: 10 INJECTION, SOLUTION INTRAMUSCULAR; INTRAVENOUS at 22:37

## 2024-10-18 ASSESSMENT — PAIN - FUNCTIONAL ASSESSMENT: PAIN_FUNCTIONAL_ASSESSMENT: NONE - DENIES PAIN

## 2024-10-19 ENCOUNTER — APPOINTMENT (OUTPATIENT)
Dept: GENERAL RADIOLOGY | Age: 79
DRG: 291 | End: 2024-10-19
Payer: MEDICARE

## 2024-10-19 ENCOUNTER — APPOINTMENT (OUTPATIENT)
Age: 79
DRG: 291 | End: 2024-10-19
Payer: MEDICARE

## 2024-10-19 VITALS
WEIGHT: 175 LBS | SYSTOLIC BLOOD PRESSURE: 113 MMHG | RESPIRATION RATE: 16 BRPM | TEMPERATURE: 97.9 F | BODY MASS INDEX: 27.47 KG/M2 | HEART RATE: 51 BPM | DIASTOLIC BLOOD PRESSURE: 60 MMHG | HEIGHT: 67 IN | OXYGEN SATURATION: 95 %

## 2024-10-19 PROBLEM — I50.9 CONGESTIVE HEART FAILURE, UNSPECIFIED HF CHRONICITY, UNSPECIFIED HEART FAILURE TYPE (HCC): Status: RESOLVED | Noted: 2024-10-18 | Resolved: 2024-10-19

## 2024-10-19 LAB
ANION GAP SERPL CALCULATED.3IONS-SCNC: 12 MMOL/L (ref 9–16)
B PARAP IS1001 DNA NPH QL NAA+NON-PROBE: NOT DETECTED
B PERT DNA SPEC QL NAA+PROBE: NOT DETECTED
BNP SERPL-MCNC: 688 PG/ML (ref 0–300)
BUN SERPL-MCNC: 38 MG/DL (ref 8–23)
C PNEUM DNA NPH QL NAA+NON-PROBE: NOT DETECTED
CALCIUM SERPL-MCNC: 9.2 MG/DL (ref 8.6–10.4)
CHLORIDE SERPL-SCNC: 107 MMOL/L (ref 98–107)
CHOLEST SERPL-MCNC: 118 MG/DL (ref 0–199)
CHOLESTEROL/HDL RATIO: 3
CO2 SERPL-SCNC: 21 MMOL/L (ref 20–31)
CREAT SERPL-MCNC: 1.3 MG/DL (ref 0.7–1.2)
EKG ATRIAL RATE: 73 BPM
EKG P AXIS: 60 DEGREES
EKG P-R INTERVAL: 280 MS
EKG Q-T INTERVAL: 458 MS
EKG QRS DURATION: 158 MS
EKG QTC CALCULATION (BAZETT): 504 MS
EKG R AXIS: -67 DEGREES
EKG T AXIS: 72 DEGREES
EKG VENTRICULAR RATE: 73 BPM
FERRITIN SERPL-MCNC: 59 NG/ML
FLUAV RNA NPH QL NAA+NON-PROBE: NOT DETECTED
FLUBV RNA NPH QL NAA+NON-PROBE: NOT DETECTED
GFR, ESTIMATED: 53 ML/MIN/1.73M2
GLUCOSE SERPL-MCNC: 109 MG/DL (ref 74–99)
HADV DNA NPH QL NAA+NON-PROBE: NOT DETECTED
HCOV 229E RNA NPH QL NAA+NON-PROBE: NOT DETECTED
HCOV HKU1 RNA NPH QL NAA+NON-PROBE: NOT DETECTED
HCOV NL63 RNA NPH QL NAA+NON-PROBE: NOT DETECTED
HCOV OC43 RNA NPH QL NAA+NON-PROBE: NOT DETECTED
HDLC SERPL-MCNC: 41 MG/DL
HMPV RNA NPH QL NAA+NON-PROBE: NOT DETECTED
HPIV1 RNA NPH QL NAA+NON-PROBE: NOT DETECTED
HPIV2 RNA NPH QL NAA+NON-PROBE: NOT DETECTED
HPIV3 RNA NPH QL NAA+NON-PROBE: NOT DETECTED
HPIV4 RNA NPH QL NAA+NON-PROBE: NOT DETECTED
IRON SATN MFR SERPL: 20 % (ref 20–55)
IRON SERPL-MCNC: 58 UG/DL (ref 61–157)
LDLC SERPL CALC-MCNC: 65 MG/DL (ref 0–100)
M PNEUMO DNA NPH QL NAA+NON-PROBE: NOT DETECTED
MAGNESIUM SERPL-MCNC: 2.3 MG/DL (ref 1.6–2.4)
POTASSIUM SERPL-SCNC: 4.3 MMOL/L (ref 3.7–5.3)
PROCALCITONIN SERPL-MCNC: 0.05 NG/ML (ref 0–0.09)
PROCALCITONIN SERPL-MCNC: 0.05 NG/ML (ref 0–0.09)
RSV RNA NPH QL NAA+NON-PROBE: NOT DETECTED
RV+EV RNA NPH QL NAA+NON-PROBE: NOT DETECTED
SARS-COV-2 RNA NPH QL NAA+NON-PROBE: NOT DETECTED
SODIUM SERPL-SCNC: 140 MMOL/L (ref 136–145)
SPECIMEN DESCRIPTION: NORMAL
TIBC SERPL-MCNC: 291 UG/DL (ref 250–450)
TRIGL SERPL-MCNC: 59 MG/DL
TROPONIN I SERPL HS-MCNC: 20 NG/L (ref 0–22)
TROPONIN I SERPL HS-MCNC: 20 NG/L (ref 0–22)
TSH SERPL DL<=0.05 MIU/L-ACNC: 2.62 UIU/ML (ref 0.27–4.2)
UNSATURATED IRON BINDING CAPACITY: 233 UG/DL (ref 112–347)
VLDLC SERPL CALC-MCNC: 12 MG/DL

## 2024-10-19 PROCEDURE — 6370000000 HC RX 637 (ALT 250 FOR IP)

## 2024-10-19 PROCEDURE — 83540 ASSAY OF IRON: CPT

## 2024-10-19 PROCEDURE — 83550 IRON BINDING TEST: CPT

## 2024-10-19 PROCEDURE — 71046 X-RAY EXAM CHEST 2 VIEWS: CPT

## 2024-10-19 PROCEDURE — 6370000000 HC RX 637 (ALT 250 FOR IP): Performed by: NURSE PRACTITIONER

## 2024-10-19 PROCEDURE — 36415 COLL VENOUS BLD VENIPUNCTURE: CPT

## 2024-10-19 PROCEDURE — 84443 ASSAY THYROID STIM HORMONE: CPT

## 2024-10-19 PROCEDURE — 82728 ASSAY OF FERRITIN: CPT

## 2024-10-19 PROCEDURE — 80048 BASIC METABOLIC PNL TOTAL CA: CPT

## 2024-10-19 PROCEDURE — 97530 THERAPEUTIC ACTIVITIES: CPT

## 2024-10-19 PROCEDURE — 99222 1ST HOSP IP/OBS MODERATE 55: CPT | Performed by: INTERNAL MEDICINE

## 2024-10-19 PROCEDURE — 6360000002 HC RX W HCPCS: Performed by: NURSE PRACTITIONER

## 2024-10-19 PROCEDURE — 83735 ASSAY OF MAGNESIUM: CPT

## 2024-10-19 PROCEDURE — 80061 LIPID PANEL: CPT

## 2024-10-19 PROCEDURE — 6370000000 HC RX 637 (ALT 250 FOR IP): Performed by: STUDENT IN AN ORGANIZED HEALTH CARE EDUCATION/TRAINING PROGRAM

## 2024-10-19 PROCEDURE — 2700000000 HC OXYGEN THERAPY PER DAY

## 2024-10-19 PROCEDURE — 83880 ASSAY OF NATRIURETIC PEPTIDE: CPT

## 2024-10-19 PROCEDURE — 97165 OT EVAL LOW COMPLEX 30 MIN: CPT

## 2024-10-19 PROCEDURE — 97535 SELF CARE MNGMENT TRAINING: CPT

## 2024-10-19 PROCEDURE — 84484 ASSAY OF TROPONIN QUANT: CPT

## 2024-10-19 PROCEDURE — 97161 PT EVAL LOW COMPLEX 20 MIN: CPT

## 2024-10-19 PROCEDURE — 84145 PROCALCITONIN (PCT): CPT

## 2024-10-19 PROCEDURE — NBSRV NON-BILLABLE SERVICE: Performed by: STUDENT IN AN ORGANIZED HEALTH CARE EDUCATION/TRAINING PROGRAM

## 2024-10-19 PROCEDURE — 2580000003 HC RX 258: Performed by: NURSE PRACTITIONER

## 2024-10-19 PROCEDURE — 93010 ELECTROCARDIOGRAM REPORT: CPT | Performed by: INTERNAL MEDICINE

## 2024-10-19 PROCEDURE — 94761 N-INVAS EAR/PLS OXIMETRY MLT: CPT

## 2024-10-19 RX ORDER — MEXILETINE HYDROCHLORIDE 200 MG/1
200 CAPSULE ORAL 3 TIMES DAILY
Status: DISCONTINUED | OUTPATIENT
Start: 2024-10-19 | End: 2024-10-19 | Stop reason: HOSPADM

## 2024-10-19 RX ORDER — BUSPIRONE HYDROCHLORIDE 5 MG/1
5 TABLET ORAL 3 TIMES DAILY
Status: DISCONTINUED | OUTPATIENT
Start: 2024-10-19 | End: 2024-10-19 | Stop reason: HOSPADM

## 2024-10-19 RX ORDER — FUROSEMIDE 40 MG/1
40 TABLET ORAL DAILY
Status: DISCONTINUED | OUTPATIENT
Start: 2024-10-19 | End: 2024-10-19 | Stop reason: HOSPADM

## 2024-10-19 RX ORDER — ISOSORBIDE MONONITRATE 30 MG/1
30 TABLET, EXTENDED RELEASE ORAL DAILY
Status: DISCONTINUED | OUTPATIENT
Start: 2024-10-19 | End: 2024-10-19 | Stop reason: HOSPADM

## 2024-10-19 RX ORDER — SOTALOL HYDROCHLORIDE 80 MG/1
80 TABLET ORAL 2 TIMES DAILY
Status: DISCONTINUED | OUTPATIENT
Start: 2024-10-19 | End: 2024-10-19 | Stop reason: HOSPADM

## 2024-10-19 RX ORDER — LISINOPRIL 10 MG/1
10 TABLET ORAL DAILY
Status: DISCONTINUED | OUTPATIENT
Start: 2024-10-19 | End: 2024-10-19 | Stop reason: HOSPADM

## 2024-10-19 RX ORDER — CARVEDILOL 3.12 MG/1
3.12 TABLET ORAL 2 TIMES DAILY WITH MEALS
Qty: 60 TABLET | Refills: 3 | Status: SHIPPED | OUTPATIENT
Start: 2024-10-19

## 2024-10-19 RX ORDER — ISOSORBIDE MONONITRATE 30 MG/1
30 TABLET, EXTENDED RELEASE ORAL DAILY
Qty: 30 TABLET | Refills: 3 | Status: SHIPPED | OUTPATIENT
Start: 2024-10-20

## 2024-10-19 RX ORDER — ATORVASTATIN CALCIUM 40 MG/1
40 TABLET, FILM COATED ORAL NIGHTLY
Status: DISCONTINUED | OUTPATIENT
Start: 2024-10-19 | End: 2024-10-19 | Stop reason: HOSPADM

## 2024-10-19 RX ADMIN — FUROSEMIDE 40 MG: 40 TABLET ORAL at 09:57

## 2024-10-19 RX ADMIN — MEXILETINE HYDROCHLORIDE 200 MG: 200 CAPSULE ORAL at 09:57

## 2024-10-19 RX ADMIN — MEXILETINE HYDROCHLORIDE 200 MG: 200 CAPSULE ORAL at 13:36

## 2024-10-19 RX ADMIN — LISINOPRIL 5 MG: 5 TABLET ORAL at 07:44

## 2024-10-19 RX ADMIN — ACETAMINOPHEN 650 MG: 325 TABLET ORAL at 07:57

## 2024-10-19 RX ADMIN — BUMETANIDE 2 MG: 0.25 INJECTION INTRAMUSCULAR; INTRAVENOUS at 07:44

## 2024-10-19 RX ADMIN — ENOXAPARIN SODIUM 40 MG: 100 INJECTION SUBCUTANEOUS at 07:44

## 2024-10-19 RX ADMIN — SOTALOL HYDROCHLORIDE 80 MG: 80 TABLET ORAL at 09:57

## 2024-10-19 RX ADMIN — SODIUM CHLORIDE, PRESERVATIVE FREE 10 ML: 5 INJECTION INTRAVENOUS at 07:47

## 2024-10-19 RX ADMIN — CARVEDILOL 3.12 MG: 3.12 TABLET, FILM COATED ORAL at 07:44

## 2024-10-19 RX ADMIN — BUSPIRONE HYDROCHLORIDE 5 MG: 5 TABLET ORAL at 13:36

## 2024-10-19 RX ADMIN — BUSPIRONE HYDROCHLORIDE 5 MG: 5 TABLET ORAL at 07:57

## 2024-10-19 ASSESSMENT — PAIN SCALES - GENERAL
PAINLEVEL_OUTOF10: 0
PAINLEVEL_OUTOF10: 3
PAINLEVEL_OUTOF10: 0

## 2024-10-19 ASSESSMENT — PAIN SCALES - WONG BAKER: WONGBAKER_NUMERICALRESPONSE: NO HURT

## 2024-10-19 ASSESSMENT — PAIN DESCRIPTION - LOCATION: LOCATION: HEAD

## 2024-10-19 NOTE — ED NOTES
Pt presents to the ED with c/o shortness of breath that onset earlier today.   Shortness of breath worsened tonight.  Pt took 1 nitro and 1 baby aspirin at home.   Pt has hx of triple bypass.   Pt is in no distress, RR even and unlabored. Pt answering questions appropriately.   Pt placed on cardiac monitor and changed into gown. IV established and labs collected.EKG done.   Call light within reach.

## 2024-10-19 NOTE — PROGRESS NOTES
Discharge order noted. AVS printed and reviewed with patient; all questions answered. Care plan and education completed. IV removed. All belongings sent with patient. Tele removed. Patient discharged home with no needs.

## 2024-10-19 NOTE — ED NOTES
ED to inpatient nurses report      Chief Complaint:  Chief Complaint   Patient presents with    Shortness of Breath     Present to ED from: Home    MOA:     LOC: alert and orientated to name, place, date  Mobility: Independent  Oxygen Baseline: RA    Current needs required: 2L for comfort   Pending ED orders: none  Present condition: stable    Why did the patient come to the ED?   Pt presents to the ED with c/o shortness of breath that onset earlier today.   Shortness of breath worsened tonight.  Pt took 1 nitro and 1 baby aspirin at home.   Pt has hx of triple bypass.   Pt is in no distress, RR even and unlabored. Pt answering questions appropriately.   Pt placed on cardiac monitor and changed into gown. IV established and labs collected.EKG done.   What is the plan?   Admission due to LETICIA  Cardiology consult  Any procedures or intervention occur?   Labs  EKG  CXR  Nitro infusion  Any safety concerns??    Mental Status:  Level of Consciousness: Alert (0)    Psych Assessment:   Psychosocial  Psychosocial (WDL): Within Defined Limits  Vital signs   Vitals:    10/18/24 2045 10/18/24 2100 10/18/24 2110 10/18/24 2115   BP: (!) 110/56 135/68  (!) 148/65   Pulse: 54 57 55 55   Resp: 15 21 18 16   Temp:       TempSrc:       SpO2: 94% 97% 99% 98%   Weight:            Vitals:  Patient Vitals for the past 24 hrs:   BP Temp Temp src Pulse Resp SpO2 Weight   10/18/24 2115 (!) 148/65 -- -- 55 16 98 % --   10/18/24 2110 -- -- -- 55 18 99 % --   10/18/24 2100 135/68 -- -- 57 21 97 % --   10/18/24 2045 (!) 110/56 -- -- 54 15 94 % --   10/18/24 2030 (!) 120/58 -- -- 56 16 96 % --   10/18/24 2025 (!) 146/62 -- -- 57 -- -- --   10/18/24 2015 (!) 146/62 -- -- 61 15 99 % --   10/18/24 2008 (!) 144/65 -- -- 70 22 98 % --   10/18/24 2000 (!) 168/73 -- -- 70 19 99 % --   10/18/24 1959 -- -- -- 73 12 99 % --   10/18/24 1957 (!) 168/73 97.8 °F (36.6 °C) Oral 71 17 99 % 79.4 kg (175 lb)   10/18/24 1956 (!) 184/87 -- -- -- -- -- --      Visit  bilateral mid thighs.    Arthritis     states no pain from arthritis    Bronchitis     CAD (coronary artery disease) 11/19/2013    cabg x 3  [facility]    Cancer ([facility])     head and cheek skin cancer    Cardiac arrest 11/2013    While playing squash.    Good exercise tolerance     pt plays golf, bowling but cannot play competive squash anymore (he passes out), still works full time ()    Hx of blood clots 12/2017    heart    Hyperkalemia 05/19/2014    Hypertension, essential     Ischemic cardiomyopathy 12/08/2016    Left ventricular apical thrombus 12/09/2016    MI (myocardial infarction) ([facility]) 11/2013    Pseudoaneurysm ([facility]) - right groin 12/12/2016    Post cardiac catheterization    Sinus drainage     Snores     no sleep apnea    Systolic dysfunction with acute on chronic heart failure ([facility]) - Efx 30% 12/08/2016    Wellness examination     Dr. Albertina Cleaning last seen 7/2022   [place], [place]       Labs:  Labs Reviewed   BASIC METABOLIC PANEL - Abnormal; Notable for the following components:       Result Value    Chloride 108 (*)     Glucose 134 (*)     BUN 41 (*)     Creatinine 1.6 (*)     Est, Glom Filt Rate 44 (*)     All other components within normal limits   BRAIN NATRIURETIC PEPTIDE - Abnormal; Notable for the following components:    NT Pro- (*)     All other components within normal limits   CBC WITH AUTO DIFFERENTIAL - Abnormal; Notable for the following components:    Hemoglobin 12.9 (*)     Hematocrit 39.8 (*)     Eosinophils % 6 (*)     Eosinophils Absolute 0.53 (*)     All other components within normal limits   MAGNESIUM - Abnormal; Notable for the following components:    Magnesium 2.5 (*)     All other components within normal limits   BLOOD GAS, VENOUS - Abnormal; Notable for the following components:    PO2, Antonio 53.8 (*)     HCO3, Venous 22.5 (*)     O2 Sat, Antonio 86.7 (*)     All other components within normal limits   CALCIUM, IONIZED   D-DIMER, QUANTITATIVE   TROPONIN   TROPONIN

## 2024-10-19 NOTE — ED PROVIDER NOTES
[facility] ED  eMERGENCY dEPARTMENT eNCOUnter    Pt Name: Erik Olea  MRN: [id number]  Birthdate 1945  Date of evaluation: 10/18/2024  Note Started: 8:03 PM EDT  PCP:  Albertina Cleaning MD    CHIEF COMPLAINT       Chief Complaint   Patient presents with    Shortness of Breath         HISTORY OF PRESENT ILLNESS    Erik Olea is a 79 y.o. male who presents with chest pain and shortness of breath patient notes has been going on all day he has been trying his breathing treatments with very minimal relief notes that last night things seem to spread to start he was unable to lay flat due to the shortness of breath.  Patient notes the chest pain is a midsternal pressure no fevers no chills no cough no wheezing patient does noted it is hard feels like he cannot get enough air so walking is difficult no leg swelling or edema no prior history of heart failure per him however review of the chart shows history of congestive heart failure of his systolic nature    PHYSICAL EXAM     INITIAL VITALS:  weight is 79.4 kg (175 lb). His oral temperature is 97.8 °F (36.6 °C). His blood pressure is 148/65 (abnormal) and his pulse is 55. His respiration is 16 and oxygen saturation is 98%.      Physical Exam  Constitutional:       General: He is in acute distress (mild respiratory distress).      Appearance: He is well-developed. He is not diaphoretic.   HENT:      Head: Normocephalic and atraumatic.      Right Ear: External ear normal.      Left Ear: External ear normal.   Eyes:      General: No scleral icterus.        Right eye: No discharge.         Left eye: No discharge.   Neck:      Trachea: No tracheal deviation.   Pulmonary:      Effort: Respiratory distress (mild to moderate distress 2-3 word sentencs) present.      Breath sounds: No stridor. No decreased breath sounds or wheezing.      Comments: Point-of-care ultrasound shows poor cardiac contractility low EF with no signs of pericardial effusion patient has

## 2024-10-19 NOTE — PROGRESS NOTES
Physical Therapy  Facility/Department: [facility] CAR 2- STEPDOWN  Physical Therapy Initial Assessment    Name: Erik Olea  : 1945  MRN: [id number]  Date of Service: 10/19/2024    Chief Complaint   Patient presents with    Shortness of Breath       Discharge Recommendations:    Further therapy recommended at discharge.    PT Equipment Recommendations  Equipment Needed: No      Patient Diagnosis(es): The primary encounter diagnosis was Chest pain, unspecified type. Diagnoses of Shortness of breath, Acute pulmonary edema, Acute systolic heart failure (HCC), Acute kidney injury (HCC), and Acute on chronic systolic congestive heart failure (HCC) were also pertinent to this visit.  Past Medical History:  has a past medical history of Accident, Arthritis, Bronchitis, CAD (coronary artery disease), Cancer (HCC), Cardiac arrest, Good exercise tolerance, Hx of blood clots, Hyperkalemia, Hypertension, essential, Ischemic cardiomyopathy, Left ventricular apical thrombus, MI (myocardial infarction) (HCC), Pseudoaneurysm (HCC) - right groin, Sinus drainage, Snores, Systolic dysfunction with acute on chronic heart failure (HCC) - Efx 30%, and Wellness examination.  Past Surgical History:  has a past surgical history that includes Coronary artery bypass graft (2013); Vasectomy; Tonsillectomy; Colonoscopy; Cardiac defibrillator placement (); Cardiac catheterization (2016); Cardiac catheterization (2013); other surgical history (Right, 2019); Abdomen surgery (Right, 2019); Cardiac catheterization (2022); Carotid endarterectomy (Left, 2022); Carotid endarterectomy (Right, 2023); other surgical history (Right, 2023); ep device procedure (N/A, 2023); Cardiac catheterization (2024); and Cardiac procedure (N/A, 2024).    Assessment  Assessment: Pt grossly CGA to SBA improved to Barry. Pt amb 300' Barry no AD. Pt without acute PT needs. Physical therapy to sign  off.  Therapy Prognosis: Good  Decision Making: Low Complexity  Requires PT Follow-Up: No  Activity Tolerance  Activity Tolerance: Patient tolerated treatment well  Activity Tolerance Comments: Pt without observable shortness of breath throughout session. Pt states anxiety is big factor in current situation.    Plan  Physical Therapy Plan  General Plan:  (d/c PT)  Safety Devices  Type of Devices: Call light within reach, Gait belt, Nurse notified, Left in bed, All fall risk precautions in place  Restraints  Restraints Initially in Place: No    Restrictions  Restrictions/Precautions  Restrictions/Precautions: Contact Precautions, General Precautions  Required Braces or Orthoses?: No     Subjective  General  Patient assessed for rehabilitation services?: Yes  Response To Previous Treatment: Not applicable  Family / Caregiver Present: No  Follows Commands: Within Functional Limits  General Comment  Comments: RN and pt agreeable to PT. Pt alert in bed upon arrival. OT co-eval  Subjective  Subjective: Pt denies any pain or numbness/ tingling.         Social/Functional History  Social/Functional History  Lives With: Alone (cat)  Type of Home: House  Home Layout: Two level, Laundry in basement (bed up, bath main)  Home Access: Stairs to enter without rails  Entrance Stairs - Number of Steps: 5 CRISTINO R. Inside, 16 steps to 2nd floor and basement  Bathroom Shower/Tub: Walk-in shower  Bathroom Toilet: Handicap height  Bathroom Equipment: None  Bathroom Accessibility: Accessible  Home Equipment: None  Receives Help From: Family (sisters)  ADL Assistance: Independent  Homemaking Assistance: Independent  Homemaking Responsibilities: Yes  Ambulation Assistance: Independent  Transfer Assistance: Independent  Active : Yes  Mode of Transportation: Car, Truck  Occupation: Part time employment  Type of Occupation: carpentry  Leisure & Hobbies: golf, bowling, gardening  Vision/Hearing  Vision  Vision: Within Functional

## 2024-10-19 NOTE — CONSULTS
[facility]    Consult / H&P               Today's Date: 10/19/2024  Patient Name: Erik Olea  Date of admission: 10/18/2024  7:51 PM  Patient's age: 79 y.o., 1945  Admission Dx: Shortness of breath [R06.02]  Acute systolic heart failure (HCC) [I50.21]  Acute pulmonary edema [J81.0]  Acute kidney injury (HCC) [N17.9]  Chest pain, unspecified type [R07.9]  Congestive heart failure, unspecified HF chronicity, unspecified heart failure type (HCC) [I50.9]    Requesting Physician: Camila German MD    Cardiac Evaluation Reason:  Shortness of breath    History Obtained From: patient and chart review     History of Present Illness:    79 y.o. male who presents with chest pain and shortness of breath patient notes has been going on all day he has been trying his breathing treatments with very minimal relief notes that last night things seem to spread to start he was unable to lay flat due to the shortness of breath.  Patient notes the chest pain is a midsternal pressure no fevers no chills no cough no wheezing patient does noted it is hard feels like he cannot get enough air so walking is difficult no leg swelling or edema no prior history of heart failure per him however review of the chart shows history of congestive heart failure of his systolic nature     Past Medical History:   has a past medical history of Accident, Arthritis, Bronchitis, CAD (coronary artery disease), Cancer (HCC), Cardiac arrest, Good exercise tolerance, Hx of blood clots, Hyperkalemia, Hypertension, essential, Ischemic cardiomyopathy, Left ventricular apical thrombus, MI (myocardial infarction) (HCC), Pseudoaneurysm (HCC) - right groin, Sinus drainage, Snores, Systolic dysfunction with acute on chronic heart failure (HCC) - Efx 30%, and Wellness examination.    Past Surgical History:   has a past surgical history that includes Coronary artery bypass graft (11/19/2013); Vasectomy; Tonsillectomy; Colonoscopy; Cardiac

## 2024-10-19 NOTE — PROGRESS NOTES
Spiritual Health History and Assessment/Progress Note  [facility]    Initial Encounter,  ,  ,      Name: Erik Olea MRN: [id number]    Age: 79 y.o.     Sex: male   Language: English   Jehovah's witness: Tenriism   <principal problem not specified>     Date: 10/18/2024            Total Time Calculated: (P) 10 min              Spiritual Assessment began in [facility] ED        Referral/Consult From: (P) Rounding   Encounter Overview/Reason: Initial Encounter  Service Provided For: (P) Patient    Jennifer, Belief, Meaning:   Patient is connected with a jennifer tradition or spiritual practice  Family/Friends Other: N/A      Importance and Influence:  Patient has no beliefs influential to healthcare decision-making identified during this visit  Family/Friends have no beliefs influential to healthcare decision-making identified during this visit    Community:  Patient is connected with a spiritual community and feels well-supported. Support system includes: Other: Family  Family/Friends feel well-supported. Support system includes: Other: Family    Assessment and Plan of Care:     Patient Interventions include: Other:  provided a supportive presence through active listening and words of affirmation.  Family/Friends Interventions include: Other: N/A    Patient Plan of Care: Spiritual Care available upon further referral  Family/Friends Plan of Care: Spiritual Care available upon further referral    Electronically signed by Chaplain Paulina on 10/18/2024 at 10:21 PM

## 2024-10-19 NOTE — DISCHARGE SUMMARY
[facility]  Office: [phone]  Quang Noe DO, Kimani Cannon DO, Bubba Wyatt DO, Johnny Christopher DO, Hira Khan MD, Vielka Escobar MD, Adarsh Bowen MD, Frida Horan MD,  Angel Warner MD, Bryon Lopez MD, Toribio Marti DO, Rebeca Christine MD,  Kenrick Solorio DO, Mau Chávez MD, Pankaj Scott MD, David Noe DO, Tarsha Blancas MD,  Jorge L Forbes DO, Liane Avery MD, Barbara Schumacher MD, Na Maldonado MD, Patrick Borrego MD,  Francisco J Mares MD, Lindsey Gonzalez MD, Vic Geronimo MD, Rogelio Richardson DO, Bello Orellana MD,  Jose Alejandro Bernard MD, Shirley Waterhouse, CNP,  Mitali Padilla, CNP, Jennifer Mac, CNP, Jd Garcia, CNP,  Maria Luz Peterson, DNP, Connie Cheek, CNP, Ivory Warren, CNP, Cathi Butt, CNP, Idalia Nassar, CNP, Skye García, CNP, Abad Melchor PA-C, Kae Smith, CNS, Ting Bermudez, CNP, Radha Florez, CNP         [facility]   IN-PATIENT SERVICE   [facility]    Discharge Summary     Patient ID: Erik Olea  :  1945   MRN: [id number]     ACCOUNT:  [id number]   Patient's PCP: Albertina Cleaning MD  Admit Date: 10/18/2024   Discharge Date: 10/19/2024  Length of Stay: 1  Code Status:  Full Code  Admitting Physician: Juan Lopez MD  Discharge Physician: Juan Lopez MD     Active Discharge Diagnoses:     Hospital Problem Lists:  Principal Problem (Resolved):    Congestive heart failure, unspecified HF chronicity, unspecified heart failure type (HCC)  Active Problems:    Coronary artery disease involving native coronary artery of native heart without angina pectoris    Acute pulmonary edema    Acute systolic heart failure (HCC)      Admission Condition:  fair     Discharged Condition: good    Hospital Stay:     Hospital Course:  Erik Olea is a 79 y.o. male who was admitted for the management of Congestive heart failure, unspecified HF chronicity, unspecified heart failure type (HCC) , presented to ER with

## 2024-10-19 NOTE — DISCHARGE INSTRUCTIONS
Follow-up outpatient with PCP and cardiology.  Continue medications as prescribed.      Cardiology recommendations   lisinopril 10mg po qday. Resume home medications. Add imdur 30mg po qday OK to discharge from cardiology perspective. :

## 2024-10-19 NOTE — H&P
[facility]  Office: [phone]  Quang Noe DO, Kimani Cannon DO, Bubba Wyatt DO, Johnny Christopher DO, Hira Khan MD, Vielka Escobar MD, Adarsh Bowen MD, Frida Horan MD,  Angel Warner MD, Bryon Lopez MD, Rebeca Christine MD,  Kenrick Solorio DO, Mau Chávez MD, Pankaj Scott MD, David Noe DO, Tarsha Blancas MD,  Jorge L Forbes DO, Liane Avery MD, Barbara Schumacher MD, Na Maldonado MD, Patrick Borrego MD,  Francisco J Mares MD, Lindsey Gonzalez MD, Vic Geronimo MD, Foreign Kumar MD, Juan Lopez MD, Camila German MD, Jd Sauceda DO, Jose Alejandro Bernard MD, Shirley Waterhouse, CNP,  Mitali Padilla, CNP, Jd Garcia, CNP,  Maria Luz Peterson, GILBERTO, Connie Cheek, CNP, Ivory Warren, CNP, Idalia Nassar, CNP, Skye García, CNP, LISA CifuentesC, LISA IrbyC, Mindy Garcia, CNP, Aditya Fox, CNP,  Brooke Hahn, CNP, Jennifer Mac, CNP,  Radha Florez, CNP, Magaly Booth, CNP         [facility]   IN-PATIENT SERVICE   [facility]    HISTORY AND PHYSICAL EXAMINATION            Date:   10/19/2024  Patient name:  Erik Olea  Date of admission:  10/18/2024  7:51 PM  MRN:   [id number]  Account:  [id number]  YOB: 1945  PCP:    Albertina Cleaning MD  Room:   2014/2014-01  Code Status:    Full Code    Chief Complaint:     Chief Complaint   Patient presents with    Shortness of Breath   \"My breathing\"    History Obtained From:     patient    History of Present Illness:     Erik Olea is a 79 y.o.  male w/ ICMP w/ CAD s/p CABG 2013 ([facility] 09/2024 -LIMA patent w/ chronic occluded L circ and SVG to OM w/ right to left collaterals, RCA 80% stenosed with SVG to RPDA patent, echo EF 45% w/ moderate MR/TR) s/p cardiac arrest w/ ICD, CK3,  who presents with Shortness of Breath   and is admitted to the hospital for the management of Congestive heart failure, unspecified HF chronicity, unspecified heart failure type (HCC).    Patient     Sodium 142 136 - 145 mmol/L    Potassium 4.4 3.7 - 5.3 mmol/L    Chloride 108 (H) 98 - 107 mmol/L    CO2 21 20 - 31 mmol/L    Anion Gap 13 9 - 16 mmol/L    Glucose 134 (H) 74 - 99 mg/dL    BUN 41 (H) 8 - 23 mg/dL    Creatinine 1.6 (H) 0.70 - 1.20 mg/dL    Est, Glom Filt Rate 44 (L) >60 mL/min/1.73m2    Calcium 8.9 8.6 - 10.4 mg/dL   Brain Natriuretic Peptide    Collection Time: 10/18/24  8:11 PM   Result Value Ref Range    NT Pro- (H) 0 - 300 pg/mL   Calcium, Ionized    Collection Time: 10/18/24  8:11 PM   Result Value Ref Range    Calcium, Ionized 1.13 1.13 - 1.33 mmol/L   CBC with Auto Differential    Collection Time: 10/18/24  8:11 PM   Result Value Ref Range    WBC 9.1 3.5 - 11.3 k/uL    RBC 4.30 4.21 - 5.77 m/uL    Hemoglobin 12.9 (L) 13.0 - 17.0 g/dL    Hematocrit 39.8 (L) 40.7 - 50.3 %    MCV 92.6 82.6 - 102.9 fL    MCH 30.0 25.2 - 33.5 pg    MCHC 32.4 28.4 - 34.8 g/dL    RDW 13.9 11.8 - 14.4 %    Platelets 199 138 - 453 k/uL    MPV 12.7 8.1 - 13.5 fL    NRBC Automated 0.0 0.0 per 100 WBC    Neutrophils % 51 36 - 65 %    Lymphocytes % 33 24 - 43 %    Monocytes % 9 3 - 12 %    Eosinophils % 6 (H) 1 - 4 %    Basophils % 1 0 - 2 %    Immature Granulocytes % 0 0 %    Neutrophils Absolute 4.70 1.50 - 8.10 k/uL    Lymphocytes Absolute 3.02 1.10 - 3.70 k/uL    Monocytes Absolute 0.78 0.10 - 1.20 k/uL    Eosinophils Absolute 0.53 (H) 0.00 - 0.44 k/uL    Basophils Absolute 0.07 0.00 - 0.20 k/uL    Immature Granulocytes Absolute <0.03 0.00 - 0.30 k/uL   D-Dimer, Quantitative    Collection Time: 10/18/24  8:11 PM   Result Value Ref Range    D-Dimer, Quant 0.54 0.00 - 0.57 ug/mL FEU   Magnesium    Collection Time: 10/18/24  8:11 PM   Result Value Ref Range    Magnesium 2.5 (H) 1.6 - 2.4 mg/dL   Troponin    Collection Time: 10/18/24  8:11 PM   Result Value Ref Range    Troponin, High Sensitivity 18 0 - 22 ng/L   Troponin    Collection Time: 10/18/24  9:22 PM   Result Value Ref Range    Troponin, High Sensitivity  cefpodoxime 200 mg oral tablet: 1 tab(s) orally every 12 hours   cefuroxime 250 mg oral tablet: 1 tab(s) orally 2 times a day   Dovato 50 mg-300 mg oral tablet: 1 orally once a day  losartan-hydroCHLOROthiazide 100 mg-25 mg oral tablet: 1 orally once a day  tamsulosin 0.4 mg oral capsule: 1 orally once a day (at bedtime)  Truvada 200 mg-300 mg oral tablet: 1 orally once a day  Xanax 0.25 mg oral tablet: 1 orally 2 times a day  zolpidem 10 mg oral tablet: 1 orally once a day (at bedtime)   amoxicillin-clavulanate 875 mg-125 mg oral tablet: 1 tab(s) orally 2 times a day  Dovato 50 mg-300 mg oral tablet: 1 orally once a day  losartan-hydroCHLOROthiazide 100 mg-25 mg oral tablet: 1 orally once a day  tamsulosin 0.4 mg oral capsule: 1 orally once a day (at bedtime)  Truvada 200 mg-300 mg oral tablet: 1 orally once a day  Xanax 0.25 mg oral tablet: 1 orally 2 times a day  zolpidem 10 mg oral tablet: 1 orally once a day (at bedtime)

## 2024-10-19 NOTE — CARE COORDINATION
10/19/24 1041   Readmission Assessment   Number of Days since last admission? 8-30 days   Previous Disposition Home Alone   Who is being Interviewed Patient   What was the patient's/caregiver's perception as to why they think they needed to return back to the hospital? Did not realize care needs would be so extensive   Did you visit your Primary Care Physician after you left the hospital, before you returned this time? No   Why weren't you able to visit your PCP? Other (Comment)  (Pt. states he called his PCP and talk to the office. They were aware of his previous admission but didn't set up an immediate f/u appointment after the admission.)   Did you see a specialist, such as Cardiac, Pulmonary, Orthopedic Physician, etc. after you left the hospital? Yes   Who advised the patient to return to the hospital? Self-referral   Does the patient report anything that got in the way of taking their medications? No   In our efforts to provide the best possible care to you and others like you, can you think of anything that we could have done to help you after you left the hospital the first time, so that you might not have needed to return so soon? Teaching during hospitalization regarding your illness

## 2024-10-19 NOTE — PLAN OF CARE
Problem: Chronic Conditions and Co-morbidities  Goal: Patient's chronic conditions and co-morbidity symptoms are monitored and maintained or improved  10/19/2024 1549 by Marleni Evans RN  Outcome: Completed  10/19/2024 1017 by Marleni Evans RN  Outcome: Progressing  10/19/2024 0312 by Jimmy Noland RN  Outcome: Progressing     Problem: Discharge Planning  Goal: Discharge to home or other facility with appropriate resources  10/19/2024 1549 by Marleni Evans RN  Outcome: Completed  10/19/2024 1017 by Marleni Evans RN  Outcome: Progressing  10/19/2024 0312 by Jimmy Noland RN  Outcome: Progressing     Problem: ABCDS Injury Assessment  Goal: Absence of physical injury  10/19/2024 1549 by Marleni Evans RN  Outcome: Completed  10/19/2024 1017 by Marleni Evans RN  Outcome: Progressing  10/19/2024 0312 by Jimmy Noland RN  Outcome: Progressing     Problem: Safety - Adult  Goal: Free from fall injury  10/19/2024 1549 by Marleni Evans RN  Outcome: Completed  10/19/2024 1017 by Marleni Evans RN  Outcome: Progressing  10/19/2024 0312 by Jimmy Noland RN  Outcome: Progressing

## 2024-10-19 NOTE — PLAN OF CARE
Problem: Chronic Conditions and Co-morbidities  Goal: Patient's chronic conditions and co-morbidity symptoms are monitored and maintained or improved  10/19/2024 1017 by Marleni Evans RN  Outcome: Progressing     Problem: Discharge Planning  Goal: Discharge to home or other facility with appropriate resources  10/19/2024 1017 by Marleni Evans RN  Outcome: Progressing     Problem: ABCDS Injury Assessment  Goal: Absence of physical injury  10/19/2024 1017 by Marleni Evans RN  Outcome: Progressing     Problem: Safety - Adult  Goal: Free from fall injury  10/19/2024 1017 by Marleni Evans RN  Outcome: Progressing

## 2024-10-19 NOTE — PROGRESS NOTES
Congestive Heart Failure Education completed and charted. CHF booklet given. Patient was receptive to education.    Discussed the  importance of medication compliance.    Discussed the importance of a heart healthy diet. Discussed 2000 mg sodium-restricted daily diet.  Patient instructed to limit fluid intake to  1.5 to 2 liters per day.    Patient instructed to weigh self at the same time of each day each morning, reinforced teaching to monitor for 3-5 lb weight increase over 1-2 days notify physician if change noted.      Signs and symptoms of CHF discussed with patient, such as feeling more tired than normal, feeling short of breath, coughing that increases when lying down, sudden weight gain, swelling of the feet, legs or belly.  Patient verbalized understanding to notify physician office if these symptoms occur.

## 2024-10-19 NOTE — PROGRESS NOTES
Occupational Therapy  Occupational Therapy Initial Evaluation  Facility/Department: [facility] CAR 2- STEPDOWN     Patient Name: Erik Olea        MRN: [id number]    : 1945    Date of Service: 10/19/2024    Discharge Recommendations   No therapy recommended at discharge.    OT Equipment Recommendations  Equipment Needed: No    Chief Complaint   Patient presents with    Shortness of Breath     Past Medical History:  has a past medical history of Accident, Arthritis, Bronchitis, CAD (coronary artery disease), Cancer (HCC), Cardiac arrest, Good exercise tolerance, Hx of blood clots, Hyperkalemia, Hypertension, essential, Ischemic cardiomyopathy, Left ventricular apical thrombus, MI (myocardial infarction) ([facility]), Pseudoaneurysm (HCC) - right groin, Sinus drainage, Snores, Systolic dysfunction with acute on chronic heart failure (HCC) - Efx 30%, and Wellness examination.  Past Surgical History:  has a past surgical history that includes Coronary artery bypass graft (2013); Vasectomy; Tonsillectomy; Colonoscopy; Cardiac defibrillator placement (); Cardiac catheterization (2016); Cardiac catheterization (2013); other surgical history (Right, 2019); Abdomen surgery (Right, 2019); Cardiac catheterization (2022); Carotid endarterectomy (Left, 2022); Carotid endarterectomy (Right, 2023); other surgical history (Right, 2023); ep device procedure (N/A, 2023); Cardiac catheterization (2024); and Cardiac procedure (N/A, 2024).    Assessment  Assessment: Pt agreed to OT eval this date. Pt currently demonstrates no acute performance deficits and is completing all ADLs/IADLs and functional mobility independently. Pt will be discharged from OT services as there are no goals identified and no needs to address. Pt reports no concerns with returning to prior living arrangements and completing all ADLs/IADLs independently upon discharge. Please reorder OT if  future needs arise.  Prognosis: Good  Decision Making: Low Complexity  No Skilled OT: Independent with ADL's;No OT goals identified  REQUIRES OT FOLLOW-UP: No  Activity Tolerance  Activity Tolerance: Patient Tolerated treatment well  Safety Devices  Type of Devices: Call light within reach;Gait belt;Nurse notified;Left in bed  Restraints  Restraints Initially in Place: No    Restrictions/Precautions  Restrictions/Precautions  Restrictions/Precautions: Contact Precautions  Required Braces or Orthoses?: No  Position Activity Restriction  Other position/activity restrictions: up with assistance    Subjective  General  Patient assessed for rehabilitation services?: Yes  Family / Caregiver Present: No  General Comment  Comments: RN ok'd pt for OT this date. Pt agreed to session and was pleasant/cooperative throughout. Pt denies pain       Home Setup/Prior Level of Function  Social/Functional History  Lives With: Alone (cat)  Type of Home: House  Home Layout: Two level;Laundry in basement (bed up, bath main)  Home Access: Stairs to enter without rails  Entrance Stairs - Number of Steps: 5 CRISTINO R. Inside, 16 steps to 2nd floor and basement  Bathroom Shower/Tub: Walk-in shower  Bathroom Toilet: Handicap height  Bathroom Equipment: None  Bathroom Accessibility: Accessible  Home Equipment: None  Receives Help From: Family  ADL Assistance: Independent  Homemaking Assistance: Independent  Homemaking Responsibilities: Yes  Ambulation Assistance: Independent  Transfer Assistance: Independent  Active : Yes  Mode of Transportation: Car;Truck  Occupation: Part time employment  Type of Occupation: carpentry  Leisure & Hobbies: golf, bowling, gardening  Additional Comments: sister available to assist PRN    Vision/Hearing  Vision  Vision: Within Functional Limits  Hearing  Hearing: Within functional limits    BUE Assessment  Gross Assessment  AROM: Within functional limits  Strength: Within functional limits (grossly

## 2024-10-19 NOTE — CARE COORDINATION
Case Management Assessment  Initial Evaluation    Date/Time of Evaluation: 10/19/2024 10:40 AM  Assessment Completed by: Lev Sosa    If patient is discharged prior to next notation, then this note serves as note for discharge by case management.    Patient Name: Erik Olea                   YOB: 1945  Diagnosis: Shortness of breath [R06.02]  Acute systolic heart failure (HCC) [I50.21]  Acute pulmonary edema [J81.0]  Acute kidney injury (HCC) [N17.9]  Chest pain, unspecified type [R07.9]  Congestive heart failure, unspecified HF chronicity, unspecified heart failure type (HCC) [I50.9]                   Date / Time: 10/18/2024  7:51 PM    Patient Admission Status: Inpatient   Readmission Risk (Low < 19, Mod (19-27), High > 27): Readmission Risk Score: 20.6    Current PCP: Albertina Cleaning MD  PCP verified by CM? Yes    Chart Reviewed: Yes      History Provided by: Patient  Patient Orientation: Alert and Oriented    Patient Cognition: Alert    Hospitalization in the last 30 days (Readmission):  Yes    If yes, Readmission Assessment in CM Navigator will be completed.    Advance Directives:      Code Status: Full Code   Patient's Primary Decision Maker is: Legal Next of Kin      Discharge Planning:    Patient lives with: Alone Type of Home: House  Primary Care Giver: Self  Patient Support Systems include: Family Members   Current Financial resources: Medicare  Current community resources: None  Current services prior to admission: None            Current DME: Home Aerosol            Type of Home Care services:  None    ADLS  Prior functional level: Independent in ADLs/IADLs  Current functional level: Independent in ADLs/IADLs    PT AM-PAC:   /24  OT AM-PAC:   /24    Family can provide assistance at DC: Yes  Would you like Case Management to discuss the discharge plan with any other family members/significant others, and if so, who? No  Plans to Return to Present Housing: Yes  Other Identified  Erik and his family were provided with a choice of provider and agrees with the discharge plan. Freedom of choice list with basic dialogue that supports the patient's individualized plan of care/goals and shares the quality data associated with the providers was provided to:     Patient Representative Name:       The Patient and/or Patient Representative Agree with the Discharge Plan?      Lev Sosa  Case Management Department  Ph: [phone] Fax: [phone]

## 2024-10-19 NOTE — PROGRESS NOTES
1409: Patients BP at 77/37 MAP 51. HR 52. Patient sleeping. Patient woke up and was asymptomatic, states that his BP gets low when he sleeps. MD notified and coming to bedside. Will recheck in the next couple of minutes of patient being awake.    1420: Patient has been awake for 10 minutes now. Rechecked BP now 113/60 MAP 77 HR 51. No new orders.

## 2024-12-02 ENCOUNTER — APPOINTMENT (OUTPATIENT)
Dept: GENERAL RADIOLOGY | Age: 79
End: 2024-12-02
Payer: MEDICARE

## 2024-12-02 ENCOUNTER — HOSPITAL ENCOUNTER (OUTPATIENT)
Age: 79
Setting detail: OBSERVATION
Discharge: HOME | End: 2024-12-03
Attending: EMERGENCY MEDICINE | Admitting: EMERGENCY MEDICINE
Payer: MEDICARE

## 2024-12-02 DIAGNOSIS — R07.9 CHEST PAIN, UNSPECIFIED TYPE: Primary | ICD-10-CM

## 2024-12-02 LAB
ANION GAP SERPL CALCULATED.3IONS-SCNC: 13 MMOL/L (ref 9–16)
BASOPHILS # BLD: 0.09 K/UL (ref 0–0.2)
BASOPHILS NFR BLD: 1 % (ref 0–2)
BNP SERPL-MCNC: 1029 PG/ML (ref 0–450)
BUN SERPL-MCNC: 34 MG/DL (ref 8–23)
CALCIUM SERPL-MCNC: 9 MG/DL (ref 8.6–10.4)
CHLORIDE SERPL-SCNC: 106 MMOL/L (ref 98–107)
CO2 SERPL-SCNC: 18 MMOL/L (ref 20–31)
CREAT SERPL-MCNC: 1.2 MG/DL (ref 0.7–1.2)
EOSINOPHIL # BLD: 0.37 K/UL (ref 0–0.44)
EOSINOPHILS RELATIVE PERCENT: 4 % (ref 1–4)
ERYTHROCYTE [DISTWIDTH] IN BLOOD BY AUTOMATED COUNT: 13.6 % (ref 11.8–14.4)
GFR, ESTIMATED: 61 ML/MIN/1.73M2
GLUCOSE SERPL-MCNC: 158 MG/DL (ref 74–99)
HCT VFR BLD AUTO: 40.4 % (ref 40.7–50.3)
HGB BLD-MCNC: 13.5 G/DL (ref 13–17)
IMM GRANULOCYTES # BLD AUTO: <0.03 K/UL (ref 0–0.3)
IMM GRANULOCYTES NFR BLD: 0 %
LYMPHOCYTES NFR BLD: 3.02 K/UL (ref 1.1–3.7)
LYMPHOCYTES RELATIVE PERCENT: 35 % (ref 24–43)
MAGNESIUM SERPL-MCNC: 2.5 MG/DL (ref 1.6–2.4)
MCH RBC QN AUTO: 29.5 PG (ref 25.2–33.5)
MCHC RBC AUTO-ENTMCNC: 33.4 G/DL (ref 28.4–34.8)
MCV RBC AUTO: 88.4 FL (ref 82.6–102.9)
MONOCYTES NFR BLD: 0.56 K/UL (ref 0.1–1.2)
MONOCYTES NFR BLD: 6 % (ref 3–12)
NEUTROPHILS NFR BLD: 54 % (ref 36–65)
NEUTS SEG NFR BLD: 4.63 K/UL (ref 1.5–8.1)
NRBC BLD-RTO: 0 PER 100 WBC
PLATELET # BLD AUTO: 192 K/UL (ref 138–453)
PMV BLD AUTO: 12.5 FL (ref 8.1–13.5)
POTASSIUM SERPL-SCNC: 4.7 MMOL/L (ref 3.7–5.3)
RBC # BLD AUTO: 4.57 M/UL (ref 4.21–5.77)
SODIUM SERPL-SCNC: 137 MMOL/L (ref 136–145)
TROPONIN I SERPL HS-MCNC: 18 NG/L (ref 0–22)
TROPONIN I SERPL HS-MCNC: 22 NG/L (ref 0–22)
WBC OTHER # BLD: 8.7 K/UL (ref 3.5–11.3)

## 2024-12-02 PROCEDURE — 99285 EMERGENCY DEPT VISIT HI MDM: CPT

## 2024-12-02 PROCEDURE — 85025 COMPLETE CBC W/AUTO DIFF WBC: CPT

## 2024-12-02 PROCEDURE — 84484 ASSAY OF TROPONIN QUANT: CPT

## 2024-12-02 PROCEDURE — 71046 X-RAY EXAM CHEST 2 VIEWS: CPT

## 2024-12-02 PROCEDURE — 93005 ELECTROCARDIOGRAM TRACING: CPT | Performed by: EMERGENCY MEDICINE

## 2024-12-02 PROCEDURE — 83735 ASSAY OF MAGNESIUM: CPT

## 2024-12-02 PROCEDURE — G0378 HOSPITAL OBSERVATION PER HR: HCPCS

## 2024-12-02 PROCEDURE — 6370000000 HC RX 637 (ALT 250 FOR IP): Performed by: EMERGENCY MEDICINE

## 2024-12-02 PROCEDURE — 80048 BASIC METABOLIC PNL TOTAL CA: CPT

## 2024-12-02 PROCEDURE — 83880 ASSAY OF NATRIURETIC PEPTIDE: CPT

## 2024-12-02 RX ORDER — HYDROXYZINE HYDROCHLORIDE 10 MG/1
10 TABLET, FILM COATED ORAL ONCE
Status: COMPLETED | OUTPATIENT
Start: 2024-12-02 | End: 2024-12-02

## 2024-12-02 RX ADMIN — HYDROXYZINE HYDROCHLORIDE 10 MG: 10 TABLET ORAL at 21:01

## 2024-12-03 VITALS
SYSTOLIC BLOOD PRESSURE: 135 MMHG | RESPIRATION RATE: 18 BRPM | OXYGEN SATURATION: 97 % | HEART RATE: 52 BPM | TEMPERATURE: 98.6 F | DIASTOLIC BLOOD PRESSURE: 72 MMHG

## 2024-12-03 LAB
EKG ATRIAL RATE: 51 BPM
EKG ATRIAL RATE: 59 BPM
EKG P AXIS: 30 DEGREES
EKG P AXIS: 69 DEGREES
EKG P-R INTERVAL: 256 MS
EKG P-R INTERVAL: 294 MS
EKG Q-T INTERVAL: 500 MS
EKG Q-T INTERVAL: 554 MS
EKG QRS DURATION: 150 MS
EKG QRS DURATION: 160 MS
EKG QTC CALCULATION (BAZETT): 495 MS
EKG QTC CALCULATION (BAZETT): 510 MS
EKG R AXIS: -59 DEGREES
EKG R AXIS: -61 DEGREES
EKG T AXIS: 12 DEGREES
EKG T AXIS: 43 DEGREES
EKG VENTRICULAR RATE: 51 BPM
EKG VENTRICULAR RATE: 59 BPM

## 2024-12-03 PROCEDURE — G0378 HOSPITAL OBSERVATION PER HR: HCPCS

## 2024-12-03 PROCEDURE — 93005 ELECTROCARDIOGRAM TRACING: CPT | Performed by: EMERGENCY MEDICINE

## 2024-12-03 PROCEDURE — 6370000000 HC RX 637 (ALT 250 FOR IP): Performed by: EMERGENCY MEDICINE

## 2024-12-03 PROCEDURE — 2580000003 HC RX 258: Performed by: EMERGENCY MEDICINE

## 2024-12-03 PROCEDURE — 6370000000 HC RX 637 (ALT 250 FOR IP): Performed by: INTERNAL MEDICINE

## 2024-12-03 PROCEDURE — 94640 AIRWAY INHALATION TREATMENT: CPT

## 2024-12-03 PROCEDURE — 99223 1ST HOSP IP/OBS HIGH 75: CPT | Performed by: INTERNAL MEDICINE

## 2024-12-03 RX ORDER — ISOSORBIDE MONONITRATE 30 MG/1
30 TABLET, EXTENDED RELEASE ORAL DAILY
Status: DISCONTINUED | OUTPATIENT
Start: 2024-12-03 | End: 2024-12-03

## 2024-12-03 RX ORDER — BUDESONIDE AND FORMOTEROL FUMARATE DIHYDRATE 160; 4.5 UG/1; UG/1
2 AEROSOL RESPIRATORY (INHALATION)
Status: DISCONTINUED | OUTPATIENT
Start: 2024-12-03 | End: 2024-12-03 | Stop reason: HOSPADM

## 2024-12-03 RX ORDER — SODIUM CHLORIDE 9 MG/ML
INJECTION, SOLUTION INTRAVENOUS PRN
Status: DISCONTINUED | OUTPATIENT
Start: 2024-12-03 | End: 2024-12-03 | Stop reason: HOSPADM

## 2024-12-03 RX ORDER — SODIUM CHLORIDE 0.9 % (FLUSH) 0.9 %
5-40 SYRINGE (ML) INJECTION EVERY 12 HOURS SCHEDULED
Status: DISCONTINUED | OUTPATIENT
Start: 2024-12-03 | End: 2024-12-03 | Stop reason: HOSPADM

## 2024-12-03 RX ORDER — RANOLAZINE 500 MG/1
500 TABLET, EXTENDED RELEASE ORAL 2 TIMES DAILY
Status: DISCONTINUED | OUTPATIENT
Start: 2024-12-03 | End: 2024-12-03 | Stop reason: HOSPADM

## 2024-12-03 RX ORDER — CARVEDILOL 3.12 MG/1
3.12 TABLET ORAL 2 TIMES DAILY WITH MEALS
Status: DISCONTINUED | OUTPATIENT
Start: 2024-12-03 | End: 2024-12-03 | Stop reason: HOSPADM

## 2024-12-03 RX ORDER — ALBUTEROL SULFATE 0.83 MG/ML
0.63 SOLUTION RESPIRATORY (INHALATION) EVERY 6 HOURS PRN
Status: DISCONTINUED | OUTPATIENT
Start: 2024-12-03 | End: 2024-12-03 | Stop reason: HOSPADM

## 2024-12-03 RX ORDER — SOTALOL HYDROCHLORIDE 80 MG/1
80 TABLET ORAL 2 TIMES DAILY
Status: DISCONTINUED | OUTPATIENT
Start: 2024-12-03 | End: 2024-12-03 | Stop reason: HOSPADM

## 2024-12-03 RX ORDER — ENOXAPARIN SODIUM 100 MG/ML
40 INJECTION SUBCUTANEOUS DAILY
Status: DISCONTINUED | OUTPATIENT
Start: 2024-12-03 | End: 2024-12-03 | Stop reason: HOSPADM

## 2024-12-03 RX ORDER — MEXILETINE HYDROCHLORIDE 200 MG/1
200 CAPSULE ORAL 3 TIMES DAILY
Status: DISCONTINUED | OUTPATIENT
Start: 2024-12-03 | End: 2024-12-03 | Stop reason: HOSPADM

## 2024-12-03 RX ORDER — RANOLAZINE 500 MG/1
500 TABLET, EXTENDED RELEASE ORAL 2 TIMES DAILY
Qty: 60 TABLET | Refills: 3 | Status: SHIPPED | OUTPATIENT
Start: 2024-12-03

## 2024-12-03 RX ORDER — MONTELUKAST SODIUM 10 MG/1
10 TABLET ORAL NIGHTLY
Status: DISCONTINUED | OUTPATIENT
Start: 2024-12-03 | End: 2024-12-03 | Stop reason: HOSPADM

## 2024-12-03 RX ORDER — ISOSORBIDE MONONITRATE 60 MG/1
60 TABLET, EXTENDED RELEASE ORAL DAILY
Qty: 30 TABLET | Refills: 3 | Status: SHIPPED | OUTPATIENT
Start: 2024-12-04

## 2024-12-03 RX ORDER — FUROSEMIDE 20 MG/1
40 TABLET ORAL DAILY
Status: DISCONTINUED | OUTPATIENT
Start: 2024-12-03 | End: 2024-12-03 | Stop reason: HOSPADM

## 2024-12-03 RX ORDER — ASPIRIN 81 MG/1
81 TABLET, CHEWABLE ORAL DAILY
Status: DISCONTINUED | OUTPATIENT
Start: 2024-12-03 | End: 2024-12-03 | Stop reason: HOSPADM

## 2024-12-03 RX ORDER — SPIRONOLACTONE 25 MG/1
25 TABLET ORAL DAILY
Status: DISCONTINUED | OUTPATIENT
Start: 2024-12-03 | End: 2024-12-03 | Stop reason: HOSPADM

## 2024-12-03 RX ORDER — ATORVASTATIN CALCIUM 40 MG/1
40 TABLET, FILM COATED ORAL DAILY
Status: DISCONTINUED | OUTPATIENT
Start: 2024-12-03 | End: 2024-12-03 | Stop reason: HOSPADM

## 2024-12-03 RX ORDER — ACETAMINOPHEN 325 MG/1
650 TABLET ORAL EVERY 4 HOURS PRN
Status: DISCONTINUED | OUTPATIENT
Start: 2024-12-03 | End: 2024-12-03 | Stop reason: HOSPADM

## 2024-12-03 RX ORDER — LISINOPRIL 10 MG/1
10 TABLET ORAL DAILY
Status: DISCONTINUED | OUTPATIENT
Start: 2024-12-03 | End: 2024-12-03 | Stop reason: HOSPADM

## 2024-12-03 RX ORDER — ISOSORBIDE MONONITRATE 30 MG/1
60 TABLET, EXTENDED RELEASE ORAL DAILY
Status: DISCONTINUED | OUTPATIENT
Start: 2024-12-04 | End: 2024-12-03 | Stop reason: HOSPADM

## 2024-12-03 RX ORDER — BUSPIRONE HYDROCHLORIDE 10 MG/1
10 TABLET ORAL 3 TIMES DAILY
Status: DISCONTINUED | OUTPATIENT
Start: 2024-12-03 | End: 2024-12-03 | Stop reason: HOSPADM

## 2024-12-03 RX ORDER — ALBUTEROL SULFATE 90 UG/1
2 INHALANT RESPIRATORY (INHALATION) EVERY 6 HOURS PRN
Status: DISCONTINUED | OUTPATIENT
Start: 2024-12-03 | End: 2024-12-03 | Stop reason: HOSPADM

## 2024-12-03 RX ORDER — SODIUM CHLORIDE 0.9 % (FLUSH) 0.9 %
5-40 SYRINGE (ML) INJECTION PRN
Status: DISCONTINUED | OUTPATIENT
Start: 2024-12-03 | End: 2024-12-03 | Stop reason: HOSPADM

## 2024-12-03 RX ADMIN — LISINOPRIL 10 MG: 10 TABLET ORAL at 08:18

## 2024-12-03 RX ADMIN — TIOTROPIUM BROMIDE INHALATION SPRAY 2 PUFF: 3.12 SPRAY, METERED RESPIRATORY (INHALATION) at 08:49

## 2024-12-03 RX ADMIN — BUDESONIDE AND FORMOTEROL FUMARATE DIHYDRATE 2 PUFF: 160; 4.5 AEROSOL RESPIRATORY (INHALATION) at 08:49

## 2024-12-03 RX ADMIN — RANOLAZINE 500 MG: 500 TABLET, FILM COATED, EXTENDED RELEASE ORAL at 09:44

## 2024-12-03 RX ADMIN — FUROSEMIDE 40 MG: 20 TABLET ORAL at 08:17

## 2024-12-03 RX ADMIN — BUSPIRONE HYDROCHLORIDE 10 MG: 10 TABLET ORAL at 08:18

## 2024-12-03 RX ADMIN — MEXILETINE HYDROCHLORIDE 200 MG: 200 CAPSULE ORAL at 08:18

## 2024-12-03 RX ADMIN — MEXILETINE HYDROCHLORIDE 200 MG: 200 CAPSULE ORAL at 13:10

## 2024-12-03 RX ADMIN — SOTALOL HYDROCHLORIDE 80 MG: 80 TABLET ORAL at 09:43

## 2024-12-03 RX ADMIN — SPIRONOLACTONE 25 MG: 25 TABLET, FILM COATED ORAL at 08:18

## 2024-12-03 RX ADMIN — BUSPIRONE HYDROCHLORIDE 10 MG: 10 TABLET ORAL at 13:10

## 2024-12-03 RX ADMIN — ASPIRIN 81 MG: 81 TABLET, CHEWABLE ORAL at 08:18

## 2024-12-03 RX ADMIN — SODIUM CHLORIDE, PRESERVATIVE FREE 10 ML: 5 INJECTION INTRAVENOUS at 08:18

## 2024-12-03 RX ADMIN — ISOSORBIDE MONONITRATE 30 MG: 30 TABLET, EXTENDED RELEASE ORAL at 08:18

## 2024-12-03 ASSESSMENT — ENCOUNTER SYMPTOMS
VOMITING: 0
NAUSEA: 0
ABDOMINAL PAIN: 0
SHORTNESS OF BREATH: 0
COUGH: 0

## 2024-12-03 ASSESSMENT — HEART SCORE: ECG: NON-SPECIFC REPOLARIZATION DISTURBANCE/LBTB/PM

## 2024-12-03 NOTE — ED NOTES
ED to inpatient nurses report      Chief Complaint:  Chief Complaint   Patient presents with    Chest Pain     Present to ED from: Home    MOA:     LOC: alert and orientated to name, place, date  Mobility: Independent  Oxygen Baseline: 94%    Current needs required: None   Pending ED orders: None  Present condition: stable    Why did the patient come to the ED? Pt arrives to ED 28 via triage.   Pt co chest pain.   Pt states he has a hx of MI, triple bypass, and anxiety.   Pt states he took 1 aspirin, 1 nitro, and a benadryl PTA.   Pt states pain began when he was sanding something down.   Pt states that he also used his inhaler at home, but that did not help.  Pt states pain is on the left side of his chest.   Pt respirations are even and unlabored, pt is alert and oriented X 4, speaking in complete sentences, bed is in the lowest position, call light is within reach, NAD noted.   Will continue to follow plan of care.   What is the plan? obs  Any procedures or intervention occur? IV, labs, CT  Any safety concerns: High fall risk    Mental Status:       Psych Assessment:   Psychosocial  Psychosocial (WDL): Within Defined Limits  Vital signs   Vitals:    12/02/24 2029 12/02/24 2045 12/02/24 2100 12/02/24 2115   BP: (!) 162/80 (!) 148/64 (!) 140/62 (!) 152/64   Pulse: 53 52 54 50   Resp: 15 18 12 16   Temp:       TempSrc:       SpO2: 95% 96% 94% 94%        Vitals:  Patient Vitals for the past 24 hrs:   BP Temp Temp src Pulse Resp SpO2   12/02/24 2115 (!) 152/64 -- -- 50 16 94 %   12/02/24 2100 (!) 140/62 -- -- 54 12 94 %   12/02/24 2045 (!) 148/64 -- -- 52 18 96 %   12/02/24 2029 (!) 162/80 -- -- 53 15 95 %   12/02/24 2025 -- 97.5 °F (36.4 °C) Oral -- -- --   12/02/24 2024 -- -- -- 61 14 --   12/02/24 2023 (!) 164/93 -- -- -- -- --      Visit Vitals  BP (!) 152/64   Pulse 50   Temp 97.5 °F (36.4 °C) (Oral)   Resp 16   SpO2 94%        LDAs:      Ambulatory Status:  Presents to emergency department  because of falls  cheek skin cancer    Cardiac arrest 11/2013    While playing squash.    Good exercise tolerance     pt plays golf, bowling but cannot play competive squash anymore (he passes out), still works full time ()    Hx of blood clots 12/2017    heart    Hyperkalemia 05/19/2014    Hypertension, essential     Ischemic cardiomyopathy 12/08/2016    Left ventricular apical thrombus 12/09/2016    MI (myocardial infarction) ([facility]) 11/2013    Pseudoaneurysm ([facility]) - right groin 12/12/2016    Post cardiac catheterization    Sinus drainage     Snores     no sleep apnea    Systolic dysfunction with acute on chronic heart failure ([facility]) - Efx 30% 12/08/2016    Wellness examination     Dr. Albertina Cleaning last seen 7/2022   [place], [place]       Labs:  Labs Reviewed   CBC WITH AUTO DIFFERENTIAL - Abnormal; Notable for the following components:       Result Value    Hematocrit 40.4 (*)     All other components within normal limits   BASIC METABOLIC PANEL - Abnormal; Notable for the following components:    CO2 18 (*)     Glucose 158 (*)     BUN 34 (*)     All other components within normal limits   MAGNESIUM - Abnormal; Notable for the following components:    Magnesium 2.5 (*)     All other components within normal limits   BRAIN NATRIURETIC PEPTIDE - Abnormal; Notable for the following components:    NT Pro-BNP 1,029 (*)     All other components within normal limits   TROPONIN   TROPONIN       Electronically signed by Cindy Vivas RN on 12/2/2024 at 10:56 PM

## 2024-12-03 NOTE — PROGRESS NOTES
CLINICAL PHARMACY NOTE: MEDS TO BEDS    Total # of Prescriptions Filled: 2   The following medications were delivered to the patient:  Ranolazine  isosorbide    Additional Documentation: Picked up at pharmacy

## 2024-12-03 NOTE — ED NOTES
Pt arrives to ED 28 via triage.   Pt co chest pain.   Pt states he has a hx of MI, triple bypass, and anxiety.   Pt states he took 1 aspirin, 1 nitro, and a benadryl PTA.   Pt states pain began when he was sanding something down.   Pt states that he also used his inhaler at home, but that did not help.  Pt states pain is on the left side of his chest.   Pt respirations are even and unlabored, pt is alert and oriented X 4, speaking in complete sentences, bed is in the lowest position, call light is within reach, NAD noted.   Will continue to follow plan of care.

## 2024-12-03 NOTE — PROGRESS NOTES
Rn performed EKG- reads sinus bradycardia w/ sinus arrhythmia with 1st degree AV block, R bundle branch block, L anterior fascicular block, bifascicular block, HR - 5. Resident notified via perfect serve.

## 2024-12-03 NOTE — ED PROVIDER NOTES
[facility] ED     Emergency Department     Faculty Attestation    I performed a history and physical examination of the patient and discussed management with the resident. I reviewed the resident’s note and agree with the documented findings and plan of care. Any areas of disagreement are noted on the chart. I was personally present for the key portions of any procedures. I have documented in the chart those procedures where I was not present during the key portions. I have reviewed the emergency nurses triage note. I agree with the chief complaint, past medical history, past surgical history, allergies, medications, social and family history as documented unless otherwise noted below. For Physician Assistant/ Nurse Practitioner cases/documentation I have personally evaluated this patient and have completed at least one if not all key elements of the E/M (history, physical exam, and MDM). Additional findings are as noted.    8:56 PM EST    Patient presents with chest tightness that he says started around noon today.  He says it has been constant but the intensity has been up-and-down since it started.  He says he did take a aspirin and nitro when the pain started.  He denies any recent fever, cough, congestion.  He denies any shortness of breath or nausea.  Patient does have a significant cardiac history.  On exam, patient is resting comfortably in the bed.  He is alert and oriented and answering questions appropriately.  Lungs are clear to auscultation bilaterally.  There is no tenderness palpation of the chest or the abdomen.  The bilateral calves are nontender nonswollen.  Will get EKG, chest x-ray, labs and most likely admit patient for cardiology evaluation.    EKG Interpretation    Interpreted by emergency department physician    Rhythm: sinus bradycardia and 1 degree AV block  Rate: 50-60  Axis: left  Ectopy: premature ventricular contractions (multifocal)  Conduction: right bundle branch

## 2024-12-03 NOTE — DISCHARGE INSTRUCTIONS
You have 1 new prescription for Ranexa which should be taken 2 times daily.  Additionally your Imdur has been increased to 60 mg.    Please call and schedule appointment to follow-up with your primary care physician within 1 week.    Take your medication as indicated.  For pain use ibuprofen (Motrin / Advil) or acetaminophen (Tylenol), unless prescribed medications that have acetaminophen in it.  You can take over the counter acetaminophen tablets (1 - 2 tablets of the 500-mg strength every 6 hours) or ibuprofen tablets (2 tablets every 4 hours).    If you have not had a stress test in over a year your primary care physician may order this test as further work-up for your chest pain.  If you have a cardiologist, then you should also call them to discuss further treatment options.    PLEASE RETURN TO THE EMERGENCY DEPARTMENT IMMEDIATELY for worsening symptoms of increasing pain, shortness of breath, feeling of your heart fluttering or racing, swelling to your feet, unable to lay flat, or if you develop any concerning symptoms such as: high fever not relieved by acetaminophen (Tylenol) and/or ibuprofen (Motrin / Advil), chills, persistent nausea and/or vomiting, loss of consciousness, numbness, weakness or tingling in the arms or legs or change in color of the extremities, changes in mental status, persistent headache, blurry vision, loss of bladder / bowel control, unable to follow up with your physician, or other any other care or concern.

## 2024-12-03 NOTE — ED PROVIDER NOTES
[facility] OBSERVATION UNIT  Emergency Department Encounter  Emergency Medicine Resident     Pt Name:Erik Olea  MRN: [id number]  Birthdate 1945  Date of evaluation: 12/2/24  PCP:  Albertina Cleaning MD  Note Started: 8:15 PM EST      CHIEF COMPLAINT       Chief Complaint   Patient presents with    Chest Pain       HISTORY OF PRESENT ILLNESS  (Location/Symptom, Timing/Onset, Context/Setting, Quality, Duration, Modifying Factors, Severity.)      Erik Olea is a 79 y.o. male with a history of cardiac arrest with subsequent triple bypass in 2013 as well as recurrent V. tach with AICD who presents with chest tightness that started at around noon today.  Describes a left-sided chest tightness that he initially related to anxiety.  At the time of the chest tightness starting, he was sanding furniture.  He is currently anticoagulated on Eliquis which he reports compliance with.  He has no history of DVT or PE.  He took a nitroglycerin and a Benadryl as well as 1 baby aspirin prior to arrival and states that this did not improve his symptoms.  He does still feel that this may be related to anxiety.  He has had no lower extremity edema, no fevers or chills.      PAST MEDICAL / SURGICAL / SOCIAL / FAMILY HISTORY      has a past medical history of Accident, Arthritis, Bronchitis, CAD (coronary artery disease), Cancer (HCC), Cardiac arrest, Good exercise tolerance, Hx of blood clots, Hyperkalemia, Hypertension, essential, Ischemic cardiomyopathy, Left ventricular apical thrombus, MI (myocardial infarction) ([facility]), Pseudoaneurysm (HCC) - right groin, Sinus drainage, Snores, Systolic dysfunction with acute on chronic heart failure (HCC) - Efx 30%, and Wellness examination.       has a past surgical history that includes Coronary artery bypass graft (11/19/2013); Vasectomy; Tonsillectomy; Colonoscopy; Cardiac defibrillator placement (2013); Cardiac catheterization (12/08/2016); Cardiac catheterization (11/18/2013); other  There is no abdominal tenderness.   Musculoskeletal:      Cervical back: Normal range of motion.      Right lower leg: No edema.      Left lower leg: No edema.   Skin:     General: Skin is warm and dry.   Neurological:      Mental Status: He is alert and oriented to person, place, and time.   Psychiatric:         Mood and Affect: Mood normal.         Behavior: Behavior normal.         Judgment: Judgment normal.           DDX/DIAGNOSTIC RESULTS / EMERGENCY DEPARTMENT COURSE / [facility]     Medical Decision Making  79-year-old male with a history of prior CABG, refractory V. tach, prior cardiac arrest who presents with chest tightness.  On arrival, he is slightly bradycardic in the 50s, slightly hypertensive, otherwise vital signs are stable.  He is not in acute distress.  Lungs are clear bilaterally with normal S1-S2 heart sounds.  Abdomen is soft and nontender.  Pulses equal throughout all 4 extremities.  He has no lower extremity edema.  Plan for cardiac workup, reassess, likely minimum observation admission given the patient has a history of cardiac disease and cardiac catheterization in September that was abnormal.  DDx: ACS, pneumonia, reflux, anxiety    Amount and/or Complexity of Data Reviewed  Labs: ordered. Decision-making details documented in ED Course.  Radiology: ordered.  ECG/medicine tests: ordered.    Risk  OTC drugs.  Prescription drug management.  Decision regarding hospitalization.        EKG  EKG showing sinus rhythm, ventricular rate 59.  Left axis deviation.  Prolonged FL interval at 256 consistent with first-degree AV block.  There are frequent PVCs in a bigeminy pattern.  There is a complete right bundle branch block.  No acute ST or T wave changes.  PVCs are new when compared to tracing on 10/18/2024, otherwise no significant changes.    All EKG's are interpreted by the Emergency Department Physician who either signs or Co-signs this chart in the absence of a cardiologist.    EMERGENCY DEPARTMENT

## 2024-12-03 NOTE — CONSULTS
tingling. No change in gate.   Endocrine: No temperature intolerance. No excessive thirst, fluid intake, or urination. No tremor.  Hematologic/Lymphatic: No abnormal bruising or bleeding    PHYSICAL EXAM:      BP (!) 154/72   Pulse 53   Temp (!) 96.6 °F (35.9 °C) (Oral)   Resp 18   SpO2 99%    Constitutional and General Appearance: alert, cooperative, in no distress   HEENT: atraumatic, normocephalic.   Respiratory:  Clear to auscultation bilaterally  Cardiovascular:  Regular S1 and S2. Bradycardic  No JVD  Peripheral pulses are symmetrical and full   Abdomen:   Soft, non tender   Bowel sounds present  Extremities:  No Le edema or cyanosis   Neurological:  Deferred     DATA:    ECHO: 09/2024    Left Ventricle: Mildly reduced left ventricular systolic function. EF by 2D Simpsons Biplane is 45%. Left ventricle size is normal. Normal wall thickness. Mild global hypokinesis present. Normal diastolic function.    Right Ventricle: Right ventricle size is normal. Lead present in the right ventricle.    Aortic Valve: Mild regurgitation.    Mitral Valve: Mild to moderate regurgitation.    Tricuspid Valve: Mild to moderate regurgitation. RVSP may be underestimated. The estimated RVSP is 27 mmHg (based on TR doppler only).    Left Atrium: Left atrium is severely dilated.    Aorta: Mildly dilated aortic root. Ao root diameter is 3.9 cm.    Cardiac Angiography: 09/2024    Severe stenosis of proximal LAD 80% and mid LAD 80%, severly calcidied with rock of calcium, LIMA to LAD is patent    Patent LIMA to LAD, LIMA to LAD touchdown has 30 to 40% stenosis with normal ALEKS flow 3, unchanged from previous heart catheterization 2022.    SVG to right PDA is patent, excellent collaterals to circumflex and OM1 territory    Occluded left circumflex and occluded SVG to OM with right to left collaterals    RCA has severe stenosis 80% in the mid region, severely calcified, SVG to right RPDA is patent.    Right femoral access closed with  Angio-Seal    Patient can be transferred for VT ablation    LABS:   CBC:   Recent Labs     12/02/24 2040   WBC 8.7   HGB 13.5   HCT 40.4*        BMP:   Recent Labs     12/02/24 2040      K 4.7   CO2 18*   BUN 34*   CREATININE 1.2   LABGLOM 61   GLUCOSE 158*     ASSESSMENT:  Chest Pain  Negative Troponins  Bigeminy noted on EKG  CABG x 3 2013  CEA bilateral  HFmrEF  S/p VT ablation on 09/2024      RECOMMENDATIONS:  Chest Pain is to be expected in the setting of multivessel CAD with patent LIMA but stenosed SVG-OM with R-L collaterals. Very recent LHC with patent LIMA and R sided grafts. No elevations in troponin to suggest an active MI at this time.   Antianginals reviewed. Ranolazine added.  VT ablation on 09/2024, continue home antiarrhythmics.   EKG showed bigeminy, current telemetry shows occasional PVCs.   Rest of cares per primary team.      Please wait for final attestation from rounding attending       Electronically signed by Tj Coker MD on 12/3/2024 at 6:12 AM          Attending Physician Statement:    I have discussed the care of  Erik Olea , including pertinent history and exam findings, with the Cardiology fellow/resident.     I have seen and examined the patient and the key elements of all parts of the encounter have been performed by me. I agree with the assessment, plan and orders as documented by the fellow/resident, after I modified exam findings and plan of treatments, and the final version is my approved version of the assessment.     Additional Comments:   Patient was seen and examined  He has a pretty complex cardiac history, including coronary artery disease status post CABG, recurrent V. tach, status post AICD, cath on 9/24 revealed a patent LIMA to LAD, SVG to PDA, occluded SVG to OM with collaterals  The patient went to [facility] for his recurrent VT and is now status post VT ablation  Patient has some chest pain, mixed features  Possible angina  Will add

## 2024-12-03 NOTE — CARE COORDINATION
Case Management Assessment  Initial Evaluation    Date/Time of Evaluation: 12/3/2024 10:22 AM  Assessment Completed by: Patricia Grant RN    If patient is discharged prior to next notation, then this note serves as note for discharge by case management.    Patient Name: Erik Olea                   YOB: 1945  Diagnosis: Chest pain [R07.9]  Chest pain, unspecified type [R07.9]                   Date / Time: 12/2/2024  8:11 PM    Patient Admission Status: Observation   Readmission Risk (Low < 19, Mod (19-27), High > 27): Readmission Risk Score: 20.7    Current PCP: Albertina Cleaning MD  PCP verified by CM? (P) Yes    Chart Reviewed: Yes      History Provided by: (P) Patient  Patient Orientation: (P) Alert and Oriented    Patient Cognition: (P) Alert    Hospitalization in the last 30 days (Readmission):  No    If yes, Readmission Assessment in CM Navigator will be completed.    Advance Directives:      Code Status: Full Code   Patient's Primary Decision Maker is: (P) Legal Next of Kin      Discharge Planning:    Patient lives with: (P) Alone Type of Home: (P) House  Primary Care Giver: (P) Self  Patient Support Systems include: (P) Family Members   Current Financial resources:    Current community resources:    Current services prior to admission: (P) None            Current DME:              Type of Home Care services:  (P) None    ADLS  Prior functional level: (P) Independent in ADLs/IADLs  Current functional level: (P) Independent in ADLs/IADLs    PT AM-PAC:   /24  OT AM-PAC:   /24    Family can provide assistance at DC:    Would you like Case Management to discuss the discharge plan with any other family members/significant others, and if so, who?    Plans to Return to Present Housing: (P) Yes  Other Identified Issues/Barriers to RETURNING to current housing: none  Potential Assistance needed at discharge: (P) N/A            Potential DME:    Patient expects to discharge to: (P) House  Plan for

## 2024-12-03 NOTE — DISCHARGE SUMMARY
CDU Discharge Summary        Patient:  Erik Olea  YOB: 1945    MRN: [id number]   Acct: [id number]    Primary Care Physician: Albertina Cleaning MD    Admit date:  12/2/2024  8:11 PM  Discharge date: 12/3/2024  2:50 PM    Discharge Diagnoses:     1.)  Patient had chest pressure with sudden onset due to anginal chest pain in setting of multivessel CAD.  Treated with medication adjustments, antianginals.  Patient's symptoms have not reoccurred with plan to follow-up outpatient.    Follow-up:  Call today/tomorrow for a follow up appointment with Albertina Cleaning MD , or return to the Emergency Room with worsening symptoms    Stressed to patient the importance of following up with primary care doctor for further workup/management of symptoms.  Pt verbalizes understanding and agrees with plan.    Discharge Medication Changes:       Medication List        START taking these medications      ranolazine 500 MG extended release tablet  Commonly known as: RANEXA  Take 1 tablet by mouth 2 times daily            CHANGE how you take these medications      isosorbide mononitrate 60 MG extended release tablet  Commonly known as: IMDUR  Take 1 tablet by mouth daily  Start taking on: December 4, 2024  What changed:   medication strength  how much to take            CONTINUE taking these medications      * albuterol 0.63 MG/3ML nebulizer solution  Commonly known as: ACCUNEB     * albuterol sulfate  (90 Base) MCG/ACT inhaler  Commonly known as: Ventolin HFA  Inhale 2 puffs into the lungs every 6 hours as needed for Wheezing.     aspirin 81 MG chewable tablet  Take 1 tablet by mouth daily.     atorvastatin 40 MG tablet  Commonly known as: LIPITOR  TAKE 1 TABLET EVERY DAY     b complex vitamins capsule     busPIRone 10 MG tablet  Commonly known as: BUSPAR     carvedilol 3.125 MG tablet  Commonly known as: COREG  Take 1 tablet by mouth 2 times daily (with meals)     fluticasone 50 MCG/ACT nasal spray  Commonly known  Axis 12 degrees     XR CHEST (2 VW)    Result Date: 12/2/2024  EXAMINATION: TWO XRAY VIEWS OF THE CHEST 12/2/2024 9:25 pm COMPARISON: 09/15/2024 radiograph HISTORY: ORDERING SYSTEM PROVIDED HISTORY: Chest Pain TECHNOLOGIST PROVIDED HISTORY: Chest Pain FINDINGS: The heart is enlarged.  ICD stable on the right.  Sternotomy wires at midline.  Mediastinum and pulmonary vascular markings are otherwise normal. Lungs appear clear.  No significant skeletal finding.     Cardiomegaly with no acute finding in the chest.           Physical Exam:    General appearance - NAD, AOx 3   Lungs -CTAB, no R/R/R  Heart - RRR, no M/R/G  Abdomen - Soft, NT/ND  Neurological -  MAEx4, No focal motor deficit, sensory loss  Extremities - Cap refil <2 sec in all ext., no edema  Skin -warm, dry      Hospital Course:  Clinical course has improved, labs and imaging reviewed.     Erik Olea originally presented to the hospital on 12/2/2024  8:11 PM with history of prior CABG, refractory V. tach with prior cardiac arrest coming in for evaluation of chest tightness.  Patient had cardiac workup in emergency department notable for flat troponins x 2 with nonspecific EKG findings however deemed to be high risk and at that time it was determined that He required further observation and evaluation with consideration for possible admission. Labs and imaging were followed daily.  Imaging results as above.  Patient with recent echo as well as angiograph he in September.  Cardiology with no planned intervention at this time.  Patient remains stable.  Antianginals and medication adjustments made.  He is medically stable to be discharged.       Disposition: Home    Patient stated that they will not drive themselves home from the hospital if they have gotten pain killers/ narcotics earlier that day and that they will arrange for transportation on their own or work with the  for a ride. Patient counseled NOT to drive while under the influence of

## 2024-12-03 NOTE — H&P
OF THE CHEST 12/2/2024 9:25 pm COMPARISON: 09/15/2024 radiograph HISTORY: ORDERING SYSTEM PROVIDED HISTORY: Chest Pain TECHNOLOGIST PROVIDED HISTORY: Chest Pain FINDINGS: The heart is enlarged.  ICD stable on the right.  Sternotomy wires at midline.  Mediastinum and pulmonary vascular markings are otherwise normal. Lungs appear clear.  No significant skeletal finding.     Cardiomegaly with no acute finding in the chest.       LABS:  I have reviewed and interpreted all available lab results.  Labs Reviewed   CBC WITH AUTO DIFFERENTIAL - Abnormal; Notable for the following components:       Result Value    Hematocrit 40.4 (*)     All other components within normal limits   BASIC METABOLIC PANEL - Abnormal; Notable for the following components:    CO2 18 (*)     Glucose 158 (*)     BUN 34 (*)     All other components within normal limits   MAGNESIUM - Abnormal; Notable for the following components:    Magnesium 2.5 (*)     All other components within normal limits   BRAIN NATRIURETIC PEPTIDE - Abnormal; Notable for the following components:    NT Pro-BNP 1,029 (*)     All other components within normal limits   TROPONIN   TROPONIN         CDU IMPRESSION / PLAN      Erik Olea is a 79 y.o. male who presents with significant cardiac history including cardiac arrest with triple bypass, V. tach with AICD and ablation presenting for evaluation of chest tightness.  Noted to have normal cardiac enzymes and nonspecific EKG.  Patient had a recent echo and cardiac angiograph he in September of this year.  Patient admitted for further assessment given significant cardiac history and risk factors.    Chest pain  Cardiology consulted, appreciate recommendations  No further intervention recommended at this time  Medication adjustments  Continue home medications and pain control  Monitor vitals, labs, and imaging  DISPO: pending consults and clinical improvement    CONSULTS:    IP CONSULT TO CARDIOLOGY    PROCEDURES:  Not

## 2024-12-04 NOTE — PROGRESS NOTES
[facility]  CDU / OBSERVATION ENCOUNTER  ATTENDING NOTE         I performed a history and physical examination of the patient and discussed management with the resident or midlevel provider. I reviewed the resident or midlevel provider's note and agree with the documented findings and plan of care. Any areas of disagreement are noted on the chart. I was personally present for the key portions of any procedures. I have documented in the chart those procedures where I was not present during the key portions. I have reviewed the nurses notes. I agree with the chief complaint, past medical history, past surgical history, allergies, medications, social and family history as documented unless otherwise noted below.    The Family history, social history, and ROS are effectively unchanged since admission unless noted elsewhere in the chart.     This patient was placed in the observation unit for reevaluation for possible admission to the hospital     Patient mated for cardiology evaluation.  Patient had review of prior studies.  Patient was seen by cardiology.  Discussed with cardiology directly regarding outpatient therapy.  Patient has known disease.  Will be started on antianginals but does not require procedure while here.  Patient to have Ranexa added and also Imdur doubled.  Discussed with patient at length.  Patient understood discharge instructions and what to return for.  Discussed also possible anxiolytics and other therapy with patient.  Patient encouraged to follow-up with PCP with 1 change made at a time.       Erik Nolasco MD  Attending Emergency  Physician

## 2024-12-07 ENCOUNTER — HOSPITAL ENCOUNTER (INPATIENT)
Age: 79
LOS: 1 days | Discharge: HOME | DRG: 312 | End: 2024-12-08
Attending: EMERGENCY MEDICINE | Admitting: INTERNAL MEDICINE
Payer: MEDICARE

## 2024-12-07 ENCOUNTER — APPOINTMENT (OUTPATIENT)
Dept: CT IMAGING | Age: 79
DRG: 312 | End: 2024-12-07
Payer: MEDICARE

## 2024-12-07 ENCOUNTER — APPOINTMENT (OUTPATIENT)
Dept: GENERAL RADIOLOGY | Age: 79
DRG: 312 | End: 2024-12-07
Payer: MEDICARE

## 2024-12-07 DIAGNOSIS — R55 SYNCOPE AND COLLAPSE: Primary | ICD-10-CM

## 2024-12-07 DIAGNOSIS — N17.9 AKI (ACUTE KIDNEY INJURY) (HCC): ICD-10-CM

## 2024-12-07 LAB
ANION GAP SERPL CALCULATED.3IONS-SCNC: 13 MMOL/L (ref 9–16)
BASOPHILS # BLD: 0.07 K/UL (ref 0–0.2)
BASOPHILS NFR BLD: 1 % (ref 0–2)
BNP SERPL-MCNC: 1106 PG/ML (ref 0–450)
BUN SERPL-MCNC: 44 MG/DL (ref 8–23)
CALCIUM SERPL-MCNC: 9.1 MG/DL (ref 8.6–10.4)
CHLORIDE SERPL-SCNC: 102 MMOL/L (ref 98–107)
CO2 SERPL-SCNC: 19 MMOL/L (ref 20–31)
CREAT SERPL-MCNC: 2.1 MG/DL (ref 0.7–1.2)
EOSINOPHIL # BLD: 0.4 K/UL (ref 0–0.44)
EOSINOPHILS RELATIVE PERCENT: 5 % (ref 1–4)
ERYTHROCYTE [DISTWIDTH] IN BLOOD BY AUTOMATED COUNT: 13.7 % (ref 11.8–14.4)
GFR, ESTIMATED: 31 ML/MIN/1.73M2
GLUCOSE SERPL-MCNC: 94 MG/DL (ref 74–99)
HCT VFR BLD AUTO: 39.2 % (ref 40.7–50.3)
HGB BLD-MCNC: 13 G/DL (ref 13–17)
IMM GRANULOCYTES # BLD AUTO: 0.03 K/UL (ref 0–0.3)
IMM GRANULOCYTES NFR BLD: 0 %
LYMPHOCYTES NFR BLD: 2.24 K/UL (ref 1.1–3.7)
LYMPHOCYTES RELATIVE PERCENT: 26 % (ref 24–43)
MAGNESIUM SERPL-MCNC: 2.4 MG/DL (ref 1.6–2.4)
MCH RBC QN AUTO: 29.5 PG (ref 25.2–33.5)
MCHC RBC AUTO-ENTMCNC: 33.2 G/DL (ref 28.4–34.8)
MCV RBC AUTO: 89.1 FL (ref 82.6–102.9)
MONOCYTES NFR BLD: 0.82 K/UL (ref 0.1–1.2)
MONOCYTES NFR BLD: 9 % (ref 3–12)
NEUTROPHILS NFR BLD: 59 % (ref 36–65)
NEUTS SEG NFR BLD: 5.22 K/UL (ref 1.5–8.1)
NRBC BLD-RTO: 0 PER 100 WBC
PLATELET # BLD AUTO: 190 K/UL (ref 138–453)
PMV BLD AUTO: 12.7 FL (ref 8.1–13.5)
POTASSIUM SERPL-SCNC: 5.3 MMOL/L (ref 3.7–5.3)
RBC # BLD AUTO: 4.4 M/UL (ref 4.21–5.77)
SODIUM SERPL-SCNC: 134 MMOL/L (ref 136–145)
TROPONIN I SERPL HS-MCNC: 14 NG/L (ref 0–22)
TROPONIN I SERPL HS-MCNC: 17 NG/L (ref 0–22)
WBC OTHER # BLD: 8.8 K/UL (ref 3.5–11.3)

## 2024-12-07 PROCEDURE — 2580000003 HC RX 258: Performed by: EMERGENCY MEDICINE

## 2024-12-07 PROCEDURE — 72125 CT NECK SPINE W/O DYE: CPT

## 2024-12-07 PROCEDURE — 6360000002 HC RX W HCPCS: Performed by: NURSE PRACTITIONER

## 2024-12-07 PROCEDURE — 84484 ASSAY OF TROPONIN QUANT: CPT

## 2024-12-07 PROCEDURE — 99285 EMERGENCY DEPT VISIT HI MDM: CPT

## 2024-12-07 PROCEDURE — 85025 COMPLETE CBC W/AUTO DIFF WBC: CPT

## 2024-12-07 PROCEDURE — 83735 ASSAY OF MAGNESIUM: CPT

## 2024-12-07 PROCEDURE — 70450 CT HEAD/BRAIN W/O DYE: CPT

## 2024-12-07 PROCEDURE — 83880 ASSAY OF NATRIURETIC PEPTIDE: CPT

## 2024-12-07 PROCEDURE — 2580000003 HC RX 258: Performed by: NURSE PRACTITIONER

## 2024-12-07 PROCEDURE — 2060000000 HC ICU INTERMEDIATE R&B

## 2024-12-07 PROCEDURE — 71045 X-RAY EXAM CHEST 1 VIEW: CPT

## 2024-12-07 PROCEDURE — 6370000000 HC RX 637 (ALT 250 FOR IP): Performed by: EMERGENCY MEDICINE

## 2024-12-07 PROCEDURE — 93005 ELECTROCARDIOGRAM TRACING: CPT | Performed by: NURSE PRACTITIONER

## 2024-12-07 PROCEDURE — 6370000000 HC RX 637 (ALT 250 FOR IP): Performed by: NURSE PRACTITIONER

## 2024-12-07 PROCEDURE — 80048 BASIC METABOLIC PNL TOTAL CA: CPT

## 2024-12-07 RX ORDER — ONDANSETRON 4 MG/1
4 TABLET, ORALLY DISINTEGRATING ORAL EVERY 8 HOURS PRN
Status: DISCONTINUED | OUTPATIENT
Start: 2024-12-07 | End: 2024-12-08 | Stop reason: HOSPADM

## 2024-12-07 RX ORDER — 0.9 % SODIUM CHLORIDE 0.9 %
500 INTRAVENOUS SOLUTION INTRAVENOUS ONCE
Status: COMPLETED | OUTPATIENT
Start: 2024-12-07 | End: 2024-12-07

## 2024-12-07 RX ORDER — ACETAMINOPHEN 650 MG/1
650 SUPPOSITORY RECTAL EVERY 6 HOURS PRN
Status: DISCONTINUED | OUTPATIENT
Start: 2024-12-07 | End: 2024-12-08 | Stop reason: HOSPADM

## 2024-12-07 RX ORDER — ISOSORBIDE MONONITRATE 60 MG/1
60 TABLET, EXTENDED RELEASE ORAL DAILY
Status: DISCONTINUED | OUTPATIENT
Start: 2024-12-08 | End: 2024-12-08 | Stop reason: HOSPADM

## 2024-12-07 RX ORDER — MONTELUKAST SODIUM 10 MG/1
10 TABLET ORAL NIGHTLY
Status: DISCONTINUED | OUTPATIENT
Start: 2024-12-07 | End: 2024-12-08 | Stop reason: HOSPADM

## 2024-12-07 RX ORDER — SODIUM CHLORIDE 0.9 % (FLUSH) 0.9 %
5-40 SYRINGE (ML) INJECTION EVERY 12 HOURS SCHEDULED
Status: DISCONTINUED | OUTPATIENT
Start: 2024-12-07 | End: 2024-12-08 | Stop reason: HOSPADM

## 2024-12-07 RX ORDER — BUDESONIDE AND FORMOTEROL FUMARATE DIHYDRATE 160; 4.5 UG/1; UG/1
2 AEROSOL RESPIRATORY (INHALATION)
Status: DISCONTINUED | OUTPATIENT
Start: 2024-12-07 | End: 2024-12-08 | Stop reason: HOSPADM

## 2024-12-07 RX ORDER — NITROGLYCERIN 0.4 MG/1
0.4 TABLET SUBLINGUAL EVERY 5 MIN PRN
Status: DISCONTINUED | OUTPATIENT
Start: 2024-12-07 | End: 2024-12-08 | Stop reason: HOSPADM

## 2024-12-07 RX ORDER — HEPARIN SODIUM 5000 [USP'U]/ML
5000 INJECTION, SOLUTION INTRAVENOUS; SUBCUTANEOUS EVERY 8 HOURS SCHEDULED
Status: DISCONTINUED | OUTPATIENT
Start: 2024-12-07 | End: 2024-12-08 | Stop reason: HOSPADM

## 2024-12-07 RX ORDER — ASPIRIN 81 MG/1
81 TABLET, CHEWABLE ORAL DAILY
Status: DISCONTINUED | OUTPATIENT
Start: 2024-12-08 | End: 2024-12-08 | Stop reason: HOSPADM

## 2024-12-07 RX ORDER — SODIUM CHLORIDE 9 MG/ML
INJECTION, SOLUTION INTRAVENOUS PRN
Status: DISCONTINUED | OUTPATIENT
Start: 2024-12-07 | End: 2024-12-08 | Stop reason: HOSPADM

## 2024-12-07 RX ORDER — RANOLAZINE 500 MG/1
500 TABLET, EXTENDED RELEASE ORAL 2 TIMES DAILY
Status: DISCONTINUED | OUTPATIENT
Start: 2024-12-07 | End: 2024-12-08 | Stop reason: HOSPADM

## 2024-12-07 RX ORDER — POLYETHYLENE GLYCOL 3350 17 G/17G
17 POWDER, FOR SOLUTION ORAL DAILY PRN
Status: DISCONTINUED | OUTPATIENT
Start: 2024-12-07 | End: 2024-12-08 | Stop reason: HOSPADM

## 2024-12-07 RX ORDER — ALBUTEROL SULFATE 0.83 MG/ML
1.25 SOLUTION RESPIRATORY (INHALATION) EVERY 6 HOURS PRN
Status: DISCONTINUED | OUTPATIENT
Start: 2024-12-07 | End: 2024-12-08 | Stop reason: HOSPADM

## 2024-12-07 RX ORDER — HYDROXYZINE HYDROCHLORIDE 25 MG/1
25 TABLET, FILM COATED ORAL ONCE
Status: COMPLETED | OUTPATIENT
Start: 2024-12-07 | End: 2024-12-07

## 2024-12-07 RX ORDER — B12/LEVOMEFOLATE CALCIUM/B-6 2-1.13-25
1 TABLET ORAL DAILY
Status: DISCONTINUED | OUTPATIENT
Start: 2024-12-08 | End: 2024-12-08 | Stop reason: HOSPADM

## 2024-12-07 RX ORDER — ALBUTEROL SULFATE 90 UG/1
2 INHALANT RESPIRATORY (INHALATION) EVERY 6 HOURS PRN
Status: DISCONTINUED | OUTPATIENT
Start: 2024-12-07 | End: 2024-12-08 | Stop reason: HOSPADM

## 2024-12-07 RX ORDER — ONDANSETRON 2 MG/ML
4 INJECTION INTRAMUSCULAR; INTRAVENOUS EVERY 6 HOURS PRN
Status: DISCONTINUED | OUTPATIENT
Start: 2024-12-07 | End: 2024-12-08 | Stop reason: HOSPADM

## 2024-12-07 RX ORDER — SODIUM CHLORIDE 9 MG/ML
INJECTION, SOLUTION INTRAVENOUS CONTINUOUS
Status: DISCONTINUED | OUTPATIENT
Start: 2024-12-07 | End: 2024-12-08 | Stop reason: HOSPADM

## 2024-12-07 RX ORDER — FLUTICASONE PROPIONATE 50 MCG
1 SPRAY, SUSPENSION (ML) NASAL DAILY
Status: DISCONTINUED | OUTPATIENT
Start: 2024-12-08 | End: 2024-12-08 | Stop reason: HOSPADM

## 2024-12-07 RX ORDER — ATORVASTATIN CALCIUM 40 MG/1
40 TABLET, FILM COATED ORAL DAILY
Status: DISCONTINUED | OUTPATIENT
Start: 2024-12-08 | End: 2024-12-08 | Stop reason: HOSPADM

## 2024-12-07 RX ORDER — MEXILETINE HYDROCHLORIDE 200 MG/1
200 CAPSULE ORAL 3 TIMES DAILY
Status: DISCONTINUED | OUTPATIENT
Start: 2024-12-07 | End: 2024-12-08 | Stop reason: HOSPADM

## 2024-12-07 RX ORDER — ACETAMINOPHEN 325 MG/1
650 TABLET ORAL EVERY 6 HOURS PRN
Status: DISCONTINUED | OUTPATIENT
Start: 2024-12-07 | End: 2024-12-08 | Stop reason: HOSPADM

## 2024-12-07 RX ORDER — SODIUM CHLORIDE 0.9 % (FLUSH) 0.9 %
10 SYRINGE (ML) INJECTION PRN
Status: DISCONTINUED | OUTPATIENT
Start: 2024-12-07 | End: 2024-12-08 | Stop reason: HOSPADM

## 2024-12-07 RX ORDER — BUSPIRONE HYDROCHLORIDE 10 MG/1
10 TABLET ORAL 3 TIMES DAILY
Status: DISCONTINUED | OUTPATIENT
Start: 2024-12-07 | End: 2024-12-08 | Stop reason: HOSPADM

## 2024-12-07 RX ORDER — SOTALOL HYDROCHLORIDE 80 MG/1
80 TABLET ORAL 2 TIMES DAILY
Status: DISCONTINUED | OUTPATIENT
Start: 2024-12-07 | End: 2024-12-07

## 2024-12-07 RX ADMIN — BUSPIRONE HYDROCHLORIDE 10 MG: 10 TABLET ORAL at 23:34

## 2024-12-07 RX ADMIN — SODIUM CHLORIDE: 9 INJECTION, SOLUTION INTRAVENOUS at 23:14

## 2024-12-07 RX ADMIN — SODIUM CHLORIDE, PRESERVATIVE FREE 10 ML: 5 INJECTION INTRAVENOUS at 23:19

## 2024-12-07 RX ADMIN — HYDROXYZINE HYDROCHLORIDE 25 MG: 25 TABLET ORAL at 21:35

## 2024-12-07 RX ADMIN — HEPARIN SODIUM 5000 UNITS: 5000 INJECTION INTRAVENOUS; SUBCUTANEOUS at 23:34

## 2024-12-07 RX ADMIN — RANOLAZINE 500 MG: 500 TABLET, FILM COATED, EXTENDED RELEASE ORAL at 23:34

## 2024-12-07 RX ADMIN — SODIUM CHLORIDE 500 ML: 9 INJECTION, SOLUTION INTRAVENOUS at 22:07

## 2024-12-07 RX ADMIN — MONTELUKAST 10 MG: 10 TABLET, FILM COATED ORAL at 23:34

## 2024-12-07 ASSESSMENT — PAIN SCALES - GENERAL: PAINLEVEL_OUTOF10: 4

## 2024-12-07 ASSESSMENT — PAIN - FUNCTIONAL ASSESSMENT: PAIN_FUNCTIONAL_ASSESSMENT: 0-10

## 2024-12-08 VITALS
HEIGHT: 67 IN | BODY MASS INDEX: 28.66 KG/M2 | OXYGEN SATURATION: 96 % | DIASTOLIC BLOOD PRESSURE: 74 MMHG | TEMPERATURE: 98.4 F | HEART RATE: 54 BPM | RESPIRATION RATE: 15 BRPM | SYSTOLIC BLOOD PRESSURE: 136 MMHG

## 2024-12-08 LAB
ANION GAP SERPL CALCULATED.3IONS-SCNC: 10 MMOL/L (ref 9–16)
BUN SERPL-MCNC: 31 MG/DL (ref 8–23)
CALCIUM SERPL-MCNC: 8.6 MG/DL (ref 8.6–10.4)
CHLORIDE SERPL-SCNC: 104 MMOL/L (ref 98–107)
CO2 SERPL-SCNC: 21 MMOL/L (ref 20–31)
CREAT SERPL-MCNC: 1.6 MG/DL (ref 0.7–1.2)
CREAT UR-MCNC: 76.4 MG/DL (ref 39–259)
GFR, ESTIMATED: 43 ML/MIN/1.73M2
GLUCOSE SERPL-MCNC: 105 MG/DL (ref 74–99)
MAGNESIUM SERPL-MCNC: 2.3 MG/DL (ref 1.6–2.4)
POTASSIUM SERPL-SCNC: 5 MMOL/L (ref 3.7–5.3)
SODIUM SERPL-SCNC: 135 MMOL/L (ref 136–145)
SODIUM UR-SCNC: 94 MMOL/L

## 2024-12-08 PROCEDURE — 6370000000 HC RX 637 (ALT 250 FOR IP): Performed by: NURSE PRACTITIONER

## 2024-12-08 PROCEDURE — 97161 PT EVAL LOW COMPLEX 20 MIN: CPT

## 2024-12-08 PROCEDURE — 36415 COLL VENOUS BLD VENIPUNCTURE: CPT

## 2024-12-08 PROCEDURE — 6360000002 HC RX W HCPCS: Performed by: NURSE PRACTITIONER

## 2024-12-08 PROCEDURE — 97165 OT EVAL LOW COMPLEX 30 MIN: CPT

## 2024-12-08 PROCEDURE — 94761 N-INVAS EAR/PLS OXIMETRY MLT: CPT

## 2024-12-08 PROCEDURE — 97535 SELF CARE MNGMENT TRAINING: CPT

## 2024-12-08 PROCEDURE — 94640 AIRWAY INHALATION TREATMENT: CPT

## 2024-12-08 PROCEDURE — 84300 ASSAY OF URINE SODIUM: CPT

## 2024-12-08 PROCEDURE — 97116 GAIT TRAINING THERAPY: CPT

## 2024-12-08 PROCEDURE — 99223 1ST HOSP IP/OBS HIGH 75: CPT | Performed by: STUDENT IN AN ORGANIZED HEALTH CARE EDUCATION/TRAINING PROGRAM

## 2024-12-08 PROCEDURE — 82570 ASSAY OF URINE CREATININE: CPT

## 2024-12-08 PROCEDURE — 2580000003 HC RX 258: Performed by: NURSE PRACTITIONER

## 2024-12-08 PROCEDURE — 80048 BASIC METABOLIC PNL TOTAL CA: CPT

## 2024-12-08 PROCEDURE — 83735 ASSAY OF MAGNESIUM: CPT

## 2024-12-08 PROCEDURE — 6370000000 HC RX 637 (ALT 250 FOR IP): Performed by: STUDENT IN AN ORGANIZED HEALTH CARE EDUCATION/TRAINING PROGRAM

## 2024-12-08 RX ADMIN — ATORVASTATIN CALCIUM 40 MG: 40 TABLET, FILM COATED ORAL at 08:12

## 2024-12-08 RX ADMIN — HEPARIN SODIUM 5000 UNITS: 5000 INJECTION INTRAVENOUS; SUBCUTANEOUS at 13:59

## 2024-12-08 RX ADMIN — BUDESONIDE AND FORMOTEROL FUMARATE DIHYDRATE 2 PUFF: 160; 4.5 AEROSOL RESPIRATORY (INHALATION) at 10:03

## 2024-12-08 RX ADMIN — TIOTROPIUM BROMIDE INHALATION SPRAY 2 PUFF: 3.12 SPRAY, METERED RESPIRATORY (INHALATION) at 10:03

## 2024-12-08 RX ADMIN — MEXILETINE HYDROCHLORIDE 200 MG: 200 CAPSULE ORAL at 13:59

## 2024-12-08 RX ADMIN — MEXILETINE HYDROCHLORIDE 200 MG: 200 CAPSULE ORAL at 08:12

## 2024-12-08 RX ADMIN — MEXILETINE HYDROCHLORIDE 200 MG: 200 CAPSULE ORAL at 00:15

## 2024-12-08 RX ADMIN — BUSPIRONE HYDROCHLORIDE 10 MG: 10 TABLET ORAL at 08:11

## 2024-12-08 RX ADMIN — SODIUM ZIRCONIUM CYCLOSILICATE 10 G: 10 POWDER, FOR SUSPENSION ORAL at 06:10

## 2024-12-08 RX ADMIN — BUSPIRONE HYDROCHLORIDE 10 MG: 10 TABLET ORAL at 13:59

## 2024-12-08 RX ADMIN — RANOLAZINE 500 MG: 500 TABLET, FILM COATED, EXTENDED RELEASE ORAL at 08:12

## 2024-12-08 RX ADMIN — ASPIRIN 81 MG: 81 TABLET, CHEWABLE ORAL at 08:12

## 2024-12-08 RX ADMIN — Medication 1 TABLET: at 08:12

## 2024-12-08 RX ADMIN — SODIUM CHLORIDE, PRESERVATIVE FREE 10 ML: 5 INJECTION INTRAVENOUS at 08:12

## 2024-12-08 RX ADMIN — HEPARIN SODIUM 5000 UNITS: 5000 INJECTION INTRAVENOUS; SUBCUTANEOUS at 05:35

## 2024-12-08 NOTE — ED NOTES
Pacemaker interrogation completed.  
Pt. Presents to the ED via ems for syncope and fall. Pt states that he was out bowling tonight when he stood up, passed out, and fell backwards. Pt thinks the he lost consciousness and that's what causes him to fall. Pt does remember the fall and states that he went down to his knees first before falling backward and hitting his head. Pt denies any other falls recently. Pt denies blood thinner use. He denies any head pain, but is having some chest pressure. Pt a&ox4 on arrival. Pt placed on full cardiac monitor. EKG, line, and labs completed. Will continue with plan of care.  
Pt. Resting in bed, eyes open, NAD. Pt states that he still feels restless and anxious. Resident informed.   
Pt. Returned from ct.   
Pt. To ct.   
The following labs were labeled with appropriate pt sticker and tubed to lab:     [] Blue     [] Lavender   [] on ice  [x] Green/yellow  [] Green/black [] on ice  [] Grey  [] on ice  [] Yellow  [] Red  [] Pink  [] Type/ Screen  [] ABG  [] VBG    [] COVID-19 swab    [] Rapid  [] PCR  [] Flu swab  [] Peds Viral Panel     [] Urine Sample  [] Fecal Sample  [] Pelvic Cultures  [] Blood Cultures  [] X 2  [] STREP Cultures  [] Wound Cultures    
CATHETERIZATION  09/17/2024   • CARDIAC DEFIBRILLATOR PLACEMENT  2013    right chest   • CARDIAC PROCEDURE N/A 9/17/2024    zafrlaci / Left heart cath / coronary angiography / rm [id number] performed by Subhash Wilson MD at [facility] CARDIAC CATH LAB   • CAROTID ENDARTERECTOMY Left 08/11/2022    LEFT CAROTID ENDARTERECTOMY WITH XENOSURE PATCH ANGIOPLASTY performed by Erik Chen MD at [facility]   • CAROTID ENDARTERECTOMY Right 01/17/2023    CAROTID ENDARTERECTOMY WITH 0.8 CM X 8 CM XENOSURE BOVINE PATCH X2 performed by Erik Chen MD at [facility]   • COLONOSCOPY     • CORONARY ARTERY BYPASS GRAFT  11/19/2013    x 3 vessell   • EP DEVICE PROCEDURE N/A 11/08/2023    lyons / icd replacement performed by Jef Arredondo MD at [facility] CARDIAC CATH LAB   • OTHER SURGICAL HISTORY Right 07/19/2019    EXCISION RIGHT SHOULDER MASS    • OTHER SURGICAL HISTORY Right 11/08/2023    ICD replacement   • TONSILLECTOMY     • VASECTOMY         PAST MEDICAL HISTORY       Past Medical History:   Diagnosis Date   • Accident 1967    [place] accident in [employer] \"[employer]  Numbness and tingling for 1 year from the low back to bilateral mid thighs.   • Arthritis     states no pain from arthritis   • Bronchitis    • CAD (coronary artery disease) 11/19/2013    cabg x 3  [facility]   • Cancer ([facility])     head and cheek skin cancer   • Cardiac arrest 11/2013    While playing squash.   • Good exercise tolerance     pt plays golf, bowling but cannot play competive squash anymore (he passes out), still works full time ()   • Hx of blood clots 12/2017    heart   • Hyperkalemia 05/19/2014   • Hypertension, essential    • Ischemic cardiomyopathy 12/08/2016   • Left ventricular apical thrombus 12/09/2016   • MI (myocardial infarction) ([facility]) 11/2013   • Pseudoaneurysm (HCC) - right groin 12/12/2016    Post cardiac catheterization   • Sinus drainage    • Snores     no sleep apnea   • Systolic dysfunction with acute on chronic

## 2024-12-08 NOTE — ED PROVIDER NOTES
[facility]     Emergency Department     Faculty Attestation    I performed a history and physical examination of the patient and discussed management with the resident. I reviewed the resident’s note and agree with the documented findings and plan of care. Any areas of disagreement are noted on the chart. I was personally present for the key portions of any procedures. I have documented in the chart those procedures where I was not present during the key portions. I have reviewed the emergency nurses triage note. I agree with the chief complaint, past medical history, past surgical history, allergies, medications, social and family history as documented unless otherwise noted below. Documentation of the HPI, Physical Exam and Medical Decision Making performed by medical students or scribes is based on my personal performance of the HPI, PE and MDM. For Physician Assistant/ Nurse Practitioner cases/documentation I have personally evaluated this patient and have completed at least one if not all key elements of the E/M (history, physical exam, and MDM). Additional findings are as noted.    Vital signs:   Vitals:    12/07/24 1942   BP: 128/68   Pulse: 50   Resp: 16   Temp: 97.8 °F (36.6 °C)   SpO2: 99%      79-year-old male with a prior history of coronary artery disease, ischemic cardiomyopathy, congestive heart failure, ventricular tachycardia (ablation at [facility] 9/25/24), and AICD placement.  Patient states that he was going from seated to standing position, when he became lightheaded and passed out.  He does believe he struck the back of his head.  He denies neck, back, or abdominal pain.  No chest pain or shortness of breath.  Patient states that he was recently admitted to the hospital, and was started on 2 new medications.  Review of the electronic medical record reveals that this was Ranexa and his Imdur dose was increased.  On exam, the patient is alert and afebrile.  Breath sounds are 
No wheezing.   Abdominal:      Palpations: Abdomen is soft.      Tenderness: There is no abdominal tenderness. There is no guarding or rebound.   Musculoskeletal:      Right lower leg: No edema.      Left lower leg: No edema.   Skin:     General: Skin is warm and dry.   Neurological:      Mental Status: He is alert and oriented to person, place, and time.   Psychiatric:         Mood and Affect: Mood normal.         Behavior: Behavior normal.         Judgment: Judgment normal.           DDX/DIAGNOSTIC RESULTS / EMERGENCY DEPARTMENT COURSE / MDM     Medical Decision Making  79-year-old male with extensive cardiac history who presents following a syncopal event.  On arrival, patient is bradycardic with rate dipping as low as 38.  He has otherwise stable vital signs.  He is afebrile.  He is awake, alert, answering questions appropriately.  He does not have any external signs of trauma.  He did hit his head when he syncopized.  He has no neck pain or back pain in the midline.  Lung sounds are clear bilaterally normal S1-S2 heart sounds.  Abdomen is soft and nontender.  He is equal pulses throughout all 4 extremities.  No peripheral edema.  Will plan for CT of the head and cervical spine given the patient's syncopal event with head trauma.  Additionally, he did have an alcoholic beverage tonight so will obtain CT of the cervical spine as well given his age and alcohol use.  Obtain cardiac workup.  Given his bradycardia, however we will would likely require admission as I do feel this is likely the source of his syncope.    Amount and/or Complexity of Data Reviewed  Labs: ordered. Decision-making details documented in ED Course.  Radiology: ordered.  ECG/medicine tests: ordered.    Risk  Prescription drug management.  Decision regarding hospitalization.        EKG  Rhythm: normal sinus  and 1 degree AV block  Rate: 40-50  Axis: left  Ectopy: none  Conduction: right bundle branch block (incomplete) and left anterior

## 2024-12-08 NOTE — PLAN OF CARE
Problem: Chronic Conditions and Co-morbidities  Goal: Patient's chronic conditions and co-morbidity symptoms are monitored and maintained or improved  12/8/2024 0817 by Mary Beth Bermudez, RN  Outcome: Progressing    Problem: Discharge Planning  Goal: Discharge to home or other facility with appropriate resources  12/8/2024 0817 by Mary Beth Bermudez, RN  Outcome: Progressing    Problem: Safety - Adult  Goal: Free from fall injury  12/8/2024 0817 by Mary Beth Bermudez, RN  Outcome: Progressing

## 2024-12-08 NOTE — H&P
and Imdur dose was increased and discharged from ED obs presented today with syncopal episode.    Patient mentioned that he was at bowling when he stood up from sitting position got lightheaded and had a brief episode of losing consciousness, he first lowered himself down on his knees and then fell.  He did hit back of his head.  EMS was called.  Patient was noted to be bradycardic.  Patient did endorse taking 1 shot of tequila prior to standing up.  Currently patient denies any chest pain or shortness of breath.  Denies any dizziness or lightheadedness.  Denies any previous episode of syncope.  Patient endorsed being anxious a lot.  Patient is very bradycardic with heart rate going down into 30s.  CT head and CT cervical spine unremarkable.  EKG done in ED showed normal sinus rhythm with first-degree AV block incomplete right bundle block block and left anterior fascicular block.    Current vitals afebrile, respiration 18, pulse 47, blood pressure 143/6 5 currently saturating well on room air.  Lab workup sodium 134, potassium 5.3, chloride 102, bicarb of 19, BUN/creatinine 40/2.1 baseline creatinine is 1.2 patient has LETICIA BNP 1106 slightly elevated as compared to October.  Troponin 17--> 14 at baseline.  CBC unremarkable.  Chest x-ray unremarkable.    Past Medical History:     Past Medical History:   Diagnosis Date    Accident 1967    [place] accident in the [employer]  Numbness and tingling for 1 year from the low back to bilateral mid thighs.    Arthritis     states no pain from arthritis    Bronchitis     CAD (coronary artery disease) 11/19/2013    cabg x 3  [facility]    Cancer (HCC)     head and cheek skin cancer    Cardiac arrest 11/2013    While playing squash.    Good exercise tolerance     pt plays golf, bowling but cannot play competive squash anymore (he passes out), still works full time ()    Hx of blood clots 12/2017    heart    Hyperkalemia 05/19/2014    Hypertension,

## 2024-12-08 NOTE — DISCHARGE SUMMARY
[facility]  Office: [phone]  Quang Noe DO, Kimani Cannon DO, Bubba Wyatt DO, Johnny Christopher DO, Hira Khan MD, Vielka Escobar MD, Adarsh Bowen MD, Frida Horan MD,  Angel Warner MD, Bryon Lopez MD, Rebeca Christine MD,  Kenrick Solorio DO, Mau Chávez MD, Pankaj Scott MD, David Noe DO, Tarsha Blancas MD,  Jorge L Forbes DO, Liane Avery MD, Barbara Schumacher MD, Na Maldonado MD, Patrick Borrego MD,  Francisco J Mares MD, Lindsey Gonzalez MD, Vic Geronimo MD, Foreign Kumar MD, Juan Lopez MD, Camila German MD, Jd Sauceda DO, Jose Alejandro Bernard MD, Shirley Waterhouse, CNP,  Mitali Padilla, CNP, Jd Garcia, CNP,  Maria Luz Peterson, GILBERTO, Connie Cheek, CNP, Ivory Warren, CNP, Idalia Nassar, CNP, Skye García, CNP, Kristina Smith PA-C, Clary Flannery PA-C, Midny Garcia, CNP, Aditya Fox, CNP,  Brooke Hahn, CNP, Jennifer Mac, CNP,  Radha Florez, CNP, Magaly Booth, CNP         [facility]   IN-PATIENT SERVICE   [facility]    Discharge Summary     Patient ID: Erik Olea  :  1945   MRN: [id number]     ACCOUNT:  [id number]   Patient's PCP: Albertina Cleaning MD  Admit Date: 2024   Discharge Date: 2024     Length of Stay: 1  Code Status:  Full Code  Admitting Physician: No admitting provider for patient encounter.  Discharge Physician: TEE Marquis - NP     Active Discharge Diagnoses:     Hospital Problem Lists:  Principal Problem:    Syncope and collapse  Active Problems:    S/P CABG (coronary artery bypass graft)    Stenosis of left carotid artery    S/P carotid endarterectomy    Stenosis of right carotid artery    Coronary artery disease involving native coronary artery of native heart without angina pectoris    HTN (hypertension), benign    Hyperkalemia    Ischemic cardiomyopathy    LETICIA (acute kidney injury) (HCC)    Chronic systolic heart failure (HCC)    Heart block, AV - bifascicular    S/P

## 2024-12-08 NOTE — PROGRESS NOTES
[facility]  Office: [phone]  Quang Noe DO, Kimani Cannon DO, Bubba Wyatt DO, Johnny Christopher DO, Hira Khan MD, Vielka Escobar MD, Adarsh Bowen MD, Frida Horan MD,  Angel Warner MD, Bryon Lopez MD, Rebeca Christine MD,  Kenrick Solorio DO, Mau Chávez MD, Pankaj Scott MD, David Noe DO, Tarsha Blancas MD,  Jorge L Forbes DO, Liane Avery MD, Barbara Schumacher MD, Na Maldonado MD, Patrick Borrego MD,  Francisco J Mares MD, Lindsey Gonzalez MD, Vic Geronimo MD, Foreign Kumar MD, Juan Lopez MD, Camila German MD, Jd Sauceda DO, Jose Alejandro Bernard MD, Shirley Waterhouse, CNP,  Mitali Padilla, CNP, Jd Garcia, CNP,  Maria Luz Peterson, GILBERTO, Connie Cheek, CNP, Ivory Warren, CNP, Idalia Nassar, CNP, Skye García, CNP, Kristina Smith PA-C, LISA IrbyC, Mindy Garcia, CNP, Aditya Fox, CNP,  Brooke Hahn, CNP, Jennifer Mac, CNP,  Radha Florez, CNP, Magaly Booth, CNP         [facility]   IN-PATIENT SERVICE   [facility]    Second Visit Note  For more detailed information please refer to the progress note of the day      12/8/2024    12:55 PM    Name:   Erik Olea  MRN:     [id number]     Acct:      [id number]   Room:   [facility] Day:  1  Admit Date:  12/7/2024  7:39 PM    PCP:   Albertina Cleaning MD  Code Status:  Full Code      Pt vitals were reviewed   New labs were reviewed   Patient was seen discussed updated plan of care with patient who is in agreement.  Repeat stat BMP ordered.  If renal function is showing improvement patient could be discharged home with outpatient BMP on Wednesday    Updated plan :     Syncope and collapse: patient continue on sotalol and mexiletine patient interrogated.  No arrhythmias noted.  Stable for discharge per cardiology  History of cardiac arrest with CABG and AICD.  LETICIA: Continue to encourage oral intake.  Likely contributing to syncopal event.  BMP .  Recheck BMP in 3 
Physical Therapy  Facility/Department: [address] STEPDOWN  Physical Therapy Initial Assessment    Name: Erik Olea  : 1945  MRN: [id number]  Date of Service: 2024    Discharge Recommendations:  No therapy recommended at discharge   PT Equipment Recommendations  Equipment Needed: No      Patient Diagnosis(es): The encounter diagnosis was Syncope and collapse.  Past Medical History:  has a past medical history of Accident, Arthritis, Bronchitis, CAD (coronary artery disease), Cancer (HCC), Cardiac arrest, Good exercise tolerance, Hx of blood clots, Hyperkalemia, Hypertension, essential, Ischemic cardiomyopathy, Left ventricular apical thrombus, MI (myocardial infarction) ([facility]), Pseudoaneurysm (HCC) - right groin, Sinus drainage, Snores, Systolic dysfunction with acute on chronic heart failure (HCC) - Efx 30%, and Wellness examination.  Past Surgical History:  has a past surgical history that includes Coronary artery bypass graft (2013); Vasectomy; Tonsillectomy; Colonoscopy; Cardiac defibrillator placement (); Cardiac catheterization (2016); Cardiac catheterization (2013); other surgical history (Right, 2019); Abdomen surgery (Right, 2019); Cardiac catheterization (2022); Carotid endarterectomy (Left, 2022); Carotid endarterectomy (Right, 2023); other surgical history (Right, 2023); ep device procedure (N/A, 2023); Cardiac catheterization (2024); and Cardiac procedure (N/A, 2024).    Assessment  Assessment: Able to ambulate and negotiate steps without difficulty. No drop in BP with position changes; instead increase in BP to 164/74 in standing.  Patient moves easily and independently. No need for further PT either here or after discharge.  DC PT.  Therapy Prognosis: Good  Decision Making: Low Complexity  Requires PT Follow-Up: No  Activity Tolerance  Activity Tolerance: Patient tolerated evaluation without incident     
Pts HR 47. Writer notified provider. No new orders placed.   
Received order for patient discharge. AVS explained, no further questions. Patient instructed specifically to hold Sotalol for HR < 60. Patient voiced understanding. IV removed, off monitor. Patient wheeled to main entrance and discharged via private vehicle.  
No    Restrictions/Precautions  Restrictions/Precautions  Restrictions/Precautions: General Precautions  Activity Level: Up with Assist  Required Braces or Orthoses?: No  Implants Present? :  (AICD)  Position Activity Restriction  Other Position/Activity Restrictions: Orthostatics    Subjective  General  Patient assessed for rehabilitation services?: Yes  Family / Caregiver Present: No  Diagnosis: Syncope/collapse, anxiety, v-tach, hx of CABG/AICD  Subjective  Subjective: RN approved therapy session, pt cooperative throughout  Pain  Pre-Pain: 0  Post-Pain: 0       Home Setup/Prior Level of Function  Social/Functional History  Lives With: Alone  Type of Home: House  Home Layout: Two level;Laundry in basement  Home Access: Stairs to enter with rails  Entrance Stairs - Number of Steps: 5  Entrance Stairs - Rails: Right  Bathroom Shower/Tub: Walk-in shower  Bathroom Toilet: Standard  Home Equipment: None  Prior Level of Assist for ADLs: Independent  Prior Level of Assist for Homemaking: Independent  Homemaking Responsibilities: Yes  Prior Level of Assist for Ambulation: Independent household ambulator, with or without device;Independent community ambulator, with or without device  Prior Level of Assist for Transfers: Independent  Active : Yes  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[employer], doors  Leisure & Hobbies: bowling, cat    Vision/Hearing  Vision  Vision: Within Functional Limits  Hearing  Hearing: Within functional limits    BUE Assessment  Gross Assessment  AROM: Within functional limits  PROM: Within functional limits  Strength: Within functional limits (5/5 grossly at BUE)  Coordination: Within functional limits  Tone: Normal  Sensation: Intact  Hand Dominance: Right     Objective  Orientation  Overall Orientation Status: Within Normal Limits  Orientation Level: Oriented X4  Cognition  Overall Cognitive Status: WFL    Activities of Daily Living  Feeding: Independent  Grooming: Independent  UE Bathing:

## 2024-12-08 NOTE — CARE COORDINATION
Case Management Assessment  Initial Evaluation    Date/Time of Evaluation: 12/8/2024 4:07 PM  Assessment Completed by: JEMIMA SCHULZ RN    If patient is discharged prior to next notation, then this note serves as note for discharge by case management.    Patient Name: Erik Olea                   YOB: 1945  Diagnosis: Syncope and collapse [R55]                   Date / Time: 12/7/2024  7:39 PM    Patient Admission Status: Inpatient   Readmission Risk (Low < 19, Mod (19-27), High > 27): Readmission Risk Score: 23.7    Current PCP: Albertina Cleaning MD  PCP verified by CM? Yes    Chart Reviewed: Yes      History Provided by: Patient  Patient Orientation: Alert and Oriented    Patient Cognition: Alert    Hospitalization in the last 30 days (Readmission):  Yes    If yes, Readmission Assessment in  Navigator will be completed.    Advance Directives:      Code Status: Full Code   Patient's Primary Decision Maker is: Legal Next of Kin      Discharge Planning:    Patient lives with: Alone Type of Home: House  Primary Care Giver: Self  Patient Support Systems include: Family Members   Current Financial resources: Medicare  Current community resources: None  Current services prior to admission: Durable Medical Equipment            Current DME: Home Aerosol            Type of Home Care services:  None    ADLS  Prior functional level: Independent in ADLs/IADLs  Current functional level: Independent in ADLs/IADLs    PT AM-PAC: 24 /24  OT AM-PAC: 24 /24    Family can provide assistance at DC:  (independent)  Would you like Case Management to discuss the discharge plan with any other family members/significant others, and if so, who? No  Plans to Return to Present Housing: Yes  Other Identified Issues/Barriers to RETURNING to current housing: none  Potential Assistance needed at discharge: N/A             Patient expects to discharge to: House  Plan for transportation at discharge: Family    Financial    Payor:

## 2024-12-08 NOTE — CARE COORDINATION
12/08/24 1608   Readmission Assessment   Number of Days since last admission? 1-7 days   Previous Disposition Home Alone   Who is being Interviewed Patient   What was the patient's/caregiver's perception as to why they think they needed to return back to the hospital? Other (Comment)  (syncope)   Did you visit your Primary Care Physician after you left the hospital, before you returned this time? No   Why weren't you able to visit your PCP? Did not have an appointment   Did you see a specialist, such as Cardiac, Pulmonary, Orthopedic Physician, etc. after you left the hospital? Yes   Who advised the patient to return to the hospital? Self-referral   Does the patient report anything that got in the way of taking their medications? No   In our efforts to provide the best possible care to you and others like you, can you think of anything that we could have done to help you after you left the hospital the first time, so that you might not have needed to return so soon? Other (Comment)  (none)

## 2024-12-08 NOTE — CONSULTS
discharge    NSTEMI (non-ST elevated myocardial infarction) ([facility])    S/P CABG (coronary artery bypass graft)    Anxiety    SVT (supraventricular tachycardia) ([facility])    AICD discharge    Chest pain    V tach ([facility])    Acute pulmonary edema    Acute systolic heart failure ([facility])         DATA:    IMPRESSION:    Syncope, likely orthostatic  CAD w/ h/o cardiac arrest s/p CABG and AICD  HFmrEF, 45%  H/o VT s/p ablation 9/2024, currently on mexiletine  Underlying conduction disease with bifascicular block and IA interval of 200 ms.     RECOMMENDATIONS:  Continue sotalol and mexiletine  Continue to hold sotalol for bradycardia, can be resumed on DC  Avoid additional AV melinda blockers  No need for cardiac work up at this time  Pacer interrogated, no concerning arrhythmia noted.  Discussed with patient about giving himself more time between changes in positions.  Rest of management per primary  Keep K > 4, Mg > 2    Jose E Linton MD  Cardiology Service  Internal Medicine Residency Program, PGY-3  [facility]      Attending Physician Statement  I have discussed the case of Erik Olea including pertinent history and exam findings with the student/resident/fellow. I have seen and examined the patient and the key elements of the encounter have been performed by me. I agree with the assessment, plan and orders as documented by the resident With changes made to the note.     Electronically signed by Subhash Wilson MD on 12/8/2024 at 8:13 PM.    [facility]      [phone]

## 2024-12-09 LAB
EKG ATRIAL RATE: 45 BPM
EKG P AXIS: 72 DEGREES
EKG P-R INTERVAL: 296 MS
EKG Q-T INTERVAL: 526 MS
EKG QRS DURATION: 160 MS
EKG QTC CALCULATION (BAZETT): 454 MS
EKG R AXIS: -62 DEGREES
EKG T AXIS: 47 DEGREES
EKG VENTRICULAR RATE: 45 BPM

## 2024-12-11 ENCOUNTER — HOSPITAL ENCOUNTER (OUTPATIENT)
Age: 79
Discharge: HOME | End: 2024-12-11
Payer: MEDICARE

## 2024-12-11 DIAGNOSIS — N17.9 AKI (ACUTE KIDNEY INJURY) (HCC): ICD-10-CM

## 2024-12-11 LAB
ANION GAP SERPL CALCULATED.3IONS-SCNC: 10 MMOL/L (ref 9–16)
BUN SERPL-MCNC: 25 MG/DL (ref 8–23)
CALCIUM SERPL-MCNC: 9 MG/DL (ref 8.6–10.4)
CHLORIDE SERPL-SCNC: 103 MMOL/L (ref 98–107)
CO2 SERPL-SCNC: 25 MMOL/L (ref 20–31)
CREAT SERPL-MCNC: 1.4 MG/DL (ref 0.7–1.2)
GFR, ESTIMATED: 50 ML/MIN/1.73M2
GLUCOSE SERPL-MCNC: 97 MG/DL (ref 74–99)
POTASSIUM SERPL-SCNC: 4.8 MMOL/L (ref 3.7–5.3)
SODIUM SERPL-SCNC: 138 MMOL/L (ref 136–145)

## 2024-12-11 PROCEDURE — 36415 COLL VENOUS BLD VENIPUNCTURE: CPT

## 2024-12-11 PROCEDURE — 80048 BASIC METABOLIC PNL TOTAL CA: CPT

## 2025-02-26 NOTE — PROGRESS NOTES
PHYSICAL THERAPY EVALUATION  Type of Visit: Evaluation              Subjective         Presenting condition or subjective complaint:    Date of onset: 11/22/24    Relevant medical history:     Dates & types of surgery:      Prior diagnostic imaging/testing results:       Prior therapy history for the same diagnosis, illness or injury:        Prior Level of Function  Transfers:   Ambulation:   ADL:   IADL:     Living Environment  Social support:     Type of home:     Stairs to enter the home:         Ramp:     Stairs inside the home:         Help at home:    Equipment owned:       Employment:      Hobbies/Interests:      Patient goals for therapy:      PHYSICAL THERAPY FOOT/ANKLE EVALUATION    SUBJECTIVE:  Matthieu is a 57 year old female who reports that she had a bunion in 2018 in her left foot and had corrective surgery.  They removed the screws, which created more soreness in the left lateral great toe.  She made an appointment with the MD regarding this soreness, which resulted in a left MTP fusion.  She has now been referred to physical therapy to help her with walking.  She states she doesn't walk much without tennis shoes and avoids stairs due to her surgery.     Patient reports symptoms of:  Range of Motion Loss, Stiffness / Tightness, and Gait Impairments    Patient report of Pain:  Pain Rating Now: Soreness  Pain Quality/Description: Soreness  Pain Better with: Rest  Pain Worse with: Being on her feet, walking, standing prolonged, Overuse  Progression of Symptoms: Improving    Patient reports Red Flags symptoms of:  None    OBJECTIVE:  Seated Lower Extremity Manual Muscle Test / Strength Assessment:  Hip Flexion: Right Hip 5/5, Left Hip 5/5  Knee Extension: Right Knee 5/5, Left Knee 5/5  Knee Flexion: Right Knee 5/5, Left Knee 5/5  Ankle Dorsiflexion: Right Ankle 5/5, Left Ankle 5/5  Ankle Eversion: Right Ankle 5/5, Left Ankle 4+/5  Ankle Inversion: Right Ankle 5/5, Left Ankle 4+/5  Great Toe Extension:  Patient discharged home independently. Patient educated on discharge instructions and verbalized understanding. All questions answered at this time. Patient belongings were collected per patient and accounted for. Patient taken off unit in wheelchair and left in private vehicle.   Right Great Toe 5/5, Left Great Toe 4/5  Single Leg Calf Raise: Right Ankle 17 reps, Left Ankle 6 reps    Seated Ankle Active ROM  Right Ankle:   Ankle Dorsiflexion: 10 degrees  Ankle Plantarflexion: 62 degrees  Ankle Inversion: 33 degrees  Ankle Eversion: 15 degrees  Right Great Toe Extension:     Left Ankle: Generalized stiffness and hypomobility  Ankle Dorsiflexion: 2 degrees  Ankle Plantarflexion: 50 degrees  Ankle Inversion: 28 degrees  Ankle Eversion: 8 degrees  Left Great Toe Extension: No Movement / Unable    Gait: Demonstrates antalgic gait with decreased stance time on her left foot, little to no push off on the left foot and increased hip flexion in the left hip to compensate    Palpation:  Pain with Palpation of: Plantar surface of MTP joint    ASSESSMENT:  Matthieu is a 57 year old female referred to Physical Therapy for Pain in joint, ankle and foot, left   from Aris Diallo MD. Matthieu demonstrates findings of Pain, Weakness, Motion Loss, and Gait Deficits that justify a need for formal Physical Therapy. These impairments interfere with their ability to perform self care tasks, work tasks, recreational activities, household chores, driving , household mobility, and community mobility as compared to their previous level of function.    Medical Diagnosis: Pain in joint, ankle and foot, left    Treatment Diagnosis: Pain in joint, ankle and foot, left     Clinical Decision Making (Complexity):  Clinical Presentation: Stable/Uncomplicated  Clinical Presentation Rationale: based on medical and personal factors listed in PT evaluation  Clinical Decision Making (Complexity): Low complexity      PHYSICAL THERAPY PLAN OF CARE:  Treatment Interventions:  Interventions: Gait Training, Manual Therapy, Neuromuscular Re-education, Therapeutic Activity, Therapeutic Exercise    Long Term Goals     PT Goal 1  Goal Identifier: Ambulation  Goal Description: [place] will be able to ambulate without gait deviation,  pain or an assistive device for 0.5 miles consecuitively  Rationale: to maximize safety and independence with performance of ADLs and functional tasks;to maximize safety and independence within the home;to maximize safety and independence within the community;to maximize safety and independence with transportation;to maximize safety and independence with self cares  Goal Progress: Reduced amount of ambulation, needs shoes and has poor pushoff  Target Date: 03/26/25    Frequency of Treatment:    Duration of Treatment:           Risks and benefits of evaluation/treatment have been explained.   Patient/Family/caregiver agrees with Plan of Care.      Evaluation Time:     PT Eval, Low Complexity Minutes (01953): 25         Signing Clinician: William Hart PT        SUMMARY OF PLAN OF CARE:  Presents S/p 1st left MTP fusion and has ankle hypomobility, plantarflexion weakness and gait impairments including decreased stance time and poor push off. Our therapy Plan of Care will focus on restoring normal foot/ankle mobility, improving plantarflexion strength and correcting gait mechanics

## 2025-03-14 ENCOUNTER — HOSPITAL ENCOUNTER (EMERGENCY)
Age: 80
Discharge: HOME | End: 2025-03-14
Attending: EMERGENCY MEDICINE
Payer: MEDICARE

## 2025-03-14 VITALS
WEIGHT: 175 LBS | DIASTOLIC BLOOD PRESSURE: 82 MMHG | HEIGHT: 67 IN | TEMPERATURE: 97.5 F | RESPIRATION RATE: 19 BRPM | OXYGEN SATURATION: 99 % | BODY MASS INDEX: 27.47 KG/M2 | SYSTOLIC BLOOD PRESSURE: 179 MMHG | HEART RATE: 96 BPM

## 2025-03-14 DIAGNOSIS — S01.81XA FACIAL LACERATION, INITIAL ENCOUNTER: Primary | ICD-10-CM

## 2025-03-14 PROCEDURE — 12011 RPR F/E/E/N/L/M 2.5 CM/<: CPT

## 2025-03-14 PROCEDURE — 90471 IMMUNIZATION ADMIN: CPT

## 2025-03-14 PROCEDURE — 99284 EMERGENCY DEPT VISIT MOD MDM: CPT

## 2025-03-14 PROCEDURE — 90715 TDAP VACCINE 7 YRS/> IM: CPT

## 2025-03-14 PROCEDURE — 6360000002 HC RX W HCPCS

## 2025-03-14 RX ADMIN — TETANUS TOXOID, REDUCED DIPHTHERIA TOXOID AND ACELLULAR PERTUSSIS VACCINE, ADSORBED 0.5 ML: 5; 2.5; 8; 8; 2.5 SUSPENSION INTRAMUSCULAR at 15:38

## 2025-03-14 ASSESSMENT — PAIN - FUNCTIONAL ASSESSMENT: PAIN_FUNCTIONAL_ASSESSMENT: NONE - DENIES PAIN

## 2025-03-14 ASSESSMENT — ENCOUNTER SYMPTOMS
SHORTNESS OF BREATH: 0
VOMITING: 0
NAUSEA: 0

## 2025-03-14 NOTE — DISCHARGE INSTRUCTIONS
You are seen today for facial injury over the left eyebrow, no other injuries.  2 Prolene sutures are placed, need to remove sutures in 1 week time.

## 2025-03-14 NOTE — ED TRIAGE NOTES
79 yo male arrived to ED through triage with laceration to left eyebrow.  Patient states he hit head on sharp part kitchen cabinet a few hours ago.   Patient denies any LOC and/or visual disturbances.   Swelling and ecchymosis noted around left eye.   Bleeding controlled at this time.  Patient states he takes a baby ASA.  Patient is alert and oriented times 4, speaking full sentences, and and answering questions appropriately.

## 2025-03-14 NOTE — ED PROVIDER NOTES
[facility] EMERGENCY DEPARTMENT  Emergency Department Encounter  Emergency Medicine Resident     Pt Name:Erik Olea  MRN: [id number]  Birthdate 1945  Date of evaluation: 3/14/25  PCP:  Albertina Cleaning MD  Note Started: 3:31 PM EDT      CHIEF COMPLAINT       Chief Complaint   Patient presents with    Laceration     Left eyebrow       HISTORY OF PRESENT ILLNESS  (Location/Symptom, Timing/Onset, Context/Setting, Quality, Duration, Modifying Factors, Severity.)      Erik Olea is a 80 y.o. male who presents with injury to the left side of the head over the eyebrow area when he tripped and hit the corner of the wooden cabinet, he denied dizziness, chest pain, shortness of breath, focal weakness, vision change, nausea, vomiting.  He does not fall frequently, he is moving by himself.  He denies loss of consciousness, falling to the ground.    PAST MEDICAL / SURGICAL / SOCIAL / FAMILY HISTORY      has a past medical history of Accident, Arthritis, Bronchitis, CAD (coronary artery disease), Cancer (HCC), Cardiac arrest (HCC), Good exercise tolerance, Hx of blood clots, Hyperkalemia, Hypertension, essential, Ischemic cardiomyopathy, Left ventricular apical thrombus, MI (myocardial infarction) (HCC), Pseudoaneurysm (HCC) - right groin, Sinus drainage, Snores, Systolic dysfunction with acute on chronic heart failure (HCC) - Efx 30%, and Wellness examination.     has a past surgical history that includes Coronary artery bypass graft (11/19/2013); Vasectomy; Tonsillectomy; Colonoscopy; Cardiac defibrillator placement (2013); Cardiac catheterization (12/08/2016); Cardiac catheterization (11/18/2013); other surgical history (Right, 07/19/2019); Abdomen surgery (Right, 07/19/2019); Cardiac catheterization (04/07/2022); Carotid endarterectomy (Left, 08/11/2022); Carotid endarterectomy (Right, 01/17/2023); other surgical history (Right, 11/08/2023); ep device procedure (N/A, 11/08/2023); Cardiac catheterization  Inhale 1 puff into the lungs 2 times daily  Patient not taking: Reported on 8/5/2022 8/12/22  Provider, MD Aamir   albuterol (VENTOLIN HFA) 108 (90 BASE) MCG/ACT inhaler Inhale 2 puffs into the lungs every 6 hours as needed for Wheezing. 10/10/14   Tavo Rivera DO   aspirin 81 MG chewable tablet Take 1 tablet by mouth daily. 11/26/13   Audelia Gonzalez, PAManuelC       REVIEW OF SYSTEMS       Review of Systems   Constitutional:  Negative for fever.   Respiratory:  Negative for shortness of breath.    Cardiovascular:  Negative for chest pain.   Gastrointestinal:  Negative for nausea and vomiting.   Neurological:  Negative for dizziness, syncope, speech difficulty, weakness and light-headedness.       PHYSICAL EXAM      INITIAL VITALS:   BP (!) 179/82   Pulse 96   Temp 97.5 °F (36.4 °C)   Resp 19   Ht 1.702 m (5' 7\")   Wt 79.4 kg (175 lb)   SpO2 99%   BMI 27.41 kg/m²     Physical Exam  HENT:      Head: Normocephalic.      Right Ear: External ear normal.      Left Ear: External ear normal.      Nose: Nose normal.      Mouth/Throat:      Mouth: Mucous membranes are moist.      Pharynx: Oropharynx is clear.   Eyes:      Extraocular Movements: Extraocular movements intact.      Pupils: Pupils are equal, round, and reactive to light.      Comments: Left eye bruise around the upper eyelid   Cardiovascular:      Pulses: Normal pulses.   Pulmonary:      Effort: Pulmonary effort is normal.   Abdominal:      Palpations: Abdomen is soft.      Tenderness: There is no abdominal tenderness.   Musculoskeletal:         General: Normal range of motion.      Cervical back: Normal range of motion. No tenderness.   Skin:     General: Skin is warm and dry.      Coloration: Skin is not jaundiced.      Findings: No rash.      Comments: Left eyebrow lateral area has 1 to 1-1/2 cm laceration about half millimeter deep.  Minimal oozing of blood from the laceration   Neurological:      General: No focal deficit present.      Mental

## 2025-04-09 DIAGNOSIS — I65.23 BILATERAL CAROTID ARTERY STENOSIS: ICD-10-CM

## 2025-05-23 ENCOUNTER — TELEPHONE (OUTPATIENT)
Dept: VASCULAR SURGERY | Age: 80
End: 2025-05-23

## 2025-05-23 NOTE — TELEPHONE ENCOUNTER
Spoke with patient to inform him that he still needed to get his Carotid Duplex before his appointment with Dr. Chen on 5/30 patient was then transferred to Central Scheduling to get it scheduled

## 2025-05-27 DIAGNOSIS — I65.23 CAROTID STENOSIS, BILATERAL: Primary | ICD-10-CM

## 2025-05-30 ENCOUNTER — HOSPITAL ENCOUNTER (OUTPATIENT)
Dept: VASCULAR LAB | Age: 80
Discharge: HOME | End: 2025-05-30
Attending: SURGERY
Payer: MEDICARE

## 2025-05-30 DIAGNOSIS — I65.23 CAROTID STENOSIS, BILATERAL: ICD-10-CM

## 2025-05-30 LAB
VAS LEFT CCA DIST EDV: 28.1 CM/S
VAS LEFT CCA DIST PSV: 221 CM/S
VAS LEFT CCA MID EDV: 13.3 CM/S
VAS LEFT CCA MID PSV: 83.5 CM/S
VAS LEFT CCA PROX EDV: 16.5 CM/S
VAS LEFT CCA PROX PSV: 82.7 CM/S
VAS LEFT ECA PSV: 98.8 CM/S
VAS LEFT ICA DIST EDV: 31.9 CM/S
VAS LEFT ICA DIST PSV: 99.3 CM/S
VAS LEFT ICA MID EDV: 29.4 CM/S
VAS LEFT ICA MID PSV: 101 CM/S
VAS LEFT ICA PROX EDV: 12.1 CM/S
VAS LEFT ICA PROX PSV: 87.8 CM/S
VAS LEFT ICA/CCA PSV: 1.21
VAS LEFT VERTEBRAL EDV: 13.2 CM/S
VAS LEFT VERTEBRAL PSV: 54.3 CM/S
VAS RIGHT CCA DIST EDV: 23.6 CM/S
VAS RIGHT CCA DIST PSV: 119 CM/S
VAS RIGHT CCA MID EDV: 14.1 CM/S
VAS RIGHT CCA MID PSV: 71.7 CM/S
VAS RIGHT CCA PROX EDV: 14.1 CM/S
VAS RIGHT CCA PROX PSV: 72.9 CM/S
VAS RIGHT ECA EDV: 12.2 CM/S
VAS RIGHT ECA PSV: 215 CM/S
VAS RIGHT ICA DIST EDV: 20.1 CM/S
VAS RIGHT ICA DIST PSV: 79.6 CM/S
VAS RIGHT ICA MID EDV: 25.6 CM/S
VAS RIGHT ICA MID PSV: 109 CM/S
VAS RIGHT ICA PROX EDV: 27.1 CM/S
VAS RIGHT ICA PROX PSV: 135 CM/S
VAS RIGHT ICA/CCA PSV: 1.13
VAS RIGHT VERTEBRAL EDV: 12.2 CM/S
VAS RIGHT VERTEBRAL PSV: 72.9 CM/S

## 2025-05-30 PROCEDURE — 93880 EXTRACRANIAL BILAT STUDY: CPT

## 2025-05-30 PROCEDURE — 93880 EXTRACRANIAL BILAT STUDY: CPT | Performed by: SURGERY

## 2025-06-13 ENCOUNTER — OFFICE VISIT (OUTPATIENT)
Dept: VASCULAR SURGERY | Age: 80
End: 2025-06-13
Payer: MEDICARE

## 2025-06-13 VITALS
OXYGEN SATURATION: 96 % | SYSTOLIC BLOOD PRESSURE: 147 MMHG | BODY MASS INDEX: 29.38 KG/M2 | TEMPERATURE: 97.2 F | DIASTOLIC BLOOD PRESSURE: 88 MMHG | HEIGHT: 67 IN | WEIGHT: 187.2 LBS | HEART RATE: 64 BPM

## 2025-06-13 DIAGNOSIS — I65.23 BILATERAL CAROTID ARTERY STENOSIS: Primary | ICD-10-CM

## 2025-06-13 PROCEDURE — G8428 CUR MEDS NOT DOCUMENT: HCPCS | Performed by: SURGERY

## 2025-06-13 PROCEDURE — 3079F DIAST BP 80-89 MM HG: CPT | Performed by: SURGERY

## 2025-06-13 PROCEDURE — 3077F SYST BP >= 140 MM HG: CPT | Performed by: SURGERY

## 2025-06-13 PROCEDURE — 1123F ACP DISCUSS/DSCN MKR DOCD: CPT | Performed by: SURGERY

## 2025-06-13 PROCEDURE — G8417 CALC BMI ABV UP PARAM F/U: HCPCS | Performed by: SURGERY

## 2025-06-13 PROCEDURE — 99213 OFFICE O/P EST LOW 20 MIN: CPT | Performed by: SURGERY

## 2025-06-13 PROCEDURE — 1036F TOBACCO NON-USER: CPT | Performed by: SURGERY

## 2025-06-13 NOTE — PROGRESS NOTES
[facility], [facility]  [address]  [place] [address]  Dept: [phone]     Patient: Erik Olea  : 1945  MRN: [id number]  DOS: 2025            HPI:  Erik Olea is a 80 y.o. male who returns to the office regarding his bilateral carotid endarterectomies.  Recall that we had done both approximately 2 to 3 years ago.  He is doing well.  Recall also that he has had velocity elevations on both sides on his previous duplex.  I was not concerned when I looked at the images.  This year his carotid duplex examination showed no significant velocity elevations.  His waveforms are relatively normal throughout the endarterectomy zones and slightly beyond.  He has not had stroke, TIA or amaurosis fugax.  He has not developed claudication or rest pain or ulceration.    Review of Systems    Vitals:    25 1003 25 1008   BP: (!) 149/80 (!) 147/88   BP Site: Right Upper Arm Left Upper Arm   Patient Position: Sitting Sitting   BP Cuff Size: Large Adult Large Adult   Pulse: 64    Temp: 97.2 °F (36.2 °C)    TempSrc: Temporal    SpO2: 96%    Weight: 84.9 kg (187 lb 3.2 oz)    Height: 1.702 m (5' 7\")           Physical Exam  On examination he has no significant carotid bruits on either side.  His chest is clear to auscultation bilaterally.  His lower extremities are warm and well-perfused and he has palpable posterior tibial pulses bilaterally.    Assessment:  1. Bilateral carotid artery stenosis          Plan:  At this point we will continue to follow him at another yearly interval.  If that velocity is normal then we will go at every 3 years.  He understands and agrees.    Electronically signed by:  Erik Chen MD

## 2025-07-13 ENCOUNTER — APPOINTMENT (OUTPATIENT)
Dept: GENERAL RADIOLOGY | Age: 80
End: 2025-07-13
Payer: MEDICARE

## 2025-07-13 ENCOUNTER — HOSPITAL ENCOUNTER (EMERGENCY)
Age: 80
Discharge: HOME | End: 2025-07-13
Attending: EMERGENCY MEDICINE
Payer: MEDICARE

## 2025-07-13 VITALS
HEIGHT: 68 IN | RESPIRATION RATE: 12 BRPM | OXYGEN SATURATION: 95 % | DIASTOLIC BLOOD PRESSURE: 58 MMHG | SYSTOLIC BLOOD PRESSURE: 123 MMHG | BODY MASS INDEX: 26.52 KG/M2 | WEIGHT: 175 LBS | TEMPERATURE: 97.5 F | HEART RATE: 48 BPM

## 2025-07-13 DIAGNOSIS — R07.9 CHEST PAIN, UNSPECIFIED TYPE: Primary | ICD-10-CM

## 2025-07-13 LAB
ALBUMIN SERPL-MCNC: 4.2 G/DL (ref 3.5–5.2)
ALBUMIN/GLOB SERPL: 1.5 {RATIO} (ref 1–2.5)
ALP SERPL-CCNC: 177 U/L (ref 40–129)
ALT SERPL-CCNC: 40 U/L (ref 10–50)
ANION GAP SERPL CALCULATED.3IONS-SCNC: 12 MMOL/L (ref 9–16)
AST SERPL-CCNC: 31 U/L (ref 10–50)
BASOPHILS # BLD: 0.05 K/UL (ref 0–0.2)
BASOPHILS NFR BLD: 1 % (ref 0–2)
BILIRUB SERPL-MCNC: 0.6 MG/DL (ref 0–1.2)
BUN SERPL-MCNC: 24 MG/DL (ref 8–23)
CALCIUM SERPL-MCNC: 9.2 MG/DL (ref 8.6–10.4)
CHLORIDE SERPL-SCNC: 104 MMOL/L (ref 98–107)
CO2 SERPL-SCNC: 25 MMOL/L (ref 20–31)
CREAT SERPL-MCNC: 1.3 MG/DL (ref 0.7–1.2)
EOSINOPHIL # BLD: 0.32 K/UL (ref 0–0.44)
EOSINOPHILS RELATIVE PERCENT: 4 % (ref 1–4)
ERYTHROCYTE [DISTWIDTH] IN BLOOD BY AUTOMATED COUNT: 14.9 % (ref 11.8–14.4)
GFR, ESTIMATED: 55 ML/MIN/1.73M2
GLUCOSE SERPL-MCNC: 114 MG/DL (ref 74–99)
HCT VFR BLD AUTO: 42.2 % (ref 40.7–50.3)
HGB BLD-MCNC: 14.1 G/DL (ref 13–17)
IMM GRANULOCYTES # BLD AUTO: 0.03 K/UL (ref 0–0.3)
IMM GRANULOCYTES NFR BLD: 0 %
LYMPHOCYTES NFR BLD: 2.09 K/UL (ref 1.1–3.7)
LYMPHOCYTES RELATIVE PERCENT: 26 % (ref 24–43)
MAGNESIUM SERPL-MCNC: 2.4 MG/DL (ref 1.6–2.4)
MCH RBC QN AUTO: 30.1 PG (ref 25.2–33.5)
MCHC RBC AUTO-ENTMCNC: 33.4 G/DL (ref 28.4–34.8)
MCV RBC AUTO: 90.2 FL (ref 82.6–102.9)
MONOCYTES NFR BLD: 0.67 K/UL (ref 0.1–1.2)
MONOCYTES NFR BLD: 8 % (ref 3–12)
NEUTROPHILS NFR BLD: 61 % (ref 36–65)
NEUTS SEG NFR BLD: 4.97 K/UL (ref 1.5–8.1)
NRBC BLD-RTO: 0 PER 100 WBC
PLATELET # BLD AUTO: 197 K/UL (ref 138–453)
PMV BLD AUTO: 12.4 FL (ref 8.1–13.5)
POTASSIUM SERPL-SCNC: 4.5 MMOL/L (ref 3.7–5.3)
PROT SERPL-MCNC: 7 G/DL (ref 6.6–8.7)
RBC # BLD AUTO: 4.68 M/UL (ref 4.21–5.77)
RBC # BLD: ABNORMAL 10*6/UL
SODIUM SERPL-SCNC: 141 MMOL/L (ref 136–145)
TROPONIN I SERPL HS-MCNC: 11 NG/L (ref 0–22)
TROPONIN I SERPL HS-MCNC: 12 NG/L (ref 0–22)
WBC OTHER # BLD: 8.1 K/UL (ref 3.5–11.3)

## 2025-07-13 PROCEDURE — 84484 ASSAY OF TROPONIN QUANT: CPT

## 2025-07-13 PROCEDURE — 85025 COMPLETE CBC W/AUTO DIFF WBC: CPT

## 2025-07-13 PROCEDURE — 93005 ELECTROCARDIOGRAM TRACING: CPT

## 2025-07-13 PROCEDURE — 71046 X-RAY EXAM CHEST 2 VIEWS: CPT

## 2025-07-13 PROCEDURE — 83735 ASSAY OF MAGNESIUM: CPT

## 2025-07-13 PROCEDURE — 80053 COMPREHEN METABOLIC PANEL: CPT

## 2025-07-13 PROCEDURE — 99285 EMERGENCY DEPT VISIT HI MDM: CPT

## 2025-07-13 PROCEDURE — 73030 X-RAY EXAM OF SHOULDER: CPT

## 2025-07-13 ASSESSMENT — LIFESTYLE VARIABLES
HOW OFTEN DO YOU HAVE A DRINK CONTAINING ALCOHOL: PATIENT DECLINED
HOW MANY STANDARD DRINKS CONTAINING ALCOHOL DO YOU HAVE ON A TYPICAL DAY: PATIENT DECLINED

## 2025-07-13 NOTE — ED NOTES
Pt arrived via triage for c/o chest pain x 2 days  States he felt pain after playing golf 2 days ago  Felt worse yesterday   [employer] pacemaker interrogated  Mid sternal chest pain  Denies sob  A&O x4  RR even and unlabored  Call light in reach

## 2025-07-13 NOTE — ED PROVIDER NOTES
[facility] EMERGENCY DEPARTMENT     Emergency Department     Faculty Attestation        I performed a history and physical examination of the patient and discussed management with the resident. I reviewed the resident’s note and agree with the documented findings and plan of care. Any areas of disagreement are noted on the chart. I was personally present for the key portions of any procedures. I have documented in the chart those procedures where I was not present during the key portions. I have reviewed the emergency nurses triage note. I agree with the chief complaint, past medical history, past surgical history, allergies, medications, social and family history as documented unless otherwise noted below.  For Physician Assistant/ Nurse Practitioner cases/documentation I have personally evaluated this patient and have completed at least one if not all key elements of the E/M (history, physical exam, and MDM). Additional findings are as noted.      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 Efforts were made to edit the dictations but occasionally words are mis-transcribed. Whenever words are used in this note in any gender, they shall be construed as though they were used in the gender appropriate to the circumstances; and

## 2025-07-13 NOTE — DISCHARGE INSTRUCTIONS
You are seen in the emergency room for chest pain.  Your blood pressure did come down appropriately.  Your blood work was within normal limits.  Please ensure you follow-up with your primary care physician as well as your cardiologist given your significant cardiac past medical history.  If any of your symptoms return or you develop any new symptoms of concern please return to the emergency room for reevaluation

## 2025-07-13 NOTE — ED PROVIDER NOTES
[facility] EMERGENCY DEPARTMENT  Emergency Department Encounter  Emergency Medicine Resident     Pt Name:Erik Olea  MRN: [id number]  Birthdate 1945  Date of evaluation: 7/13/25  PCP:  Albertina Cleaning MD  Note Started: 12:25 PM EDT      CHIEF COMPLAINT       Chief Complaint   Patient presents with    Hypertension       HISTORY OF PRESENT ILLNESS  (Location/Symptom, Timing/Onset, Context/Setting, Quality, Duration, Modifying Factors, Severity.)      Erik Olea is a 80 y.o. male who presents with chest tightness that started yesterday.  Patient was out golfing on Friday and states he was feeling normal and at baseline.  Pain started when he woke up yesterday morning.  Describes as a tightness in the left sternal border.  Does not radiate.  No associated shortness of breath, nausea, vomiting, diaphoresis.  Has also noted to have a mild headache this morning when he woke up.  He took 2 Tylenol and the headache is since gone away patient states he took his blood pressure and noted that it was elevated.  Continued to be elevated on repeat and given his significant cardiac past medical history with the surgical history, AICD, CHF.  Did take his medications this morning.  Chest tightness is improving but given his history patient wanted to be evaluated.  Does think that this may just be anxiety    PAST MEDICAL / SURGICAL / SOCIAL / FAMILY HISTORY      has a past medical history of Accident, Arthritis, Bronchitis, CAD (coronary artery disease), Cancer (HCC), Cardiac arrest (HCC), Good exercise tolerance, Hx of blood clots, Hyperkalemia, Hypertension, essential, Ischemic cardiomyopathy, Left ventricular apical thrombus, MI (myocardial infarction) (HCC), Pseudoaneurysm (HCC) - right groin, Sinus drainage, Snores, Systolic dysfunction with acute on chronic heart failure (HCC) - Efx 30%, and Wellness examination.     has a past surgical history that includes Coronary artery bypass graft (11/19/2013); Vasectomy;

## 2025-07-14 LAB
EKG ATRIAL RATE: 61 BPM
EKG P AXIS: 35 DEGREES
EKG P-R INTERVAL: 292 MS
EKG Q-T INTERVAL: 492 MS
EKG QRS DURATION: 162 MS
EKG QTC CALCULATION (BAZETT): 495 MS
EKG R AXIS: -62 DEGREES
EKG T AXIS: 44 DEGREES
EKG VENTRICULAR RATE: 61 BPM

## (undated) DEVICE — KIT MICRO INTRO 4FR STIFF 40CM NIGHTENALL TUNGSTEN 7SMT

## (undated) DEVICE — GLOVE SURG SZ 7 L12IN FNGR THK79MIL GRN LTX FREE

## (undated) DEVICE — CUFF BLD PRSS AD CLTH SGL TB W/ BAYNT CONN ROUNDED CORNER

## (undated) DEVICE — STRIP,CLOSURE,WOUND,MEDI-STRIP,1/2X4: Brand: MEDLINE

## (undated) DEVICE — COVER,LIGHT HANDLE,FLX,2/PK: Brand: MEDLINE INDUSTRIES, INC.

## (undated) DEVICE — SUTURE VCRL + SZ 3-0 L27IN ABSRB UD L26MM SH 1/2 CIR VCP416H

## (undated) DEVICE — PROTECTOR ULN NRV PUR FOAM HK LOOP STRP ANATOMICALLY

## (undated) DEVICE — DRESSING TRNSPAR W5XL4.5IN FLM SHT SEMIPERMEABLE WIND

## (undated) DEVICE — CATH TRAY FOLEY 16FR LTX ANTIMICROB 350ML

## (undated) DEVICE — SUTURE ETHLN SZ 4-0 L18IN NONABSORBABLE BLK L19MM PS-2 3/8 1667H

## (undated) DEVICE — SUTURE PERMAHAND SZ 4-0 L18IN NONABSORBABLE BLK SILK BRAID A183H

## (undated) DEVICE — GAUZE,SPONGE,FLUFF,6"X6.75",STRL,5/TRAY: Brand: MEDLINE

## (undated) DEVICE — SKIN PROBE W/400 SERIES TEMP: Brand: MEDLINE

## (undated) DEVICE — INTRODUCER SHTH 6FR L11CM 0.038IN STD SIDEPRT EXTN 3 W

## (undated) DEVICE — TOWEL,OR,DSP,ST,BLUE,STD,4/PK,20PK/CS: Brand: MEDLINE

## (undated) DEVICE — TAPE UMB 72X7/32 IN

## (undated) DEVICE — RESERVOIR,SUCTION,100CC,SILICONE: Brand: MEDLINE

## (undated) DEVICE — ANGIOGRAPHIC CATHETER: Brand: EXPO™

## (undated) DEVICE — GUIDEWIRE, 0.035' X 60 CM, STRAIGHT TIP,: Brand: MEDLINE

## (undated) DEVICE — GLOVE SURG SZ 65 CRM LTX FREE POLYISOPRENE POLYMER BEAD ANTI

## (undated) DEVICE — PAD,NON-ADHERENT,3X8,STERILE,LF,1/PK: Brand: MEDLINE

## (undated) DEVICE — SUTURE NONABSORBABLE MONOFILAMENT 6-0 C-1 1X30 IN PROLENE 8706H

## (undated) DEVICE — GUIDEWIRE 35/260/FC/PTFE/3J: Brand: GUIDEWIRE

## (undated) DEVICE — PROVE COVER: Brand: UNBRANDED

## (undated) DEVICE — GLOVE SURG SZ 7 CRM LTX FREE POLYISOPRENE POLYMER BEAD ANTI

## (undated) DEVICE — DRAIN SURG W10XL20CM SIL SMOOTH FLAT 3/4 PERF DBL WRP

## (undated) DEVICE — TAPE MED W1/8XL30IN WHT POLY

## (undated) DEVICE — MEDI-TRACE CADENCE ADULT, DEFIBRILLATION ELECTRODE -RTS  (10 PR/PK) - PHYSIO-CONTROL: Brand: MEDI-TRACE CADENCE

## (undated) DEVICE — GLOVE SURG SZ 7.5 L11.73IN FNGR THK9.8MIL STRW LTX POLYMER

## (undated) DEVICE — Device

## (undated) DEVICE — SKIN AFFIX SURG ADHESIVE 72/CS 0.55ML: Brand: MEDLINE

## (undated) DEVICE — SURGICAL PROCEDURE TRAY CRD CATH SVMMC

## (undated) DEVICE — CATHETER ANGIO IM 0.056 INX6 FRX100 CM EXPO

## (undated) DEVICE — SYRINGE IRRIG 60ML SFT PLIABLE BLB EZ TO GRP 1 HND USE W/

## (undated) DEVICE — SUTURE PROL SZ 5-0 L36IN NONABSORBABLE BLU L13MM C-1 3/8 8720H

## (undated) DEVICE — GOWN,AURORA,NONREINFORCED,LARGE: Brand: MEDLINE

## (undated) DEVICE — SUTURE NONABSORBABLE MONOFILAMENT 7-0 BV-1 1X24 IN PROLENE 8702H

## (undated) DEVICE — CONTAINER,SPECIMEN,4OZ,OR STRL: Brand: MEDLINE

## (undated) DEVICE — POSITIONER,HEAD,MULTIRING,36CS: Brand: MEDLINE

## (undated) DEVICE — 9165 UNIVERSAL PATIENT PLATE: Brand: 3M™

## (undated) DEVICE — CATHETER,URETHRAL,REDRUBBER,STRL,18FR: Brand: MEDLINE

## (undated) DEVICE — INTENDED FOR TISSUE SEPARATION, AND OTHER PROCEDURES THAT REQUIRE A SHARP SURGICAL BLADE TO PUNCTURE OR CUT.: Brand: BARD-PARKER ® CARBON RIB-BACK BLADES

## (undated) DEVICE — SUTURE ETHLN SZ 3-0 L18IN NONABSORBABLE BLK L24MM PS-1 3/8 1663G

## (undated) DEVICE — SHEET, T, LAPAROTOMY, STERILE: Brand: MEDLINE

## (undated) DEVICE — GLOVE SURG SZ 65 L12IN FNGR THK87MIL WHT LTX FREE

## (undated) DEVICE — ADHESIVE SKIN CLOSURE TOP 36 CC HI VISC DERMBND MINI

## (undated) DEVICE — COVER LT HNDL BLU PLAS

## (undated) DEVICE — GLOVE SURG SZ 75 L12IN FNGR THK79MIL GRN LTX FREE

## (undated) DEVICE — YANKAUER,FLEXIBLE HANDLE,REGLR CAPACITY: Brand: MEDLINE INDUSTRIES, INC.

## (undated) DEVICE — GOWN,SIRUS,NONRNF,SETINSLV,XL,20/CS: Brand: MEDLINE

## (undated) DEVICE — GLOVE SURG SZ 75 L12IN FNGR THK87MIL WHT LTX FREE

## (undated) DEVICE — SHUNT CV SIL EXT LOOP L30CM DIA3X4MM FULL SPR REINF SUNDT

## (undated) DEVICE — ANGIO-SEAL VIP VASCULAR CLOSURE DEVICE: Brand: ANGIO-SEAL

## (undated) DEVICE — SUTURE MCRYL + SZ 4 0 L18IN ABSRB UD PC 3 L16MM 3 8 CIR PRIM MCP845G

## (undated) DEVICE — TUBING SUCT 12FR MAL ALUM SHFT FN CAP VENT UNIV CONN W/ OBT

## (undated) DEVICE — GLOVE SURG SZ 65 L12IN FNGR THK79MIL GRN LTX FREE

## (undated) DEVICE — CHLORAPREP 26ML ORANGE

## (undated) DEVICE — MINOR BSIN PK